# Patient Record
Sex: FEMALE | Race: WHITE | NOT HISPANIC OR LATINO | Employment: OTHER | URBAN - METROPOLITAN AREA
[De-identification: names, ages, dates, MRNs, and addresses within clinical notes are randomized per-mention and may not be internally consistent; named-entity substitution may affect disease eponyms.]

---

## 2017-01-17 ENCOUNTER — ALLSCRIPTS OFFICE VISIT (OUTPATIENT)
Dept: OTHER | Facility: OTHER | Age: 47
End: 2017-01-17

## 2017-03-07 ENCOUNTER — ALLSCRIPTS OFFICE VISIT (OUTPATIENT)
Dept: OTHER | Facility: OTHER | Age: 47
End: 2017-03-07

## 2017-03-26 ENCOUNTER — HOSPITAL ENCOUNTER (EMERGENCY)
Facility: HOSPITAL | Age: 47
Discharge: HOME | End: 2017-03-26
Attending: EMERGENCY MEDICINE | Admitting: EMERGENCY MEDICINE
Payer: COMMERCIAL

## 2017-03-26 ENCOUNTER — APPOINTMENT (EMERGENCY)
Dept: RADIOLOGY | Facility: HOSPITAL | Age: 47
End: 2017-03-26
Payer: COMMERCIAL

## 2017-03-26 VITALS
HEART RATE: 79 BPM | OXYGEN SATURATION: 100 % | RESPIRATION RATE: 20 BRPM | DIASTOLIC BLOOD PRESSURE: 68 MMHG | WEIGHT: 124 LBS | TEMPERATURE: 98.7 F | SYSTOLIC BLOOD PRESSURE: 102 MMHG

## 2017-03-26 DIAGNOSIS — R19.7 NAUSEA VOMITING AND DIARRHEA: ICD-10-CM

## 2017-03-26 DIAGNOSIS — R10.9 ABDOMINAL DISCOMFORT: ICD-10-CM

## 2017-03-26 DIAGNOSIS — Z98.84 H/O GASTRIC BYPASS: Primary | ICD-10-CM

## 2017-03-26 DIAGNOSIS — R11.2 NAUSEA VOMITING AND DIARRHEA: ICD-10-CM

## 2017-03-26 LAB
ALBUMIN SERPL BCP-MCNC: 3.1 G/DL (ref 3.5–5)
ALP SERPL-CCNC: 58 U/L (ref 46–116)
ALT SERPL W P-5'-P-CCNC: 27 U/L (ref 12–78)
ANION GAP SERPL CALCULATED.3IONS-SCNC: 9 MMOL/L (ref 4–13)
AST SERPL W P-5'-P-CCNC: 12 U/L (ref 5–45)
BACTERIA UR QL AUTO: NORMAL /HPF
BASOPHILS # BLD AUTO: 0.02 THOUSANDS/ΜL (ref 0–0.1)
BASOPHILS NFR BLD AUTO: 0 % (ref 0–1)
BILIRUB SERPL-MCNC: 0.38 MG/DL (ref 0.2–1)
BILIRUB UR QL STRIP: NEGATIVE
BUN SERPL-MCNC: 8 MG/DL (ref 5–25)
CALCIUM SERPL-MCNC: 8.6 MG/DL (ref 8.3–10.1)
CHLORIDE SERPL-SCNC: 107 MMOL/L (ref 100–108)
CLARITY UR: CLEAR
CO2 SERPL-SCNC: 27 MMOL/L (ref 21–32)
COLOR UR: YELLOW
CREAT SERPL-MCNC: 0.57 MG/DL (ref 0.6–1.3)
EOSINOPHIL # BLD AUTO: 0.07 THOUSAND/ΜL (ref 0–0.61)
EOSINOPHIL NFR BLD AUTO: 1 % (ref 0–6)
ERYTHROCYTE [DISTWIDTH] IN BLOOD BY AUTOMATED COUNT: 14.1 % (ref 11.6–15.1)
GFR SERPL CREATININE-BSD FRML MDRD: >60 ML/MIN/1.73SQ M
GLUCOSE SERPL-MCNC: 87 MG/DL (ref 65–140)
GLUCOSE UR STRIP-MCNC: NEGATIVE MG/DL
HCG UR QL: NEGATIVE
HCT VFR BLD AUTO: 35 % (ref 34.8–46.1)
HGB BLD-MCNC: 11.7 G/DL (ref 11.5–15.4)
HGB UR QL STRIP.AUTO: ABNORMAL
HYALINE CASTS #/AREA URNS LPF: NORMAL /LPF
KETONES UR STRIP-MCNC: NEGATIVE MG/DL
LEUKOCYTE ESTERASE UR QL STRIP: NEGATIVE
LIPASE SERPL-CCNC: 230 U/L (ref 73–393)
LYMPHOCYTES # BLD AUTO: 2 THOUSANDS/ΜL (ref 0.6–4.47)
LYMPHOCYTES NFR BLD AUTO: 34 % (ref 14–44)
MCH RBC QN AUTO: 30.8 PG (ref 26.8–34.3)
MCHC RBC AUTO-ENTMCNC: 33.4 G/DL (ref 31.4–37.4)
MCV RBC AUTO: 92 FL (ref 82–98)
MONOCYTES # BLD AUTO: 0.22 THOUSAND/ΜL (ref 0.17–1.22)
MONOCYTES NFR BLD AUTO: 4 % (ref 4–12)
NEUTROPHILS # BLD AUTO: 3.6 THOUSANDS/ΜL (ref 1.85–7.62)
NEUTS SEG NFR BLD AUTO: 61 % (ref 43–75)
NITRITE UR QL STRIP: NEGATIVE
NON-SQ EPI CELLS URNS QL MICRO: NORMAL /HPF
NRBC BLD AUTO-RTO: 0 /100 WBCS
PH UR STRIP.AUTO: 6 [PH] (ref 4.5–8)
PLATELET # BLD AUTO: 263 THOUSANDS/UL (ref 149–390)
PMV BLD AUTO: 8.9 FL (ref 8.9–12.7)
POTASSIUM SERPL-SCNC: 3.8 MMOL/L (ref 3.5–5.3)
PROT SERPL-MCNC: 6.3 G/DL (ref 6.4–8.2)
PROT UR STRIP-MCNC: NEGATIVE MG/DL
RBC # BLD AUTO: 3.8 MILLION/UL (ref 3.81–5.12)
RBC #/AREA URNS AUTO: NORMAL /HPF
SODIUM SERPL-SCNC: 143 MMOL/L (ref 136–145)
SP GR UR STRIP.AUTO: 1.01 (ref 1–1.03)
UROBILINOGEN UR QL STRIP.AUTO: 1 E.U./DL
WBC # BLD AUTO: 5.92 THOUSAND/UL (ref 4.31–10.16)
WBC #/AREA URNS AUTO: NORMAL /HPF

## 2017-03-26 PROCEDURE — 74177 CT ABD & PELVIS W/CONTRAST: CPT

## 2017-03-26 PROCEDURE — 87086 URINE CULTURE/COLONY COUNT: CPT

## 2017-03-26 PROCEDURE — 99284 EMERGENCY DEPT VISIT MOD MDM: CPT

## 2017-03-26 PROCEDURE — 36415 COLL VENOUS BLD VENIPUNCTURE: CPT | Performed by: EMERGENCY MEDICINE

## 2017-03-26 PROCEDURE — 96375 TX/PRO/DX INJ NEW DRUG ADDON: CPT

## 2017-03-26 PROCEDURE — 85025 COMPLETE CBC W/AUTO DIFF WBC: CPT | Performed by: EMERGENCY MEDICINE

## 2017-03-26 PROCEDURE — 81001 URINALYSIS AUTO W/SCOPE: CPT

## 2017-03-26 PROCEDURE — 96361 HYDRATE IV INFUSION ADD-ON: CPT

## 2017-03-26 PROCEDURE — 80053 COMPREHEN METABOLIC PANEL: CPT | Performed by: EMERGENCY MEDICINE

## 2017-03-26 PROCEDURE — 83690 ASSAY OF LIPASE: CPT | Performed by: EMERGENCY MEDICINE

## 2017-03-26 PROCEDURE — 81025 URINE PREGNANCY TEST: CPT | Performed by: EMERGENCY MEDICINE

## 2017-03-26 PROCEDURE — 96374 THER/PROPH/DIAG INJ IV PUSH: CPT

## 2017-03-26 RX ORDER — OXYCODONE HYDROCHLORIDE 5 MG/1
5 TABLET ORAL EVERY 4 HOURS PRN
Qty: 8 TABLET | Refills: 0 | Status: SHIPPED | OUTPATIENT
Start: 2017-03-26 | End: 2017-04-05

## 2017-03-26 RX ORDER — DICYCLOMINE HCL 20 MG
20 TABLET ORAL 2 TIMES DAILY
Qty: 60 TABLET | Refills: 0 | Status: SHIPPED | OUTPATIENT
Start: 2017-03-26 | End: 2020-02-27 | Stop reason: HOSPADM

## 2017-03-26 RX ORDER — ONDANSETRON 4 MG/1
4 TABLET, FILM COATED ORAL EVERY 6 HOURS
Qty: 30 TABLET | Refills: 0 | Status: SHIPPED | OUTPATIENT
Start: 2017-03-26 | End: 2019-03-29 | Stop reason: SDUPTHER

## 2017-03-26 RX ORDER — ONDANSETRON 2 MG/ML
4 INJECTION INTRAMUSCULAR; INTRAVENOUS ONCE
Status: COMPLETED | OUTPATIENT
Start: 2017-03-26 | End: 2017-03-26

## 2017-03-26 RX ADMIN — SODIUM CHLORIDE 1000 ML: 0.9 INJECTION, SOLUTION INTRAVENOUS at 12:18

## 2017-03-26 RX ADMIN — IOHEXOL 50 ML: 240 INJECTION, SOLUTION INTRATHECAL; INTRAVASCULAR; INTRAVENOUS; ORAL at 11:23

## 2017-03-26 RX ADMIN — ONDANSETRON 4 MG: 2 INJECTION INTRAMUSCULAR; INTRAVENOUS at 11:18

## 2017-03-26 RX ADMIN — IOHEXOL 100 ML: 350 INJECTION, SOLUTION INTRAVENOUS at 13:23

## 2017-03-26 RX ADMIN — HYDROMORPHONE HYDROCHLORIDE 0.5 MG: 1 INJECTION, SOLUTION INTRAMUSCULAR; INTRAVENOUS; SUBCUTANEOUS at 11:19

## 2017-03-28 LAB — BACTERIA UR CULT: NORMAL

## 2017-03-30 ENCOUNTER — ALLSCRIPTS OFFICE VISIT (OUTPATIENT)
Dept: OTHER | Facility: OTHER | Age: 47
End: 2017-03-30

## 2017-04-06 ENCOUNTER — ALLSCRIPTS OFFICE VISIT (OUTPATIENT)
Dept: OTHER | Facility: OTHER | Age: 47
End: 2017-04-06

## 2017-04-13 ENCOUNTER — ALLSCRIPTS OFFICE VISIT (OUTPATIENT)
Dept: OTHER | Facility: OTHER | Age: 47
End: 2017-04-13

## 2017-05-09 ENCOUNTER — ALLSCRIPTS OFFICE VISIT (OUTPATIENT)
Dept: OTHER | Facility: OTHER | Age: 47
End: 2017-05-09

## 2017-10-31 ENCOUNTER — ALLSCRIPTS OFFICE VISIT (OUTPATIENT)
Dept: OTHER | Facility: OTHER | Age: 47
End: 2017-10-31

## 2017-10-31 DIAGNOSIS — R53.83 OTHER FATIGUE: ICD-10-CM

## 2017-11-01 NOTE — PROGRESS NOTES
Assessment  1  Gastric ulcer (531 90) (K25 9)   2  GERD (gastroesophageal reflux disease) (530 81) (K21 9)   3  Fatigue (780 79) (R53 83)    Plan  Fatigue    · (1) CBC/PLT/DIFF; Status:Active; Requested for:31Oct2017;    · (1) COMPREHENSIVE METABOLIC PANEL; Status:Active; Requested for:31Oct2017;    · (1) LIPID PANEL, FASTING; Status:Active; Requested for:31Oct2017;    · (1) TSH; Status:Active; Requested for:31Oct2017;   GERD (gastroesophageal reflux disease)    · Pantoprazole Sodium 40 MG Oral Tablet Delayed Release (Protonix); TAKE 1  TABLET DAILY   · Sucralfate 1 GM Oral Tablet; TAKE 1 TABLET EVERY 12 HOURS DAILY   · *1 -  [facility]  *  Status: Active   Requested for: 85YMD6236  Care Summary provided  : Yes    Discussion/Summary  Discussion Summary:   Follow up in 2 weeks  Counseling Documentation With Imm: The patient was counseled regarding diagnostic results,-- instructions for management,-- risk factor reductions,-- prognosis,-- patient and family education,-- impressions,-- risks and benefits of treatment options,-- importance of compliance with treatment  Medication SE Review and Pt Understands Tx: The treatment plan was reviewed with the patient/guardian  The patient/guardian understands and agrees with the treatment plan      Chief Complaint  Chief Complaint Free Text Note Form: Complains of stomach pain      History of Present Illness  HPI: new pt with history of gastric bypass with numerous complications, pt has had esophageal strictures and ulcers and is having severe acid reflux, pt has stomach pain for over a year but worse over the last 2 weeks,      Review of Systems  Complete-Female:   Constitutional: no fever,-- no chills-- and-- no recent weight loss  Cardiovascular: no chest pain-- and-- no palpitations  Respiratory: no shortness of breath-- and-- no wheezing     Gastrointestinal: abdominal pain,-- nausea-- and-- vomiting, but-- no constipation,-- no diarrhea-- and-- no blood in stools  Genitourinary: no dysuria  Active Problems  1  Abnormal mammogram (793 80) (R92 8)   2  Asthma (493 90) (J45 909)   3  Atopic dermatitis (691 8) (L20 9)   4  Bronchitis, acute (466 0) (J20 9)   5  Burn of hand (944 00) (T23 009A)   6  Contact dermatitis (692 9) (L25 9)   7  Cough (786 2) (R05)   8  Depression screening (V79 0) (Z13 89)   9  Depression with anxiety (300 4) (F41 8)   10  Encounter for competency evaluation (V68 09) (Z02 79)   11  Encounter for screening mammogram for malignant neoplasm of breast (V76 12)    (Z12 31)   12  Fatigue (780 79) (R53 83)   13  History of Furuncles (680 9) (L02 92)   14  Gastric ulcer (531 90) (K25 9)   15  Herpes zoster (053 9) (B02 9)   16  Hypokalemia (276 8) (E87 6)   17  Insomnia (780 52) (G47 00)   18  Nausea with vomiting (787 01) (R11 2)   19  Nausea with vomiting (787 01) (R11 2)   20  Neck pain (723 1) (M54 2)   21  Nicotine dependence (305 1) (F17 200)   22  Numbness and tingling in right hand (782 0) (R20 0,R20 2)   23  Pain in joint of right wrist (719 43) (M25 531)   24  Periodontal abscess (523 31) (K05 219)   25  Pneumothorax, postprocedural (512 1) (J95 811)   26  Preoperative evaluation to rule out surgical contraindication (V72 83) (Z01 818)   27  Ready to quit smoking (V49 89) (F17 200)   28  Screening for deficiency anemia (V78 1) (Z13 0)   29  Screening for diabetes mellitus (V77 1) (Z13 1)   30  Screening for hyperlipidemia (V77 91) (Z13 220)   31  Tooth pain (525 9) (K08 89)   32  Upper respiratory tract infection (465 9) (J06 9)   33  Visit for screening (V82 9) (Z13 9)   34  Visit for screening mammogram (V76 12) (Z12 31)   35  Vitamin D deficiency (268 9) (E55 9)   36  Weakness of right hand (728 87) (R29 898)   37  Well female exam with routine gynecological exam (V72 31) (Z01 419)   38  Wheezing (786 07) (R06 2)    Past Medical History  1  History of Furuncles (680 9) (L02 92)   2   History of hypokalemia (V12 29) (Z86 39)    Surgical History  1  History of Dental Surgery   2  History of Gallbladder Surgery   3  History of Gastric Surgery For Morbid Obesity Gastric Bypass   4  History of Tubal Ligation (V25 2)    Family History  Mother    1  Family history of Diabetes (250 00) (E11 9)   2  Family history of Heart disease (429 9) (I51 9)  Father    3  Family history of Cancer (199 1) (C80 1)  Family History Reviewed: The family history was reviewed and updated today  Social History   · Caffeine use (V49 89) (F15 90)   · Current every day smoker (305 1) (F17 200)   · No alcohol use   · Ready to quit smoking (V49 89) (F17 200)   · Single   · Tobacco use (305 1) (Z72 0)  Social History Reviewed: The social history was reviewed and is unchanged  Current Meds   1  ARIPiprazole 5 MG Oral Tablet; TAKE 1 TABLET DAILY; Therapy: 78AVK1612 to (Evaluate:50Vot9924)  Requested for: 01NHL5724; Last   C27KFJ2820 Ordered   2  Carafate 1 GM/10ML Oral Suspension; TAKE 10 ML 3 TIMES DAILY; Therapy: 66Aui0834 to (Evaluate:88Ozg1822)  Requested for: 54QAH5337; Last   AI:59YXI7336 Ordered   3  Diclofenac Sodium 75 MG Oral Tablet Delayed Release; take 1 tablet by mouth twice a   day; Therapy: 92CJR2913 to (Evaluate:2017)  Requested for: 96QNV1227; Last   DP:57FXM6257 Ordered   4  DrRx Zithromax Z-Beltran 250 MG #6 pill pack; Two pills on day one followed by one pill po q   d til gone; Therapy: 59IZD5934 to (Last ANDREA:48TGC4634) Ordered   5  Ondansetron 4 MG Oral Tablet Disintegrating; Take one by mouth three times a day as   needed; Therapy: 41YES4375 to (Amaya Joyce)  Requested for: 72CZL7195; Last   Rx:99Xhz0830 Ordered   6  Pantoprazole Sodium 40 MG Oral Tablet Delayed Release; TAKE ONE (1) TABLET BY   MOUTH ONCE DAILY; Therapy: 94KDI4487 to (Last Rx:2017)  Requested for: 2017 Ordered   7   ProAir  (90 Base) MCG/ACT Inhalation Aerosol Solution; INHALE 1 TO 2 PUFFS   EVERY 4 TO 6 HOURS AS NEEDED; Therapy: 69AMY6767 to (Last Rx:09Mar2017)  Requested for: 26XBP1295 Ordered   8  Silver Sulfadiazine 1 % External Cream; APPLY  AND RUB  IN A THIN FILM TO AFFECTED   AREAS TWICE DAILY  (AM AND PM); Therapy: 86CYF9298 to (Last KB:38LHC9966)  Requested for: 99CCQ6554 Ordered   9  TraZODone HCl - 150 MG Oral Tablet; TAKE 1 TABLET AT BEDTIME; Therapy: 91PNE2663 to (Evaluate:57Dna3495)  Requested for: 56EDA6149; Last   Rx:93Tnl2457 Ordered   10  Venlafaxine HCl ER 75 MG Oral Capsule Extended Release 24 Hour; Take one capsule    daily for one week then increase to 2 caps daily; Therapy: 17CLU9353 to (Last ZU:08PNO8876)  Requested for: 92FLX9396 Ordered    Allergies  1  Morphine Sulfate TABS    Vitals  Vital Signs    Recorded: 09XRK5931 10:46AM   Temperature 48 3 F   Systolic 777   Diastolic 68   Height 5 ft 1 in   Weight 151 lb    BMI Calculated 28 53   BSA Calculated 1 68     Physical Exam    Constitutional   General appearance: No acute distress, well appearing and well nourished  well developed,-- appears healthy,-- well nourished-- and-- well hydrated  Ears, Nose, Mouth, and Throat   External inspection of ears and nose: Normal     Otoscopic examination: Tympanic membranes translucent with normal light reflex  Canals patent without erythema  Nasal mucosa, septum, and turbinates: Normal without edema or erythema  Oropharynx: Normal with no erythema, edema, exudate or lesions  Pulmonary   Respiratory effort: No increased work of breathing or signs of respiratory distress  Respiratory rate: normal  Assessment of respiratory effort revealed normal rhythm and effort  Auscultation of lungs: Clear to auscultation  no rales or crackles were heard bilaterally  no rhonchi  no friction rub  no wheezing  Cardiovascular   Auscultation of heart: Normal rate and rhythm, normal S1 and S2, without murmurs  The heart rate was normal  The rhythm was regular   Heart sounds: normal S1-- and-- normal S2  no murmurs were heard  Lymphatic   Palpation of lymph nodes in neck: No lymphadenopathy      Psychiatric   Mood and affect: Normal          Signatures   Electronically signed by : Clara Lee DO; Oct 31 2017 11:01AM EST                       (Author)

## 2017-11-21 ENCOUNTER — GENERIC CONVERSION - ENCOUNTER (OUTPATIENT)
Dept: OTHER | Facility: OTHER | Age: 47
End: 2017-11-21

## 2017-12-07 ENCOUNTER — GENERIC CONVERSION - ENCOUNTER (OUTPATIENT)
Dept: OTHER | Facility: OTHER | Age: 47
End: 2017-12-07

## 2018-01-09 NOTE — MISCELLANEOUS
Provider Comments  Provider Comments: You missed your appointment with Dr Clark Jensen on 04/13/2017  Please be advised this is your third no-show with our practice  Contact our office at [phone] to reschedule  Thank You,  [address] [place] [address] [facility]      Signatures   Electronically signed by : Jodee Howard DO;  Apr 14 2017  4:33PM EST                       (Author)

## 2018-01-10 NOTE — MISCELLANEOUS
Provider Comments  Provider Comments:   Marylou missed her scheduled appointment with Dr Kev Joseph on 3/30/2017  Our office attempted to contact her reschedule  A message was left on machine for patient to contact our office        Signatures   Electronically signed by : Yissel Ma DO; Mar 31 2017  4:46PM EST                       (Author)

## 2018-01-12 VITALS
RESPIRATION RATE: 16 BRPM | HEIGHT: 61 IN | BODY MASS INDEX: 25.32 KG/M2 | WEIGHT: 134.13 LBS | HEART RATE: 84 BPM | SYSTOLIC BLOOD PRESSURE: 120 MMHG | TEMPERATURE: 98.1 F | DIASTOLIC BLOOD PRESSURE: 80 MMHG

## 2018-01-13 VITALS
TEMPERATURE: 98.4 F | SYSTOLIC BLOOD PRESSURE: 110 MMHG | HEIGHT: 61 IN | DIASTOLIC BLOOD PRESSURE: 68 MMHG | WEIGHT: 151 LBS | BODY MASS INDEX: 28.51 KG/M2

## 2018-01-13 VITALS
RESPIRATION RATE: 16 BRPM | WEIGHT: 130.25 LBS | HEIGHT: 61 IN | SYSTOLIC BLOOD PRESSURE: 94 MMHG | HEART RATE: 60 BPM | DIASTOLIC BLOOD PRESSURE: 60 MMHG | TEMPERATURE: 98.2 F | BODY MASS INDEX: 24.59 KG/M2

## 2018-01-14 VITALS
BODY MASS INDEX: 25.72 KG/M2 | HEIGHT: 61 IN | DIASTOLIC BLOOD PRESSURE: 68 MMHG | TEMPERATURE: 98.2 F | WEIGHT: 136.25 LBS | RESPIRATION RATE: 16 BRPM | HEART RATE: 72 BPM | SYSTOLIC BLOOD PRESSURE: 112 MMHG

## 2018-01-15 NOTE — PROGRESS NOTES
Assessment    1  Insomnia (780 52) (G47 00)   2  Depression with anxiety (300 4) (F41 8)    Plan  Depression with anxiety    · RisperiDONE 0 5 MG Oral Tablet; TAKE (1) TABLET TWICE DAILY      · RisperiDONE 1 MG Oral Tablet; TAKE 1 TABLET EVERY EVENING  Depression with anxiety, Insomnia    · TraZODone HCl - 150 MG Oral Tablet; TAKE 1 TABLET AT BEDTIME  Neck pain    · Diclofenac Sodium 75 MG Oral Tablet Delayed Release; take 1 tablet by mouth twice a day  SocHx: Ready to quit smoking    · Nicotine 21 MG/24HR Transdermal Patch 24 Hour    Discussion/Summary  Discussion Summary:   Increase risperdal and then start trazodone for insomnia  Pt informed we will no longer rx tramadol given methamphetamine use  Will give anti-inflammatory in its place  RTO 1 month  Chief Complaint  Chief Complaint Free Text Note Form: pt here for follow up from [facility]  Placed on risperdone      History of Present Illness  HPI: Pt presents for follow up from mental health [facility]  She was placed on risperdal  She was sent to ED by police for attempting to stab herself after getting high off of methamphetamine  She is noting improvement since being started on risperdal  She is having some insomnia  Review of Systems  Complete-Female:   Constitutional: as noted in HPI  Eyes: No complaints of eye pain, no red eyes, no eyesight problems, no discharge, no dry eyes, no itching of eyes  ENT: no complaints of earache, no loss of hearing, no nose bleeds, no nasal discharge, no sore throat, no hoarseness  Cardiovascular: No complaints of slow heart rate, no fast heart rate, no chest pain, no palpitations, no leg claudication, no lower extremity edema  Respiratory: No complaints of shortness of breath, no wheezing, no cough, no SOB on exertion, no orthopnea, no PND  Gastrointestinal: No complaints of abdominal pain, no constipation, no nausea or vomiting, no diarrhea, no bloody stools     Musculoskeletal: No complaints of arthralgias, no myalgias, no joint swelling or stiffness, no limb pain or swelling  Integumentary: No complaints of skin rash or lesions, no itching, no skin wounds, no breast pain or lump  Psychiatric: Not suicidal, no sleep disturbance, no anxiety or depression, no change in personality, no emotional problems  ROS Reviewed:   ROS reviewed  Active Problems    1  Abdominal pain (789 00) (R10 9)   2  Abdominal pain, left upper quadrant (789 02) (R10 12)   3  Abdominal pain, LLQ (left lower quadrant) (789 04) (R10 32)   4  Abnormal mammogram (793 80) (R92 8)   5  Asthma (493 90) (J45 909)   6  Atopic dermatitis (691 8) (L20 9)   7  Bronchitis, acute (466 0) (J20 9)   8  Chest pain (786 50) (R07 9)   9  Contact dermatitis (692 9) (L25 9)   10  Cough (786 2) (R05)   11  Depression screening (V79 0) (Z13 89)   12  Depression with anxiety (300 4) (F41 8)   13  Encounter for competency evaluation (V68 09) (Z02 79)   14  Encounter for screening mammogram for malignant neoplasm of breast (V76 12) (Z12 31)   15  Fatigue (780 79) (R53 83)   16  Furuncles (680 9) (L02 92)   17  Gastric ulcer (531 90) (K25 9)   18  Herpes zoster (053 9) (B02 9)   19  Hypokalemia (276 8) (E87 6)   20  Impetigo (684) (L01 00)   21  Insomnia (780 52) (G47 00)   22  Nausea with vomiting (787 01) (R11 2)   23  Nausea with vomiting (787 01) (R11 2)   24  Neck pain (723 1) (M54 2)   25  Nicotine dependence (305 1) (F17 200)   26  Numbness and tingling in right hand (782 0) (R20 2)   27  Pain in joint of right wrist (719 43) (M25 531)   28  Periodontal abscess (523 31) (K05 219)   29  Pneumothorax, postprocedural (512 1) (J95 811)   30  Preoperative evaluation to rule out surgical contraindication (V72 83) (Z01 818)   31  Rash of face (782 1) (R21)   32  Ready to quit smoking (V49 89) (Z78 9)   33  Rib fractures (807 00) (S22 39XA)   34  Screening for deficiency anemia (V78 1) (Z13 0)   35   Screening for diabetes mellitus (V77 1) (Z13 1) 39  Screening for hyperlipidemia (V77 91) (Z13 220)   37  Skin rash (782 1) (R21)   38  Tooth pain (525 9) (K08 89)   39  Visit for screening (V82 9) (Z13 9)   40  Visit for screening mammogram (V76 12) (Z12 31)   41  Vitamin D deficiency (268 9) (E55 9)   42  Weakness of right hand (728 87) (M62 81)   43  Well female exam with routine gynecological exam (V72 31) (Z01 419)   44  Wheezing (786 07) (R06 2)    Past Medical History    1  History of hypokalemia (V12 29) (Z86 39)  Active Problems And Past Medical History Reviewed: The active problems and past medical history were reviewed and updated today  Surgical History    1  History of Dental Surgery   2  History of Gallbladder Surgery   3  History of Gastric Surgery For Morbid Obesity Gastric Bypass   4  History of Tubal Ligation (V25 2)  Surgical History Reviewed: The surgical history was reviewed and updated today  Family History  Mother    1  Family history of Diabetes (250 00) (E11 9)   2  Family history of Heart disease (429 9) (I51 9)  Father    3  Family history of Cancer (199 1) (C80 1)  Family History Reviewed: The family history was reviewed and updated today  Social History    · Caffeine use (V49 89) (F15 90)   · Current every day smoker (305 1) (F17 200)   · No alcohol use   · Ready to quit smoking (V49 89) (Z78 9)   · Single   · Tobacco use (305 1) (Z72 0)  Social History Reviewed: The social history was reviewed and updated today  The social history was reviewed and is unchanged  Current Meds   1  Breo Ellipta 200-25 MCG/INH Inhalation Aerosol Powder Breath Activated; Take one puff once daily; Therapy: 03JJE1854 to (Last Rx:90Hej9288)  Requested for: 22Cxy3187 Ordered   2  Carafate 1 GM/10ML Oral Suspension; TAKE 10 ML 3 TIMES DAILY; Therapy: 83Vjm0201 to (Rodriguez Petersen)  Requested for: 77Xsp2704; Last Rx:16Zmr6891   Ordered   3   Nicotine 21 MG/24HR Transdermal Patch 24 Hour; APPLY 1 PATCH DAILY AS DIRECTED; Therapy: 48EQV6114 to (Evaluate:14Mar2017)  Requested for: 01GHV3885; Last Rx:17Jan2017   Ordered   4  Ondansetron 4 MG Oral Tablet Dispersible; Take one by mouth three times a day as needed; Therapy: 38FKF2895 to ([id number])  Requested for: 48RXF6930; Last Rx:07Pxx8709   Ordered   5  Pantoprazole Sodium 40 MG Oral Tablet Delayed Release; Take 1 tablet daily; Therapy: 51GFB5802 to (Evaluate:70Zry4978)  Requested for: 48ZKG8449; Last Rx:17Jan2017   Ordered   6  ProAir  (90 Base) MCG/ACT Inhalation Aerosol Solution; INHALE 1 TO 2 PUFFS EVERY 4 TO   6 HOURS AS NEEDED; Therapy: 00ZRY7623 to (Last Rx:17Jan2017)  Requested for: 33ATQ9893 Ordered   7  ValACYclovir HCl - 1 GM Oral Tablet; TAKE 1 TABLET 3 TIMES DAILY; Therapy: 58Php5611 to (Evaluate:86Dxr3076)  Requested for: 40Fuu4848; Last Rx:84Ovz2837   Ordered  Medication List Reviewed: The medication list was reviewed and updated today  Allergies    1  Morphine Sulfate TABS    Vitals  Vital Signs    Recorded: 88FRL9453 03:07PM   Temperature 98 6 F   Heart Rate 68   Respiration 14   Systolic 155   Diastolic 64   Height 5 ft 1 in   Weight 128 lb 4 00 oz   BMI Calculated 24 23   BSA Calculated 1 56     Physical Exam    Constitutional   General appearance: No acute distress, well appearing and well nourished  Ears, Nose, Mouth, and Throat   External inspection of ears and nose: Normal     Otoscopic examination: Tympanic membranes translucent with normal light reflex  Canals patent without erythema  Nasal mucosa, septum, and turbinates: Normal without edema or erythema  Oropharynx: Normal with no erythema, edema, exudate or lesions  Pulmonary   Respiratory effort: No increased work of breathing or signs of respiratory distress  Auscultation of lungs: Clear to auscultation  Cardiovascular   Auscultation of heart: Normal rate and rhythm, normal S1 and S2, without murmurs      Examination of extremities for edema and/or varicosities: Normal     Carotid pulses: Normal     Musculoskeletal   Gait and station: Normal     Digits and nails: Normal without clubbing or cyanosis  Skin   Skin and subcutaneous tissue: Normal without rashes or lesions      Psychiatric   Orientation to person, place, and time: Normal     Mood and affect: Normal          Future Appointments    Date/Time Provider Specialty Site   04/06/2017 09:15 AM Mariangel Burgess [facility]     Signatures   Electronically signed by : Kirkwood Dandy, [address]; Mar  9 2017  3:48PM EST                       (Author)    Electronically signed by : LUIS ALBERTO Jackson ; Mar 10 2017  7:54AM EST                       (Author)

## 2018-01-22 VITALS
HEART RATE: 88 BPM | WEIGHT: 150 LBS | DIASTOLIC BLOOD PRESSURE: 70 MMHG | HEIGHT: 61 IN | OXYGEN SATURATION: 99 % | BODY MASS INDEX: 28.32 KG/M2 | TEMPERATURE: 98.4 F | SYSTOLIC BLOOD PRESSURE: 110 MMHG

## 2018-01-22 VITALS
TEMPERATURE: 98.6 F | RESPIRATION RATE: 14 BRPM | DIASTOLIC BLOOD PRESSURE: 64 MMHG | WEIGHT: 128.25 LBS | BODY MASS INDEX: 24.21 KG/M2 | HEART RATE: 68 BPM | SYSTOLIC BLOOD PRESSURE: 100 MMHG | HEIGHT: 61 IN

## 2018-01-24 VITALS
TEMPERATURE: 98.8 F | WEIGHT: 156 LBS | SYSTOLIC BLOOD PRESSURE: 110 MMHG | DIASTOLIC BLOOD PRESSURE: 70 MMHG | HEIGHT: 61 IN | BODY MASS INDEX: 29.45 KG/M2

## 2018-08-07 ENCOUNTER — TRANSCRIBE ORDERS (OUTPATIENT)
Dept: LAB | Facility: CLINIC | Age: 48
End: 2018-08-07

## 2018-08-07 ENCOUNTER — APPOINTMENT (OUTPATIENT)
Dept: LAB | Facility: CLINIC | Age: 48
End: 2018-08-07
Payer: COMMERCIAL

## 2018-08-07 DIAGNOSIS — R11.2 NAUSEA AND VOMITING, INTRACTABILITY OF VOMITING NOT SPECIFIED, UNSPECIFIED VOMITING TYPE: Primary | ICD-10-CM

## 2018-08-07 DIAGNOSIS — R11.2 NAUSEA AND VOMITING, INTRACTABILITY OF VOMITING NOT SPECIFIED, UNSPECIFIED VOMITING TYPE: ICD-10-CM

## 2018-08-07 LAB
ALBUMIN SERPL BCP-MCNC: 3.3 G/DL (ref 3.5–5)
ALP SERPL-CCNC: 78 U/L (ref 46–116)
ALT SERPL W P-5'-P-CCNC: 17 U/L (ref 12–78)
ANION GAP SERPL CALCULATED.3IONS-SCNC: 6 MMOL/L (ref 4–13)
AST SERPL W P-5'-P-CCNC: 14 U/L (ref 5–45)
BASOPHILS # BLD AUTO: 0.02 THOUSANDS/ΜL (ref 0–0.1)
BASOPHILS NFR BLD AUTO: 0 % (ref 0–1)
BILIRUB SERPL-MCNC: 0.9 MG/DL (ref 0.2–1)
BUN SERPL-MCNC: 12 MG/DL (ref 5–25)
CALCIUM SERPL-MCNC: 9 MG/DL (ref 8.3–10.1)
CHLORIDE SERPL-SCNC: 107 MMOL/L (ref 100–108)
CO2 SERPL-SCNC: 27 MMOL/L (ref 21–32)
CREAT SERPL-MCNC: 0.66 MG/DL (ref 0.6–1.3)
EOSINOPHIL # BLD AUTO: 0.04 THOUSAND/ΜL (ref 0–0.61)
EOSINOPHIL NFR BLD AUTO: 1 % (ref 0–6)
ERYTHROCYTE [DISTWIDTH] IN BLOOD BY AUTOMATED COUNT: 16.5 % (ref 11.6–15.1)
GFR SERPL CREATININE-BSD FRML MDRD: 106 ML/MIN/1.73SQ M
GLUCOSE P FAST SERPL-MCNC: 84 MG/DL (ref 65–99)
HCT VFR BLD AUTO: 40.2 % (ref 34.8–46.1)
HGB BLD-MCNC: 12.7 G/DL (ref 11.5–15.4)
IMM GRANULOCYTES # BLD AUTO: 0.01 THOUSAND/UL (ref 0–0.2)
IMM GRANULOCYTES NFR BLD AUTO: 0 % (ref 0–2)
LYMPHOCYTES # BLD AUTO: 2.25 THOUSANDS/ΜL (ref 0.6–4.47)
LYMPHOCYTES NFR BLD AUTO: 43 % (ref 14–44)
MCH RBC QN AUTO: 27.9 PG (ref 26.8–34.3)
MCHC RBC AUTO-ENTMCNC: 31.6 G/DL (ref 31.4–37.4)
MCV RBC AUTO: 88 FL (ref 82–98)
MONOCYTES # BLD AUTO: 0.28 THOUSAND/ΜL (ref 0.17–1.22)
MONOCYTES NFR BLD AUTO: 5 % (ref 4–12)
NEUTROPHILS # BLD AUTO: 2.59 THOUSANDS/ΜL (ref 1.85–7.62)
NEUTS SEG NFR BLD AUTO: 51 % (ref 43–75)
NRBC BLD AUTO-RTO: 0 /100 WBCS
PLATELET # BLD AUTO: 233 THOUSANDS/UL (ref 149–390)
PMV BLD AUTO: 10.6 FL (ref 8.9–12.7)
POTASSIUM SERPL-SCNC: 3.7 MMOL/L (ref 3.5–5.3)
PROT SERPL-MCNC: 7.1 G/DL (ref 6.4–8.2)
RBC # BLD AUTO: 4.56 MILLION/UL (ref 3.81–5.12)
SODIUM SERPL-SCNC: 140 MMOL/L (ref 136–145)
WBC # BLD AUTO: 5.19 THOUSAND/UL (ref 4.31–10.16)

## 2018-08-07 PROCEDURE — 80053 COMPREHEN METABOLIC PANEL: CPT

## 2018-08-07 PROCEDURE — 85025 COMPLETE CBC W/AUTO DIFF WBC: CPT

## 2018-08-07 PROCEDURE — 36415 COLL VENOUS BLD VENIPUNCTURE: CPT

## 2019-03-27 ENCOUNTER — TRANSITIONAL CARE MANAGEMENT (OUTPATIENT)
Dept: FAMILY MEDICINE CLINIC | Facility: CLINIC | Age: 49
End: 2019-03-27

## 2019-03-27 ENCOUNTER — OFFICE VISIT (OUTPATIENT)
Dept: FAMILY MEDICINE CLINIC | Facility: CLINIC | Age: 49
End: 2019-03-27
Payer: COMMERCIAL

## 2019-03-27 VITALS
HEART RATE: 75 BPM | SYSTOLIC BLOOD PRESSURE: 102 MMHG | WEIGHT: 131 LBS | DIASTOLIC BLOOD PRESSURE: 60 MMHG | HEIGHT: 60 IN | OXYGEN SATURATION: 97 % | TEMPERATURE: 97.6 F | RESPIRATION RATE: 16 BRPM | BODY MASS INDEX: 25.72 KG/M2

## 2019-03-27 DIAGNOSIS — Z79.899 USES NEBULIZER AND INHALER AT HOME: ICD-10-CM

## 2019-03-27 DIAGNOSIS — R11.2 INTRACTABLE VOMITING WITH NAUSEA, UNSPECIFIED VOMITING TYPE: ICD-10-CM

## 2019-03-27 DIAGNOSIS — K92.0 COFFEE GROUND EMESIS: ICD-10-CM

## 2019-03-27 DIAGNOSIS — Z72.0 CURRENTLY ATTEMPTING TO QUIT SMOKING: ICD-10-CM

## 2019-03-27 DIAGNOSIS — L23.1 CONTACT DERMATITIS DUE TO ADHESIVES, UNSPECIFIED CONTACT DERMATITIS TYPE: ICD-10-CM

## 2019-03-27 DIAGNOSIS — K91.89 GASTROJEJUNAL ANASTOMOTIC STRICTURE: ICD-10-CM

## 2019-03-27 DIAGNOSIS — K21.9 GASTROESOPHAGEAL REFLUX DISEASE, ESOPHAGITIS PRESENCE NOT SPECIFIED: ICD-10-CM

## 2019-03-27 DIAGNOSIS — Z76.89 ENCOUNTER TO ESTABLISH CARE WITH NEW DOCTOR: Primary | ICD-10-CM

## 2019-03-27 DIAGNOSIS — Z98.84 S/P GASTRIC BYPASS: ICD-10-CM

## 2019-03-27 DIAGNOSIS — IMO0001 TRANSITION OF CARE PERFORMED WITH SHARING OF CLINICAL SUMMARY: ICD-10-CM

## 2019-03-27 DIAGNOSIS — R10.9 ABDOMINAL PAIN, UNSPECIFIED ABDOMINAL LOCATION: ICD-10-CM

## 2019-03-27 DIAGNOSIS — G47.00 INSOMNIA, UNSPECIFIED TYPE: ICD-10-CM

## 2019-03-27 PROBLEM — Z91.19 HISTORY OF NONCOMPLIANCE WITH MEDICAL TREATMENT: Status: ACTIVE | Noted: 2019-03-27

## 2019-03-27 PROBLEM — Z78.9 TRANSITION OF CARE PERFORMED WITH SHARING OF CLINICAL SUMMARY: Status: ACTIVE | Noted: 2019-03-27

## 2019-03-27 PROBLEM — Z91.199 HISTORY OF NONCOMPLIANCE WITH MEDICAL TREATMENT: Status: ACTIVE | Noted: 2019-03-27

## 2019-03-27 PROCEDURE — 99496 TRANSJ CARE MGMT HIGH F2F 7D: CPT | Performed by: FAMILY MEDICINE

## 2019-03-27 RX ORDER — PANTOPRAZOLE SODIUM 40 MG/1
40 TABLET, DELAYED RELEASE ORAL
COMMUNITY
Start: 2017-10-31 | End: 2020-10-15 | Stop reason: SDUPTHER

## 2019-03-27 RX ORDER — FLUTICASONE FUROATE AND VILANTEROL 200; 25 UG/1; UG/1
1 POWDER RESPIRATORY (INHALATION) DAILY
COMMUNITY
Start: 2016-12-13 | End: 2019-03-29 | Stop reason: SDUPTHER

## 2019-03-27 RX ORDER — CHOLECALCIFEROL (VITAMIN D3) 1250 MCG
1 CAPSULE ORAL DAILY
COMMUNITY
End: 2022-04-05

## 2019-03-27 RX ORDER — TRAMADOL HYDROCHLORIDE 50 MG/1
50 TABLET ORAL EVERY 6 HOURS PRN
Qty: 30 TABLET | Refills: 0 | Status: SHIPPED | OUTPATIENT
Start: 2019-03-27 | End: 2020-02-27 | Stop reason: HOSPADM

## 2019-03-27 RX ORDER — HYDROXYZINE HYDROCHLORIDE 25 MG/1
25 TABLET, FILM COATED ORAL EVERY 6 HOURS PRN
Qty: 30 TABLET | Refills: 0 | Status: SHIPPED | OUTPATIENT
Start: 2019-03-27 | End: 2019-03-27 | Stop reason: SDUPTHER

## 2019-03-27 RX ORDER — SUCRALFATE 1 G/10ML
SUSPENSION ORAL
COMMUNITY
Start: 2019-03-27 | End: 2020-10-15 | Stop reason: SDUPTHER

## 2019-03-27 RX ORDER — HYDROXYZINE HYDROCHLORIDE 25 MG/1
25 TABLET, FILM COATED ORAL EVERY 6 HOURS PRN
Qty: 30 TABLET | Refills: 0 | Status: SHIPPED | OUTPATIENT
Start: 2019-03-27 | End: 2020-02-27 | Stop reason: HOSPADM

## 2019-03-27 RX ORDER — TRIAMCINOLONE ACETONIDE 1 MG/G
CREAM TOPICAL 2 TIMES DAILY
Qty: 30 G | Refills: 0 | Status: SHIPPED | OUTPATIENT
Start: 2019-03-27 | End: 2020-02-27 | Stop reason: HOSPADM

## 2019-03-27 RX ORDER — TRAMADOL HYDROCHLORIDE 50 MG/1
50 TABLET ORAL EVERY 6 HOURS PRN
COMMUNITY
Start: 2017-12-07 | End: 2019-03-27 | Stop reason: SDUPTHER

## 2019-03-27 RX ORDER — ALBUTEROL SULFATE 90 UG/1
1-2 AEROSOL, METERED RESPIRATORY (INHALATION)
COMMUNITY
Start: 2015-07-23 | End: 2019-03-29 | Stop reason: SDUPTHER

## 2019-03-27 NOTE — PROGRESS NOTES
Assessment/Plan:        Diagnoses and all orders for this visit:    Encounter to establish care with new doctor    Transition of care performed with sharing of clinical summary    Coffee ground emesis  Comments:  resolved prior to d/c, EGD in hosp no bleed, cont meds and f/u with surgeon as had been advised at d/c    Intractable vomiting with nausea, unspecified vomiting type  Comments:  dx at admission, tolerated PO in hosp and continues improving/ tolerating more usual diet, cpm and f/u with surg    Abdominal pain, unspecified abdominal location  Comments:  improving/resolving, cpm  Orders:  -     traMADol (ULTRAM) 50 mg tablet; Take 1 tablet (50 mg total) by mouth every 6 (six) hours as needed for moderate pain or severe pain May take up to 2 tablets every 6 hours as needed for pain    Gastrojejunal anastomotic stricture    Gastroesophageal reflux disease, esophagitis presence not specified  -     traMADol (ULTRAM) 50 mg tablet; Take 1 tablet (50 mg total) by mouth every 6 (six) hours as needed for moderate pain or severe pain May take up to 2 tablets every 6 hours as needed for pain    Insomnia, unspecified type  Comments:  trial hydroxyzine prn  Orders:  -     Discontinue: hydrOXYzine HCL (ATARAX) 25 mg tablet; Take 1 tablet (25 mg total) by mouth every 6 (six) hours as needed for itching  -     hydrOXYzine HCL (ATARAX) 25 mg tablet; Take 1 tablet (25 mg total) by mouth every 6 (six) hours as needed (insomnia)    S/P gastric bypass  -     traMADol (ULTRAM) 50 mg tablet;  Take 1 tablet (50 mg total) by mouth every 6 (six) hours as needed for moderate pain or severe pain May take up to 2 tablets every 6 hours as needed for pain    Currently attempting to quit smoking  Comments:  cpm, good work so far!!    Contact dermatitis due to adhesives, unspecified contact dermatitis type  -     triamcinolone (KENALOG) 0 1 % cream; Apply topically 2 (two) times a day    Uses nebulizer and inhaler at home  Comments:  uses inhaler PRN, not nebulizer           Subjective:  Chief Complaint   Patient presents with    Transition of Care Management    Establish Care        Patient ID: Meeta Mason is a 53 y o  female  Pt is new to our office  she is scheduled today as SIMONE, just discharged yesterday  Per c/c, SIMONE and hospital notes reviewed by me  Has not yet scheduled the f/u appt with surgeon as directed at d/c  Used to see [address] Humera Bernard, but not since 2017, pt states saw Dr Julia Calvillo in [facility] [place] [facility] since then  Reports, "Down to 10 cigarettes a day, on the patch"  C/o the tramadol rx'd by hospital at d/c not helping and she would like something to help her sleep  "Thankful ate some fish and kept it down last night"    3/23/2019  [facility]  Admission Date: 3/23/2019   Discharge Date: 3/26/2019  Hospital Course  This is a brief description of the patient's hospital stay; please refer to medical chart for further details  Patient is a 54 y/o female w/ PMH gerd, gastric bypass 2013 w/ Dr Bertin Nicholas at [facility], h/o subsequent strictures requiring adhesiolysis who presents w/ intractable N/V  CXR unremarkable  CT A/P only sig for some nonspecific mucosal thickening of anatomic gastric antrum of bypassed portion of the stomach  Patient states since initial bypass surgery has had chronic nausea  Last saw surgeon and had scope about a year ago w/ noted ulcers and strictures  Was supposed to have surgery for stricture but ultimately did not go through with the procedure  Patient states can not keep any fluids/foods down  Vomits 5-6 x/day  Notes coffee ground appearing emesis  Chronic nausea  Notes burning/crampy epigastric pain  No melena  No BRBPR  Patient had CT scan of the abdomen and pelvis on March 23 which showed no acute abdominal or pelvic pathology  There is no anterograde bowel obstruction  There was evidence of previous gastric bypass  Patient was seen by general surgery/bariatric surgery and upper GI was ordered   Patient to undergo EGD today which showed moderately enlarged pouch, GJ stricture which was able to accommodate scope, had to be balloon dilated, no ulcer seen  General surgery recommended continuation of PPI and carafate along with smoking cessation  This was stressed to the pt post-procedure  She was placed on a full liquid diet  She was placed on vitamin supplementation by general surgery  She otherwise remained stable for discharge home at this time  hgb remained stable, 10 2  No further episodes of emesis  She was discharged home with strict dietary recommendations, smoking cessation, nicotine patch, and follow up with general surgery office  She was also recommended to follow up with her PCP, which she states she has an appt with him tomorrow, 3/27  Admission Diagnoses   Generalized abdominal pain (R10 84)  Coffee ground emesis (K92 0)  Intractable vomiting with nausea, unspecified vomiting type (R11 2)  Discharge Diagnoses  Principal Problem:  Intractable nausea and vomiting  Active Problems:  Esophageal reflux  History of gastric bypass 2936 complicated by strictures requiring adhesiolysis   Coffee ground emesis   Medication List   START taking these medications   acetaminophen 500 MG tablet  Commonly known as: TYLENOL  Take 2 tablets (1,000 mg total) by mouth every 8 (eight) hours as needed for mild pain (pain score 1-3), headaches or fever  calcium carbonate-vitamin D 250-125 mg-unit Tab  Commonly known as: OS-FLACA D  Take 1 tablet by mouth 3 (three) times a day with meals  ergocalciferol 50,000 unit capsule  Commonly known as: DRISDOL (VITAMIN D-2)  Take 1 capsule (50,000 Units total) by mouth once a week  nicotine 14 mg/24 hr  Commonly known as: NICODERM CQ  Place 1 patch on the skin daily  pantoprazole 40 MG tablet  Commonly known as: PROTONIX  Take 1 tablet (40 mg total) by mouth 2 (two) times a day before meals    sucralfate 100 mg/mL suspension  Commonly known as: CARAFATE  Take 10 mL (1 g total) by mouth 4 (four) times a day before meals and at bedtime  traMADol 50 mg tablet  Commonly known as: ULTRAM  Take 1 tablet (50 mg total) by mouth every 6 (six) hours as needed for moderate pain (pain score 4-6)  12/7/2017  [address] [facility]  Assessment   1  GERD (gastroesophageal reflux disease) (530 81) (K21 9)   2  Abdominal pain (789 00) (R10 9)   Plan  Abdominal pain    · Start: TraMADol HCl - 50 MG Oral Tablet; TAKE 1 TO 2 TABLETS EVERY 4 TO 6 HOURSAS NEEDED FOR PAIN   Discussion/Summary   Follow up in 4 weeks  The patient was counseled regarding diagnostic results,-- instructions for management,-- risk factor reductions,-- prognosis,-- patient and family education,-- impressions,-- risks and benefits of treatment options,-- importance of compliance with treatment  Possible side effects of new medications were reviewed with the patient/guardian today  The treatment plan was reviewed with the patient/guardian  The patient/guardian understands and agrees with the treatment plan    Chief Complaint  pt complains of nauseasurgeon on 12/11keep food down    History of Present Illness  follow up for abdominal pain , pt failed to see the gastroenterologist, pt complains of epigastric abdominal pain radiating to the back, pt is taking protonix, and carafate neither of which is helping, pt is scheduled with gastro on the 11th           Review of Systems   Constitutional: Positive for activity change, appetite change and fatigue  Negative for chills, diaphoresis, fever and unexpected weight change  HENT: Negative for mouth sores, nosebleeds, sore throat, trouble swallowing and voice change  Eyes: Negative  Respiratory: Negative for apnea, cough, choking, chest tightness, shortness of breath, wheezing and stridor  Cardiovascular: Positive for leg swelling (slight puffiness in legs from the IVF per pt, getting better)  Negative for chest pain and palpitations     Gastrointestinal: Negative for anal bleeding, blood in stool, constipation, diarrhea and rectal pain  Per hpi   Endocrine: Negative  Genitourinary: Negative for decreased urine volume, difficulty urinating, dysuria, flank pain, frequency, hematuria and urgency  Musculoskeletal: Negative for gait problem  Skin: Negative  Neurological: Negative  Hematological: Negative  Objective:     Physical Exam   Constitutional: She is oriented to person, place, and time  She appears well-developed  She is cooperative  Non-toxic appearance  No distress  Appears chronically ill, older than stated age   HENT:   Head: Normocephalic and atraumatic  Mouth/Throat: Uvula is midline, oropharynx is clear and moist and mucous membranes are normal    Eyes: Pupils are equal, round, and reactive to light  Conjunctivae and lids are normal  No scleral icterus  Neck: Trachea normal  Neck supple  No JVD present  No thyroid mass and no thyromegaly present  Cardiovascular: Normal rate, regular rhythm, normal heart sounds and normal pulses  No edema   Pulmonary/Chest: Effort normal  No stridor  She has decreased breath sounds in the right lower field and the left lower field  She has no wheezes  She has no rhonchi  She has no rales  Abdominal: Soft  Bowel sounds are normal  She exhibits no distension and no mass  There is no hepatosplenomegaly  There is generalized tenderness (mild)  There is no rigidity, no rebound, no guarding, no CVA tenderness, no tenderness at McBurney's point and negative Khan's sign  No hernia  Lymphadenopathy:     She has no cervical adenopathy  Right: No supraclavicular adenopathy present  Left: No supraclavicular adenopathy present  Neurological: She is alert and oriented to person, place, and time  She has normal reflexes  Gait normal    Skin: Skin is warm and dry  She is not diaphoretic  No cyanosis  No pallor          No jaundice  Contact derm patches b/l antecub prior IV sites Psychiatric: Her speech is normal and behavior is normal  Her mood appears anxious (mildly)  Her affect is not angry, not blunt, not labile and not inappropriate  She does not exhibit a depressed mood  Nursing note and vitals reviewed  Vitals:    03/27/19 1240   BP: 102/60   Pulse: 75   Resp: 16   Temp: 97 6 °F (36 4 °C)   SpO2: 97%   Weight: 59 4 kg (131 lb)   Height: 5' (1 524 m)       Transitional Care Management Review:  Neetu Hector is a 53 y o  female here for TCM follow up  During the TCM phone call patient stated:    TCM Call (since 2/27/2019)     Date and time call was made  3/27/2019 12:35 PM    Hospital care reviewed  Records reviewed    Patient was hospitialized at  [facility]    Date of Admission  03/23/19    Date of discharge  03/26/19    Diagnosis  Abdominal Pain    Disposition  Home    Were the patients medications reviewed and updated  No    Current Symptoms  None      TCM Call (since 2/27/2019)     Post hospital issues  None    Should patient be enrolled in anticoag monitoring? No    Scheduled for follow up?   Yes    I have advised the patient to call PCP with any new or worsening symptoms  Tommy Almanzar MA          [employer], DO

## 2019-03-29 DIAGNOSIS — Z79.899 USES NEBULIZER AND INHALER AT HOME: Primary | ICD-10-CM

## 2019-03-29 DIAGNOSIS — F17.200 TOBACCO USE DISORDER: ICD-10-CM

## 2019-03-29 DIAGNOSIS — R11.2 INTRACTABLE VOMITING WITH NAUSEA, UNSPECIFIED VOMITING TYPE: ICD-10-CM

## 2019-03-30 PROBLEM — Z79.899 USES NEBULIZER AND INHALER AT HOME: Status: ACTIVE | Noted: 2019-03-30

## 2019-03-30 PROBLEM — K92.0 COFFEE GROUND EMESIS: Status: ACTIVE | Noted: 2019-03-30

## 2019-03-30 PROBLEM — K91.89 GASTROJEJUNAL ANASTOMOTIC STRICTURE: Status: ACTIVE | Noted: 2019-03-30

## 2019-03-30 PROBLEM — L23.1 CONTACT DERMATITIS DUE TO ADHESIVES: Status: ACTIVE | Noted: 2019-03-30

## 2019-03-31 RX ORDER — ALBUTEROL SULFATE 90 UG/1
1-2 AEROSOL, METERED RESPIRATORY (INHALATION) 2 TIMES DAILY PRN
Qty: 1 INHALER | Refills: 0 | Status: SHIPPED | OUTPATIENT
Start: 2019-03-31 | End: 2020-10-15

## 2019-03-31 RX ORDER — FLUTICASONE FUROATE AND VILANTEROL 200; 25 UG/1; UG/1
1 POWDER RESPIRATORY (INHALATION) DAILY
Qty: 1 INHALER | Refills: 0 | Status: SHIPPED | OUTPATIENT
Start: 2019-03-31 | End: 2020-10-15

## 2019-03-31 RX ORDER — ONDANSETRON 4 MG/1
4 TABLET, FILM COATED ORAL EVERY 8 HOURS PRN
Qty: 30 TABLET | Refills: 0 | Status: SHIPPED | OUTPATIENT
Start: 2019-03-31 | End: 2020-02-27 | Stop reason: HOSPADM

## 2019-04-01 ENCOUNTER — TELEPHONE (OUTPATIENT)
Dept: FAMILY MEDICINE CLINIC | Facility: CLINIC | Age: 49
End: 2019-04-01

## 2019-04-03 ENCOUNTER — TELEPHONE (OUTPATIENT)
Dept: FAMILY MEDICINE CLINIC | Facility: CLINIC | Age: 49
End: 2019-04-03

## 2019-04-04 DIAGNOSIS — G47.00 INSOMNIA, UNSPECIFIED TYPE: Primary | ICD-10-CM

## 2019-04-04 RX ORDER — TRAZODONE HYDROCHLORIDE 50 MG/1
50 TABLET ORAL
Qty: 30 TABLET | Refills: 0 | Status: SHIPPED | OUTPATIENT
Start: 2019-04-04 | End: 2020-10-15

## 2019-04-10 ENCOUNTER — TELEPHONE (OUTPATIENT)
Dept: FAMILY MEDICINE CLINIC | Facility: CLINIC | Age: 49
End: 2019-04-10

## 2019-05-15 ENCOUNTER — TELEPHONE (OUTPATIENT)
Dept: FAMILY MEDICINE CLINIC | Facility: CLINIC | Age: 49
End: 2019-05-15

## 2019-11-01 ENCOUNTER — TELEPHONE (OUTPATIENT)
Dept: FAMILY MEDICINE CLINIC | Facility: CLINIC | Age: 49
End: 2019-11-01

## 2020-02-24 ENCOUNTER — APPOINTMENT (EMERGENCY)
Dept: CT IMAGING | Facility: HOSPITAL | Age: 50
DRG: 249 | End: 2020-02-24
Payer: COMMERCIAL

## 2020-02-24 ENCOUNTER — HOSPITAL ENCOUNTER (INPATIENT)
Facility: HOSPITAL | Age: 50
LOS: 1 days | Discharge: HOME | DRG: 249 | End: 2020-02-27
Attending: EMERGENCY MEDICINE | Admitting: INTERNAL MEDICINE
Payer: COMMERCIAL

## 2020-02-24 DIAGNOSIS — R10.9 ABDOMINAL PAIN: Primary | ICD-10-CM

## 2020-02-24 DIAGNOSIS — K52.9 ENTERITIS: ICD-10-CM

## 2020-02-24 LAB
ABO GROUP BLD: NORMAL
ALBUMIN SERPL BCP-MCNC: 4.3 G/DL (ref 3.5–5.7)
ALP SERPL-CCNC: 72 U/L (ref 40–150)
ALT SERPL W P-5'-P-CCNC: 25 U/L (ref 7–52)
ANION GAP SERPL CALCULATED.3IONS-SCNC: 7 MMOL/L (ref 4–13)
APTT PPP: 37 SECONDS (ref 23–37)
AST SERPL W P-5'-P-CCNC: 28 U/L (ref 13–39)
BASOPHILS # BLD AUTO: 0 THOUSANDS/ΜL (ref 0–0.1)
BASOPHILS NFR BLD AUTO: 0 % (ref 0–2)
BILIRUB SERPL-MCNC: 0.5 MG/DL (ref 0.2–1)
BUN SERPL-MCNC: 9 MG/DL (ref 7–25)
CALCIUM SERPL-MCNC: 9.6 MG/DL (ref 8.6–10.5)
CHLORIDE SERPL-SCNC: 106 MMOL/L (ref 98–107)
CO2 SERPL-SCNC: 27 MMOL/L (ref 21–31)
CREAT SERPL-MCNC: 0.72 MG/DL (ref 0.6–1.2)
EOSINOPHIL # BLD AUTO: 0.1 THOUSAND/ΜL (ref 0–0.61)
EOSINOPHIL NFR BLD AUTO: 1 % (ref 0–5)
ERYTHROCYTE [DISTWIDTH] IN BLOOD BY AUTOMATED COUNT: 16.8 % (ref 11.5–14.5)
GFR SERPL CREATININE-BSD FRML MDRD: 99 ML/MIN/1.73SQ M
GLUCOSE SERPL-MCNC: 107 MG/DL (ref 65–99)
HCG SERPL QL: NEGATIVE
HCT VFR BLD AUTO: 42.7 % (ref 42–47)
HGB BLD-MCNC: 13.4 G/DL (ref 12–16)
INR PPP: 1.03 (ref 0.9–1.5)
LACTATE SERPL-SCNC: 1.1 MMOL/L (ref 0.5–2)
LIPASE SERPL-CCNC: 63 U/L (ref 11–82)
LYMPHOCYTES # BLD AUTO: 2.4 THOUSANDS/ΜL (ref 0.6–4.47)
LYMPHOCYTES NFR BLD AUTO: 48 % (ref 21–51)
MCH RBC QN AUTO: 26.6 PG (ref 26–34)
MCHC RBC AUTO-ENTMCNC: 31.4 G/DL (ref 31–37)
MCV RBC AUTO: 85 FL (ref 81–99)
MONOCYTES # BLD AUTO: 0.2 THOUSAND/ΜL (ref 0.17–1.22)
MONOCYTES NFR BLD AUTO: 3 % (ref 2–12)
NEUTROPHILS # BLD AUTO: 2.3 THOUSANDS/ΜL (ref 1.4–6.5)
NEUTS SEG NFR BLD AUTO: 47 % (ref 42–75)
PLATELET # BLD AUTO: 330 THOUSANDS/UL (ref 149–390)
PMV BLD AUTO: 7.3 FL (ref 8.6–11.7)
POTASSIUM SERPL-SCNC: 3.4 MMOL/L (ref 3.5–5.5)
PROT SERPL-MCNC: 7.5 G/DL (ref 6.4–8.9)
PROTHROMBIN TIME: 11.9 SECONDS (ref 10.2–13)
RBC # BLD AUTO: 5.02 MILLION/UL (ref 3.9–5.2)
RH BLD: POSITIVE
SODIUM SERPL-SCNC: 140 MMOL/L (ref 134–143)
TROPONIN I SERPL-MCNC: <0.03 NG/ML
WBC # BLD AUTO: 5 THOUSAND/UL (ref 4.8–10.8)

## 2020-02-24 PROCEDURE — 93005 ELECTROCARDIOGRAM TRACING: CPT

## 2020-02-24 PROCEDURE — 85610 PROTHROMBIN TIME: CPT | Performed by: EMERGENCY MEDICINE

## 2020-02-24 PROCEDURE — 86850 RBC ANTIBODY SCREEN: CPT | Performed by: EMERGENCY MEDICINE

## 2020-02-24 PROCEDURE — 96375 TX/PRO/DX INJ NEW DRUG ADDON: CPT

## 2020-02-24 PROCEDURE — 84484 ASSAY OF TROPONIN QUANT: CPT | Performed by: EMERGENCY MEDICINE

## 2020-02-24 PROCEDURE — 74174 CTA ABD&PLVS W/CONTRAST: CPT

## 2020-02-24 PROCEDURE — 84703 CHORIONIC GONADOTROPIN ASSAY: CPT | Performed by: EMERGENCY MEDICINE

## 2020-02-24 PROCEDURE — 80053 COMPREHEN METABOLIC PANEL: CPT | Performed by: EMERGENCY MEDICINE

## 2020-02-24 PROCEDURE — 36415 COLL VENOUS BLD VENIPUNCTURE: CPT | Performed by: EMERGENCY MEDICINE

## 2020-02-24 PROCEDURE — 83605 ASSAY OF LACTIC ACID: CPT | Performed by: EMERGENCY MEDICINE

## 2020-02-24 PROCEDURE — 83690 ASSAY OF LIPASE: CPT | Performed by: EMERGENCY MEDICINE

## 2020-02-24 PROCEDURE — 86900 BLOOD TYPING SEROLOGIC ABO: CPT | Performed by: EMERGENCY MEDICINE

## 2020-02-24 PROCEDURE — 96365 THER/PROPH/DIAG IV INF INIT: CPT

## 2020-02-24 PROCEDURE — 85025 COMPLETE CBC W/AUTO DIFF WBC: CPT | Performed by: EMERGENCY MEDICINE

## 2020-02-24 PROCEDURE — 86901 BLOOD TYPING SEROLOGIC RH(D): CPT | Performed by: EMERGENCY MEDICINE

## 2020-02-24 PROCEDURE — 71275 CT ANGIOGRAPHY CHEST: CPT

## 2020-02-24 PROCEDURE — 99285 EMERGENCY DEPT VISIT HI MDM: CPT | Performed by: EMERGENCY MEDICINE

## 2020-02-24 PROCEDURE — 99285 EMERGENCY DEPT VISIT HI MDM: CPT

## 2020-02-24 PROCEDURE — 85730 THROMBOPLASTIN TIME PARTIAL: CPT | Performed by: EMERGENCY MEDICINE

## 2020-02-24 PROCEDURE — 96361 HYDRATE IV INFUSION ADD-ON: CPT

## 2020-02-24 RX ORDER — HYDROMORPHONE HCL/PF 1 MG/ML
0.5 SYRINGE (ML) INJECTION ONCE
Status: COMPLETED | OUTPATIENT
Start: 2020-02-24 | End: 2020-02-24

## 2020-02-24 RX ORDER — ONDANSETRON 2 MG/ML
4 INJECTION INTRAMUSCULAR; INTRAVENOUS ONCE
Status: COMPLETED | OUTPATIENT
Start: 2020-02-24 | End: 2020-02-24

## 2020-02-24 RX ORDER — FENTANYL CITRATE 50 UG/ML
50 INJECTION, SOLUTION INTRAMUSCULAR; INTRAVENOUS ONCE
Status: COMPLETED | OUTPATIENT
Start: 2020-02-24 | End: 2020-02-25

## 2020-02-24 RX ORDER — HYDROMORPHONE HCL/PF 1 MG/ML
0.2 SYRINGE (ML) INJECTION ONCE
Status: DISCONTINUED | OUTPATIENT
Start: 2020-02-24 | End: 2020-02-24

## 2020-02-24 RX ORDER — SUCRALFATE ORAL 1 G/10ML
1000 SUSPENSION ORAL ONCE
Status: DISCONTINUED | OUTPATIENT
Start: 2020-02-24 | End: 2020-02-24

## 2020-02-24 RX ORDER — SUCRALFATE ORAL 1 G/10ML
1000 SUSPENSION ORAL ONCE
Status: COMPLETED | OUTPATIENT
Start: 2020-02-24 | End: 2020-02-24

## 2020-02-24 RX ORDER — OLANZAPINE 10 MG/1
6 INJECTION, POWDER, LYOPHILIZED, FOR SOLUTION INTRAMUSCULAR ONCE
Status: DISCONTINUED | OUTPATIENT
Start: 2020-02-24 | End: 2020-02-24

## 2020-02-24 RX ORDER — OLANZAPINE 10 MG/1
4 INJECTION, POWDER, LYOPHILIZED, FOR SOLUTION INTRAMUSCULAR ONCE
Status: DISCONTINUED | OUTPATIENT
Start: 2020-02-24 | End: 2020-02-24

## 2020-02-24 RX ORDER — CEFEPIME HYDROCHLORIDE 2 G/50ML
2000 INJECTION, SOLUTION INTRAVENOUS ONCE
Status: COMPLETED | OUTPATIENT
Start: 2020-02-24 | End: 2020-02-24

## 2020-02-24 RX ORDER — ACETAMINOPHEN 325 MG/1
650 TABLET ORAL ONCE
Status: DISCONTINUED | OUTPATIENT
Start: 2020-02-24 | End: 2020-02-24

## 2020-02-24 RX ORDER — PANTOPRAZOLE SODIUM 40 MG/1
40 INJECTION, POWDER, FOR SOLUTION INTRAVENOUS ONCE
Status: COMPLETED | OUTPATIENT
Start: 2020-02-24 | End: 2020-02-25

## 2020-02-24 RX ADMIN — IOHEXOL 100 ML: 350 INJECTION, SOLUTION INTRAVENOUS at 20:06

## 2020-02-24 RX ADMIN — HYDROMORPHONE HYDROCHLORIDE 0.5 MG: 1 INJECTION, SOLUTION INTRAMUSCULAR; INTRAVENOUS; SUBCUTANEOUS at 20:10

## 2020-02-24 RX ADMIN — SODIUM CHLORIDE 1000 ML: 0.9 INJECTION, SOLUTION INTRAVENOUS at 23:00

## 2020-02-24 RX ADMIN — CEFEPIME HYDROCHLORIDE 2000 MG: 2 INJECTION, SOLUTION INTRAVENOUS at 23:00

## 2020-02-24 RX ADMIN — SODIUM CHLORIDE 1000 ML: 0.9 INJECTION, SOLUTION INTRAVENOUS at 20:12

## 2020-02-24 RX ADMIN — SUCRALFATE 1000 MG: 1 SUSPENSION ORAL at 23:17

## 2020-02-24 RX ADMIN — ONDANSETRON 4 MG: 2 INJECTION INTRAMUSCULAR; INTRAVENOUS at 20:10

## 2020-02-25 ENCOUNTER — APPOINTMENT (OUTPATIENT)
Dept: RADIOLOGY | Facility: HOSPITAL | Age: 50
DRG: 249 | End: 2020-02-25
Attending: SPECIALIST
Payer: COMMERCIAL

## 2020-02-25 ENCOUNTER — APPOINTMENT (EMERGENCY)
Dept: CT IMAGING | Facility: HOSPITAL | Age: 50
DRG: 249 | End: 2020-02-25
Payer: COMMERCIAL

## 2020-02-25 PROBLEM — R10.12 LEFT UPPER QUADRANT PAIN: Status: ACTIVE | Noted: 2020-02-25

## 2020-02-25 PROBLEM — F41.8 DEPRESSION WITH ANXIETY: Status: ACTIVE | Noted: 2017-03-07

## 2020-02-25 PROBLEM — Z98.84 HISTORY OF GASTRIC BYPASS: Status: ACTIVE | Noted: 2019-03-23

## 2020-02-25 LAB
ABO GROUP BLD: NORMAL
AMPHETAMINES SERPL QL SCN: POSITIVE
ANION GAP SERPL CALCULATED.3IONS-SCNC: 7 MMOL/L (ref 4–13)
ATRIAL RATE: 60 BPM
BACTERIA UR QL AUTO: ABNORMAL /HPF
BARBITURATES UR QL: NEGATIVE
BASOPHILS # BLD AUTO: 0 THOUSANDS/ΜL (ref 0–0.1)
BASOPHILS NFR BLD AUTO: 0 % (ref 0–2)
BENZODIAZ UR QL: NEGATIVE
BILIRUB UR QL STRIP: NEGATIVE
BLD GP AB SCN SERPL QL: POSITIVE
BUN SERPL-MCNC: 8 MG/DL (ref 7–25)
CALCIUM SERPL-MCNC: 8.5 MG/DL (ref 8.6–10.5)
CHLORIDE SERPL-SCNC: 106 MMOL/L (ref 98–107)
CLARITY UR: CLEAR
CO2 SERPL-SCNC: 26 MMOL/L (ref 21–31)
COCAINE UR QL: NEGATIVE
COLOR UR: YELLOW
CREAT SERPL-MCNC: 0.74 MG/DL (ref 0.6–1.2)
DAT POLY-SP REAG RBC QL: NEGATIVE
EOSINOPHIL # BLD AUTO: 0 THOUSAND/ΜL (ref 0–0.61)
EOSINOPHIL NFR BLD AUTO: 0 % (ref 0–5)
ERYTHROCYTE [DISTWIDTH] IN BLOOD BY AUTOMATED COUNT: 16.9 % (ref 11.5–14.5)
GFR SERPL CREATININE-BSD FRML MDRD: 96 ML/MIN/1.73SQ M
GLUCOSE SERPL-MCNC: 98 MG/DL (ref 65–99)
GLUCOSE UR STRIP-MCNC: NEGATIVE MG/DL
HCT VFR BLD AUTO: 37.6 % (ref 42–47)
HGB BLD-MCNC: 11.7 G/DL (ref 12–16)
HGB UR QL STRIP.AUTO: ABNORMAL
KETONES UR STRIP-MCNC: NEGATIVE MG/DL
LEUKOCYTE ESTERASE UR QL STRIP: NEGATIVE
LIPASE SERPL-CCNC: 64 U/L (ref 73–393)
LYMPHOCYTES # BLD AUTO: 1.1 THOUSANDS/ΜL (ref 0.6–4.47)
LYMPHOCYTES NFR BLD AUTO: 10 % (ref 21–51)
MCH RBC QN AUTO: 26.3 PG (ref 26–34)
MCHC RBC AUTO-ENTMCNC: 31.2 G/DL (ref 31–37)
MCV RBC AUTO: 85 FL (ref 81–99)
METHADONE UR QL: NEGATIVE
MONOCYTES # BLD AUTO: 0.3 THOUSAND/ΜL (ref 0.17–1.22)
MONOCYTES NFR BLD AUTO: 3 % (ref 2–12)
NEUTROPHILS # BLD AUTO: 9.4 THOUSANDS/ΜL (ref 1.4–6.5)
NEUTS SEG NFR BLD AUTO: 87 % (ref 42–75)
NITRITE UR QL STRIP: NEGATIVE
NON-SQ EPI CELLS URNS QL MICRO: ABNORMAL /HPF
OPIATES UR QL SCN: NEGATIVE
P AXIS: 76 DEGREES
PCP UR QL: NEGATIVE
PH UR STRIP.AUTO: 6 [PH]
PLATELET # BLD AUTO: 265 THOUSANDS/UL (ref 149–390)
PMV BLD AUTO: 7.9 FL (ref 8.6–11.7)
POTASSIUM SERPL-SCNC: 3.5 MMOL/L (ref 3.5–5.5)
PR INTERVAL: 106 MS
PROCALCITONIN SERPL-MCNC: 2.92 NG/ML
PROT UR STRIP-MCNC: NEGATIVE MG/DL
QRS AXIS: 78 DEGREES
QRSD INTERVAL: 84 MS
QT INTERVAL: 440 MS
QTC INTERVAL: 440 MS
RBC # BLD AUTO: 4.45 MILLION/UL (ref 3.9–5.2)
RBC #/AREA URNS AUTO: ABNORMAL /HPF
RH BLD: POSITIVE
SODIUM SERPL-SCNC: 139 MMOL/L (ref 134–143)
SP GR UR STRIP.AUTO: <=1.005 (ref 1–1.03)
SPECIMEN EXPIRATION DATE: NORMAL
T WAVE AXIS: 78 DEGREES
THC UR QL: NEGATIVE
UROBILINOGEN UR QL STRIP.AUTO: 0.2 E.U./DL
VENTRICULAR RATE: 60 BPM
WBC # BLD AUTO: 10.8 THOUSAND/UL (ref 4.8–10.8)
WBC #/AREA URNS AUTO: ABNORMAL /HPF

## 2020-02-25 PROCEDURE — 86880 COOMBS TEST DIRECT: CPT | Performed by: PHYSICIAN ASSISTANT

## 2020-02-25 PROCEDURE — 99254 IP/OBS CNSLTJ NEW/EST MOD 60: CPT | Performed by: SPECIALIST

## 2020-02-25 PROCEDURE — 84145 PROCALCITONIN (PCT): CPT | Performed by: PHYSICIAN ASSISTANT

## 2020-02-25 PROCEDURE — 83690 ASSAY OF LIPASE: CPT | Performed by: PHYSICIAN ASSISTANT

## 2020-02-25 PROCEDURE — 81001 URINALYSIS AUTO W/SCOPE: CPT | Performed by: EMERGENCY MEDICINE

## 2020-02-25 PROCEDURE — 74177 CT ABD & PELVIS W/CONTRAST: CPT

## 2020-02-25 PROCEDURE — 99220 PR INITIAL OBSERVATION CARE/DAY 70 MINUTES: CPT | Performed by: HOSPITALIST

## 2020-02-25 PROCEDURE — 74022 RADEX COMPL AQT ABD SERIES: CPT

## 2020-02-25 PROCEDURE — 80307 DRUG TEST PRSMV CHEM ANLYZR: CPT | Performed by: EMERGENCY MEDICINE

## 2020-02-25 PROCEDURE — 86870 RBC ANTIBODY IDENTIFICATION: CPT | Performed by: INTERNAL MEDICINE

## 2020-02-25 PROCEDURE — 96367 TX/PROPH/DG ADDL SEQ IV INF: CPT

## 2020-02-25 PROCEDURE — 80048 BASIC METABOLIC PNL TOTAL CA: CPT | Performed by: PHYSICIAN ASSISTANT

## 2020-02-25 PROCEDURE — 93010 ELECTROCARDIOGRAM REPORT: CPT | Performed by: INTERNAL MEDICINE

## 2020-02-25 PROCEDURE — C9113 INJ PANTOPRAZOLE SODIUM, VIA: HCPCS | Performed by: EMERGENCY MEDICINE

## 2020-02-25 PROCEDURE — 85025 COMPLETE CBC W/AUTO DIFF WBC: CPT | Performed by: PHYSICIAN ASSISTANT

## 2020-02-25 PROCEDURE — 82150 ASSAY OF AMYLASE: CPT | Performed by: PHYSICIAN ASSISTANT

## 2020-02-25 PROCEDURE — 96366 THER/PROPH/DIAG IV INF ADDON: CPT

## 2020-02-25 PROCEDURE — 96375 TX/PRO/DX INJ NEW DRUG ADDON: CPT

## 2020-02-25 PROCEDURE — 96372 THER/PROPH/DIAG INJ SC/IM: CPT

## 2020-02-25 PROCEDURE — 81003 URINALYSIS AUTO W/O SCOPE: CPT | Performed by: EMERGENCY MEDICINE

## 2020-02-25 RX ORDER — HALOPERIDOL 5 MG/ML
5 INJECTION INTRAMUSCULAR ONCE
Status: COMPLETED | OUTPATIENT
Start: 2020-02-25 | End: 2020-02-25

## 2020-02-25 RX ORDER — ACETAMINOPHEN 325 MG/1
650 TABLET ORAL EVERY 6 HOURS PRN
Status: DISCONTINUED | OUTPATIENT
Start: 2020-02-25 | End: 2020-02-27 | Stop reason: HOSPADM

## 2020-02-25 RX ORDER — TRAMADOL HYDROCHLORIDE 50 MG/1
50 TABLET ORAL EVERY 6 HOURS PRN
Status: DISCONTINUED | OUTPATIENT
Start: 2020-02-25 | End: 2020-02-25

## 2020-02-25 RX ORDER — ALBUTEROL SULFATE 90 UG/1
2 AEROSOL, METERED RESPIRATORY (INHALATION) EVERY 4 HOURS PRN
Status: DISCONTINUED | OUTPATIENT
Start: 2020-02-25 | End: 2020-02-27 | Stop reason: HOSPADM

## 2020-02-25 RX ORDER — NICOTINE 21 MG/24HR
1 PATCH, TRANSDERMAL 24 HOURS TRANSDERMAL DAILY
Status: DISCONTINUED | OUTPATIENT
Start: 2020-02-25 | End: 2020-02-27 | Stop reason: HOSPADM

## 2020-02-25 RX ORDER — FLUTICASONE FUROATE AND VILANTEROL 200; 25 UG/1; UG/1
1 POWDER RESPIRATORY (INHALATION) DAILY
Status: DISCONTINUED | OUTPATIENT
Start: 2020-02-25 | End: 2020-02-27 | Stop reason: HOSPADM

## 2020-02-25 RX ORDER — CIPROFLOXACIN 2 MG/ML
400 INJECTION, SOLUTION INTRAVENOUS EVERY 12 HOURS
Status: DISCONTINUED | OUTPATIENT
Start: 2020-02-25 | End: 2020-02-27

## 2020-02-25 RX ORDER — DIPHENHYDRAMINE HYDROCHLORIDE 50 MG/ML
25 INJECTION INTRAMUSCULAR; INTRAVENOUS ONCE
Status: COMPLETED | OUTPATIENT
Start: 2020-02-25 | End: 2020-02-25

## 2020-02-25 RX ORDER — PANTOPRAZOLE SODIUM 40 MG/1
40 TABLET, DELAYED RELEASE ORAL
Status: DISCONTINUED | OUTPATIENT
Start: 2020-02-25 | End: 2020-02-27 | Stop reason: HOSPADM

## 2020-02-25 RX ORDER — TRAZODONE HYDROCHLORIDE 50 MG/1
50 TABLET ORAL
Status: DISCONTINUED | OUTPATIENT
Start: 2020-02-25 | End: 2020-02-27 | Stop reason: HOSPADM

## 2020-02-25 RX ORDER — SUCRALFATE ORAL 1 G/10ML
1000 SUSPENSION ORAL
Status: DISCONTINUED | OUTPATIENT
Start: 2020-02-25 | End: 2020-02-27 | Stop reason: HOSPADM

## 2020-02-25 RX ORDER — HYDROXYZINE HYDROCHLORIDE 25 MG/1
25 TABLET, FILM COATED ORAL EVERY 6 HOURS PRN
Status: DISCONTINUED | OUTPATIENT
Start: 2020-02-25 | End: 2020-02-25

## 2020-02-25 RX ORDER — ONDANSETRON 2 MG/ML
4 INJECTION INTRAMUSCULAR; INTRAVENOUS EVERY 6 HOURS PRN
Status: DISCONTINUED | OUTPATIENT
Start: 2020-02-25 | End: 2020-02-27 | Stop reason: HOSPADM

## 2020-02-25 RX ADMIN — VANCOMYCIN HYDROCHLORIDE 750 MG: 750 INJECTION, SOLUTION INTRAVENOUS at 00:06

## 2020-02-25 RX ADMIN — SUCRALFATE 1000 MG: 1 SUSPENSION ORAL at 09:10

## 2020-02-25 RX ADMIN — SUCRALFATE 1000 MG: 1 SUSPENSION ORAL at 12:04

## 2020-02-25 RX ADMIN — DIPHENHYDRAMINE HYDROCHLORIDE 25 MG: 50 INJECTION INTRAMUSCULAR; INTRAVENOUS at 01:56

## 2020-02-25 RX ADMIN — FENTANYL CITRATE 50 MCG: 50 INJECTION INTRAMUSCULAR; INTRAVENOUS at 00:06

## 2020-02-25 RX ADMIN — SUCRALFATE 1000 MG: 1 SUSPENSION ORAL at 21:40

## 2020-02-25 RX ADMIN — HALOPERIDOL LACTATE 5 MG: 5 INJECTION, SOLUTION INTRAMUSCULAR at 01:52

## 2020-02-25 RX ADMIN — CIPROFLOXACIN 400 MG: 2 INJECTION, SOLUTION INTRAVENOUS at 16:01

## 2020-02-25 RX ADMIN — IOHEXOL 100 ML: 350 INJECTION, SOLUTION INTRAVENOUS at 01:21

## 2020-02-25 RX ADMIN — FLUTICASONE FUROATE AND VILANTEROL TRIFENATATE 1 PUFF: 200; 25 POWDER RESPIRATORY (INHALATION) at 09:10

## 2020-02-25 RX ADMIN — PANTOPRAZOLE SODIUM 40 MG: 40 TABLET, DELAYED RELEASE ORAL at 05:55

## 2020-02-25 RX ADMIN — PANTOPRAZOLE SODIUM 40 MG: 40 INJECTION, POWDER, FOR SOLUTION INTRAVENOUS at 00:08

## 2020-02-25 RX ADMIN — METRONIDAZOLE 500 MG: 500 INJECTION, SOLUTION INTRAVENOUS at 15:34

## 2020-02-25 RX ADMIN — SUCRALFATE 1000 MG: 1 SUSPENSION ORAL at 16:01

## 2020-02-25 RX ADMIN — NICOTINE 1 PATCH: 14 PATCH, EXTENDED RELEASE TRANSDERMAL at 09:08

## 2020-02-25 RX ADMIN — METRONIDAZOLE 500 MG: 500 INJECTION, SOLUTION INTRAVENOUS at 21:40

## 2020-02-25 NOTE — NURSING NOTE
Pt refused AM bloodwork after being stuck x 1  Pt jerked arm back during 1st attempt, so attempt was unsuccessful

## 2020-02-25 NOTE — SOCIAL WORK
Attempted to evaluate the pt x3  Pt very lethargic holding the phone on her face  Leeanna Pitts RN and Ena DAVIS present during the evaluation  Pt did not open her eyes and mumbled her answers to questions  Pt states she lives with her mother at [address] [place] [address]  Verified mothers phone as [phone]  Pt states she gets her medications at [facility] in [facility]  Pt states her mother will transport her home upon discharge  Patient/caregiver received discharge checklist   Content reviewed  Patient/caregiver encouraged to participate in discharge plan of care prior to discharge home  CM reviewed d/c planning process including the following: identifying help at home, patient preference for d/c planning needs, availability of treatment team to discuss questions or concerns patient and/or family may have regarding understanding medications and recognizing signs and symptoms once discharged  CM also encouraged patient to follow up with all recommended appointments after discharge  Patient advised of importance for patient and family to participate in managing patients medical well being

## 2020-02-25 NOTE — UTILIZATION REVIEW
Initial Clinical Review    Admission: Date/Time/Statement: Admission Orders (From admission, onward)     Ordered        02/25/20 0159  Place in Observation  Once                   Orders Placed This Encounter   Procedures    Place in Observation     Standing Status:   Standing     Number of Occurrences:   1     Order Specific Question:   Admitting Physician     Answer:   Libby Dias [[id number]]     Order Specific Question:   Level of Care     Answer:   Med Surg [[id number]]     ED Arrival Information     Expected Arrival Acuity Means of Arrival Escorted By Service Admission Type    - 2/24/2020 19:23 Urgent Ambulance [facility] General Medicine Urgent    Arrival Complaint    abd pain         Chief Complaint   Patient presents with    Abdominal Pain     Pt states she passed out in the bathtub and now has epigastric LUQ pain  Pt has hx of gastric bypass     Assessment/Plan: Left upper quadrant pain  Assessment & Plan  · Placed in observation Medicine  · Place on clear liquid diet  · Consult GI in a m  · Continue pre-hospital Carafate and Protonix  · Give pain control p r n   · Unclear etiology as patient's labs and vital signs are unremarkable and CT scan only reveals nonspecific small-bowel enteritis  Patient did receive vanc and cefepime and ER, will hold off on further antibiotics at this time and trend procalcitonin      Nicotine dependence  Assessment & Plan  Give nicotine 14 mg transdermal daily     Asthma  Assessment & Plan  Continue pre-hospital Breo 200/251 puff daily and Proventil 2 puffs q 4 hours p r n      Gastroesophageal reflux disease  Assessment & Plan  Continue pre-hospital Carafate 1 g p o  Q i d  and Protonix 40 mg p o  Daily     Intractable vomiting with nausea  Assessment & Plan  Give Zofran p r n  Insomnia  Assessment & Plan  Continue pre-hospital trazodone 50 mg p o  Q h s  P r n  Geradine Austin is a 51 y o  female who presents with left upper quadrant abdominal pain x1 week    Patient presents ER for further evaluation treatment one-week history of left upper quadrant abdominal pain  Patient states her pain is 10/10 without aggravating or alleviating factors and accompanied with multiple episodes of vomiting  Patient has a history of gastric bypass in 2013 with subsequent surgeries and most recently was at [facility] in March of 2019 and had to have an EGD and underwent a balloon dilatation of the stricture at the [address] junction without ulcer      Patient states that she was supposed to have and unknown surgery done by our bariatric surgeon Dr Martin Brock but he passed away 2 days prior to her scheduled surgery    Patient denies any fever chills, no bright red blood per rectum or dark stools       ED Triage Vitals   Temperature Pulse Respirations Blood Pressure SpO2   02/24/20 1939 02/24/20 1937 02/24/20 1937 02/24/20 1937 02/24/20 1937   (!) 97 °F (36 1 °C) 66 18 106/79 100 %      Temp Source Heart Rate Source Patient Position - Orthostatic VS BP Location FiO2 (%)   02/24/20 1937 02/24/20 1937 02/25/20 0256 02/24/20 1937 --   Rectal Monitor Lying Left arm       Pain Score       02/24/20 1937       Worst Possible Pain        Wt Readings from Last 1 Encounters:   02/24/20 52 2 kg (115 lb)     Additional Vital Signs:   02/25/20 0753  99 9 °F (37 7 °C)  74  18  94/59    100 %  None (Room air)  Lying   02/25/20 0256  99 5 °F (37 5 °C)  65  16  99/67    95 %  None (Room air)  Lying   02/25/20 0215    61  16  96/65  77  100 %       02/25/20 0200    58  18  101/70  81  100 %       02/25/20 0145    58  17  104/66  80  100 %       02/25/20 0045    59    104/71  82  100 %       02/25/20 0000    71    106/75  86  100 %       02/24/20 2345    84    97/70  79  91 %       02/24/20 2330    81    105/70  82  100 %       02/24/20 2315    66    107/75  86  100 %       02/24/20 2300    80    109/68  83  92 %       02/24/20 2245  98 3 °F (36 8 °C)  98  21  88/53Abnormal   67        02/24/20 2230    69  18  84/58Abnormal    65  100 %  None (Room air)     BP: Dr Eli Rizvi notified at 02/24/20 2230 02/24/20 2200    67  17  116/70  86  100 %       02/24/20 2145    61  14  107/67  82  100 %       02/24/20 2115    59  22      100 %       02/24/20 2045    55  18  109/75  86  100 %       02/24/20 1939  97 °F (36 1 °C)Abnormal                  02/24/20 1937    66  18  106/79  89  100 %  None (Room air)           Pertinent Labs/Diagnostic Test Results:   Ct abd/pelvis   (2/25)     No evidence of bowel obstruction  Mild thickening of loops of small bowel which may represent enteritis  CTA    Chest/abd/pelvis  (2/24)     Mild diffuse small bowel dilation with mild diffuse wall thickening and mucosal hyperemia in keeping with a nonspecific small bowel enteritis, likely infectious or inflammatory  No prominent terminal ileal thickening  Prominent stool throughout the colon without obstruction    Results from last 7 days   Lab Units 02/25/20 0828 02/24/20 1945   WBC Thousand/uL 10 80 5 00   HEMOGLOBIN g/dL 11 7* 13 4   HEMATOCRIT % 37 6* 42 7   PLATELETS Thousands/uL 265 330   NEUTROS ABS Thousands/µL 9 40* 2 30         Results from last 7 days   Lab Units 02/25/20 0828 02/24/20 1945   SODIUM mmol/L 139 140   POTASSIUM mmol/L 3 5 3 4*   CHLORIDE mmol/L 106 106   CO2 mmol/L 26 27   ANION GAP mmol/L 7 7   BUN mg/dL 8 9   CREATININE mg/dL 0 74 0 72   EGFR ml/min/1 73sq m 96 99   CALCIUM mg/dL 8 5* 9 6     Results from last 7 days   Lab Units 02/24/20 1945   AST U/L 28   ALT U/L 25   ALK PHOS U/L 72   TOTAL PROTEIN g/dL 7 5   ALBUMIN g/dL 4 3   TOTAL BILIRUBIN mg/dL 0 50         Results from last 7 days   Lab Units 02/25/20 0828 02/24/20 1945   GLUCOSE RANDOM mg/dL 98 107*           Results from last 7 days   Lab Units 02/24/20 1945   TROPONIN I ng/mL <0 03         Results from last 7 days   Lab Units 02/24/20 1945   PROTIME seconds 11 9   INR  1 03   PTT seconds 37             Results from last 7 days   Lab Units 02/24/20  2254   LACTIC ACID mmol/L 1 1               Results from last 7 days   Lab Units 02/24/20  1945   LIPASE u/L 63             Results from last 7 days   Lab Units 02/25/20  0138   CLARITY UA  Clear   COLOR UA  Yellow   SPEC GRAV UA  <=1 005*   PH UA  6 0   GLUCOSE UA mg/dl Negative   KETONES UA mg/dl Negative   BLOOD UA  2+*   PROTEIN UA mg/dl Negative   NITRITE UA  Negative   BILIRUBIN UA  Negative   UROBILINOGEN UA E U /dl 0 2   LEUKOCYTES UA  Negative   WBC UA /hpf None Seen   RBC UA /hpf 10-20*   BACTERIA UA /hpf None Seen   EPITHELIAL CELLS WET PREP /hpf None Seen         Results from last 7 days   Lab Units 02/25/20  0138   AMPH/METH  Positive*   BARBITURATE UR  Negative   BENZODIAZEPINE UR  Negative   COCAINE UR  Negative   METHADONE URINE  Negative   OPIATE UR  Negative   PCP UR  Negative   THC UR  Negative         ED Treatment:   Medication Administration from 02/24/2020 1923 to 02/25/2020 0230       Date/Time Order Dose Route Action Action by Comments     02/24/2020 2003 OLANZapine (ZyPREXA) IM injection 6 mg   Intramuscular Canceled Entry Shree Dunn RN      02/24/2020 2230 sodium chloride 0 9 % bolus 1,000 mL 0 mL Intravenous Stopped Shree Dunn RN      02/24/2020 2012 sodium chloride 0 9 % bolus 1,000 mL 1,000 mL Intravenous [address] Shree Dunn RN      02/24/2020 2013 acetaminophen (TYLENOL) tablet 650 mg   Oral Canceled Entry Shree Dunn RN      02/24/2020 2016 sucralfate (CARAFATE) oral suspension 1,000 mg   Oral Canceled Entry Shree Dunn RN      02/24/2020 2010 HYDROmorphone (DILAUDID) injection 0 5 mg 0 5 mg Intravenous Given Shree Dunn RN      02/24/2020 2010 ondansetron (ZOFRAN) injection 4 mg 4 mg Intravenous Given Shree Dunn RN      02/24/2020 2006 iohexol (OMNIPAQUE) 350 MG/ML injection (MULTI-DOSE) 100 mL 100 mL Intravenous Given Eloise Pines      02/24/2020 2024 OLANZapine (ZyPREXA) IM injection 4 mg 0 mg Intramuscular Hold Shree Dunn RN per Dr Azalea Jose     02/24/2020 2025 sucralfate (CARAFATE) oral suspension 1,000 mg 0 mg Oral Hold Shree Dunn RN per Dr Azalea Jose     02/25/2020 0156 vancomycin (VANCOCIN) IVPB (premix) 750 mg 0 mg/kg Intravenous Stopped Shree Dunn RN      02/25/2020 0006 vancomycin (VANCOCIN) IVPB (premix) 750 mg 750 mg Intravenous [address] Shree Dunn RN      02/24/2020 2335 cefepime (MAXIPIME) IVPB (premix) 2,000 mg 0 mg Intravenous Stopped Shree Dunn RN      02/24/2020 2300 cefepime (MAXIPIME) IVPB (premix) 2,000 mg 2,000 mg Intravenous [address] Shree Dunn RN      02/25/2020 0130 sodium chloride 0 9 % bolus 1,000 mL 0 mL Intravenous Stopped Shree Dunn RN      02/24/2020 2300 sodium chloride 0 9 % bolus 1,000 mL 1,000 mL Intravenous [address] Shree Dunn RN      02/24/2020 2317 sucralfate (CARAFATE) oral suspension 1,000 mg 1,000 mg Oral Given Shree Dunn RN      02/25/2020 0008 pantoprazole (PROTONIX) injection 40 mg 40 mg Intravenous Given Shree Dunn RN      02/25/2020 0006 iohexol (OMNIPAQUE) 240 MG/ML solution 25 mL 25 mL Oral Given Shree Dunn RN in 325mL water     02/25/2020 0006 fentanyl citrate (PF) 100 MCG/2ML 50 mcg 50 mcg Intravenous Given Shree Dunn RN      02/25/2020 0121 iohexol (OMNIPAQUE) 350 MG/ML injection (SINGLE-DOSE) 100 mL 100 mL Intravenous Given Gaby Corral      02/25/2020 0152 haloperidol lactate (HALDOL) injection 5 mg 5 mg Intramuscular Given Shree Dunn RN      02/25/2020 6329 diphenhydrAMINE (BENADRYL) injection 25 mg 25 mg Intravenous Given Shree Dunn RN         Past Medical History:   Diagnosis Date    Asthma     GERD (gastroesophageal reflux disease)     H/O gastric bypass     Hypokalemia     Methamphetamine abuse ([address] )     Positive UDS;  Feb 2020     Present on Admission:   Left upper quadrant pain   Insomnia   Intractable vomiting with nausea   Gastroesophageal reflux disease   Asthma   Nicotine dependence      Admitting Diagnosis: Enteritis [K52 9]  Abdominal pain [R10 9]  Age/Sex: 51 y o  female  Admission Orders:  Scheduled Medications:    Medications:  fluticasone-vilanterol 1 puff Inhalation Daily   nicotine 1 patch Transdermal Daily   pantoprazole 40 mg Oral Early Morning   sucralfate 1,000 mg Oral 4x Daily (AC & HS)     Continuous IV Infusions:     PRN Meds:    acetaminophen 650 mg Oral Q6H PRN   albuterol 2 puff Inhalation Q4H PRN   hydrOXYzine HCL 25 mg Oral Q6H PRN   ondansetron 4 mg Intravenous Q6H PRN   traMADol 50 mg Oral Q6H PRN   traZODone 50 mg Oral HS PRN         Network Utilization Review Department  [email or web address]  ATTENTION: Please call with any questions or concerns to [phone] and carefully listen to the prompts so that you are directed to the right person  All voicemails are confidential   Margret Velez all requests for admission clinical reviews, approved or denied determinations and any other requests to dedicated fax number below belonging to the campus where the patient is receiving treatment   List of dedicated fax numbers for the Facilities:  [address] DENIALS (Administrative/Medical Necessity) [phone]   [address] (Maternity/NICU/Pediatrics) [phone]   Anisha Oshea [phone]   Cristiana Leon [phone]   Nelda Isaac [phone]   Merlinda Downs [phone]   [address] [facility] 15[address] [facility] [phone]   [facility]  [phone]   [address] [facility] [address]  [address] [facility] 1000 [address] [facility] [phone]

## 2020-02-25 NOTE — CONSULTS
Consultation - GI   Keo Morris 51 y o  female MRN: [id number]  Unit/Bed#: -01 Encounter: [id number]      Assessment/Plan     Assessment:  1  Abdominal pain, diffusely tender with nausea and vomiting  2  Enteritis on CT scan  3  Anemia  4  GERD  Plan:  The case was discussed with Dr Tessa Celeste who saw and examined the patient  Will check stool studies to rule out infectious cause  Will check amylase and lipase to rule out pancreatitis  Would recommend surgical consultation to rule out underlying surgical etiologists such as appendicitis  Continue Protonix 40mg and Carafate  Continue Zofran as needed for nausea/vomiting  Recommend antibiotic use with Flagyl and Cipro  Continue clear liquid diet  All of the patient's questions were answered  History of Present Illness   Physician Requesting Consult: Kaycee Moore MD  Reason for Consult / Principal Problem: Abdominal pain  Hx and PE limited by:   HPI: Shawn Cruz is a 51y o  year old female who presents with ongoing abdominal pain with nausea and vomiting  She states she feels the same since she came in  She admits that her pain is a 10/10 without aggravating factors  She has a history of a gastric bypass surgery in 2013  She had an EGD in March 2019 with Dr Leal Quiet which balloon dilatation of the stricture at the [address] junction was completed  She has no ulcers at that time but admits having ulcers in the past    Her CT did show evidence of enteritis and has been having some diarrhea lately as well  She has not had any rectal bleeding  She has known history of GERD and has been stable on Protonix and Carafate  Inpatient consult to gastroenterology  Consult performed by: Jenny Nicholson PA-C  Consult ordered by: Deidre Ojeda MD          Review of Systems   Constitutional: Positive for appetite change  HENT: Negative for trouble swallowing  Respiratory: Negative for shortness of breath      Cardiovascular: Negative for chest pain    Gastrointestinal: Positive for abdominal pain, diarrhea, nausea and vomiting  Negative for anal bleeding, blood in stool and rectal pain  Historical Information   Past Medical History:   Diagnosis Date    Asthma     GERD (gastroesophageal reflux disease)     H/O gastric bypass     Hypokalemia     Methamphetamine abuse ([address] )     Positive UDS;  Feb 2020     Past Surgical History:   Procedure Laterality Date    CHOLECYSTECTOMY      DENTAL SURGERY      GASTRIC BYPASS      EILEEN-EN-Y PROCEDURE      TUBAL LIGATION       Social History   Social History     Substance and Sexual Activity   Alcohol Use Never    Frequency: Never     Social History     Substance and Sexual Activity   Drug Use Yes    Types: Methamphetamines     E-Cigarette/Vaping    E-Cigarette Use Never User      E-Cigarette/Vaping Substances     Social History     Tobacco Use   Smoking Status Current Every Day Smoker    Packs/day: 0 25    Years: 15 00    Pack years: 3 75    Types: Cigarettes   Smokeless Tobacco Never Used     Family History:   Family History   Problem Relation Age of Onset    Diabetes Mother     Heart disease Mother     Stroke Mother     Arthritis Mother     Hyperlipidemia Mother     Colon cancer Father     Heart disease Sister     Coronary artery disease Sister     Heart disease Brother     Coronary artery disease Brother        Meds/Allergies   all current active meds have been reviewed, current meds:   Current Facility-Administered Medications   Medication Dose Route Frequency    acetaminophen (TYLENOL) tablet 650 mg  650 mg Oral Q6H PRN    albuterol (PROVENTIL HFA,VENTOLIN HFA) inhaler 2 puff  2 puff Inhalation Q4H PRN    fluticasone-vilanterol (BREO ELLIPTA) 200-25 MCG/INH inhaler 1 puff  1 puff Inhalation Daily    nicotine (NICODERM CQ) 14 mg/24hr TD 24 hr patch 1 patch  1 patch Transdermal Daily    ondansetron (ZOFRAN) injection 4 mg  4 mg Intravenous Q6H PRN    pantoprazole (PROTONIX) EC tablet 40 mg  40 mg Oral Early Morning    sucralfate (CARAFATE) oral suspension 1,000 mg  1,000 mg Oral 4x Daily (AC & HS)    traZODone (DESYREL) tablet 50 mg  50 mg Oral HS PRN    and PTA meds:   Prior to Admission Medications   Prescriptions Last Dose Informant Patient Reported? Taking?    CARAFATE 1 GM/10ML suspension Not Taking at Unknown time  Yes No   Cholecalciferol (VITAMIN D3) 11268 units CAPS Not Taking at Unknown time  Yes No   Sig: Take by mouth   albuterol (PROAIR HFA) 90 mcg/act inhaler Not Taking at Unknown time  No No   Sig: Inhale 1-2 puffs 2 (two) times a day as needed for wheezing or shortness of breath   Patient not taking: Reported on 2/25/2020   dicyclomine (BENTYL) 20 mg tablet   No No   Sig: Take 1 tablet by mouth 2 (two) times a day for 30 days   fluticasone-vilanterol (BREO ELLIPTA) 200-25 MCG/INH inhaler Not Taking at Unknown time  No No   Sig: Inhale 1 puff daily   Patient not taking: Reported on 2/25/2020   hydrOXYzine HCL (ATARAX) 25 mg tablet Not Taking at Unknown time  No No   Sig: Take 1 tablet (25 mg total) by mouth every 6 (six) hours as needed (insomnia)   Patient not taking: Reported on 2/25/2020   ondansetron (ZOFRAN) 4 mg tablet   No No   Sig: Take 1 tablet (4 mg total) by mouth every 8 (eight) hours as needed for nausea or vomiting for up to 30 days   pantoprazole (PROTONIX) 40 mg tablet Not Taking at Unknown time  Yes No   Sig: Take 40 mg by mouth   traMADol (ULTRAM) 50 mg tablet Not Taking at Unknown time  No No   Sig: Take 1 tablet (50 mg total) by mouth every 6 (six) hours as needed for moderate pain or severe pain May take up to 2 tablets every 6 hours as needed for pain   Patient not taking: Reported on 2/25/2020   traZODone (DESYREL) 50 mg tablet Not Taking at Unknown time  No No   Sig: Take 1 tablet (50 mg total) by mouth daily at bedtime as needed for sleep   Patient not taking: Reported on 2/25/2020   triamcinolone (KENALOG) 0 1 % cream Not Taking at Unknown time No No   Sig: Apply topically 2 (two) times a day   Patient not taking: Reported on 2/25/2020      Facility-Administered Medications: None       Allergies   Allergen Reactions    Morphine     Sulfamethoxazole-Trimethoprim Nausea Only       Objective       Intake/Output Summary (Last 24 hours) at 2/25/2020 1317  Last data filed at 2/25/2020 0940  Gross per 24 hour   Intake 2420 ml   Output    Net 2420 ml       Invasive Devices:   Peripheral IV 02/24/20 Left Antecubital (Active)   Site Assessment Clean;Dry; Intact 2/25/2020 11:00 AM   Dressing Type Transparent 2/25/2020 11:00 AM   Line Status Capped;Flushed 2/25/2020 11:00 AM   Dressing Status Clean;Dry; Intact 2/25/2020 11:00 AM     Vitals:    02/25/20 0200 02/25/20 0215 02/25/20 0256 02/25/20 0753   BP: 101/70 96/65 99/67 94/59   BP Location:   Right arm Right arm   Pulse: 58 61 65 74   Resp: 18 16 16 18   Temp:   99 5 °F (37 5 °C) 99 9 °F (37 7 °C)   TempSrc:   Temporal Temporal   SpO2: 100% 100% 95% 100%   Weight:       Height:         Physical Exam   Constitutional: She is oriented to person, place, and time  She appears well-developed  No distress  HENT:   Head: Normocephalic and atraumatic  Eyes: EOM are normal    Neck: Neck supple  Cardiovascular: Normal rate and regular rhythm  Pulmonary/Chest: Effort normal and breath sounds normal    Abdominal: Soft  Bowel sounds are normal  She exhibits no distension  There is tenderness  There is no rebound and no guarding  Diffusely tender throughout abdomen   Neurological: She is alert and oriented to person, place, and time  Nursing note and vitals reviewed  Lab Results: I have personally reviewed pertinent reports  Imaging Studies: I have personally reviewed pertinent reports  VTE Prophylaxis: Heparin    Counseling / Coordination of Care  Total floor / unit time spent today 70 minutes   Greater than 50% of total time was spent with the patient and / or family counseling and / or coordination of care  A description of the counseling / coordination of care: direct patient care, review of patient's chart, discussion with supervising physician and hospital staff

## 2020-02-25 NOTE — H&P
H&P- Maria Isabel Vance 1970, 51 y o  female MRN: [id number]    Unit/Bed#: -01 Encounter: [id number]    Primary Care Provider: Boston Ortiz DO   Date and time admitted to hospital: 2/24/2020  7:24 PM        * Left upper quadrant pain  Assessment & Plan  · Placed in observation Medicine  · Place on clear liquid diet  · Consult GI in a m  · Continue pre-hospital Carafate and Protonix  · Give pain control p r n   · Unclear etiology as patient's labs and vital signs are unremarkable and CT scan only reveals nonspecific small-bowel enteritis  Patient did receive vanc and cefepime and ER, will hold off on further antibiotics at this time and trend procalcitonin  Nicotine dependence  Assessment & Plan  Give nicotine 14 mg transdermal daily    Asthma  Assessment & Plan  Continue pre-hospital Breo 200/251 puff daily and Proventil 2 puffs q 4 hours p r n  Gastroesophageal reflux disease  Assessment & Plan  Continue pre-hospital Carafate 1 g p o  Q i d  and Protonix 40 mg p o  Daily    Intractable vomiting with nausea  Assessment & Plan  Give Zofran p r n  Insomnia  Assessment & Plan  Continue pre-hospital trazodone 50 mg p o  Q h s  P r n  VTE Prophylaxis: Heparin  Code Status:  Level 1  POLST: There is no POLST form on file for this patient (pre-hospital)  Discussion with family:  None present at bedside at time of exam    Anticipated Length of Stay:  Patient will be admitted on an Observation basis with an anticipated length of stay of  < 2 midnights  Justification for Hospital Stay:  Abdominal pain and vomiting requiring further evaluation    Chief Complaint:   Left upper quadrant abdominal pain x1 week    History of Present Illness:    Maria Isabel Vance is a 51 y o  female who presents with left upper quadrant abdominal pain x1 week  Patient presents ER for further evaluation treatment one-week history of left upper quadrant abdominal pain    Patient states her pain is 10/10 without aggravating or alleviating factors and accompanied with multiple episodes of vomiting  Patient has a history of gastric bypass in 2013 with subsequent surgeries and most recently was at [facility] in March of 2019 and had to have an EGD and underwent a balloon dilatation of the stricture at the [facility] junction without ulcer  Patient states that she was supposed to have and unknown surgery done by our bariatric surgeon Dr Radhames Fernandez but he passed away 2 days prior to her scheduled surgery  Patient denies any fever chills, no bright red blood per rectum or dark stools  Review of Systems:  Review of Systems   Constitutional: Negative for chills and fever  Respiratory: Negative for cough and shortness of breath  Gastrointestinal: Positive for abdominal pain, nausea and vomiting  Negative for blood in stool and diarrhea  Genitourinary: Negative for dysuria, hematuria and urgency  Neurological: Negative for weakness, light-headedness and headaches  All other systems reviewed and are negative  Past Medical and Surgical History:   Past Medical History:   Diagnosis Date    Asthma     GERD (gastroesophageal reflux disease)     H/O gastric bypass     Hypokalemia        Past Surgical History:   Procedure Laterality Date    CHOLECYSTECTOMY      DENTAL SURGERY      GASTRIC BYPASS      EILEEN-EN-Y PROCEDURE      TUBAL LIGATION         Meds/Allergies:  Prior to Admission medications    Medication Sig Start Date End Date Taking?  Authorizing Provider   albuterol (PROAIR HFA) 90 mcg/act inhaler Inhale 1-2 puffs 2 (two) times a day as needed for wheezing or shortness of breath 3/31/19  Yes Tala Coffman DO   CARAFATE 1 GM/10ML suspension  3/27/19   Historical Provider, MD   Cholecalciferol (VITAMIN D3) 86316 units CAPS Take by mouth    Historical Provider, MD   dicyclomine (BENTYL) 20 mg tablet Take 1 tablet by mouth 2 (two) times a day for 30 days 3/26/17 4/25/17  Shira Sewell DO fluticasone-vilanterol (BREO ELLIPTA) 200-25 MCG/INH inhaler Inhale 1 puff daily 3/31/19   Shaista Chase DO   hydrOXYzine HCL (ATARAX) 25 mg tablet Take 1 tablet (25 mg total) by mouth every 6 (six) hours as needed (insomnia) 3/27/19   Tala Coffman DO   ondansetron (ZOFRAN) 4 mg tablet Take 1 tablet (4 mg total) by mouth every 8 (eight) hours as needed for nausea or vomiting for up to 30 days 3/31/19 4/30/19  Shaista Chase DO   pantoprazole (PROTONIX) 40 mg tablet Take 40 mg by mouth 10/31/17   Historical Provider, MD   traMADol (ULTRAM) 50 mg tablet Take 1 tablet (50 mg total) by mouth every 6 (six) hours as needed for moderate pain or severe pain May take up to 2 tablets every 6 hours as needed for pain 3/27/19   Shaista Chase DO   traZODone (DESYREL) 50 mg tablet Take 1 tablet (50 mg total) by mouth daily at bedtime as needed for sleep 4/4/19   Shaista Chase DO   triamcinolone (KENALOG) 0 1 % cream Apply topically 2 (two) times a day  Patient not taking: Reported on 2/25/2020 3/27/19   Shaista Chase DO     I have reviewed home medications using allscripts  Allergies:    Allergies   Allergen Reactions    Morphine     Sulfamethoxazole-Trimethoprim Nausea Only       Social History:  Marital Status: Single   Occupation:  Unclear  Patient Pre-hospital Living Situation:  Resides at home with   Patient Pre-hospital Level of Mobility:  Full without assist  Patient Pre-hospital Diet Restrictions:  None  Substance Use History:   Social History     Substance and Sexual Activity   Alcohol Use Never    Frequency: Never     Social History     Tobacco Use   Smoking Status Current Every Day Smoker    Packs/day: 0 25    Years: 15 00    Pack years: 3 75    Types: Cigarettes   Smokeless Tobacco Never Used     Social History     Substance and Sexual Activity   Drug Use Yes    Types: Methamphetamines       Family History:  I have reviewed the patients family history    Physical Exam: Vitals:   Blood Pressure: 99/67 (02/25/20 0256)  Pulse: 65 (02/25/20 0256)  Temperature: 99 5 °F (37 5 °C) (02/25/20 0256)  Temp Source: Temporal (02/25/20 0256)  Respirations: 16 (02/25/20 0256)  Height: 5' (152 4 cm) (02/24/20 1937)  Weight - Scale: 52 2 kg (115 lb) (02/24/20 1937)  SpO2: 95 % (02/25/20 0256)    Physical Exam   Constitutional: She is oriented to person, place, and time  She appears well-developed and well-nourished  HENT:   Head: Normocephalic and atraumatic  Mouth/Throat: No oropharyngeal exudate  Eyes: Pupils are equal, round, and reactive to light  EOM are normal  No scleral icterus  Neck: Normal range of motion  Neck supple  No JVD present  Cardiovascular: Normal rate, regular rhythm and normal heart sounds  No murmur heard  Pulmonary/Chest: Effort normal and breath sounds normal  No respiratory distress  She has no wheezes  She has no rales  Abdominal: Soft  Bowel sounds are normal  There is tenderness in the epigastric area and left upper quadrant  There is guarding  There is no rebound  Musculoskeletal: Normal range of motion  She exhibits no edema  Lymphadenopathy:     She has no cervical adenopathy  Neurological: She is alert and oriented to person, place, and time  Skin: Skin is warm and dry  No rash noted  No erythema  Psychiatric: Her mood appears anxious  Her affect is labile  She is agitated  Nursing note and vitals reviewed  Additional Data:   Lab Results: I have personally reviewed pertinent reports      Results from last 7 days   Lab Units 02/24/20 1945   WBC Thousand/uL 5 00   HEMOGLOBIN g/dL 13 4   HEMATOCRIT % 42 7   PLATELETS Thousands/uL 330   NEUTROS PCT % 47   LYMPHS PCT % 48   MONOS PCT % 3   EOS PCT % 1     Results from last 7 days   Lab Units 02/24/20 1945   POTASSIUM mmol/L 3 4*   CHLORIDE mmol/L 106   CO2 mmol/L 27   BUN mg/dL 9   CREATININE mg/dL 0 72   CALCIUM mg/dL 9 6   ALK PHOS U/L 72   ALT U/L 25   AST U/L 28     Results from last 7 days   Lab Units 02/24/20  1945   INR  1 03               Imaging: I have personally reviewed pertinent reports  CT abdomen pelvis with contrast   Final Result by Jaymie Beck DO (02/25 0140)      No evidence of bowel obstruction  Mild thickening of loops of small bowel which may represent enteritis  Workstation performed: [id number]         CTA chest abdomen pelvis w wo contrast   Final Result by Cecille Palumbo MD (02/24 2030)      Mild diffuse small bowel dilation with mild diffuse wall thickening and mucosal hyperemia in keeping with a nonspecific small bowel enteritis, likely infectious or inflammatory  No prominent terminal ileal thickening  Prominent stool throughout the colon without obstruction  I personally discussed this study with Bre Adams on 2/24/2020 at 8:308PM                      Workstation performed: [id number]             EKG, Pathology, and Other Studies Reviewed on Admission:   · EKG: N/A    NetAccess / Epic Records Reviewed: Yes     ** Please Note: This note has been constructed using a voice recognition system   **

## 2020-02-25 NOTE — PROGRESS NOTES
Patient receiving clear liquid diet  She has not had significant wt  change per chart review  BMI is 22 46 normal  GI recommending endoscopy, obstruction series  Diet advancement not indicated at this time  Recommend ensure clear apple TID with meals, no RD protocol to order same

## 2020-02-25 NOTE — ASSESSMENT & PLAN NOTE
· Placed in observation Medicine  · Place on clear liquid diet  · Consult GI in a m  · Continue pre-hospital Carafate and Protonix  · Give pain control p r n   · Unclear etiology as patient's labs and vital signs are unremarkable and CT scan only reveals nonspecific small-bowel enteritis  Patient did receive vanc and cefepime and ER, will hold off on further antibiotics at this time and trend procalcitonin

## 2020-02-25 NOTE — ED PROVIDER NOTES
History  Chief Complaint   Patient presents with    Abdominal Pain     Pt states she passed out in the bathtub and now has epigastric LUQ pain  Pt has hx of gastric bypass     HPI    Patient is a 51year old female who presents with mid and luq abdominal pain  She notes a history of gastric bypass  Patient also reports and episode of syncope in the bathtub  No head strike or fall  No vomiting  No chest pain or sob  Lungs clear  No focal neuro defects      + diffuse abdominal tenderness  MDM 51 yof, abdominal pain, ddx includes AAA, dissection, appy vs other acute intraabdominal process, will image  -----------------------  PER PRIOR NOTES  Patient is a 54 y/o female w/ PMH gerd, gastric bypass 2013 w/ Dr Venkatesh Vickers at [facility], h/o subsequent strictures requiring adhesiolysis who presents w/ intractable N/V  CXR unremarkable  CT A/P only sig for some nonspecific mucosal thickening of anatomic gastric antrum of bypassed portion of the stomach  Patient states since initial bypass surgery has had chronic nausea  Last saw surgeon and had scope about a year ago w/ noted ulcers and strictures  Was supposed to have surgery for stricture but ultimately did not go through with the procedure  Patient states can not keep any fluids/foods down  Vomits 5-6 x/day  Notes coffee ground appearing emesis  Chronic nausea  Notes burning/crampy epigastric pain  No melena  No BRBPR  Patient had CT scan of the abdomen and pelvis on March 23 which showed no acute abdominal or pelvic pathology  There is no anterograde bowel obstruction  There was evidence of previous gastric bypass  Patient was seen by general surgery/bariatric surgery and upper GI was ordered  Patient to undergo EGD today which showed moderately enlarged pouch, GJ stricture which was able to accommodate scope, had to be balloon dilated, no ulcer seen   General surgery recommended continuation of PPI and carafate along with smoking cessation  This was stressed to the pt post-procedure  She was placed on a full liquid diet  She was placed on vitamin supplementation by general surgery  She otherwise remained stable for discharge home at this time  hgb remained stable, 10 2  No further episodes of emesis  She was discharged home with strict dietary recommendations, smoking cessation, nicotine patch, and follow up with general surgery office  She was also recommended to follow up with her PCP, which she states she has an appt with him tomorrow, 3/27   -------------------------------    Prior to Admission Medications   Prescriptions Last Dose Informant Patient Reported? Taking?    CARAFATE 1 GM/10ML suspension   Yes No   Cholecalciferol (VITAMIN D3) 88622 units CAPS   Yes No   Sig: Take by mouth   albuterol (PROAIR HFA) 90 mcg/act inhaler   No No   Sig: Inhale 1-2 puffs 2 (two) times a day as needed for wheezing or shortness of breath   dicyclomine (BENTYL) 20 mg tablet   No No   Sig: Take 1 tablet by mouth 2 (two) times a day for 30 days   fluticasone-vilanterol (BREO ELLIPTA) 200-25 MCG/INH inhaler   No No   Sig: Inhale 1 puff daily   hydrOXYzine HCL (ATARAX) 25 mg tablet   No No   Sig: Take 1 tablet (25 mg total) by mouth every 6 (six) hours as needed (insomnia)   ondansetron (ZOFRAN) 4 mg tablet   No No   Sig: Take 1 tablet (4 mg total) by mouth every 8 (eight) hours as needed for nausea or vomiting for up to 30 days   pantoprazole (PROTONIX) 40 mg tablet   Yes No   Sig: Take 40 mg by mouth   traMADol (ULTRAM) 50 mg tablet   No No   Sig: Take 1 tablet (50 mg total) by mouth every 6 (six) hours as needed for moderate pain or severe pain May take up to 2 tablets every 6 hours as needed for pain   traZODone (DESYREL) 50 mg tablet   No No   Sig: Take 1 tablet (50 mg total) by mouth daily at bedtime as needed for sleep   triamcinolone (KENALOG) 0 1 % cream   No No   Sig: Apply topically 2 (two) times a day      Facility-Administered Medications: None Past Medical History:   Diagnosis Date    Asthma     GERD (gastroesophageal reflux disease)     H/O gastric bypass     Hypokalemia        Past Surgical History:   Procedure Laterality Date    CHOLECYSTECTOMY      DENTAL SURGERY      GASTRIC BYPASS      EILEEN-EN-Y PROCEDURE      TUBAL LIGATION         Family History   Problem Relation Age of Onset    Diabetes Mother     Heart disease Mother     Stroke Mother     Arthritis Mother     Hyperlipidemia Mother     Colon cancer Father     Heart disease Sister     Coronary artery disease Sister     Heart disease Brother     Coronary artery disease Brother      I have reviewed and agree with the history as documented  E-Cigarette/Vaping     E-Cigarette/Vaping Substances     Social History     Tobacco Use    Smoking status: Current Every Day Smoker    Smokeless tobacco: Never Used    Tobacco comment: ready to quit smoking per [employer]   Substance Use Topics    Alcohol use: No    Drug use: No       Review of Systems   Gastrointestinal: Positive for abdominal pain  All other systems reviewed and are negative  Physical Exam  Physical Exam   Constitutional: She is oriented to person, place, and time  She appears well-developed and well-nourished  HENT:   Head: Normocephalic and atraumatic  Right Ear: External ear normal    Left Ear: External ear normal    Eyes: Conjunctivae and EOM are normal    Neck: Normal range of motion  Neck supple  No JVD present  No tracheal deviation present  Cardiovascular: Normal rate, regular rhythm and normal heart sounds  Pulmonary/Chest: Effort normal  No respiratory distress  She has no wheezes  She has no rales  Abdominal: Soft  Bowel sounds are normal  There is tenderness  There is no rebound and no guarding  Musculoskeletal: She exhibits no edema or tenderness  Neurological: She is alert and oriented to person, place, and time  Skin: Skin is warm and dry  No rash noted  No erythema  Psychiatric: She has a normal mood and affect  Thought content normal    Nursing note and vitals reviewed        Vital Signs  ED Triage Vitals   Temperature Pulse Respirations Blood Pressure SpO2   02/24/20 1939 02/24/20 1937 02/24/20 1937 02/24/20 1937 02/24/20 1937   (!) 97 °F (36 1 °C) 66 18 106/79 100 %      Temp Source Heart Rate Source Patient Position - Orthostatic VS BP Location FiO2 (%)   02/24/20 1937 02/24/20 1937 -- 02/24/20 1937 --   Rectal Monitor  Left arm       Pain Score       02/24/20 1937       Worst Possible Pain           Vitals:    02/24/20 2345 02/25/20 0000 02/25/20 0045 02/25/20 0145   BP: 97/70 106/75 104/71 104/66   Pulse: 84 71 59 58         Visual Acuity      ED Medications  Medications   sodium chloride 0 9 % bolus 1,000 mL (0 mL Intravenous Stopped 2/24/20 2230)   HYDROmorphone (DILAUDID) injection 0 5 mg (0 5 mg Intravenous Given 2/24/20 2010)   ondansetron (ZOFRAN) injection 4 mg (4 mg Intravenous Given 2/24/20 2010)   iohexol (OMNIPAQUE) 350 MG/ML injection (MULTI-DOSE) 100 mL (100 mL Intravenous Given 2/24/20 2006)   vancomycin (VANCOCIN) IVPB (premix) 750 mg (0 mg/kg × 52 2 kg Intravenous Stopped 2/25/20 0156)   cefepime (MAXIPIME) IVPB (premix) 2,000 mg (0 mg Intravenous Stopped 2/24/20 2335)   sodium chloride 0 9 % bolus 1,000 mL (0 mL Intravenous Stopped 2/25/20 0130)   sucralfate (CARAFATE) oral suspension 1,000 mg (1,000 mg Oral Given 2/24/20 2317)   pantoprazole (PROTONIX) injection 40 mg (40 mg Intravenous Given 2/25/20 0008)   iohexol (OMNIPAQUE) 240 MG/ML solution 25 mL (25 mL Oral Given 2/25/20 0006)   fentanyl citrate (PF) 100 MCG/2ML 50 mcg (50 mcg Intravenous Given 2/25/20 0006)   iohexol (OMNIPAQUE) 350 MG/ML injection (SINGLE-DOSE) 100 mL (100 mL Intravenous Given 2/25/20 0121)   haloperidol lactate (HALDOL) injection 5 mg (5 mg Intramuscular Given 2/25/20 0152)   diphenhydrAMINE (BENADRYL) injection 25 mg (25 mg Intravenous Given 2/25/20 0156)       Diagnostic Studies  Results Reviewed     Procedure Component Value Units Date/Time    UA w Reflex to Microscopic w Reflex to Culture [[id number]]  (Abnormal) Collected:  02/25/20 0138    Lab Status:  Final result Specimen:  Urine, Clean Catch Updated:  02/25/20 0158     Color, UA Yellow     Clarity, UA Clear     Specific Gravity, UA <=1 005     pH, UA 6 0     Leukocytes, UA Negative     Nitrite, UA Negative     Protein, UA Negative mg/dl      Glucose, UA Negative mg/dl      Ketones, UA Negative mg/dl      Urobilinogen, UA 0 2 E U /dl      Bilirubin, UA Negative     Blood, UA 2+    Urine Microscopic [[id number]] Collected:  02/25/20 0138    Lab Status: In process Specimen:  Urine, Clean Catch Updated:  02/25/20 0152    Rapid drug screen, urine [[id number]] Collected:  02/25/20 0138    Lab Status: In process Specimen:  Urine, Clean Catch Updated:  02/25/20 0152    Lactic acid, plasma [[id number]]  (Normal) Collected:  02/24/20 2254    Lab Status:  Final result Specimen:  Blood from Arm, Right Updated:  02/24/20 2328     LACTIC ACID 1 1 mmol/L     Narrative:       Result may be elevated if tourniquet was used during collection      hCG, qualitative pregnancy [[id number]]  (Normal) Collected:  02/24/20 2040    Lab Status:  Final result Specimen:  Blood Updated:  02/24/20 2103     Preg, Serum Negative    Lipase [[id number]]  (Normal) Collected:  02/24/20 1945    Lab Status:  Final result Specimen:  Blood from Arm, Left Updated:  02/24/20 2039     Lipase 63 u/L     Comprehensive metabolic panel [[id number]]  (Abnormal) Collected:  02/24/20 1945    Lab Status:  Final result Specimen:  Blood from Arm, Left Updated:  02/24/20 2038     Sodium 140 mmol/L      Potassium 3 4 mmol/L      Chloride 106 mmol/L      CO2 27 mmol/L      ANION GAP 7 mmol/L      BUN 9 mg/dL      Creatinine 0 72 mg/dL      Glucose 107 mg/dL      Calcium 9 6 mg/dL      AST 28 U/L      ALT 25 U/L      Alkaline Phosphatase 72 U/L      Total Protein 7 5 g/dL      Albumin 4 3 g/dL      Total Bilirubin 0 50 mg/dL      eGFR 99 ml/min/1 73sq m     Narrative:       National Kidney Disease Foundation guidelines for Chronic Kidney Disease (CKD):     Stage 1 with normal or high GFR (GFR > 90 mL/min/1 73 square meters)    Stage 2 Mild CKD (GFR = 60-89 mL/min/1 73 square meters)    Stage 3A Moderate CKD (GFR = 45-59 mL/min/1 73 square meters)    Stage 3B Moderate CKD (GFR = 30-44 mL/min/1 73 square meters)    Stage 4 Severe CKD (GFR = 15-29 mL/min/1 73 square meters)    Stage 5 End Stage CKD (GFR <15 mL/min/1 73 square meters)  Note: GFR calculation is accurate only with a steady state creatinine    Troponin I [[id number]]  (Normal) Collected:  02/24/20 1945    Lab Status:  Final result Specimen:  Blood from Arm, Left Updated:  02/24/20 2011     Troponin I <0 03 ng/mL     Protime-INR [[id number]]  (Normal) Collected:  02/24/20 1945    Lab Status:  Final result Specimen:  Blood from Arm, Left Updated:  02/24/20 2003     Protime 11 9 seconds      INR 1 03    APTT [[id number]]  (Normal) Collected:  02/24/20 1945    Lab Status:  Final result Specimen:  Blood from Arm, Left Updated:  02/24/20 2003     PTT 37 seconds     CBC and differential [[id number]]  (Abnormal) Collected:  02/24/20 1945    Lab Status:  Final result Specimen:  Blood from Arm, Left Updated:  02/24/20 1956     WBC 5 00 Thousand/uL      RBC 5 02 Million/uL      Hemoglobin 13 4 g/dL      Hematocrit 42 7 %      MCV 85 fL      MCH 26 6 pg      MCHC 31 4 g/dL      RDW 16 8 %      MPV 7 3 fL      Platelets 024 Thousands/uL      Neutrophils Relative 47 %      Lymphocytes Relative 48 %      Monocytes Relative 3 %      Eosinophils Relative 1 %      Basophils Relative 0 %      Neutrophils Absolute 2 30 Thousands/µL      Lymphocytes Absolute 2 40 Thousands/µL      Monocytes Absolute 0 20 Thousand/µL      Eosinophils Absolute 0 10 Thousand/µL      Basophils Absolute 0 00 Thousands/µL                  CT abdomen pelvis with contrast   Final Result by Casey Elliott DO (02/25 0140)      No evidence of bowel obstruction  Mild thickening of loops of small bowel which may represent enteritis  Workstation performed: [id number]         CTA chest abdomen pelvis w wo contrast   Final Result by Luisa Lorenzo MD (02/24 2030)      Mild diffuse small bowel dilation with mild diffuse wall thickening and mucosal hyperemia in keeping with a nonspecific small bowel enteritis, likely infectious or inflammatory  No prominent terminal ileal thickening  Prominent stool throughout the colon without obstruction  I personally discussed this study with [place] Ken on 2/24/2020 at 8:308PM                      Workstation performed: [id number]                    Procedures  Procedures         ED Course                               MDM  Number of Diagnoses or Management Options  Abdominal pain:   Enteritis:   Diagnosis management comments: Ct with po - no obstruction  Accepted by Benjamin Muir / Darla Sandifer        Disposition  Final diagnoses:   Abdominal pain   Enteritis     Time reflects when diagnosis was documented in both MDM as applicable and the Disposition within this note     Time User Action Codes Description Comment    2/25/2020  2:01 AM Oliva Arias Add [R10 9] Abdominal pain     2/25/2020  2:01 AM Oliva Arias Add [K52 9] Enteritis       ED Disposition     ED Disposition Condition Date/Time Comment    Admit Stable Tue Feb 25, 2020  2:01 AM Case was discussed with dominic and the patient's admission status was agreed to be Admission Status: observation status to the service of Dr Benjamin Muir   Follow-up Information    None         Patient's Medications   Discharge Prescriptions    No medications on file     No discharge procedures on file      PDMP Review     None          ED Provider  Electronically Signed by           Trevor Ferro MD  02/25/20 6404

## 2020-02-26 LAB
AMYLASE SERPL-CCNC: 35 IU/L (ref 25–115)
ANION GAP SERPL CALCULATED.3IONS-SCNC: 6 MMOL/L (ref 4–13)
BASOPHILS # BLD AUTO: 0 THOUSANDS/ΜL (ref 0–0.1)
BASOPHILS NFR BLD AUTO: 0 % (ref 0–2)
BUN SERPL-MCNC: 8 MG/DL (ref 7–25)
CALCIUM SERPL-MCNC: 8.6 MG/DL (ref 8.6–10.5)
CHLORIDE SERPL-SCNC: 103 MMOL/L (ref 98–107)
CO2 SERPL-SCNC: 25 MMOL/L (ref 21–31)
CREAT SERPL-MCNC: 0.54 MG/DL (ref 0.6–1.2)
EOSINOPHIL # BLD AUTO: 0 THOUSAND/ΜL (ref 0–0.61)
EOSINOPHIL NFR BLD AUTO: 0 % (ref 0–5)
ERYTHROCYTE [DISTWIDTH] IN BLOOD BY AUTOMATED COUNT: 16.7 % (ref 11.5–14.5)
GFR SERPL CREATININE-BSD FRML MDRD: 112 ML/MIN/1.73SQ M
GLUCOSE SERPL-MCNC: 74 MG/DL (ref 65–99)
HCT VFR BLD AUTO: 33 % (ref 42–47)
HGB BLD-MCNC: 10.5 G/DL (ref 12–16)
LYMPHOCYTES # BLD AUTO: 1.1 THOUSANDS/ΜL (ref 0.6–4.47)
LYMPHOCYTES NFR BLD AUTO: 17 % (ref 21–51)
MCH RBC QN AUTO: 26.5 PG (ref 26–34)
MCHC RBC AUTO-ENTMCNC: 31.9 G/DL (ref 31–37)
MCV RBC AUTO: 83 FL (ref 81–99)
MONOCYTES # BLD AUTO: 0.2 THOUSAND/ΜL (ref 0.17–1.22)
MONOCYTES NFR BLD AUTO: 3 % (ref 2–12)
NEUTROPHILS # BLD AUTO: 5.5 THOUSANDS/ΜL (ref 1.4–6.5)
NEUTS SEG NFR BLD AUTO: 80 % (ref 42–75)
PLATELET # BLD AUTO: 223 THOUSANDS/UL (ref 149–390)
PMV BLD AUTO: 8 FL (ref 8.6–11.7)
POTASSIUM SERPL-SCNC: 3.3 MMOL/L (ref 3.5–5.5)
PROCALCITONIN SERPL-MCNC: 1.71 NG/ML
RBC # BLD AUTO: 3.96 MILLION/UL (ref 3.9–5.2)
SODIUM SERPL-SCNC: 134 MMOL/L (ref 134–143)
WBC # BLD AUTO: 6.9 THOUSAND/UL (ref 4.8–10.8)

## 2020-02-26 PROCEDURE — 85025 COMPLETE CBC W/AUTO DIFF WBC: CPT | Performed by: HOSPITALIST

## 2020-02-26 PROCEDURE — 99232 SBSQ HOSP IP/OBS MODERATE 35: CPT | Performed by: HOSPITALIST

## 2020-02-26 PROCEDURE — 84145 PROCALCITONIN (PCT): CPT | Performed by: PHYSICIAN ASSISTANT

## 2020-02-26 PROCEDURE — 80048 BASIC METABOLIC PNL TOTAL CA: CPT | Performed by: HOSPITALIST

## 2020-02-26 RX ORDER — POLYETHYLENE GLYCOL 3350 17 G/17G
17 POWDER, FOR SOLUTION ORAL DAILY PRN
Status: DISCONTINUED | OUTPATIENT
Start: 2020-02-26 | End: 2020-02-27 | Stop reason: HOSPADM

## 2020-02-26 RX ADMIN — SUCRALFATE 1000 MG: 1 SUSPENSION ORAL at 21:29

## 2020-02-26 RX ADMIN — METRONIDAZOLE 500 MG: 500 INJECTION, SOLUTION INTRAVENOUS at 15:07

## 2020-02-26 RX ADMIN — SUCRALFATE 1000 MG: 1 SUSPENSION ORAL at 15:57

## 2020-02-26 RX ADMIN — NICOTINE 1 PATCH: 14 PATCH, EXTENDED RELEASE TRANSDERMAL at 09:32

## 2020-02-26 RX ADMIN — TRAZODONE HYDROCHLORIDE 50 MG: 50 TABLET ORAL at 21:30

## 2020-02-26 RX ADMIN — METRONIDAZOLE 500 MG: 500 INJECTION, SOLUTION INTRAVENOUS at 05:12

## 2020-02-26 RX ADMIN — FLUTICASONE FUROATE AND VILANTEROL TRIFENATATE 1 PUFF: 200; 25 POWDER RESPIRATORY (INHALATION) at 09:30

## 2020-02-26 RX ADMIN — CIPROFLOXACIN 400 MG: 2 INJECTION, SOLUTION INTRAVENOUS at 01:55

## 2020-02-26 RX ADMIN — CIPROFLOXACIN 400 MG: 2 INJECTION, SOLUTION INTRAVENOUS at 15:54

## 2020-02-26 RX ADMIN — SUCRALFATE 1000 MG: 1 SUSPENSION ORAL at 06:22

## 2020-02-26 RX ADMIN — SUCRALFATE 1000 MG: 1 SUSPENSION ORAL at 11:53

## 2020-02-26 RX ADMIN — ENOXAPARIN SODIUM 40 MG: 40 INJECTION SUBCUTANEOUS at 11:53

## 2020-02-26 RX ADMIN — METRONIDAZOLE 500 MG: 500 INJECTION, SOLUTION INTRAVENOUS at 21:29

## 2020-02-26 NOTE — SOCIAL WORK
Discussed the POC with the rounding team   Pt  will need one more day of antibiotics and will be able to be discharged to home tomorrow  Pt will have and endoscopy as an outpt  Mother will transport home

## 2020-02-26 NOTE — ASSESSMENT & PLAN NOTE
· Mild and intermittent, currently without exacerbation  ·  Continue pre-hospital Breo 200/251 puff daily and Proventil 2 puffs q 4 hours p r n

## 2020-02-26 NOTE — ASSESSMENT & PLAN NOTE
· Abdominal pain has improved but has not resolved  · Secondary to an infectious enterocolitis  · Procalcitonin level was 2 92, it is now down to 1 71  · Stool studies are  pending  · Appreciate GI and surgery input  · Continue day 2 of IV metronidazole and IV ciprofloxacin  · Patient is currently on a clear liquid diet  · Will change the patient from the observation status to the inpatient status due to slower than expected resolution of symptoms, the need for IV antibiotics in the setting of having an elevated procalcitonin level  · Potential discharge planning for 24-48 hours

## 2020-02-26 NOTE — PROGRESS NOTES
Progress Note - Mariya Pina 1970, 51 y o  female MRN: [id number]    Unit/Bed#: -01 Encounter: [id number]    Primary Care Provider: Romulo Bullard DO   Date and time admitted to hospital: 2/24/2020  7:24 PM        * Left upper quadrant pain  Assessment & Plan  · Abdominal pain has improved but has not resolved  · Secondary to an infectious enterocolitis  · Procalcitonin level was 2 92, it is now down to 1 71  · Stool studies are  pending  · Appreciate GI and surgery input  · Continue day 2 of IV metronidazole and IV ciprofloxacin  · Patient is currently on a clear liquid diet  · Will change the patient from the observation status to the inpatient status due to slower than expected resolution of symptoms, the need for IV antibiotics in the setting of having an elevated procalcitonin level  · Potential discharge planning for 24-48 hours    Intractable vomiting with nausea  Assessment & Plan  · The most part has resolved, patient has started to tolerate a clear liquid diet  · Secondary to the infectious enterocolitis  · See the outline above    Asthma  Assessment & Plan  · Mild and intermittent, currently without exacerbation  ·  Continue pre-hospital Breo 200/251 puff daily and Proventil 2 puffs q 4 hours p r n  Gastroesophageal reflux disease  Assessment & Plan  · Continue Carafate and Protonix    Insomnia  Assessment & Plan  · Continue pre-hospital trazodone 50 mg p o  Q h s  P r n  Nicotine dependence  Assessment & Plan  · Continue nicotine patch  · Cessation counseling provided        VTE Pharmacologic Prophylaxis: Pharmacologic: Enoxaparin (Lovenox)    Patient Centered Rounds: I have performed bedside rounds with nursing staff today      Discussions with Specialists or Other Care Team Provider:  Gastroenterology, surgery, case management, nursing, pharmacy  Education and Discussions with Family / Patient:  Patient was brought up to par with the plan of care for today, all questions answered to her satisfaction    Current Length of Stay: 0 day(s)    Current Patient Status: Observation   Certification Statement: The patient, admitted on an observation basis, will now require > 2 midnight hospital stay due to The need for continued IV antibiotics    Discharge Plan:  Hopeful discharge planning in 24-48 hours    Code Status: Level 1 - Full Code    Subjective:   Patient seen and examined, complains of constipation  She reports a moderate improvement in her abdominal pain, nausea and vomiting  Objective:     Vitals:   Temp (24hrs), Av 9 °F (36 6 °C), Min:97 4 °F (36 3 °C), Max:98 9 °F (37 2 °C)    Temp:  [97 4 °F (36 3 °C)-98 9 °F (37 2 °C)] 97 5 °F (36 4 °C)  HR:  [70-76] 70  Resp:  [17-18] 18  BP: ()/(54-68) 111/68  SpO2:  [96 %-100 %] 100 %  Body mass index is 22 46 kg/m²  Input and Output Summary (last 24 hours): Intake/Output Summary (Last 24 hours) at 2020 1143  Last data filed at 2020 0914  Gross per 24 hour   Intake 360 ml   Output    Net 360 ml       Physical Exam:     Physical Exam   Constitutional: She is oriented to person, place, and time  She appears well-developed and well-nourished  HENT:   Head: Normocephalic and atraumatic  Nose: Nose normal    Mouth/Throat: Oropharynx is clear and moist    Eyes: Pupils are equal, round, and reactive to light  Conjunctivae and EOM are normal    Neck: Normal range of motion  Neck supple  No JVD present  No thyromegaly present  Cardiovascular: Normal rate, regular rhythm and intact distal pulses  Exam reveals no gallop and no friction rub  No murmur heard  Pulmonary/Chest: Effort normal and breath sounds normal  No respiratory distress  Abdominal: Soft  Bowel sounds are normal  She exhibits no distension and no mass  There is no tenderness  There is no guarding     Soft, mildly tender to palpation in the epigastric region, nondistended, bowel sounds normoactive x4 quadrants, no rebound or guarding Musculoskeletal: Normal range of motion  She exhibits no edema  Lymphadenopathy:     She has no cervical adenopathy  Neurological: She is alert and oriented to person, place, and time  No cranial nerve deficit  Skin: Skin is warm  No rash noted  No erythema  Psychiatric: She has a normal mood and affect  Her behavior is normal    Vitals reviewed  Additional Data:     Labs:    Results from last 7 days   Lab Units 02/26/20 0449   WBC Thousand/uL 6 90   HEMOGLOBIN g/dL 10 5*   HEMATOCRIT % 33 0*   PLATELETS Thousands/uL 223   NEUTROS PCT % 80*   LYMPHS PCT % 17*   MONOS PCT % 3   EOS PCT % 0     Results from last 7 days   Lab Units 02/26/20 0449 02/24/20 1945   POTASSIUM mmol/L 3 3*   < > 3 4*   CHLORIDE mmol/L 103   < > 106   CO2 mmol/L 25   < > 27   BUN mg/dL 8   < > 9   CREATININE mg/dL 0 54*   < > 0 72   CALCIUM mg/dL 8 6   < > 9 6   ALK PHOS U/L  --   --  72   ALT U/L  --   --  25   AST U/L  --   --  28    < > = values in this interval not displayed  Results from last 7 days   Lab Units 02/24/20 1945   INR  1 03               * I Have Reviewed All Lab Data Listed Above  * Additional Pertinent Lab Tests Reviewed:  Tess 67 Admission  Reviewed    Imaging:  Imaging Reports Reviewed Today Include:  Obstructive series-grossly within normal limits    Recent Cultures (last 7 days):           Last 24 Hours Medication List:     Current Facility-Administered Medications:  acetaminophen 650 mg Oral Q6H PRN Bo Fabian PA-C    albuterol 2 puff Inhalation Q4H PRN Bo Fabian PA-C    ciprofloxacin 400 mg Intravenous Q12H Luz Carmona MD Last Rate: 400 mg (02/26/20 0155)   fluticasone-vilanterol 1 puff Inhalation Daily Bo Fabian PA-C    metroNIDAZOLE 500 mg Intravenous Q8H Luz Carmona MD Last Rate: 500 mg (02/26/20 0628)   nicotine 1 patch Transdermal Daily Bo Fabian PA-C    ondansetron 4 mg Intravenous Q6H PRN Bo Fabian PA-C    pantoprazole 40 mg Oral Early Morning Dalia Mcburney, PA-C    sucralfate 1,000 mg Oral 4x Daily (AC & HS) Dalia Mcburney, PA-C    traZODone 50 mg Oral HS PRN Dalia Mcburney, PA-C         Today, Patient Was Seen By: Robyn Srinivasan MD    ** Please Note: Dictation voice to text software may have been used in the creation of this document   **

## 2020-02-26 NOTE — ASSESSMENT & PLAN NOTE
· The most part has resolved, patient has started to tolerate a clear liquid diet  · Secondary to the infectious enterocolitis  · See the outline above

## 2020-02-26 NOTE — UTILIZATION REVIEW
Initial Clinical Review   Observation 2/25/20 @ 0159, converted to inpatient admission 2/26/20 @ 1147 for continued care & tx for LUQ pain  Per MD:  Double ivabt therapy in progress for abd pain 2nd infectious enterocolitis with elevated procalcitonin  abd Soft, mildly tender to palpation in the epigastric region, nondistended, bowel sounds normoactive x4 quadrants, no rebound or guarding  Remains on clear liquids at this time  Admitting Physician Jasmina Miranda    Level of Care Med Surg    Estimated length of stay More than 2 Midnights    Certification I certify that inpatient services are medically necessary for this patient for a duration of greater than two midnights  See H&P and MD Progress Notes for additional information about the patient's course of treatment            Order History   Inpatient   Date/Time Action Taken User   02/26/20 1147 Release Ramez Leslie MD (auto-released)   02/26/20 1147 Complete Ramez Leslie MD       ED Arrival Information       Expected Arrival 70 Angel Barron of Arrival Escorted By Service Admission Type    - 2/24/2020 19:23 Urgent Ambulance [facility]      Arrival Complaint    abd pain                Chief Complaint   Patient presents with    Abdominal Pain     Pt states she passed out in the bathtub and now has epigastric LUQ pain  Pt has hx of gastric bypass     Raphael Erickson is a 51 y o  female who presents with left upper quadrant abdominal pain x1 week  Patient presents ER for further evaluation treatment one-week history of left upper quadrant abdominal pain  Patient states her pain is 10/10 without aggravating or alleviating factors and accompanied with multiple episodes of vomiting   Patient has a history of gastric bypass in 2013 with subsequent surgeries and most recently was at [facility] in March of 2019 and had to have an EGD and underwent a balloon dilatation of the stricture at the [address] [place] [address] without ulcer  Patient states that she was supposed to have and unknown surgery done by our bariatric surgeon Dr Vielka Norris but he passed away 2 days prior to her scheduled surgery   Patient denies any fever chills, no bright red blood per rectum or dark stools            ED Triage Vitals   Temperature Pulse Respirations Blood Pressure SpO2   02/24/20 1939 02/24/20 1937 02/24/20 1937 02/24/20 1937 02/24/20 1937   (!) 97 °F (36 1 °C) 66 18 106/79 100 %      Temp Source Heart Rate Source Patient Position - Orthostatic VS BP Location FiO2 (%)   02/24/20 1937 02/24/20 1937 02/25/20 0256 02/24/20 1937 --   Rectal Monitor Lying Left arm       Pain Score       02/24/20 1937       Worst Possible Pain             Wt Readings from Last 1 Encounters:   02/24/20 52 2 kg (115 lb)     Additional Vital Signs:   02/25/20 0753  99 9 °F (37 7 °C)  74  18  94/59    100 %  None (Room air)  Lying   02/25/20 0256  99 5 °F (37 5 °C)  65  16  99/67    95 %  None (Room air)  Lying   02/25/20 0215    61  16  96/65  77  100 %       02/25/20 0200    58  18  101/70  81  100 %       02/25/20 0145    58  17  104/66  80  100 %       02/25/20 0045    59    104/71  82  100 %       02/25/20 0000    71    106/75  86  100 %       02/24/20 2345    84    97/70  79  91 %       02/24/20 2330    81    105/70  82  100 %       02/24/20 2315    66    107/75  86  100 %       02/24/20 2300    80    109/68  83  92 %       02/24/20 2245  98 3 °F (36 8 °C)  98  21  88/53Abnormal   67         02/24/20 2230    69  18  84/58Abnormal   65  100 %  None (Room air)     BP: Dr Donald Charles notified at 02/24/20 2230 02/24/20 2200    67  17  116/70  86  100 %       02/24/20 2145    61  14  107/67  82  100 %       02/24/20 2115    59  22      100 %       02/24/20 2045    55  18  109/75  86  100 %       02/24/20 1939  97 °F (36 1 °C)Abnormal                  02/24/20 1937    66  18  106/79  89  100 %  None (Room air)   Pertinent Labs/Diagnostic Test Results:   Ct abd/pelvis (2/25) No evidence of bowel obstruction  Mild thickening of loops of small bowel which may represent enteritis  CTA Chest/abd/pelvis (2/24) Mild diffuse small bowel dilation with mild diffuse wall thickening and mucosal hyperemia in keeping with a nonspecific small bowel enteritis, likely infectious or inflammatory  No prominent terminal ileal thickening  Prominent stool throughout the colon without obstruction  Ref Range & Units 2/26/20 0449 2/25/20 0828 2/24/20 1945   WBC 4 80 - 10 80 Thousand/uL 6 90  10 80  5 00    RBC 3 90 - 5 20 Million/uL 3 96  4 45  5 02    Hemoglobin 12 0 - 16 0 g/dL 10 5Low   11 7Low   13 4    Hematocrit 42 0 - 47 0 % 33  0Low   37 6Low   42 7    MCV 81 - 99 fL 83  85  85    MCH 26 0 - 34 0 pg 26 5  26 3  26 6    MCHC 31 0 - 37 0 g/dL 31 9  31 2  31 4    RDW 11 5 - 14 5 % 16 7High   16 9High   16  8High     MPV 8 6 - 11 7 fL 8 0Low   7 9Low   7 3Low     Platelets 368 - 474 Thousands/uL 223  265  330    Neutrophils Relative 42 - 75 % 80High   87High   47    Lymphocytes Relative 21 - 51 % 17Low   10Low   48    Monocytes Relative 2 - 12 % 3  3  3    Eosinophils Relative 0 - 5 % 0  0  1    Basophils Relative 0 - 2 % 0  0  0    Neutrophils Absolute 1 40 - 6 50 Thousands/µL 5 50  9  40High   2 30    Lymphocytes Absolute 0 60 - 4 47 Thousands/µL 1 10  1 10  2 40    Monocytes Absolute 0 17 - 1 22 Thousand/µL 0 20  0 30  0 20    Eosinophils Absolute 0 00 - 0 61 Thousand/µL 0 00  0 00  0 10    Basophils Absolute 0 00 - 0 10 Thousands/µL 0 00  0 00  0 00    nRBC          Ref Range & Units 2/26/20 0449 2/25/20 0828 2/24/20 1945   Sodium 134 - 143 mmol/L 134  139  140    Potassium 3 5 - 5 5 mmol/L 3 3Low   3 5  3 4Low     Chloride 98 - 107 mmol/L 103  106  106    CO2 21 - 31 mmol/L 25  26  27    ANION GAP 4 - 13 mmol/L 6  7  7    BUN 7 - 25 mg/dL 8  8  9    Creatinine 0 60 - 1 20 mg/dL 0 54Low   0 74 CM 0 72 CM   Glucose 65 - 99 mg/dL 74  98 CM 107High  CM   Calcium 8 6 - 10 5 mg/dL 8 6  8 5Low   9 6    eGFR ml/min/1 73sq m 112  96  99             Results from last 7 days   Lab Units 02/24/20   1945   AST U/L 28   ALT U/L 25   ALK PHOS U/L 72   TOTAL PROTEIN g/dL 7 5   ALBUMIN g/dL 4 3   TOTAL BILIRUBIN mg/dL 0 50           Results from last 7 days   Lab Units 02/25/20   0828 02/24/20   1945   GLUCOSE RANDOM mg/dL 98 107*          Results from last 7 days   Lab Units 02/24/20   1945   TROPONIN I ng/mL <0 03          Results from last 7 days   Lab Units 02/24/20   1945   PROTIME seconds 11 9   INR  1 03   PTT seconds 37          Results from last 7 days   Lab Units 02/24/20   2254   LACTIC ACID mmol/L 1 1          Results from last 7 days   Lab Units 02/24/20   1945   LIPASE u/L 63          Results from last 7 days   Lab Units 02/25/20   0138   CLARITY UA  Clear   COLOR UA  Yellow   SPEC GRAV UA  <=1 005*   PH UA  6 0   GLUCOSE UA mg/dl Negative   KETONES UA mg/dl Negative   BLOOD UA  2+*   PROTEIN UA mg/dl Negative   NITRITE UA  Negative   BILIRUBIN UA  Negative   UROBILINOGEN UA E U /dl 0 2   LEUKOCYTES UA  Negative   WBC UA /hpf None Seen   RBC UA /hpf 10-20*   BACTERIA UA /hpf None Seen   EPITHELIAL CELLS WET PREP /hpf None Seen            Results from last 7 days   Lab Units 02/25/20   0138   AMPH/METH  Positive*   BARBITURATE UR  Negative   BENZODIAZEPINE UR  Negative   COCAINE UR  Negative   METHADONE URINE  Negative   OPIATE UR  Negative   PCP UR  Negative   THC UR  Negative      Ref Range & Units 2/26/20 0449   Procalcitonin <=0 25 ng/ml 1  71High        ED Treatment:               Medication Administration from 02/24/2020 1923 to 02/25/2020 0230        Date/Time Order Dose Route Action     02/24/2020 2012 sodium chloride 0 9 % bolus 1,000 mL 1,000 mL Intravenous New Bag     02/24/2020 2010 HYDROmorphone (DILAUDID) injection 0 5 mg 0 5 mg Intravenous Given     02/24/2020 2010 ondansetron (ZOFRAN) injection 4 mg 4 mg Intravenous Given 02/24/2020 2006 iohexol (OMNIPAQUE) 350 MG/ML injection (MULTI-DOSE) 100 mL 100 mL Intravenous Given     02/25/2020 0006 vancomycin (VANCOCIN) IVPB (premix) 750 mg 750 mg Intravenous New Bag     02/24/2020 2300 cefepime (MAXIPIME) IVPB (premix) 2,000 mg 2,000 mg Intravenous New Bag     02/24/2020 2300 sodium chloride 0 9 % bolus 1,000 mL 1,000 mL Intravenous New Bag     02/24/2020 2317 sucralfate (CARAFATE) oral suspension 1,000 mg 1,000 mg Oral Given     02/25/2020 0008 pantoprazole (PROTONIX) injection 40 mg 40 mg Intravenous Given     02/25/2020 0006 iohexol (OMNIPAQUE) 240 MG/ML solution 25 mL 25 mL Oral Given     02/25/2020 0006 fentanyl citrate (PF) 100 MCG/2ML 50 mcg 50 mcg Intravenous Given     02/25/2020 0121 iohexol (OMNIPAQUE) 350 MG/ML injection (SINGLE-DOSE) 100 mL 100 mL Intravenous Given     02/25/2020 0152 haloperidol lactate (HALDOL) injection 5 mg 5 mg Intramuscular Given     02/25/2020 0156 diphenhydrAMINE (BENADRYL) injection 25 mg 25 mg Intravenous Given          Medical History                                                       Present on Admission:    Left upper quadrant pain    Insomnia    Intractable vomiting with nausea    Gastroesophageal reflux disease    Asthma    Nicotine dependence     Admitting Diagnosis: Enteritis [K52 9]   Abdominal pain [R10 9]   Age/Sex: 51 y o  female   Admission Orders:   Consult gi  Consult surgery  Clear liquids  scd's  Scheduled Medications:   acetaminophen 650 mg Oral Q6H PRN   albuterol 2 puff Inhalation Q4H PRN   ciprofloxacin 400 mg Intravenous Q12H   fluticasone-vilanterol 1 puff Inhalation Daily   metroNIDAZOLE 500 mg Intravenous Q8H   nicotine 1 patch Transdermal Daily   ondansetron 4 mg Intravenous Q6H PRN   pantoprazole 40 mg Oral Early Morning   polyethylene glycol 17 g Oral Daily PRN   sucralfate 1,000 mg Oral 4x Daily (AC & HS)   traZODone 50 mg Oral HS PRN     Network Utilization Review Department [email or web address]   ATTENTION: Please call with any questions or concerns to [phone] and carefully listen to the prompts so that you are directed to the right person  All voicemails are confidential    Flores Omer all requests for admission clinical reviews, approved or denied determinations and any other requests to dedicated fax number below belonging to the campus where the patient is receiving treatment   List of dedicated fax numbers for the Facilities:   [address] [place] [address] DENIALS (Administrative/Medical Necessity) [phone]   [address] (Maternity/NICU/Pediatrics) [phone]   [facility] [phone]   [facility] [phone]   Lucia Mcguire [phone]   Kay Elizondo [phone]   [address] [place] [address] [address] [place] [address] [phone]   Collin Barahona [phone]   [address] [facility]  [address] [facility] [address] [facility] [phone]

## 2020-02-26 NOTE — PLAN OF CARE
Problem: PAIN - ADULT  Goal: Verbalizes/displays adequate comfort level or baseline comfort level  Description  Interventions:  - Encourage patient to monitor pain and request assistance  - Assess pain using appropriate pain scale  - Administer analgesics based on type and severity of pain and evaluate response  - Implement non-pharmacological measures as appropriate and evaluate response  - Consider cultural and social influences on pain and pain management  - Notify physician/advanced practitioner if interventions unsuccessful or patient reports new pain  Outcome: Progressing     Problem: INFECTION - ADULT  Goal: Absence or prevention of progression during hospitalization  Description  INTERVENTIONS:  - Assess and monitor for signs and symptoms of infection  - Monitor lab/diagnostic results  - Monitor all insertion sites, i e  indwelling lines, tubes, and drains  - Monitor endotracheal if appropriate and nasal secretions for changes in amount and color  - [place] appropriate cooling/warming therapies per order  - Administer medications as ordered  - Instruct and encourage patient and family to use good hand hygiene technique  - Identify and instruct in appropriate isolation precautions for identified infection/condition  Outcome: Progressing  Goal: Absence of fever/infection during neutropenic period  Description  INTERVENTIONS:  - Monitor WBC    Outcome: Progressing     Problem: SAFETY ADULT  Goal: Patient will remain free of falls  Description  INTERVENTIONS:  - Assess patient frequently for physical needs  -  Identify cognitive and physical deficits and behaviors that affect risk of falls    -  [place] fall precautions as indicated by assessment   - Educate patient/family on patient safety including physical limitations  - Instruct patient to call for assistance with activity based on assessment  - Modify environment to reduce risk of injury  - Consider OT/PT consult to assist with strengthening/mobility  Outcome: Progressing  Goal: Maintain or return to baseline ADL function  Description  INTERVENTIONS:  -  Assess patient's ability to carry out ADLs; assess patient's baseline for ADL function and identify physical deficits which impact ability to perform ADLs (bathing, care of mouth/teeth, toileting, grooming, dressing, etc )  - Assess/evaluate cause of self-care deficits   - Assess range of motion  - Assess patient's mobility; develop plan if impaired  - Assess patient's need for assistive devices and provide as appropriate  - Encourage maximum independence but intervene and supervise when necessary  - Involve family in performance of ADLs  - Assess for home care needs following discharge   - Consider OT consult to assist with ADL evaluation and planning for discharge  - Provide patient education as appropriate  Outcome: Progressing  Goal: Maintain or return mobility status to optimal level  Description  INTERVENTIONS:  - Assess patient's baseline mobility status (ambulation, transfers, stairs, etc )    - Identify cognitive and physical deficits and behaviors that affect mobility  - Identify mobility aids required to assist with transfers and/or ambulation (gait belt, sit-to-stand, lift, walker, cane, etc )  - [place] fall precautions as indicated by assessment  - Record patient progress and toleration of activity level on Mobility SBAR; progress patient to next Phase/Stage  - Instruct patient to call for assistance with activity based on assessment  - Consider rehabilitation consult to assist with strengthening/weightbearing, etc   Outcome: Progressing     Problem: DISCHARGE PLANNING  Goal: Discharge to home or other facility with appropriate resources  Description  INTERVENTIONS:  - Identify barriers to discharge w/patient and caregiver  - Arrange for needed discharge resources and transportation as appropriate  - Identify discharge learning needs (meds, wound care, etc )  - Arrange for interpretive services to assist at discharge as needed  - Refer to Case Management Department for coordinating discharge planning if the patient needs post-hospital services based on physician/advanced practitioner order or complex needs related to functional status, cognitive ability, or social support system  Outcome: Progressing     Problem: Knowledge Deficit  Goal: Patient/family/caregiver demonstrates understanding of disease process, treatment plan, medications, and discharge instructions  Description  Complete learning assessment and assess knowledge base  Interventions:  - Provide teaching at level of understanding  - Provide teaching via preferred learning methods  Outcome: Progressing     Problem: Potential for Falls  Goal: Patient will remain free of falls  Description  INTERVENTIONS:  - Assess patient frequently for physical needs  -  Identify cognitive and physical deficits and behaviors that affect risk of falls    -  [place] fall precautions as indicated by assessment   - Educate patient/family on patient safety including physical limitations  - Instruct patient to call for assistance with activity based on assessment  - Modify environment to reduce risk of injury  - Consider OT/PT consult to assist with strengthening/mobility  Outcome: Progressing     Problem: Prexisting or High Potential for Compromised Skin Integrity  Goal: Skin integrity is maintained or improved  Description  INTERVENTIONS:  - Identify patients at risk for skin breakdown  - Assess and monitor skin integrity  - Assess and monitor nutrition and hydration status  - Monitor labs   - Assess for incontinence   - Turn and reposition patient  - Assist with mobility/ambulation  - Relieve pressure over bony prominences  - Avoid friction and shearing  - Provide appropriate hygiene as needed including keeping skin clean and dry  - Evaluate need for skin moisturizer/barrier cream  - Collaborate with interdisciplinary team   - Patient/family teaching  - Consider wound care consult   Outcome: Progressing     Problem: Nutrition/Hydration-ADULT  Goal: Nutrient/Hydration intake appropriate for improving, restoring or maintaining nutritional needs  Description  Monitor and assess patient's nutrition/hydration status for malnutrition  Collaborate with interdisciplinary team and initiate plan and interventions as ordered  Monitor patient's weight and dietary intake as ordered or per policy  Utilize nutrition screening tool and intervene as necessary  Determine patient's food preferences and provide high-protein, high-caloric foods as appropriate       INTERVENTIONS:  - Monitor oral intake, urinary output, labs, and treatment plans  - Assess nutrition and hydration status and recommend course of action  - Evaluate amount of meals eaten  - Assist patient with eating if necessary   - Allow adequate time for meals  - Recommend/ encourage appropriate diets, oral nutritional supplements, and vitamin/mineral supplements  - Order, calculate, and assess calorie counts as needed  - Recommend, monitor, and adjust tube feedings and TPN/PPN based on assessed needs  - Assess need for intravenous fluids  - Provide specific nutrition/hydration education as appropriate  - Include patient/family/caregiver in decisions related to nutrition  Outcome: Progressing     Problem: DISCHARGE PLANNING - CARE MANAGEMENT  Goal: Discharge to post-acute care or home with appropriate resources  Description  INTERVENTIONS:  - Conduct assessment to determine patient/family and health care team treatment goals, and need for post-acute services based on payer coverage, community resources, and patient preferences, and barriers to discharge  - Address psychosocial, clinical, and financial barriers to discharge as identified in assessment in conjunction with the patient/family and health care team  - Arrange appropriate level of post-acute services according to patient's   needs and preference and payer coverage in collaboration with the physician and health care team  - Communicate with and update the patient/family, physician, and health care team regarding progress on the discharge plan  - Arrange appropriate transportation to post-acute venues  Pt will be discharged home with mother who will transport     Outcome: Progressing

## 2020-02-27 ENCOUNTER — TRANSITIONAL CARE MANAGEMENT (OUTPATIENT)
Dept: FAMILY MEDICINE CLINIC | Facility: CLINIC | Age: 50
End: 2020-02-27

## 2020-02-27 VITALS
DIASTOLIC BLOOD PRESSURE: 52 MMHG | BODY MASS INDEX: 22.58 KG/M2 | HEIGHT: 60 IN | RESPIRATION RATE: 20 BRPM | TEMPERATURE: 98 F | OXYGEN SATURATION: 100 % | HEART RATE: 80 BPM | WEIGHT: 115 LBS | SYSTOLIC BLOOD PRESSURE: 90 MMHG

## 2020-02-27 LAB
ANION GAP SERPL CALCULATED.3IONS-SCNC: 7 MMOL/L (ref 4–13)
BASOPHILS # BLD AUTO: 0 THOUSANDS/ΜL (ref 0–0.1)
BASOPHILS NFR BLD AUTO: 0 % (ref 0–2)
BUN SERPL-MCNC: 8 MG/DL (ref 7–25)
CALCIUM SERPL-MCNC: 8.6 MG/DL (ref 8.6–10.5)
CHLORIDE SERPL-SCNC: 106 MMOL/L (ref 98–107)
CO2 SERPL-SCNC: 25 MMOL/L (ref 21–31)
CREAT SERPL-MCNC: 0.55 MG/DL (ref 0.6–1.2)
EOSINOPHIL # BLD AUTO: 0.1 THOUSAND/ΜL (ref 0–0.61)
EOSINOPHIL NFR BLD AUTO: 1 % (ref 0–5)
ERYTHROCYTE [DISTWIDTH] IN BLOOD BY AUTOMATED COUNT: 16.8 % (ref 11.5–14.5)
GFR SERPL CREATININE-BSD FRML MDRD: 111 ML/MIN/1.73SQ M
GLUCOSE SERPL-MCNC: 74 MG/DL (ref 65–99)
HCT VFR BLD AUTO: 32.6 % (ref 42–47)
HGB BLD-MCNC: 10.4 G/DL (ref 12–16)
LYMPHOCYTES # BLD AUTO: 1.5 THOUSANDS/ΜL (ref 0.6–4.47)
LYMPHOCYTES NFR BLD AUTO: 36 % (ref 21–51)
MCH RBC QN AUTO: 26.7 PG (ref 26–34)
MCHC RBC AUTO-ENTMCNC: 31.9 G/DL (ref 31–37)
MCV RBC AUTO: 84 FL (ref 81–99)
MONOCYTES # BLD AUTO: 0.2 THOUSAND/ΜL (ref 0.17–1.22)
MONOCYTES NFR BLD AUTO: 6 % (ref 2–12)
NEUTROPHILS # BLD AUTO: 2.3 THOUSANDS/ΜL (ref 1.4–6.5)
NEUTS SEG NFR BLD AUTO: 56 % (ref 42–75)
PLATELET # BLD AUTO: 223 THOUSANDS/UL (ref 149–390)
PMV BLD AUTO: 8 FL (ref 8.6–11.7)
POTASSIUM SERPL-SCNC: 3.3 MMOL/L (ref 3.5–5.5)
PROCALCITONIN SERPL-MCNC: 0.85 NG/ML
RBC # BLD AUTO: 3.89 MILLION/UL (ref 3.9–5.2)
SODIUM SERPL-SCNC: 138 MMOL/L (ref 134–143)
WBC # BLD AUTO: 4 THOUSAND/UL (ref 4.8–10.8)

## 2020-02-27 PROCEDURE — 84145 PROCALCITONIN (PCT): CPT | Performed by: HOSPITALIST

## 2020-02-27 PROCEDURE — 99239 HOSP IP/OBS DSCHRG MGMT >30: CPT | Performed by: HOSPITALIST

## 2020-02-27 PROCEDURE — 85025 COMPLETE CBC W/AUTO DIFF WBC: CPT | Performed by: HOSPITALIST

## 2020-02-27 PROCEDURE — 80048 BASIC METABOLIC PNL TOTAL CA: CPT | Performed by: HOSPITALIST

## 2020-02-27 RX ORDER — CIPROFLOXACIN 500 MG/1
500 TABLET, FILM COATED ORAL EVERY 12 HOURS SCHEDULED
Status: DISCONTINUED | OUTPATIENT
Start: 2020-02-27 | End: 2020-02-27 | Stop reason: HOSPADM

## 2020-02-27 RX ORDER — METRONIDAZOLE 500 MG/1
500 TABLET ORAL EVERY 8 HOURS SCHEDULED
Status: DISCONTINUED | OUTPATIENT
Start: 2020-02-27 | End: 2020-02-27 | Stop reason: HOSPADM

## 2020-02-27 RX ORDER — METRONIDAZOLE 500 MG/1
500 TABLET ORAL EVERY 8 HOURS SCHEDULED
Qty: 18 TABLET | Refills: 0 | Status: SHIPPED | OUTPATIENT
Start: 2020-02-27 | End: 2020-03-04

## 2020-02-27 RX ORDER — CIPROFLOXACIN 500 MG/1
500 TABLET, FILM COATED ORAL EVERY 12 HOURS SCHEDULED
Qty: 12 TABLET | Refills: 0 | Status: SHIPPED | OUTPATIENT
Start: 2020-02-27 | End: 2020-03-04

## 2020-02-27 RX ADMIN — NICOTINE 1 PATCH: 14 PATCH, EXTENDED RELEASE TRANSDERMAL at 09:17

## 2020-02-27 RX ADMIN — CIPROFLOXACIN 400 MG: 2 INJECTION, SOLUTION INTRAVENOUS at 01:59

## 2020-02-27 RX ADMIN — FLUTICASONE FUROATE AND VILANTEROL TRIFENATATE 1 PUFF: 200; 25 POWDER RESPIRATORY (INHALATION) at 09:17

## 2020-02-27 RX ADMIN — SUCRALFATE 1000 MG: 1 SUSPENSION ORAL at 06:03

## 2020-02-27 RX ADMIN — METRONIDAZOLE 500 MG: 500 INJECTION, SOLUTION INTRAVENOUS at 05:14

## 2020-02-27 RX ADMIN — PANTOPRAZOLE SODIUM 40 MG: 40 TABLET, DELAYED RELEASE ORAL at 05:53

## 2020-02-27 NOTE — ASSESSMENT & PLAN NOTE
· Mild and intermittent, currently without exacerbation  ·  Continue pre-hospital Breo 200/251 puff daily and Proventil 2 puffs q 4 hours p r n    · DC home on pre-admit meds at pre-admit dosages

## 2020-02-27 NOTE — PLAN OF CARE
Problem: PAIN - ADULT  Goal: Verbalizes/displays adequate comfort level or baseline comfort level  Description  Interventions:  - Encourage patient to monitor pain and request assistance  - Assess pain using appropriate pain scale  - Administer analgesics based on type and severity of pain and evaluate response  - Implement non-pharmacological measures as appropriate and evaluate response  - Consider cultural and social influences on pain and pain management  - Notify physician/advanced practitioner if interventions unsuccessful or patient reports new pain  Outcome: Progressing     Problem: INFECTION - ADULT  Goal: Absence or prevention of progression during hospitalization  Description  INTERVENTIONS:  - Assess and monitor for signs and symptoms of infection  - Monitor lab/diagnostic results  - Monitor all insertion sites, i e  indwelling lines, tubes, and drains  - Monitor endotracheal if appropriate and nasal secretions for changes in amount and color  - [place] appropriate cooling/warming therapies per order  - Administer medications as ordered  - Instruct and encourage patient and family to use good hand hygiene technique  - Identify and instruct in appropriate isolation precautions for identified infection/condition  Outcome: Progressing  Goal: Absence of fever/infection during neutropenic period  Description  INTERVENTIONS:  - Monitor WBC    Outcome: Progressing     Problem: SAFETY ADULT  Goal: Patient will remain free of falls  Description  INTERVENTIONS:  - Assess patient frequently for physical needs  -  Identify cognitive and physical deficits and behaviors that affect risk of falls    -  [place] fall precautions as indicated by assessment   - Educate patient/family on patient safety including physical limitations  - Instruct patient to call for assistance with activity based on assessment  - Modify environment to reduce risk of injury  - Consider OT/PT consult to assist with strengthening/mobility  Outcome: Progressing  Goal: Maintain or return to baseline ADL function  Description  INTERVENTIONS:  -  Assess patient's ability to carry out ADLs; assess patient's baseline for ADL function and identify physical deficits which impact ability to perform ADLs (bathing, care of mouth/teeth, toileting, grooming, dressing, etc )  - Assess/evaluate cause of self-care deficits   - Assess range of motion  - Assess patient's mobility; develop plan if impaired  - Assess patient's need for assistive devices and provide as appropriate  - Encourage maximum independence but intervene and supervise when necessary  - Involve family in performance of ADLs  - Assess for home care needs following discharge   - Consider OT consult to assist with ADL evaluation and planning for discharge  - Provide patient education as appropriate  Outcome: Progressing  Goal: Maintain or return mobility status to optimal level  Description  INTERVENTIONS:  - Assess patient's baseline mobility status (ambulation, transfers, stairs, etc )    - Identify cognitive and physical deficits and behaviors that affect mobility  - Identify mobility aids required to assist with transfers and/or ambulation (gait belt, sit-to-stand, lift, walker, cane, etc )  - [place] fall precautions as indicated by assessment  - Record patient progress and toleration of activity level on Mobility SBAR; progress patient to next Phase/Stage  - Instruct patient to call for assistance with activity based on assessment  - Consider rehabilitation consult to assist with strengthening/weightbearing, etc   Outcome: Progressing     Problem: DISCHARGE PLANNING  Goal: Discharge to home or other facility with appropriate resources  Description  INTERVENTIONS:  - Identify barriers to discharge w/patient and caregiver  - Arrange for needed discharge resources and transportation as appropriate  - Identify discharge learning needs (meds, wound care, etc )  - Arrange for interpretive services to assist at discharge as needed  - Refer to Case Management Department for coordinating discharge planning if the patient needs post-hospital services based on physician/advanced practitioner order or complex needs related to functional status, cognitive ability, or social support system  Outcome: Progressing     Problem: Knowledge Deficit  Goal: Patient/family/caregiver demonstrates understanding of disease process, treatment plan, medications, and discharge instructions  Description  Complete learning assessment and assess knowledge base  Interventions:  - Provide teaching at level of understanding  - Provide teaching via preferred learning methods  Outcome: Progressing     Problem: Potential for Falls  Goal: Patient will remain free of falls  Description  INTERVENTIONS:  - Assess patient frequently for physical needs  -  Identify cognitive and physical deficits and behaviors that affect risk of falls    -  [place] fall precautions as indicated by assessment   - Educate patient/family on patient safety including physical limitations  - Instruct patient to call for assistance with activity based on assessment  - Modify environment to reduce risk of injury  - Consider OT/PT consult to assist with strengthening/mobility  Outcome: Progressing     Problem: Prexisting or High Potential for Compromised Skin Integrity  Goal: Skin integrity is maintained or improved  Description  INTERVENTIONS:  - Identify patients at risk for skin breakdown  - Assess and monitor skin integrity  - Assess and monitor nutrition and hydration status  - Monitor labs   - Assess for incontinence   - Turn and reposition patient  - Assist with mobility/ambulation  - Relieve pressure over bony prominences  - Avoid friction and shearing  - Provide appropriate hygiene as needed including keeping skin clean and dry  - Evaluate need for skin moisturizer/barrier cream  - Collaborate with interdisciplinary team   - Patient/family teaching  - Consider wound care consult   Outcome: Progressing     Problem: Nutrition/Hydration-ADULT  Goal: Nutrient/Hydration intake appropriate for improving, restoring or maintaining nutritional needs  Description  Monitor and assess patient's nutrition/hydration status for malnutrition  Collaborate with interdisciplinary team and initiate plan and interventions as ordered  Monitor patient's weight and dietary intake as ordered or per policy  Utilize nutrition screening tool and intervene as necessary  Determine patient's food preferences and provide high-protein, high-caloric foods as appropriate       INTERVENTIONS:  - Monitor oral intake, urinary output, labs, and treatment plans  - Assess nutrition and hydration status and recommend course of action  - Evaluate amount of meals eaten  - Assist patient with eating if necessary   - Allow adequate time for meals  - Recommend/ encourage appropriate diets, oral nutritional supplements, and vitamin/mineral supplements  - Order, calculate, and assess calorie counts as needed  - Recommend, monitor, and adjust tube feedings and TPN/PPN based on assessed needs  - Assess need for intravenous fluids  - Provide specific nutrition/hydration education as appropriate  - Include patient/family/caregiver in decisions related to nutrition  Outcome: Progressing     Problem: DISCHARGE PLANNING - CARE MANAGEMENT  Goal: Discharge to post-acute care or home with appropriate resources  Description  INTERVENTIONS:  - Conduct assessment to determine patient/family and health care team treatment goals, and need for post-acute services based on payer coverage, community resources, and patient preferences, and barriers to discharge  - Address psychosocial, clinical, and financial barriers to discharge as identified in assessment in conjunction with the patient/family and health care team  - Arrange appropriate level of post-acute services according to patient's   needs and preference and payer coverage in collaboration with the physician and health care team  - Communicate with and update the patient/family, physician, and health care team regarding progress on the discharge plan  - Arrange appropriate transportation to post-acute venues  Pt will be discharged home with mother who will transport     Outcome: Progressing

## 2020-02-27 NOTE — ASSESSMENT & PLAN NOTE
· More or less resolved  · Secondary to an infectious enterocolitis  · Procalcitonin level is improving  · Patient feels well enough to go home  · Status post a GI and surgical evaluation  · No further inpatient workup needed  · Patient to be discharged to complete a total of 7 days worth of Cipro and Flagyl  · Prescription provided for the same  · Outpatient follow-up with PCP

## 2020-02-27 NOTE — SOCIAL WORK
Pt has been evaluated by MD and is stable to be discharged  Pt indicated she will need a ride home  TC to [employer], spoke to Jermaine who reports they are booked for the day  Pt has a friend who will transport

## 2020-02-27 NOTE — DISCHARGE SUMMARY
Discharge- Isidoro Aguilar 1970, 51 y o  female MRN: [id number]    Unit/Bed#: -01 Encounter: [id number]    Primary Care Provider: Daniele Bhagat DO   Date and time admitted to hospital: 2/24/2020  7:24 PM        * Left upper quadrant pain  Assessment & Plan  · More or less resolved  · Secondary to an infectious enterocolitis  · Procalcitonin level is improving  · Patient feels well enough to go home  · Status post a GI and surgical evaluation  · No further inpatient workup needed  · Patient to be discharged to complete a total of 7 days worth of Cipro and Flagyl  · Prescription provided for the same  · Outpatient follow-up with PCP    Intractable vomiting with nausea  Assessment & Plan  · Resolved  · Secondary to the infectious enterocolitis  · See the outline above    Asthma  Assessment & Plan  · Mild and intermittent, currently without exacerbation  ·  Continue pre-hospital Breo 200/251 puff daily and Proventil 2 puffs q 4 hours p r n  · DC home on pre-admit meds at pre-admit dosages    Gastroesophageal reflux disease  Assessment & Plan  · Continue Carafate and Protonix at time of discharge    Insomnia  Assessment & Plan  · Continue pre-hospital trazodone 50 mg p o  Q h s  P r n      Nicotine dependence  Assessment & Plan  · Continue nicotine patch  · Cessation counseling provided  · Patient quite not yet ready to quit          Discharging Physician / Practitioner: Paramjit Delcid MD  PCP: Daniele Bhagat DO  Admission Date:   Admission Orders (From admission, onward)     Ordered        02/26/20 1147  Inpatient Admission  Once         02/25/20 0159  Place in Observation  Once                   Discharge Date: 02/27/20    Resolved Problems  Date Reviewed: 2/25/2020    None          Consultations During Hospital Stay:  · Gastroenterology  · General surgery    Procedures Performed:   · None    Significant Findings / Test Results:   · CTA chest abdomen pelvis-Mild diffuse small bowel dilation with mild diffuse wall thickening and mucosal hyperemia in keeping with a nonspecific small bowel enteritis, likely infectious or inflammatory  No prominent terminal ileal thickening  Prominent stool throughout the colon without obstruction  · Obstructive series-Nonspecific bowel gas pattern with scattered mildly dilated small bowel   The appearance is similar to the prior CT and is unlikely obstructive in etiology        Incidental Findings:   · None     Test Results Pending at Discharge (will require follow up): · None     Outpatient Tests Requested:  · None    Complications:     None    Reason for Admission:  Abdominal pain/enteritis    Hospital Course:     Vincenzo Daily is a 51 y o  female patient who originally presented to the hospital on 2/24/2020 due to abdominal pain  Please refer to the initial history and physical examination completed by Eliana RIGGS for the initial presenting features and complaints  In brief, the patient is a pleasant 23-year-old female who was admitted back on day of admission after she came in with intractable abdominal pain  In the emergency room she had a CTA chest abdomen pelvis, which yielded enteritis  Patient was started on antibiotics and was admitted to the floors  A GI consultation was obtained  Initially GI requested that we get a general surgical evaluation because they were concerned about her clinical presentation  Patient has a known history of having gastric bypass surgery and recently last year underwent EGD testing which resulted in dilatation of an esophageal stricture  A general surgical consultation was obtained  An obstructive series yielded no acute pathology as outlined above  Patient had increased procalcitonin testing  She initially received vancomycin and cefepime in the ER, however this was changed to metronidazole and ciprofloxacin  Patient had a great response to the antibiotics  She started tolerating a clear liquid diet  Ultimately she was cleared for discharge on 02/27/2020  She was given a prescription to complete a total of 7 days worth of antibiotics  She will follow up in the outpatient setting with her primary care physician  She was otherwise discharged home on all other pre-admission medications at the preadmission dosages  Please see above list of diagnoses and related plan for additional information  Condition at Discharge: good     Discharge Day Visit / Exam:     Subjective:  Patient seen and examined, no new complaints no distress  Patient feels well enough to go home  Her only concern is having the right clothing to go home with  Vitals: Blood Pressure: 90/52 (02/27/20 0805)  Pulse: 80 (02/27/20 0805)  Temperature: 98 °F (36 7 °C) (02/27/20 0805)  Temp Source: Tympanic (02/27/20 0805)  Respirations: 20 (02/27/20 0805)  Height: 5' (152 4 cm) (02/24/20 1937)  Weight - Scale: 52 2 kg (115 lb) (02/24/20 1937)  SpO2: 100 % (02/27/20 0805)  Exam:   Physical Exam   Constitutional: She is oriented to person, place, and time  She appears well-developed and well-nourished  HENT:   Head: Normocephalic and atraumatic  Nose: Nose normal    Mouth/Throat: Oropharynx is clear and moist    Eyes: Pupils are equal, round, and reactive to light  Conjunctivae and EOM are normal    Neck: Normal range of motion  Neck supple  No JVD present  No thyromegaly present  Cardiovascular: Normal rate, regular rhythm and intact distal pulses  Exam reveals no gallop and no friction rub  No murmur heard  Pulmonary/Chest: Effort normal and breath sounds normal  No respiratory distress  Abdominal: Soft  Bowel sounds are normal  She exhibits no distension and no mass  There is no tenderness  There is no guarding  Musculoskeletal: Normal range of motion  She exhibits no edema  Lymphadenopathy:     She has no cervical adenopathy  Neurological: She is alert and oriented to person, place, and time  No cranial nerve deficit     Skin: Skin is warm  No rash noted  No erythema  Psychiatric: She has a normal mood and affect  Her behavior is normal    Vitals reviewed  Discussion with Family:  No family members were present at the time of my discharge evaluation    Discharge instructions/Information to patient and family:   See after visit summary for information provided to patient and family  Provisions for Follow-Up Care:  See after visit summary for information related to follow-up care and any pertinent home health orders  Disposition:     Home    For Discharges to [address] [facility]:   · Not Applicable to this Patient - Not Applicable to this Patient    Planned Readmission:    None     Discharge Statement:  I spent 40 minutes discharging the patient  This time was spent on the day of discharge  I had direct contact with the patient on the day of discharge  Greater than 50% of the total time was spent examining patient, answering all patient questions, arranging and discussing plan of care with patient as well as directly providing post-discharge instructions  Additional time then spent on discharge activities  Discharge Medications:  See after visit summary for reconciled discharge medications provided to patient and family        ** Please Note: This note has been constructed using a voice recognition system **

## 2020-02-27 NOTE — NURSING NOTE
Pt expressed need for ride and clothing to go home stating that her mother does not drive  Roommate overheard and offered to bring her home because they are being discharge at the same time  Pt walked to the lobby with RN and [facility] Student  Pt willing accepted ride from roommate and left together

## 2020-04-08 LAB — BLOOD GROUP ANTIBODIES SERPL: NORMAL

## 2020-09-07 ENCOUNTER — APPOINTMENT (EMERGENCY)
Dept: RADIOLOGY | Facility: HOSPITAL | Age: 50
End: 2020-09-07
Payer: COMMERCIAL

## 2020-09-07 ENCOUNTER — HOSPITAL ENCOUNTER (EMERGENCY)
Facility: HOSPITAL | Age: 50
Discharge: HOME | End: 2020-09-07
Attending: FAMILY MEDICINE
Payer: COMMERCIAL

## 2020-09-07 VITALS
HEART RATE: 59 BPM | TEMPERATURE: 97.3 F | DIASTOLIC BLOOD PRESSURE: 64 MMHG | SYSTOLIC BLOOD PRESSURE: 107 MMHG | WEIGHT: 115 LBS | RESPIRATION RATE: 22 BRPM | HEIGHT: 60 IN | OXYGEN SATURATION: 100 % | BODY MASS INDEX: 22.58 KG/M2

## 2020-09-07 DIAGNOSIS — R52 DIFFUSE PAIN: Primary | ICD-10-CM

## 2020-09-07 DIAGNOSIS — Z76.5 DRUG-SEEKING BEHAVIOR: ICD-10-CM

## 2020-09-07 LAB
ALBUMIN SERPL BCP-MCNC: 3.7 G/DL (ref 3.5–5.7)
ALP SERPL-CCNC: 54 U/L (ref 40–150)
ALT SERPL W P-5'-P-CCNC: 18 U/L (ref 7–52)
ANION GAP SERPL CALCULATED.3IONS-SCNC: 6 MMOL/L (ref 4–13)
APTT PPP: 31 SECONDS (ref 23–37)
AST SERPL W P-5'-P-CCNC: 32 U/L (ref 13–39)
BASOPHILS # BLD AUTO: 0 THOUSANDS/ΜL (ref 0–0.1)
BASOPHILS NFR BLD AUTO: 1 % (ref 0–2)
BILIRUB SERPL-MCNC: 0.4 MG/DL (ref 0.2–1)
BUN SERPL-MCNC: 15 MG/DL (ref 7–25)
CALCIUM SERPL-MCNC: 8.9 MG/DL (ref 8.6–10.5)
CHLORIDE SERPL-SCNC: 108 MMOL/L (ref 98–107)
CK MB SERPL-MCNC: 18.1 NG/ML (ref 0.6–6.3)
CK MB SERPL-MCNC: 4.3 % (ref 0.6–6.3)
CK SERPL-CCNC: 418 U/L (ref 30–192)
CO2 SERPL-SCNC: 25 MMOL/L (ref 21–31)
CREAT SERPL-MCNC: 0.58 MG/DL (ref 0.6–1.2)
EOSINOPHIL # BLD AUTO: 0.2 THOUSAND/ΜL (ref 0–0.61)
EOSINOPHIL NFR BLD AUTO: 3 % (ref 0–5)
ERYTHROCYTE [DISTWIDTH] IN BLOOD BY AUTOMATED COUNT: 19.4 % (ref 11.5–14.5)
GFR SERPL CREATININE-BSD FRML MDRD: 109 ML/MIN/1.73SQ M
GLUCOSE SERPL-MCNC: 99 MG/DL (ref 65–99)
HCT VFR BLD AUTO: 32.6 % (ref 42–47)
HGB BLD-MCNC: 10.3 G/DL (ref 12–16)
INR PPP: 0.98 (ref 0.84–1.19)
LIPASE SERPL-CCNC: 20 U/L (ref 11–82)
LYMPHOCYTES # BLD AUTO: 2.2 THOUSANDS/ΜL (ref 0.6–4.47)
LYMPHOCYTES NFR BLD AUTO: 40 % (ref 21–51)
MAGNESIUM SERPL-MCNC: 2 MG/DL (ref 1.9–2.7)
MCH RBC QN AUTO: 23.8 PG (ref 26–34)
MCHC RBC AUTO-ENTMCNC: 31.7 G/DL (ref 31–37)
MCV RBC AUTO: 75 FL (ref 81–99)
MONOCYTES # BLD AUTO: 0.3 THOUSAND/ΜL (ref 0.17–1.22)
MONOCYTES NFR BLD AUTO: 6 % (ref 2–12)
NEUTROPHILS # BLD AUTO: 2.8 THOUSANDS/ΜL (ref 1.4–6.5)
NEUTS SEG NFR BLD AUTO: 51 % (ref 42–75)
PHOSPHATE SERPL-MCNC: 3.6 MG/DL (ref 3–5.5)
PLATELET # BLD AUTO: 289 THOUSANDS/UL (ref 149–390)
PMV BLD AUTO: 7.2 FL (ref 8.6–11.7)
POTASSIUM SERPL-SCNC: 3.8 MMOL/L (ref 3.5–5.5)
PROT SERPL-MCNC: 6.2 G/DL (ref 6.4–8.9)
PROTHROMBIN TIME: 12.9 SECONDS (ref 11.6–14.5)
RBC # BLD AUTO: 4.34 MILLION/UL (ref 3.9–5.2)
SODIUM SERPL-SCNC: 139 MMOL/L (ref 134–143)
TSH SERPL DL<=0.05 MIU/L-ACNC: 0.53 UIU/ML (ref 0.45–5.33)
WBC # BLD AUTO: 5.6 THOUSAND/UL (ref 4.8–10.8)

## 2020-09-07 PROCEDURE — 71045 X-RAY EXAM CHEST 1 VIEW: CPT

## 2020-09-07 PROCEDURE — 96374 THER/PROPH/DIAG INJ IV PUSH: CPT

## 2020-09-07 PROCEDURE — 99284 EMERGENCY DEPT VISIT MOD MDM: CPT

## 2020-09-07 PROCEDURE — 96361 HYDRATE IV INFUSION ADD-ON: CPT

## 2020-09-07 PROCEDURE — 84100 ASSAY OF PHOSPHORUS: CPT | Performed by: PHYSICIAN ASSISTANT

## 2020-09-07 PROCEDURE — 93005 ELECTROCARDIOGRAM TRACING: CPT

## 2020-09-07 PROCEDURE — 82553 CREATINE MB FRACTION: CPT | Performed by: PHYSICIAN ASSISTANT

## 2020-09-07 PROCEDURE — 83690 ASSAY OF LIPASE: CPT | Performed by: PHYSICIAN ASSISTANT

## 2020-09-07 PROCEDURE — 36415 COLL VENOUS BLD VENIPUNCTURE: CPT | Performed by: PHYSICIAN ASSISTANT

## 2020-09-07 PROCEDURE — 85610 PROTHROMBIN TIME: CPT | Performed by: PHYSICIAN ASSISTANT

## 2020-09-07 PROCEDURE — 80053 COMPREHEN METABOLIC PANEL: CPT | Performed by: PHYSICIAN ASSISTANT

## 2020-09-07 PROCEDURE — 99284 EMERGENCY DEPT VISIT MOD MDM: CPT | Performed by: PHYSICIAN ASSISTANT

## 2020-09-07 PROCEDURE — 85025 COMPLETE CBC W/AUTO DIFF WBC: CPT | Performed by: PHYSICIAN ASSISTANT

## 2020-09-07 PROCEDURE — 85730 THROMBOPLASTIN TIME PARTIAL: CPT | Performed by: PHYSICIAN ASSISTANT

## 2020-09-07 PROCEDURE — 83735 ASSAY OF MAGNESIUM: CPT | Performed by: PHYSICIAN ASSISTANT

## 2020-09-07 PROCEDURE — 82550 ASSAY OF CK (CPK): CPT | Performed by: PHYSICIAN ASSISTANT

## 2020-09-07 PROCEDURE — 84443 ASSAY THYROID STIM HORMONE: CPT | Performed by: PHYSICIAN ASSISTANT

## 2020-09-07 RX ORDER — PANTOPRAZOLE SODIUM 40 MG/1
40 TABLET, DELAYED RELEASE ORAL ONCE
Status: COMPLETED | OUTPATIENT
Start: 2020-09-07 | End: 2020-09-07

## 2020-09-07 RX ORDER — KETOROLAC TROMETHAMINE 30 MG/ML
30 INJECTION, SOLUTION INTRAMUSCULAR; INTRAVENOUS ONCE
Status: COMPLETED | OUTPATIENT
Start: 2020-09-07 | End: 2020-09-07

## 2020-09-07 RX ADMIN — PANTOPRAZOLE SODIUM 40 MG: 40 TABLET, DELAYED RELEASE ORAL at 19:13

## 2020-09-07 RX ADMIN — SODIUM CHLORIDE 1000 ML: 0.9 INJECTION, SOLUTION INTRAVENOUS at 19:11

## 2020-09-07 RX ADMIN — KETOROLAC TROMETHAMINE 30 MG: 30 INJECTION, SOLUTION INTRAMUSCULAR at 19:12

## 2020-09-07 NOTE — ED NOTES
Pt  Again rang call bell asking to use portable phone  Pt  Required assistance operating portable phone  This nurse assisted pt  In calling two numbers        Gaurav Jose RN  09/07/20 [id number]

## 2020-09-07 NOTE — ED NOTES
Pt rang call bell stating "I just want to go home  I'm not stupid toradol isn't even a good drug "  Provider aware and bedside immediately        Ronnie Fritz RN  09/07/20 1943

## 2020-09-07 NOTE — ED NOTES
Pt  Shouting out as this nurse entered the room for blood draw  Pt  Requesting pain medication  This nurse informed pt   Pain medications were ordered but IV access must be established first       Terryelton Womack RN  09/07/20 1947

## 2020-09-08 LAB
ATRIAL RATE: 54 BPM
P AXIS: 66 DEGREES
PR INTERVAL: 134 MS
QRS AXIS: 70 DEGREES
QRSD INTERVAL: 72 MS
QT INTERVAL: 446 MS
QTC INTERVAL: 422 MS
T WAVE AXIS: 67 DEGREES
VENTRICULAR RATE: 54 BPM

## 2020-09-08 PROCEDURE — 93010 ELECTROCARDIOGRAM REPORT: CPT | Performed by: INTERNAL MEDICINE

## 2020-09-08 NOTE — ED PROVIDER NOTES
History  Chief Complaint   Patient presents with    Arm Pain     According to the patient, she has bilateral arm pain  no trauma     64-year-old female presents the emergency department with concern for bruising and diffuse pain appearing across the extremities  She appears much older than stated age  Patient reports that over the last 3 days she has noted bruising appearing over the arms and legs which she reports as painful and feels like frostbite she denies any major medical problems  She states that she takes Protonix for her history of stomach ulcers and denies other daily medications  She appears moderately distressed and uncomfortable due to her symptoms  She reports to pain with movement and pain with breathing  She states that she usually smokes about has stopped due to her symptoms  Allergies reviewed          Prior to Admission Medications   Prescriptions Last Dose Informant Patient Reported? Taking? CARAFATE 1 GM/10ML suspension Not Taking at Unknown time  Yes No   Cholecalciferol (VITAMIN D3) 27243 units CAPS   Yes No   Sig: Take by mouth   albuterol (PROAIR HFA) 90 mcg/act inhaler   No No   Sig: Inhale 1-2 puffs 2 (two) times a day as needed for wheezing or shortness of breath   Patient not taking: Reported on 2/25/2020   fluticasone-vilanterol (BREO ELLIPTA) 200-25 MCG/INH inhaler   No No   Sig: Inhale 1 puff daily   Patient not taking: Reported on 2/25/2020   pantoprazole (PROTONIX) 40 mg tablet   Yes No   Sig: Take 40 mg by mouth   traZODone (DESYREL) 50 mg tablet   No No   Sig: Take 1 tablet (50 mg total) by mouth daily at bedtime as needed for sleep   Patient not taking: Reported on 2/25/2020      Facility-Administered Medications: None       Past Medical History:   Diagnosis Date    Asthma     GERD (gastroesophageal reflux disease)     H/O gastric bypass     Hypokalemia     Methamphetamine abuse (HCC)     Positive UDS;  Feb 2020       Past Surgical History:   Procedure Laterality Date    CHOLECYSTECTOMY      DENTAL SURGERY      GASTRIC BYPASS      EILEEN-EN-Y PROCEDURE      TUBAL LIGATION         Family History   Problem Relation Age of Onset    Diabetes Mother     Heart disease Mother     Stroke Mother     Arthritis Mother     Hyperlipidemia Mother     Colon cancer Father     Heart disease Sister     Coronary artery disease Sister     Heart disease Brother     Coronary artery disease Brother      I have reviewed and agree with the history as documented  E-Cigarette/Vaping    E-Cigarette Use Never User      E-Cigarette/Vaping Substances     Social History     Tobacco Use    Smoking status: Former Smoker     Packs/day: 0 25     Years: 15 00     Pack years: 3 75     Types: Cigarettes    Smokeless tobacco: Never Used   Substance Use Topics    Alcohol use: Never     Frequency: Never    Drug use: Not Currently     Types: Methamphetamines       Review of Systems   Constitutional: Negative for chills, fatigue and fever  HENT: Negative for congestion, ear pain, rhinorrhea, sinus pressure, sneezing, sore throat and trouble swallowing  Eyes: Negative for discharge and itching  Respiratory: Negative for cough, chest tightness, shortness of breath, wheezing and stridor  Cardiovascular: Negative for chest pain and palpitations  Gastrointestinal: Negative for abdominal pain, diarrhea, nausea and vomiting  Neurological: Negative for dizziness, syncope, numbness and headaches  Hematological: Bruises/bleeds easily  All other systems reviewed and are negative  Physical Exam  Physical Exam  Vitals signs and nursing note reviewed  Constitutional:       General: She is in acute distress (pain)  Appearance: She is well-developed  She is cachectic  She is not diaphoretic  Comments: Appearing much older than stated age  HENT:      Head: Normocephalic and atraumatic        Right Ear: External ear normal       Left Ear: External ear normal  Nose: Nose normal    Eyes:      Conjunctiva/sclera: Conjunctivae normal       Pupils: Pupils are equal, round, and reactive to light  Neck:      Musculoskeletal: Normal range of motion  Cardiovascular:      Rate and Rhythm: Normal rate and regular rhythm  Heart sounds: Normal heart sounds  No murmur  No friction rub  No gallop  Pulmonary:      Effort: Pulmonary effort is normal  No respiratory distress  Breath sounds: Normal breath sounds  No stridor  No wheezing or rales  Abdominal:      General: Bowel sounds are normal  There is no distension  Palpations: Abdomen is soft  Tenderness: There is no abdominal tenderness  There is no guarding  Musculoskeletal: Normal range of motion  General: No tenderness  Skin:     General: Skin is warm  Capillary Refill: Capillary refill takes less than 2 seconds  Comments: [place] shaped bruised noted to the patients upper and lower extremities b/l  Ranging in size from a nickel to half dollar  No bruises noted over trunk or back  Neurological:      Mental Status: She is alert and oriented to person, place, and time           Vital Signs  ED Triage Vitals [09/07/20 1800]   Temperature Pulse Respirations Blood Pressure SpO2   (!) 97 3 °F (36 3 °C) 65 18 110/73 100 %      Temp Source Heart Rate Source Patient Position - Orthostatic VS BP Location FiO2 (%)   Temporal Monitor Lying Right arm --      Pain Score       9           Vitals:    09/07/20 1800 09/07/20 1914 09/07/20 1930   BP: 110/73 108/66 107/64   Pulse: 65 60 59   Patient Position - Orthostatic VS: Lying Lying          Visual Acuity      ED Medications  Medications   sodium chloride 0 9 % bolus 1,000 mL (0 mL Intravenous Stopped 9/7/20 1949)   ketorolac (TORADOL) injection 30 mg (30 mg Intravenous Given 9/7/20 1912)   pantoprazole (PROTONIX) EC tablet 40 mg (40 mg Oral Given 9/7/20 1913)       Diagnostic Studies  Results Reviewed     Procedure Component Value Units Date/Time CKMB [[id number]]  (Abnormal) Collected:  09/07/20 1900    Lab Status:  Final result Specimen:  Blood from Arm, Right Updated:  09/07/20 2015     CK-MB Index 4 3 %      CK-MB 18 1 ng/mL     TSH [[id number]]  (Normal) Collected:  09/07/20 1900    Lab Status:  Final result Specimen:  Blood from Arm, Right Updated:  09/07/20 2005     TSH 3RD GENERATON 0 530 uIU/mL     Narrative:       Patients undergoing fluorescein dye angiography may retain small amounts of fluorescein in the body for 48-72 hours post procedure  Samples containing fluorescein can produce falsely depressed TSH values  If the patient had this procedure,a specimen should be resubmitted post fluorescein clearance        CK Total with Reflex CKMB [[id number]]  (Abnormal) Collected:  09/07/20 1900    Lab Status:  Final result Specimen:  Blood from Arm, Right Updated:  09/07/20 1955     Total  U/L     Comprehensive metabolic panel [[id number]]  (Abnormal) Collected:  09/07/20 1900    Lab Status:  Final result Specimen:  Blood from Arm, Right Updated:  09/07/20 1955     Sodium 139 mmol/L      Potassium 3 8 mmol/L      Chloride 108 mmol/L      CO2 25 mmol/L      ANION GAP 6 mmol/L      BUN 15 mg/dL      Creatinine 0 58 mg/dL      Glucose 99 mg/dL      Calcium 8 9 mg/dL      AST 32 U/L      ALT 18 U/L      Alkaline Phosphatase 54 U/L      Total Protein 6 2 g/dL      Albumin 3 7 g/dL      Total Bilirubin 0 40 mg/dL      eGFR 109 ml/min/1 73sq m     Narrative:       Maik guidelines for Chronic Kidney Disease (CKD):     Stage 1 with normal or high GFR (GFR > 90 mL/min/1 73 square meters)    Stage 2 Mild CKD (GFR = 60-89 mL/min/1 73 square meters)    Stage 3A Moderate CKD (GFR = 45-59 mL/min/1 73 square meters)    Stage 3B Moderate CKD (GFR = 30-44 mL/min/1 73 square meters)    Stage 4 Severe CKD (GFR = 15-29 mL/min/1 73 square meters)    Stage 5 End Stage CKD (GFR <15 mL/min/1 73 square meters)  Note: GFR calculation is accurate only with a steady state creatinine    Phosphorus [[id number]]  (Normal) Collected:  09/07/20 1900    Lab Status:  Final result Specimen:  Blood from Arm, Right Updated:  09/07/20 1954     Phosphorus 3 6 mg/dL     Magnesium [[id number]]  (Normal) Collected:  09/07/20 1900    Lab Status:  Final result Specimen:  Blood from Arm, Right Updated:  09/07/20 1954     Magnesium 2 0 mg/dL     Lipase [[id number]]  (Normal) Collected:  09/07/20 1900    Lab Status:  Final result Specimen:  Blood from Arm, Right Updated:  09/07/20 1954     Lipase 20 u/L     Protime-INR [[id number]]  (Normal) Collected:  09/07/20 1900    Lab Status:  Final result Specimen:  Blood from Arm, Right Updated:  09/07/20 1945     Protime 12 9 seconds      INR 0 98    APTT [[id number]]  (Normal) Collected:  09/07/20 1900    Lab Status:  Final result Specimen:  Blood from Arm, Right Updated:  09/07/20 1945     PTT 31 seconds     CBC and differential [[id number]]  (Abnormal) Collected:  09/07/20 1900    Lab Status:  Final result Specimen:  Blood from Arm, Right Updated:  09/07/20 1938     WBC 5 60 Thousand/uL      RBC 4 34 Million/uL      Hemoglobin 10 3 g/dL      Hematocrit 32 6 %      MCV 75 fL      MCH 23 8 pg      MCHC 31 7 g/dL      RDW 19 4 %      MPV 7 2 fL      Platelets 417 Thousands/uL      Neutrophils Relative 51 %      Lymphocytes Relative 40 %      Monocytes Relative 6 %      Eosinophils Relative 3 %      Basophils Relative 1 %      Neutrophils Absolute 2 80 Thousands/µL      Lymphocytes Absolute 2 20 Thousands/µL      Monocytes Absolute 0 30 Thousand/µL      Eosinophils Absolute 0 20 Thousand/µL      Basophils Absolute 0 00 Thousands/µL                  XR chest 1 view portable   Final Result by Mecca Roberson MD (09/07 1959)      No acute cardiopulmonary disease              Workstation performed: [id number]                    Procedures  Procedures         ED Course  ED Course as of Sep 08 1148   Mon Sep 07, 2020   1950 Patient signing out AMA at this time  She states that her pain was not being adequately controlled  I offered ketamine to this patient for pain management  She refused and states that she no longer wants any medications for pain or further care in the ED  She states "I would rather go home and take Tylenol"advised the patient that leaving without having a complete evaluation the emergency department is dangerous and may result in death permanent disability and worsening symptoms  She verbalized understandings and accepts responsibility to leave AMA  AMA paperwork completed and signed by the patient    Dr Maryellen Leal made aware  US AUDIT      Most Recent Value   Initial Alcohol Screen: US AUDIT-C    1  How often do you have a drink containing alcohol?  0 Filed at: 09/07/2020 1759   Audit-C Score  0 Filed at: 09/07/2020 1759                  PATRICK/DAST-10      Most Recent Value   PATRICK: How many times in the past year have you    Used an illegal drug or used a prescription medication for non-medical reasons? Never Filed at: 09/07/2020 1759                                MDM  Number of Diagnoses or Management Options  Diffuse pain: new and requires workup  Drug-seeking behavior: established and worsening  Diagnosis management comments: Pt reported to be known to facility for drug seeking behaviors in the past, there is concern the patient may have multiple EMRs with different dates of birth provided  I attempted to complete a workup on the patient to evaluate for  Possible coagulopathy and other etiologies to explain presence of reportedly atraumatic bruising  toradol was initially trialed for pain relief however the patient because agitated that I was not providing stronger medications for pain  I discussed this patients case with Dr Maryellen Leal  I offered the patient ketamine as an alternative medication to try for analgesia and advised that I would not be giving narcotic pain medication to her   She stated that she wished to leave AMA and that she no longer wanted any further care from us in the emergency department  Pt stated "I would rather just go home and take tylenol"  See ED Course note  At the time the patient signed out ama, her presenting distress related to her pain had apparently resolved and was ambulatory without difficulty, assistance or complaint through the emergency department  Of note, her lab evaluation and cxr appear unremarkable  Amount and/or Complexity of Data Reviewed  Clinical lab tests: ordered and reviewed  Tests in the radiology section of CPT®: ordered and reviewed  Discuss the patient with other providers: yes    Risk of Complications, Morbidity, and/or Mortality  Presenting problems: moderate  Diagnostic procedures: low  Management options: low    Patient Progress  Patient progress: stable        Disposition  Final diagnoses:   Diffuse pain   Drug-seeking behavior     Time reflects when diagnosis was documented in both MDM as applicable and the Disposition within this note     Time User Action Codes Description Comment    9/7/2020  6:53 PM Zeina Needles Add [H69 80] Eustachian tube dysfunction     9/7/2020  6:54 PM Zeina Needles Add [Z91 09] Environmental allergies     9/7/2020  7:03 PM Zeina Needles Remove [Z91 09] Environmental allergies     9/7/2020  7:03 PM Carroll Finch Remove [H69 80] Eustachian tube dysfunction     9/7/2020  7:45 PM Zeina Needles Add [R52] Diffuse pain     9/7/2020  7:57 PM Zeina Needles Add [Z76 5] Drug-seeking behavior       ED Disposition     ED Disposition Condition Date/Time Comment    [facility] Sep 7, 2020  7:46 PM Date: 9/7/2020  Patient: Lara Mehta  Admitted: 9/7/2020  6:02 PM  Attending Provider: MD Marylou Baker or her authorized caregiver has made the decision for the patient to leave the emergency department against the advice of  the emergency department staff   She or her authorized caregiver has been informed and understands the inherent risks, including death, worsening symptoms, permanent disability  She or her authorized caregiver has decided to accept the [address] [place] for this decision  Marylou Morris and all necessary parties have been advised that she may return for further evaluation or treatment  Her condition at time of discharge was stable  Marylou Morris had current vital signs as follows:  /66 ( BP Location: Left arm)   Pulse 60   Temp (!) 97 3 °F (36 3 °C) (Temporal)   Resp 20   Ht 5' (1 524 m)   Wt 52 2 kg (115 lb)         Follow-up Information    None         Discharge Medication List as of 9/7/2020  8:02 PM      CONTINUE these medications which have NOT CHANGED    Details   albuterol (PROAIR HFA) 90 mcg/act inhaler Inhale 1-2 puffs 2 (two) times a day as needed for wheezing or shortness of breath, Starting Sun 3/31/2019, Normal      CARAFATE 1 GM/10ML suspension Starting Wed 3/27/2019, Historical Med      Cholecalciferol (VITAMIN D3) 53623 units CAPS Take by mouth, Historical Med      fluticasone-vilanterol (BREO ELLIPTA) 200-25 MCG/INH inhaler Inhale 1 puff daily, Starting Sun 3/31/2019, Normal      pantoprazole (PROTONIX) 40 mg tablet Take 40 mg by mouth, Starting Tue 10/31/2017, Historical Med      traZODone (DESYREL) 50 mg tablet Take 1 tablet (50 mg total) by mouth daily at bedtime as needed for sleep, Starting Thu 4/4/2019, Normal           No discharge procedures on file      PDMP Review     None          ED Provider  Electronically Signed by           Lilliam Duran PA-C  09/08/20 8131

## 2020-09-09 ENCOUNTER — TELEPHONE (OUTPATIENT)
Dept: FAMILY MEDICINE CLINIC | Facility: CLINIC | Age: 50
End: 2020-09-09

## 2020-10-15 ENCOUNTER — OFFICE VISIT (OUTPATIENT)
Dept: INTERNAL MEDICINE CLINIC | Facility: CLINIC | Age: 50
End: 2020-10-15
Payer: COMMERCIAL

## 2020-10-15 VITALS
OXYGEN SATURATION: 99 % | WEIGHT: 143.5 LBS | SYSTOLIC BLOOD PRESSURE: 122 MMHG | BODY MASS INDEX: 28.17 KG/M2 | HEART RATE: 88 BPM | HEIGHT: 60 IN | TEMPERATURE: 97.8 F | DIASTOLIC BLOOD PRESSURE: 64 MMHG

## 2020-10-15 DIAGNOSIS — K91.89 GASTROJEJUNAL ANASTOMOTIC STRICTURE: ICD-10-CM

## 2020-10-15 DIAGNOSIS — Z11.4 SCREENING FOR HIV (HUMAN IMMUNODEFICIENCY VIRUS): ICD-10-CM

## 2020-10-15 DIAGNOSIS — Z13.29 SCREENING FOR THYROID DISORDER: ICD-10-CM

## 2020-10-15 DIAGNOSIS — L30.9 DERMATITIS: ICD-10-CM

## 2020-10-15 DIAGNOSIS — N95.1 PERIMENOPAUSAL: ICD-10-CM

## 2020-10-15 DIAGNOSIS — K21.9 GASTROESOPHAGEAL REFLUX DISEASE, UNSPECIFIED WHETHER ESOPHAGITIS PRESENT: ICD-10-CM

## 2020-10-15 DIAGNOSIS — Z13.220 SCREENING FOR LIPID DISORDERS: ICD-10-CM

## 2020-10-15 DIAGNOSIS — Z13.0 SCREENING FOR DEFICIENCY ANEMIA: ICD-10-CM

## 2020-10-15 DIAGNOSIS — Z13.31 POSITIVE DEPRESSION SCREENING: ICD-10-CM

## 2020-10-15 DIAGNOSIS — F15.11 HISTORY OF METHAMPHETAMINE ABUSE (HCC): ICD-10-CM

## 2020-10-15 DIAGNOSIS — R20.2 PARESTHESIA: ICD-10-CM

## 2020-10-15 DIAGNOSIS — Z12.11 SCREEN FOR COLON CANCER: ICD-10-CM

## 2020-10-15 DIAGNOSIS — Z23 ENCOUNTER FOR VACCINATION: ICD-10-CM

## 2020-10-15 DIAGNOSIS — F32.1 MODERATE MAJOR DEPRESSION, SINGLE EPISODE (HCC): ICD-10-CM

## 2020-10-15 DIAGNOSIS — Z13.1 SCREENING FOR DIABETES MELLITUS (DM): ICD-10-CM

## 2020-10-15 DIAGNOSIS — Z12.31 ENCOUNTER FOR SCREENING MAMMOGRAM FOR MALIGNANT NEOPLASM OF BREAST: ICD-10-CM

## 2020-10-15 DIAGNOSIS — Z80.0 FAMILY HISTORY OF COLON CANCER: ICD-10-CM

## 2020-10-15 DIAGNOSIS — R10.84 GENERALIZED ABDOMINAL PAIN: ICD-10-CM

## 2020-10-15 DIAGNOSIS — F51.05 INSOMNIA SECONDARY TO DEPRESSION WITH ANXIETY: ICD-10-CM

## 2020-10-15 DIAGNOSIS — F17.210 CIGARETTE NICOTINE DEPENDENCE WITHOUT COMPLICATION: ICD-10-CM

## 2020-10-15 DIAGNOSIS — J45.40 MODERATE PERSISTENT ASTHMA WITHOUT COMPLICATION: ICD-10-CM

## 2020-10-15 DIAGNOSIS — E55.9 VITAMIN D DEFICIENCY: ICD-10-CM

## 2020-10-15 DIAGNOSIS — L65.9 ALOPECIA: ICD-10-CM

## 2020-10-15 DIAGNOSIS — R11.2 NON-INTRACTABLE VOMITING WITH NAUSEA, UNSPECIFIED VOMITING TYPE: ICD-10-CM

## 2020-10-15 DIAGNOSIS — F41.8 DEPRESSION WITH ANXIETY: ICD-10-CM

## 2020-10-15 DIAGNOSIS — R26.9 GAIT ABNORMALITY: ICD-10-CM

## 2020-10-15 DIAGNOSIS — R49.0 HOARSENESS: ICD-10-CM

## 2020-10-15 DIAGNOSIS — F41.8 INSOMNIA SECONDARY TO DEPRESSION WITH ANXIETY: ICD-10-CM

## 2020-10-15 DIAGNOSIS — Z98.84 HISTORY OF GASTRIC BYPASS: ICD-10-CM

## 2020-10-15 DIAGNOSIS — N91.2 AMENORRHEA: ICD-10-CM

## 2020-10-15 DIAGNOSIS — J45.40 MODERATE PERSISTENT ASTHMA WITHOUT COMPLICATION: Primary | ICD-10-CM

## 2020-10-15 DIAGNOSIS — F41.8 ANXIETY WITH DEPRESSION: ICD-10-CM

## 2020-10-15 DIAGNOSIS — G47.00 INSOMNIA, UNSPECIFIED TYPE: ICD-10-CM

## 2020-10-15 PROBLEM — L23.1 CONTACT DERMATITIS DUE TO ADHESIVES: Status: RESOLVED | Noted: 2019-03-30 | Resolved: 2020-10-15

## 2020-10-15 PROBLEM — K92.0 COFFEE GROUND EMESIS: Status: RESOLVED | Noted: 2019-03-30 | Resolved: 2020-10-15

## 2020-10-15 PROBLEM — R10.9 ABDOMINAL PAIN: Status: RESOLVED | Noted: 2019-03-27 | Resolved: 2020-10-15

## 2020-10-15 PROBLEM — R10.12 LEFT UPPER QUADRANT PAIN: Status: RESOLVED | Noted: 2020-02-25 | Resolved: 2020-10-15

## 2020-10-15 PROCEDURE — 99215 OFFICE O/P EST HI 40 MIN: CPT | Performed by: INTERNAL MEDICINE

## 2020-10-15 PROCEDURE — 4004F PT TOBACCO SCREEN RCVD TLK: CPT | Performed by: INTERNAL MEDICINE

## 2020-10-15 PROCEDURE — 3725F SCREEN DEPRESSION PERFORMED: CPT | Performed by: INTERNAL MEDICINE

## 2020-10-15 RX ORDER — CLOTRIMAZOLE AND BETAMETHASONE DIPROPIONATE 10; .64 MG/G; MG/G
CREAM TOPICAL 2 TIMES DAILY
Qty: 45 G | Refills: 0 | Status: SHIPPED | OUTPATIENT
Start: 2020-10-15 | End: 2020-10-15 | Stop reason: SDUPTHER

## 2020-10-15 RX ORDER — PANTOPRAZOLE SODIUM 40 MG/1
40 TABLET, DELAYED RELEASE ORAL DAILY
Qty: 90 TABLET | Refills: 1 | Status: ON HOLD | OUTPATIENT
Start: 2020-10-15 | End: 2020-10-28 | Stop reason: SDUPTHER

## 2020-10-15 RX ORDER — PANTOPRAZOLE SODIUM 40 MG/1
40 TABLET, DELAYED RELEASE ORAL DAILY
Qty: 90 TABLET | Refills: 1 | Status: SHIPPED | OUTPATIENT
Start: 2020-10-15 | End: 2020-10-15 | Stop reason: SDUPTHER

## 2020-10-15 RX ORDER — SUCRALFATE 1 G/10ML
1 SUSPENSION ORAL
Qty: 420 ML | Refills: 2 | Status: SHIPPED | OUTPATIENT
Start: 2020-10-15 | End: 2020-10-15 | Stop reason: SDUPTHER

## 2020-10-15 RX ORDER — SUCRALFATE 1 G/10ML
1 SUSPENSION ORAL
Qty: 420 ML | Refills: 2 | Status: SHIPPED | OUTPATIENT
Start: 2020-10-15 | End: 2021-07-14

## 2020-10-15 RX ORDER — CLOTRIMAZOLE AND BETAMETHASONE DIPROPIONATE 10; .64 MG/G; MG/G
CREAM TOPICAL 2 TIMES DAILY
Qty: 45 G | Refills: 0 | Status: SHIPPED | OUTPATIENT
Start: 2020-10-15 | End: 2021-05-13

## 2020-10-15 RX ORDER — FLUTICASONE FUROATE AND VILANTEROL 100; 25 UG/1; UG/1
1 POWDER RESPIRATORY (INHALATION) DAILY
Qty: 3 INHALER | Refills: 0 | Status: SHIPPED | OUTPATIENT
Start: 2020-10-15 | End: 2020-10-15 | Stop reason: SDUPTHER

## 2020-10-15 RX ORDER — FLUTICASONE FUROATE AND VILANTEROL 100; 25 UG/1; UG/1
1 POWDER RESPIRATORY (INHALATION) DAILY
Qty: 3 INHALER | Refills: 0 | Status: SHIPPED | OUTPATIENT
Start: 2020-10-15 | End: 2020-10-19

## 2020-10-19 DIAGNOSIS — J45.40 MODERATE PERSISTENT ASTHMA WITHOUT COMPLICATION: Primary | ICD-10-CM

## 2020-10-19 RX ORDER — BUDESONIDE AND FORMOTEROL FUMARATE DIHYDRATE 160; 4.5 UG/1; UG/1
2 AEROSOL RESPIRATORY (INHALATION) 2 TIMES DAILY
Qty: 3 INHALER | Refills: 0 | Status: SHIPPED | OUTPATIENT
Start: 2020-10-19 | End: 2021-05-13 | Stop reason: SDUPTHER

## 2020-10-23 ENCOUNTER — TELEPHONE (OUTPATIENT)
Dept: INTERNAL MEDICINE CLINIC | Facility: CLINIC | Age: 50
End: 2020-10-23

## 2020-10-27 ENCOUNTER — APPOINTMENT (OUTPATIENT)
Dept: LAB | Facility: CLINIC | Age: 50
End: 2020-10-27
Payer: COMMERCIAL

## 2020-10-27 ENCOUNTER — HOSPITAL ENCOUNTER (OUTPATIENT)
Facility: HOSPITAL | Age: 50
Setting detail: OBSERVATION
Discharge: HOME | End: 2020-10-28
Attending: EMERGENCY MEDICINE | Admitting: HOSPITALIST
Payer: COMMERCIAL

## 2020-10-27 ENCOUNTER — APPOINTMENT (OUTPATIENT)
Dept: RADIOLOGY | Facility: CLINIC | Age: 50
End: 2020-10-27
Payer: COMMERCIAL

## 2020-10-27 ENCOUNTER — OFFICE VISIT (OUTPATIENT)
Dept: URGENT CARE | Facility: CLINIC | Age: 50
End: 2020-10-27
Payer: COMMERCIAL

## 2020-10-27 ENCOUNTER — APPOINTMENT (EMERGENCY)
Dept: CT IMAGING | Facility: HOSPITAL | Age: 50
End: 2020-10-27
Payer: COMMERCIAL

## 2020-10-27 ENCOUNTER — TRANSCRIBE ORDERS (OUTPATIENT)
Dept: LAB | Facility: CLINIC | Age: 50
End: 2020-10-27

## 2020-10-27 VITALS
OXYGEN SATURATION: 100 % | HEART RATE: 100 BPM | HEIGHT: 65 IN | WEIGHT: 144 LBS | SYSTOLIC BLOOD PRESSURE: 170 MMHG | DIASTOLIC BLOOD PRESSURE: 90 MMHG | BODY MASS INDEX: 23.99 KG/M2 | TEMPERATURE: 97.6 F

## 2020-10-27 DIAGNOSIS — R04.2 HEMOPTYSIS: ICD-10-CM

## 2020-10-27 DIAGNOSIS — R10.84 GENERALIZED ABDOMINAL PAIN: ICD-10-CM

## 2020-10-27 DIAGNOSIS — Z98.84 HISTORY OF GASTRIC BYPASS: ICD-10-CM

## 2020-10-27 DIAGNOSIS — R10.9 ABDOMINAL PAIN, UNSPECIFIED ABDOMINAL LOCATION: Primary | ICD-10-CM

## 2020-10-27 DIAGNOSIS — Z11.4 SCREENING FOR HIV (HUMAN IMMUNODEFICIENCY VIRUS): ICD-10-CM

## 2020-10-27 DIAGNOSIS — R10.84 GENERALIZED ABDOMINAL PAIN: Primary | ICD-10-CM

## 2020-10-27 DIAGNOSIS — R11.2 NON-INTRACTABLE VOMITING WITH NAUSEA, UNSPECIFIED VOMITING TYPE: ICD-10-CM

## 2020-10-27 DIAGNOSIS — R20.2 PARESTHESIA: ICD-10-CM

## 2020-10-27 DIAGNOSIS — Z13.220 SCREENING FOR LIPID DISORDERS: ICD-10-CM

## 2020-10-27 DIAGNOSIS — N91.2 AMENORRHEA: Primary | ICD-10-CM

## 2020-10-27 DIAGNOSIS — N91.2 AMENORRHEA: ICD-10-CM

## 2020-10-27 DIAGNOSIS — K21.9 GASTROESOPHAGEAL REFLUX DISEASE, UNSPECIFIED WHETHER ESOPHAGITIS PRESENT: ICD-10-CM

## 2020-10-27 DIAGNOSIS — K91.89 GASTROJEJUNAL ANASTOMOTIC STRICTURE: ICD-10-CM

## 2020-10-27 PROBLEM — F41.8 DEPRESSION WITH ANXIETY: Chronic | Status: ACTIVE | Noted: 2017-03-07

## 2020-10-27 LAB
25(OH)D3 SERPL-MCNC: 12.5 NG/ML (ref 30–100)
ALBUMIN SERPL BCP-MCNC: 3.4 G/DL (ref 3.5–5)
ALBUMIN SERPL BCP-MCNC: 4 G/DL (ref 3.5–5.7)
ALP SERPL-CCNC: 68 U/L (ref 40–150)
ALP SERPL-CCNC: 88 U/L (ref 46–116)
ALT SERPL W P-5'-P-CCNC: 17 U/L (ref 7–52)
ALT SERPL W P-5'-P-CCNC: 25 U/L (ref 12–78)
AMPHETAMINES SERPL QL SCN: NEGATIVE
AMYLASE SERPL-CCNC: 53 IU/L (ref 25–115)
ANION GAP SERPL CALCULATED.3IONS-SCNC: 3 MMOL/L (ref 4–13)
ANION GAP SERPL CALCULATED.3IONS-SCNC: 8 MMOL/L (ref 4–13)
ANISOCYTOSIS BLD QL SMEAR: PRESENT
AST SERPL W P-5'-P-CCNC: 23 U/L (ref 5–45)
AST SERPL W P-5'-P-CCNC: 29 U/L (ref 13–39)
B-HCG SERPL-ACNC: <0.6 MIU/ML (ref 0–11.6)
BARBITURATES UR QL: NEGATIVE
BASOPHILS # BLD AUTO: 0.05 THOUSANDS/ΜL (ref 0–0.1)
BASOPHILS # BLD AUTO: 0.1 THOUSANDS/ΜL (ref 0–0.1)
BASOPHILS NFR BLD AUTO: 1 % (ref 0–1)
BASOPHILS NFR BLD AUTO: 1 % (ref 0–2)
BENZODIAZ UR QL: NEGATIVE
BILIRUB SERPL-MCNC: 0.6 MG/DL (ref 0.2–1)
BILIRUB SERPL-MCNC: 0.6 MG/DL (ref 0.2–1)
BILIRUB UR QL STRIP: NEGATIVE
BUN SERPL-MCNC: 11 MG/DL (ref 5–25)
BUN SERPL-MCNC: 11 MG/DL (ref 7–25)
CALCIUM ALBUM COR SERPL-MCNC: 8.9 MG/DL (ref 8.3–10.1)
CALCIUM SERPL-MCNC: 8.4 MG/DL (ref 8.3–10.1)
CALCIUM SERPL-MCNC: 9 MG/DL (ref 8.6–10.5)
CHLORIDE SERPL-SCNC: 105 MMOL/L (ref 98–107)
CHLORIDE SERPL-SCNC: 110 MMOL/L (ref 100–108)
CHOLEST SERPL-MCNC: 153 MG/DL (ref 50–200)
CLARITY UR: CLEAR
CO2 SERPL-SCNC: 24 MMOL/L (ref 21–31)
CO2 SERPL-SCNC: 27 MMOL/L (ref 21–32)
COCAINE UR QL: NEGATIVE
COLOR UR: YELLOW
CREAT SERPL-MCNC: 0.59 MG/DL (ref 0.6–1.2)
CREAT SERPL-MCNC: 0.61 MG/DL (ref 0.6–1.3)
EOSINOPHIL # BLD AUTO: 0.04 THOUSAND/ΜL (ref 0–0.61)
EOSINOPHIL # BLD AUTO: 0.1 THOUSAND/ΜL (ref 0–0.61)
EOSINOPHIL NFR BLD AUTO: 1 % (ref 0–5)
EOSINOPHIL NFR BLD AUTO: 1 % (ref 0–6)
ERYTHROCYTE [DISTWIDTH] IN BLOOD BY AUTOMATED COUNT: 18.4 % (ref 11.6–15.1)
ERYTHROCYTE [DISTWIDTH] IN BLOOD BY AUTOMATED COUNT: 18.6 % (ref 11.5–14.5)
EST. AVERAGE GLUCOSE BLD GHB EST-MCNC: 103 MG/DL
FERRITIN SERPL-MCNC: 4 NG/ML (ref 8–388)
FOLATE SERPL-MCNC: 17.3 NG/ML (ref 3.1–17.5)
FSH SERPL-ACNC: 8.8 MIU/ML
GFR SERPL CREATININE-BSD FRML MDRD: 107 ML/MIN/1.73SQ M
GFR SERPL CREATININE-BSD FRML MDRD: 108 ML/MIN/1.73SQ M
GLUCOSE P FAST SERPL-MCNC: 70 MG/DL (ref 65–99)
GLUCOSE SERPL-MCNC: 84 MG/DL (ref 65–99)
GLUCOSE UR STRIP-MCNC: NEGATIVE MG/DL
HBA1C MFR BLD: 5.2 %
HCG SERPL QL: NEGATIVE
HCT VFR BLD AUTO: 30.1 % (ref 42–47)
HCT VFR BLD AUTO: 31 % (ref 34.8–46.1)
HDLC SERPL-MCNC: 77 MG/DL
HGB BLD-MCNC: 9 G/DL (ref 11.5–15.4)
HGB BLD-MCNC: 9.2 G/DL (ref 12–16)
HGB UR QL STRIP.AUTO: NEGATIVE
HYPERCHROMIA BLD QL SMEAR: PRESENT
IMM GRANULOCYTES # BLD AUTO: 0.01 THOUSAND/UL (ref 0–0.2)
IMM GRANULOCYTES NFR BLD AUTO: 0 % (ref 0–2)
IRON SATN MFR SERPL: 2 %
IRON SERPL-MCNC: 11 UG/DL (ref 50–170)
KETONES UR STRIP-MCNC: NEGATIVE MG/DL
LDLC SERPL CALC-MCNC: 65 MG/DL (ref 0–100)
LEUKOCYTE ESTERASE UR QL STRIP: NEGATIVE
LH SERPL-ACNC: 21.1 MIU/ML
LIPASE SERPL-CCNC: 127 U/L (ref 73–393)
LIPASE SERPL-CCNC: 16 U/L (ref 11–82)
LYMPHOCYTES # BLD AUTO: 1.86 THOUSANDS/ΜL (ref 0.6–4.47)
LYMPHOCYTES # BLD AUTO: 2.6 THOUSANDS/ΜL (ref 0.6–4.47)
LYMPHOCYTES NFR BLD AUTO: 27 % (ref 14–44)
LYMPHOCYTES NFR BLD AUTO: 33 % (ref 21–51)
MAGNESIUM SERPL-MCNC: 2.3 MG/DL (ref 1.6–2.6)
MCH RBC QN AUTO: 22.9 PG (ref 26–34)
MCH RBC QN AUTO: 23 PG (ref 26.8–34.3)
MCHC RBC AUTO-ENTMCNC: 29 G/DL (ref 31.4–37.4)
MCHC RBC AUTO-ENTMCNC: 30.6 G/DL (ref 31–37)
MCV RBC AUTO: 75 FL (ref 81–99)
MCV RBC AUTO: 79 FL (ref 82–98)
METHADONE UR QL: NEGATIVE
MICROCYTES BLD QL AUTO: PRESENT
MONOCYTES # BLD AUTO: 0.32 THOUSAND/ΜL (ref 0.17–1.22)
MONOCYTES # BLD AUTO: 0.4 THOUSAND/ΜL (ref 0.17–1.22)
MONOCYTES NFR BLD AUTO: 5 % (ref 2–12)
MONOCYTES NFR BLD AUTO: 5 % (ref 4–12)
NEUTROPHILS # BLD AUTO: 4.7 THOUSANDS/ΜL (ref 1.85–7.62)
NEUTROPHILS # BLD AUTO: 4.8 THOUSANDS/ΜL (ref 1.4–6.5)
NEUTS SEG NFR BLD AUTO: 61 % (ref 42–75)
NEUTS SEG NFR BLD AUTO: 66 % (ref 43–75)
NITRITE UR QL STRIP: NEGATIVE
NRBC BLD AUTO-RTO: 0 /100 WBCS
OPIATES UR QL SCN: POSITIVE
OVALOCYTES BLD QL SMEAR: PRESENT
OXYCODONE+OXYMORPHONE UR QL SCN: NEGATIVE
PCP UR QL: NEGATIVE
PH UR STRIP.AUTO: 6.5 [PH]
PLATELET # BLD AUTO: 326 THOUSANDS/UL (ref 149–390)
PLATELET # BLD AUTO: 382 THOUSANDS/UL (ref 149–390)
PLATELET BLD QL SMEAR: ADEQUATE
PMV BLD AUTO: 7.9 FL (ref 8.6–11.7)
PMV BLD AUTO: 9.6 FL (ref 8.9–12.7)
POTASSIUM SERPL-SCNC: 4.1 MMOL/L (ref 3.5–5.3)
POTASSIUM SERPL-SCNC: 4.6 MMOL/L (ref 3.5–5.5)
PROT SERPL-MCNC: 7 G/DL (ref 6.4–8.2)
PROT SERPL-MCNC: 7 G/DL (ref 6.4–8.9)
PROT UR STRIP-MCNC: NEGATIVE MG/DL
RBC # BLD AUTO: 3.92 MILLION/UL (ref 3.81–5.12)
RBC # BLD AUTO: 4.02 MILLION/UL (ref 3.9–5.2)
RBC MORPH BLD: NORMAL
SODIUM SERPL-SCNC: 137 MMOL/L (ref 134–143)
SODIUM SERPL-SCNC: 140 MMOL/L (ref 136–145)
SP GR UR STRIP.AUTO: 1.02 (ref 1–1.03)
THC UR QL: NEGATIVE
TIBC SERPL-MCNC: 541 UG/DL (ref 250–450)
TRIGL SERPL-MCNC: 56 MG/DL
TROPONIN I SERPL-MCNC: <0.03 NG/ML
TSH SERPL DL<=0.05 MIU/L-ACNC: 1.34 UIU/ML (ref 0.36–3.74)
UROBILINOGEN UR QL STRIP.AUTO: 0.2 E.U./DL
VIT B12 SERPL-MCNC: 240 PG/ML (ref 100–900)
WBC # BLD AUTO: 6.98 THOUSAND/UL (ref 4.31–10.16)
WBC # BLD AUTO: 7.9 THOUSAND/UL (ref 4.8–10.8)

## 2020-10-27 PROCEDURE — 83036 HEMOGLOBIN GLYCOSYLATED A1C: CPT

## 2020-10-27 PROCEDURE — C9113 INJ PANTOPRAZOLE SODIUM, VIA: HCPCS | Performed by: PHYSICIAN ASSISTANT

## 2020-10-27 PROCEDURE — 83001 ASSAY OF GONADOTROPIN (FSH): CPT

## 2020-10-27 PROCEDURE — 96375 TX/PRO/DX INJ NEW DRUG ADDON: CPT

## 2020-10-27 PROCEDURE — 84703 CHORIONIC GONADOTROPIN ASSAY: CPT

## 2020-10-27 PROCEDURE — 96360 HYDRATION IV INFUSION INIT: CPT

## 2020-10-27 PROCEDURE — 85025 COMPLETE CBC W/AUTO DIFF WBC: CPT | Performed by: EMERGENCY MEDICINE

## 2020-10-27 PROCEDURE — 83540 ASSAY OF IRON: CPT

## 2020-10-27 PROCEDURE — 80053 COMPREHEN METABOLIC PANEL: CPT | Performed by: EMERGENCY MEDICINE

## 2020-10-27 PROCEDURE — 83690 ASSAY OF LIPASE: CPT | Performed by: EMERGENCY MEDICINE

## 2020-10-27 PROCEDURE — 99283 EMERGENCY DEPT VISIT LOW MDM: CPT | Performed by: PHYSICIAN ASSISTANT

## 2020-10-27 PROCEDURE — 82150 ASSAY OF AMYLASE: CPT

## 2020-10-27 PROCEDURE — G1004 CDSM NDSC: HCPCS

## 2020-10-27 PROCEDURE — 83735 ASSAY OF MAGNESIUM: CPT

## 2020-10-27 PROCEDURE — 82728 ASSAY OF FERRITIN: CPT

## 2020-10-27 PROCEDURE — 84702 CHORIONIC GONADOTROPIN TEST: CPT | Performed by: EMERGENCY MEDICINE

## 2020-10-27 PROCEDURE — 83690 ASSAY OF LIPASE: CPT

## 2020-10-27 PROCEDURE — 83550 IRON BINDING TEST: CPT

## 2020-10-27 PROCEDURE — 80061 LIPID PANEL: CPT

## 2020-10-27 PROCEDURE — 84443 ASSAY THYROID STIM HORMONE: CPT

## 2020-10-27 PROCEDURE — 99203 OFFICE O/P NEW LOW 30 MIN: CPT | Performed by: PHYSICIAN ASSISTANT

## 2020-10-27 PROCEDURE — 85025 COMPLETE CBC W/AUTO DIFF WBC: CPT

## 2020-10-27 PROCEDURE — 96361 HYDRATE IV INFUSION ADD-ON: CPT

## 2020-10-27 PROCEDURE — 83002 ASSAY OF GONADOTROPIN (LH): CPT

## 2020-10-27 PROCEDURE — 82306 VITAMIN D 25 HYDROXY: CPT

## 2020-10-27 PROCEDURE — 74022 RADEX COMPL AQT ABD SERIES: CPT

## 2020-10-27 PROCEDURE — 84484 ASSAY OF TROPONIN QUANT: CPT | Performed by: PHYSICIAN ASSISTANT

## 2020-10-27 PROCEDURE — G0382 LEV 3 HOSP TYPE B ED VISIT: HCPCS | Performed by: PHYSICIAN ASSISTANT

## 2020-10-27 PROCEDURE — 99285 EMERGENCY DEPT VISIT HI MDM: CPT | Performed by: EMERGENCY MEDICINE

## 2020-10-27 PROCEDURE — 84425 ASSAY OF VITAMIN B-1: CPT

## 2020-10-27 PROCEDURE — 82607 VITAMIN B-12: CPT

## 2020-10-27 PROCEDURE — 93005 ELECTROCARDIOGRAM TRACING: CPT

## 2020-10-27 PROCEDURE — 81003 URINALYSIS AUTO W/O SCOPE: CPT | Performed by: EMERGENCY MEDICINE

## 2020-10-27 PROCEDURE — 74177 CT ABD & PELVIS W/CONTRAST: CPT

## 2020-10-27 PROCEDURE — 84207 ASSAY OF VITAMIN B-6: CPT

## 2020-10-27 PROCEDURE — 99220 PR INITIAL OBSERVATION CARE/DAY 70 MINUTES: CPT | Performed by: PHYSICIAN ASSISTANT

## 2020-10-27 PROCEDURE — 87389 HIV-1 AG W/HIV-1&-2 AB AG IA: CPT

## 2020-10-27 PROCEDURE — 36415 COLL VENOUS BLD VENIPUNCTURE: CPT

## 2020-10-27 PROCEDURE — 84590 ASSAY OF VITAMIN A: CPT

## 2020-10-27 PROCEDURE — 80307 DRUG TEST PRSMV CHEM ANLYZR: CPT | Performed by: PHYSICIAN ASSISTANT

## 2020-10-27 PROCEDURE — 99285 EMERGENCY DEPT VISIT HI MDM: CPT

## 2020-10-27 PROCEDURE — C9113 INJ PANTOPRAZOLE SODIUM, VIA: HCPCS | Performed by: EMERGENCY MEDICINE

## 2020-10-27 PROCEDURE — 82746 ASSAY OF FOLIC ACID SERUM: CPT

## 2020-10-27 PROCEDURE — 80053 COMPREHEN METABOLIC PANEL: CPT

## 2020-10-27 PROCEDURE — 96374 THER/PROPH/DIAG INJ IV PUSH: CPT

## 2020-10-27 RX ORDER — HYDROMORPHONE HCL/PF 1 MG/ML
0.2 SYRINGE (ML) INJECTION EVERY 6 HOURS PRN
Status: DISCONTINUED | OUTPATIENT
Start: 2020-10-27 | End: 2020-10-28 | Stop reason: HOSPADM

## 2020-10-27 RX ORDER — SODIUM CHLORIDE 9 MG/ML
125 INJECTION, SOLUTION INTRAVENOUS CONTINUOUS
Status: DISCONTINUED | OUTPATIENT
Start: 2020-10-27 | End: 2020-10-28 | Stop reason: HOSPADM

## 2020-10-27 RX ORDER — METOCLOPRAMIDE 10 MG/1
10 TABLET ORAL 4 TIMES DAILY
Qty: 10 TABLET | Refills: 0 | Status: SHIPPED | OUTPATIENT
Start: 2020-10-27 | End: 2020-10-28 | Stop reason: HOSPADM

## 2020-10-27 RX ORDER — BUDESONIDE AND FORMOTEROL FUMARATE DIHYDRATE 160; 4.5 UG/1; UG/1
2 AEROSOL RESPIRATORY (INHALATION) 2 TIMES DAILY
Status: DISCONTINUED | OUTPATIENT
Start: 2020-10-27 | End: 2020-10-28 | Stop reason: HOSPADM

## 2020-10-27 RX ORDER — PANTOPRAZOLE SODIUM 40 MG/1
40 INJECTION, POWDER, FOR SOLUTION INTRAVENOUS ONCE
Status: COMPLETED | OUTPATIENT
Start: 2020-10-27 | End: 2020-10-27

## 2020-10-27 RX ORDER — ACETAMINOPHEN 325 MG/1
650 TABLET ORAL EVERY 4 HOURS PRN
Status: DISCONTINUED | OUTPATIENT
Start: 2020-10-27 | End: 2020-10-28 | Stop reason: HOSPADM

## 2020-10-27 RX ORDER — PROCHLORPERAZINE 25 MG
25 SUPPOSITORY, RECTAL RECTAL EVERY 12 HOURS PRN
Status: DISCONTINUED | OUTPATIENT
Start: 2020-10-27 | End: 2020-10-28 | Stop reason: HOSPADM

## 2020-10-27 RX ORDER — ONDANSETRON 2 MG/ML
4 INJECTION INTRAMUSCULAR; INTRAVENOUS EVERY 6 HOURS PRN
Status: DISCONTINUED | OUTPATIENT
Start: 2020-10-27 | End: 2020-10-28 | Stop reason: HOSPADM

## 2020-10-27 RX ORDER — SODIUM CHLORIDE 9 MG/ML
1000 INJECTION, SOLUTION INTRAVENOUS CONTINUOUS
Status: DISCONTINUED | OUTPATIENT
Start: 2020-10-27 | End: 2020-10-27

## 2020-10-27 RX ORDER — METOCLOPRAMIDE HYDROCHLORIDE 5 MG/ML
10 INJECTION INTRAMUSCULAR; INTRAVENOUS ONCE
Status: COMPLETED | OUTPATIENT
Start: 2020-10-27 | End: 2020-10-27

## 2020-10-27 RX ORDER — HYDROMORPHONE HCL/PF 1 MG/ML
0.2 SYRINGE (ML) INJECTION ONCE
Status: COMPLETED | OUTPATIENT
Start: 2020-10-27 | End: 2020-10-27

## 2020-10-27 RX ORDER — HYDROMORPHONE HCL/PF 1 MG/ML
0.5 SYRINGE (ML) INJECTION ONCE
Status: COMPLETED | OUTPATIENT
Start: 2020-10-27 | End: 2020-10-27

## 2020-10-27 RX ADMIN — PANTOPRAZOLE SODIUM 40 MG: 40 INJECTION, POWDER, LYOPHILIZED, FOR SOLUTION INTRAVENOUS at 16:35

## 2020-10-27 RX ADMIN — SODIUM CHLORIDE 1000 ML/HR: 9 INJECTION, SOLUTION INTRAVENOUS at 13:52

## 2020-10-27 RX ADMIN — HYDROMORPHONE HYDROCHLORIDE 0.5 MG: 1 INJECTION, SOLUTION INTRAMUSCULAR; INTRAVENOUS; SUBCUTANEOUS at 12:49

## 2020-10-27 RX ADMIN — HYDROMORPHONE HYDROCHLORIDE 0.2 MG: 1 INJECTION, SOLUTION INTRAMUSCULAR; INTRAVENOUS; SUBCUTANEOUS at 20:13

## 2020-10-27 RX ADMIN — PANTOPRAZOLE SODIUM 8 MG/HR: 40 INJECTION, POWDER, LYOPHILIZED, FOR SOLUTION INTRAVENOUS at 19:23

## 2020-10-27 RX ADMIN — SODIUM CHLORIDE 1000 ML: 0.9 INJECTION, SOLUTION INTRAVENOUS at 19:13

## 2020-10-27 RX ADMIN — HYDROMORPHONE HYDROCHLORIDE 0.2 MG: 1 INJECTION, SOLUTION INTRAMUSCULAR; INTRAVENOUS; SUBCUTANEOUS at 23:28

## 2020-10-27 RX ADMIN — SODIUM CHLORIDE 125 ML/HR: 0.9 INJECTION, SOLUTION INTRAVENOUS at 23:27

## 2020-10-27 RX ADMIN — IOHEXOL 100 ML: 350 INJECTION, SOLUTION INTRAVENOUS at 14:31

## 2020-10-27 RX ADMIN — METOCLOPRAMIDE 10 MG: 5 INJECTION, SOLUTION INTRAMUSCULAR; INTRAVENOUS at 12:46

## 2020-10-28 ENCOUNTER — ANESTHESIA (OUTPATIENT)
Dept: GASTROENTEROLOGY | Facility: HOSPITAL | Age: 50
End: 2020-10-28
Payer: COMMERCIAL

## 2020-10-28 ENCOUNTER — ANESTHESIA EVENT (OUTPATIENT)
Dept: GASTROENTEROLOGY | Facility: HOSPITAL | Age: 50
End: 2020-10-28
Payer: COMMERCIAL

## 2020-10-28 ENCOUNTER — APPOINTMENT (OUTPATIENT)
Dept: GASTROENTEROLOGY | Facility: HOSPITAL | Age: 50
End: 2020-10-28
Attending: INTERNAL MEDICINE
Payer: COMMERCIAL

## 2020-10-28 VITALS
BODY MASS INDEX: 24.46 KG/M2 | OXYGEN SATURATION: 98 % | HEIGHT: 65 IN | TEMPERATURE: 98.9 F | DIASTOLIC BLOOD PRESSURE: 72 MMHG | SYSTOLIC BLOOD PRESSURE: 104 MMHG | WEIGHT: 146.83 LBS | RESPIRATION RATE: 19 BRPM | HEART RATE: 61 BPM

## 2020-10-28 VITALS — HEART RATE: 67 BPM

## 2020-10-28 LAB
ANION GAP SERPL CALCULATED.3IONS-SCNC: 6 MMOL/L (ref 4–13)
ATRIAL RATE: 60 BPM
BUN SERPL-MCNC: 8 MG/DL (ref 7–25)
CALCIUM SERPL-MCNC: 7.9 MG/DL (ref 8.6–10.5)
CHLORIDE SERPL-SCNC: 108 MMOL/L (ref 98–107)
CO2 SERPL-SCNC: 24 MMOL/L (ref 21–31)
CREAT SERPL-MCNC: 0.55 MG/DL (ref 0.6–1.2)
ERYTHROCYTE [DISTWIDTH] IN BLOOD BY AUTOMATED COUNT: 18.5 % (ref 11.5–14.5)
GFR SERPL CREATININE-BSD FRML MDRD: 110 ML/MIN/1.73SQ M
GLUCOSE P FAST SERPL-MCNC: 87 MG/DL (ref 65–99)
GLUCOSE SERPL-MCNC: 87 MG/DL (ref 65–99)
HCT VFR BLD AUTO: 26 % (ref 42–47)
HGB BLD-MCNC: 8.1 G/DL (ref 12–16)
HIV 1+2 AB+HIV1 P24 AG SERPL QL IA: NORMAL
MCH RBC QN AUTO: 23.5 PG (ref 26–34)
MCHC RBC AUTO-ENTMCNC: 31.4 G/DL (ref 31–37)
MCV RBC AUTO: 75 FL (ref 81–99)
P AXIS: 66 DEGREES
PLATELET # BLD AUTO: 292 THOUSANDS/UL (ref 149–390)
PMV BLD AUTO: 7.7 FL (ref 8.6–11.7)
POTASSIUM SERPL-SCNC: 3.7 MMOL/L (ref 3.5–5.5)
PR INTERVAL: 134 MS
QRS AXIS: 54 DEGREES
QRSD INTERVAL: 82 MS
QT INTERVAL: 438 MS
QTC INTERVAL: 438 MS
RBC # BLD AUTO: 3.46 MILLION/UL (ref 3.9–5.2)
SODIUM SERPL-SCNC: 138 MMOL/L (ref 134–143)
T WAVE AXIS: 61 DEGREES
VENTRICULAR RATE: 60 BPM
WBC # BLD AUTO: 6.4 THOUSAND/UL (ref 4.8–10.8)

## 2020-10-28 PROCEDURE — 93010 ELECTROCARDIOGRAM REPORT: CPT | Performed by: INTERNAL MEDICINE

## 2020-10-28 PROCEDURE — C9113 INJ PANTOPRAZOLE SODIUM, VIA: HCPCS | Performed by: PHYSICIAN ASSISTANT

## 2020-10-28 PROCEDURE — 88305 TISSUE EXAM BY PATHOLOGIST: CPT | Performed by: PATHOLOGY

## 2020-10-28 PROCEDURE — 99217 PR OBSERVATION CARE DISCHARGE MANAGEMENT: CPT | Performed by: FAMILY MEDICINE

## 2020-10-28 PROCEDURE — 85027 COMPLETE CBC AUTOMATED: CPT | Performed by: PHYSICIAN ASSISTANT

## 2020-10-28 PROCEDURE — 80048 BASIC METABOLIC PNL TOTAL CA: CPT | Performed by: PHYSICIAN ASSISTANT

## 2020-10-28 RX ORDER — FENTANYL CITRATE/PF 50 MCG/ML
12.5 SYRINGE (ML) INJECTION
Status: COMPLETED | OUTPATIENT
Start: 2020-10-28 | End: 2020-10-28

## 2020-10-28 RX ORDER — PANTOPRAZOLE SODIUM 40 MG/1
40 TABLET, DELAYED RELEASE ORAL DAILY
Qty: 90 TABLET | Refills: 0 | Status: SHIPPED | OUTPATIENT
Start: 2020-10-28 | End: 2021-05-13 | Stop reason: SDUPTHER

## 2020-10-28 RX ORDER — LIDOCAINE HYDROCHLORIDE 10 MG/ML
INJECTION, SOLUTION EPIDURAL; INFILTRATION; INTRACAUDAL; PERINEURAL AS NEEDED
Status: DISCONTINUED | OUTPATIENT
Start: 2020-10-28 | End: 2020-10-28

## 2020-10-28 RX ORDER — ONDANSETRON 4 MG/1
4 TABLET, FILM COATED ORAL EVERY 8 HOURS PRN
Qty: 20 TABLET | Refills: 0 | Status: SHIPPED | OUTPATIENT
Start: 2020-10-28 | End: 2021-05-13

## 2020-10-28 RX ORDER — ONDANSETRON 2 MG/ML
4 INJECTION INTRAMUSCULAR; INTRAVENOUS ONCE AS NEEDED
Status: DISCONTINUED | OUTPATIENT
Start: 2020-10-28 | End: 2020-10-28 | Stop reason: HOSPADM

## 2020-10-28 RX ORDER — EPHEDRINE SULFATE 50 MG/ML
INJECTION INTRAVENOUS AS NEEDED
Status: DISCONTINUED | OUTPATIENT
Start: 2020-10-28 | End: 2020-10-28

## 2020-10-28 RX ORDER — PROPOFOL 10 MG/ML
INJECTION, EMULSION INTRAVENOUS AS NEEDED
Status: DISCONTINUED | OUTPATIENT
Start: 2020-10-28 | End: 2020-10-28

## 2020-10-28 RX ORDER — DICYCLOMINE HYDROCHLORIDE 10 MG/1
10 CAPSULE ORAL
Qty: 20 CAPSULE | Refills: 0 | Status: SHIPPED | OUTPATIENT
Start: 2020-10-28 | End: 2021-05-13

## 2020-10-28 RX ADMIN — EPHEDRINE SULFATE 10 MG: 50 INJECTION, SOLUTION INTRAVENOUS at 13:40

## 2020-10-28 RX ADMIN — NICOTINE 1 PATCH: 7 PATCH TRANSDERMAL at 08:25

## 2020-10-28 RX ADMIN — LIDOCAINE HYDROCHLORIDE 100 MG: 10 INJECTION, SOLUTION EPIDURAL; INFILTRATION; INTRACAUDAL; PERINEURAL at 13:33

## 2020-10-28 RX ADMIN — SODIUM CHLORIDE 125 ML/HR: 0.9 INJECTION, SOLUTION INTRAVENOUS at 08:37

## 2020-10-28 RX ADMIN — BUDESONIDE AND FORMOTEROL FUMARATE DIHYDRATE 2 PUFF: 160; 4.5 AEROSOL RESPIRATORY (INHALATION) at 13:25

## 2020-10-28 RX ADMIN — PROPOFOL 50 MG: 10 INJECTION, EMULSION INTRAVENOUS at 13:34

## 2020-10-28 RX ADMIN — FENTANYL CITRATE 12.5 MCG: 50 INJECTION INTRAMUSCULAR; INTRAVENOUS at 14:28

## 2020-10-28 RX ADMIN — HYDROMORPHONE HYDROCHLORIDE 0.2 MG: 1 INJECTION, SOLUTION INTRAMUSCULAR; INTRAVENOUS; SUBCUTANEOUS at 08:31

## 2020-10-28 RX ADMIN — FENTANYL CITRATE 12.5 MCG: 50 INJECTION INTRAMUSCULAR; INTRAVENOUS at 14:14

## 2020-10-28 RX ADMIN — FENTANYL CITRATE 12.5 MCG: 50 INJECTION INTRAMUSCULAR; INTRAVENOUS at 14:06

## 2020-10-28 RX ADMIN — HYDROMORPHONE HYDROCHLORIDE 0.2 MG: 1 INJECTION, SOLUTION INTRAMUSCULAR; INTRAVENOUS; SUBCUTANEOUS at 16:50

## 2020-10-28 RX ADMIN — BUDESONIDE AND FORMOTEROL FUMARATE DIHYDRATE 2 PUFF: 160; 4.5 AEROSOL RESPIRATORY (INHALATION) at 08:25

## 2020-10-28 RX ADMIN — FENTANYL CITRATE 12.5 MCG: 50 INJECTION INTRAMUSCULAR; INTRAVENOUS at 14:48

## 2020-10-28 RX ADMIN — HYDROMORPHONE HYDROCHLORIDE 0.2 MG: 1 INJECTION, SOLUTION INTRAMUSCULAR; INTRAVENOUS; SUBCUTANEOUS at 02:31

## 2020-10-28 RX ADMIN — ONDANSETRON 4 MG: 2 INJECTION INTRAMUSCULAR; INTRAVENOUS at 03:54

## 2020-10-28 RX ADMIN — PROPOFOL 100 MG: 10 INJECTION, EMULSION INTRAVENOUS at 13:33

## 2020-10-28 RX ADMIN — BUDESONIDE AND FORMOTEROL FUMARATE DIHYDRATE 2 PUFF: 160; 4.5 AEROSOL RESPIRATORY (INHALATION) at 08:28

## 2020-10-28 RX ADMIN — PANTOPRAZOLE SODIUM 8 MG/HR: 40 INJECTION, POWDER, LYOPHILIZED, FOR SOLUTION INTRAVENOUS at 06:01

## 2020-10-30 ENCOUNTER — TRANSITIONAL CARE MANAGEMENT (OUTPATIENT)
Dept: INTERNAL MEDICINE CLINIC | Facility: CLINIC | Age: 50
End: 2020-10-30

## 2020-11-01 LAB
VIT A SERPL-MCNC: 29.4 UG/DL (ref 20.1–62)
VIT B1 BLD-SCNC: 103.4 NMOL/L (ref 66.5–200)

## 2020-11-03 LAB — VIT B6 SERPL-MCNC: 8 UG/L (ref 2–32.8)

## 2020-11-25 ENCOUNTER — APPOINTMENT (OUTPATIENT)
Dept: RADIOLOGY | Facility: CLINIC | Age: 50
End: 2020-11-25
Payer: COMMERCIAL

## 2020-11-25 ENCOUNTER — OFFICE VISIT (OUTPATIENT)
Dept: URGENT CARE | Facility: CLINIC | Age: 50
End: 2020-11-25
Payer: COMMERCIAL

## 2020-11-25 VITALS
OXYGEN SATURATION: 96 % | RESPIRATION RATE: 18 BRPM | DIASTOLIC BLOOD PRESSURE: 63 MMHG | HEART RATE: 98 BPM | WEIGHT: 140 LBS | TEMPERATURE: 97.4 F | BODY MASS INDEX: 22.5 KG/M2 | HEIGHT: 66 IN | SYSTOLIC BLOOD PRESSURE: 139 MMHG

## 2020-11-25 DIAGNOSIS — S49.91XA INJURY OF RIGHT UPPER EXTREMITY, INITIAL ENCOUNTER: ICD-10-CM

## 2020-11-25 DIAGNOSIS — S46.912A LEFT SHOULDER STRAIN, INITIAL ENCOUNTER: Primary | ICD-10-CM

## 2020-11-25 DIAGNOSIS — S49.92XA INJURY OF LEFT UPPER EXTREMITY, INITIAL ENCOUNTER: ICD-10-CM

## 2020-11-25 PROCEDURE — 99203 OFFICE O/P NEW LOW 30 MIN: CPT | Performed by: NURSE PRACTITIONER

## 2020-11-25 PROCEDURE — 73080 X-RAY EXAM OF ELBOW: CPT

## 2020-11-25 PROCEDURE — 99283 EMERGENCY DEPT VISIT LOW MDM: CPT | Performed by: NURSE PRACTITIONER

## 2020-11-25 PROCEDURE — G0382 LEV 3 HOSP TYPE B ED VISIT: HCPCS | Performed by: NURSE PRACTITIONER

## 2020-11-25 PROCEDURE — 73110 X-RAY EXAM OF WRIST: CPT

## 2020-11-25 PROCEDURE — 73030 X-RAY EXAM OF SHOULDER: CPT

## 2020-12-17 ENCOUNTER — HOSPITAL ENCOUNTER (EMERGENCY)
Facility: HOSPITAL | Age: 50
Discharge: HOME | End: 2020-12-17
Attending: EMERGENCY MEDICINE
Payer: COMMERCIAL

## 2020-12-17 VITALS
HEART RATE: 76 BPM | DIASTOLIC BLOOD PRESSURE: 63 MMHG | RESPIRATION RATE: 18 BRPM | OXYGEN SATURATION: 99 % | SYSTOLIC BLOOD PRESSURE: 114 MMHG | TEMPERATURE: 98.4 F

## 2020-12-17 DIAGNOSIS — F15.10 METHAMPHETAMINE ABUSE (HCC): Primary | ICD-10-CM

## 2020-12-17 PROCEDURE — 99282 EMERGENCY DEPT VISIT SF MDM: CPT | Performed by: EMERGENCY MEDICINE

## 2020-12-17 PROCEDURE — 99283 EMERGENCY DEPT VISIT LOW MDM: CPT

## 2021-02-23 ENCOUNTER — OFFICE VISIT (OUTPATIENT)
Dept: URGENT CARE | Facility: CLINIC | Age: 51
End: 2021-02-23
Payer: COMMERCIAL

## 2021-02-23 VITALS
HEART RATE: 82 BPM | BODY MASS INDEX: 27.34 KG/M2 | HEIGHT: 60 IN | RESPIRATION RATE: 20 BRPM | TEMPERATURE: 97.7 F | OXYGEN SATURATION: 100 %

## 2021-02-23 DIAGNOSIS — M54.50 ACUTE LEFT-SIDED LOW BACK PAIN WITHOUT SCIATICA: ICD-10-CM

## 2021-02-23 DIAGNOSIS — R05.9 COUGH: Primary | ICD-10-CM

## 2021-02-23 LAB
SL AMB  POCT GLUCOSE, UA: NEGATIVE
SL AMB LEUKOCYTE ESTERASE,UA: NEGATIVE
SL AMB POCT BILIRUBIN,UA: NEGATIVE
SL AMB POCT BLOOD,UA: ABNORMAL
SL AMB POCT CLARITY,UA: ABNORMAL
SL AMB POCT COLOR,UA: ABNORMAL
SL AMB POCT KETONES,UA: NEGATIVE
SL AMB POCT NITRITE,UA: NEGATIVE
SL AMB POCT PH,UA: 7.5
SL AMB POCT SPECIFIC GRAVITY,UA: 1.02
SL AMB POCT URINE PROTEIN: NEGATIVE
SL AMB POCT UROBILINOGEN: 0.2

## 2021-02-23 PROCEDURE — U0003 INFECTIOUS AGENT DETECTION BY NUCLEIC ACID (DNA OR RNA); SEVERE ACUTE RESPIRATORY SYNDROME CORONAVIRUS 2 (SARS-COV-2) (CORONAVIRUS DISEASE [COVID-19]), AMPLIFIED PROBE TECHNIQUE, MAKING USE OF HIGH THROUGHPUT TECHNOLOGIES AS DESCRIBED BY CMS-2020-01-R: HCPCS | Performed by: NURSE PRACTITIONER

## 2021-02-23 PROCEDURE — G0382 LEV 3 HOSP TYPE B ED VISIT: HCPCS | Performed by: NURSE PRACTITIONER

## 2021-02-23 PROCEDURE — 99283 EMERGENCY DEPT VISIT LOW MDM: CPT | Performed by: NURSE PRACTITIONER

## 2021-02-23 PROCEDURE — U0005 INFEC AGEN DETEC AMPLI PROBE: HCPCS | Performed by: NURSE PRACTITIONER

## 2021-02-23 PROCEDURE — 87086 URINE CULTURE/COLONY COUNT: CPT | Performed by: NURSE PRACTITIONER

## 2021-02-23 PROCEDURE — 99213 OFFICE O/P EST LOW 20 MIN: CPT | Performed by: NURSE PRACTITIONER

## 2021-02-23 PROCEDURE — 81002 URINALYSIS NONAUTO W/O SCOPE: CPT | Performed by: NURSE PRACTITIONER

## 2021-02-23 NOTE — PATIENT INSTRUCTIONS
Patient instructed to self quarantine  Advised to avoid nsaids  If you develop prolonged high fever, worsening or productive cough, shortness of breath, difficulty breathing, chest pain, or any new or concerning symptoms please proceed ER  Instructed patient to call ER prior to arrival make them aware she is being test for covid  Patient verbalizes understanding  Start vitamin c 1g twice daily,vitamin d3 2000IU daily, and multivitamin  monitor pulse ox  Call PCP in 24 hours for f/u  [address]    Your healthcare provider and/or public health staff have evaluated you and have determined that you do not need to remain in the hospital at this time  At this time you can be isolated at home where you will be monitored by staff from your local or state health department  You should carefully follow the prevention and isolation steps below until a healthcare provider or local or state health department says that you can return to your normal activities  Stay home except to get medical care    People who are mildly ill with COVID-19 are able to isolate at home during their illness  You should restrict activities outside your home, except for getting medical care  Do not go to work, school, or public areas  Avoid using public transportation, ride-sharing, or taxis  Separate yourself from other people and animals in your home    People: As much as possible, you should stay in a specific room and away from other people in your home  Also, you should use a separate bathroom, if available  Animals: You should restrict contact with pets and other animals while you are sick with COVID-19, just like you would around other people  Although there have not been reports of pets or other animals becoming sick with COVID-19, it is still recommended that people sick with COVID-19 limit contact with animals until more information is known about the virus   When possible, have another member of your household care for your animals while you are sick  If you are sick with COVID-19, avoid contact with your pet, including petting, snuggling, being kissed or licked, and sharing food  If you must care for your pet or be around animals while you are sick, wash your hands before and after you interact with pets and wear a facemask  See COVID-19 and Animals for more information  Call ahead before visiting your doctor    If you have a medical appointment, call the healthcare provider and tell them that you have or may have COVID-19  This will help the healthcare providers office take steps to keep other people from getting infected or exposed  Wear a facemask    You should wear a facemask when you are around other people (e g , sharing a room or vehicle) or pets and before you enter a healthcare providers office  If you are not able to wear a facemask (for example, because it causes trouble breathing), then people who live with you should not stay in the same room with you, or they should wear a facemask if they enter your room  Cover your coughs and sneezes    Cover your mouth and nose with a tissue when you cough or sneeze  Throw used tissues in a lined trash can  Immediately wash your hands with soap and water for at least 20 seconds or, if soap and water are not available, clean your hands with an alcohol-based hand  that contains at least 60% alcohol  Clean your hands often    Wash your hands often with soap and water for at least 20 seconds, especially after blowing your nose, coughing, or sneezing; going to the bathroom; and before eating or preparing food  If soap and water are not readily available, use an alcohol-based hand  with at least 60% alcohol, covering all surfaces of your hands and rubbing them together until they feel dry  Soap and water are the best option if hands are visibly dirty  Avoid touching your eyes, nose, and mouth with unwashed hands      Avoid sharing personal household items    You should not share dishes, drinking glasses, cups, eating utensils, towels, or bedding with other people or pets in your home  After using these items, they should be washed thoroughly with soap and water  Clean all high-touch surfaces everyday    High touch surfaces include counters, tabletops, doorknobs, bathroom fixtures, toilets, phones, keyboards, tablets, and bedside tables  Also, clean any surfaces that may have blood, stool, or body fluids on them  Use a household cleaning spray or wipe, according to the label instructions  Labels contain instructions for safe and effective use of the cleaning product including precautions you should take when applying the product, such as wearing gloves and making sure you have good ventilation during use of the product  Monitor your symptoms    Seek prompt medical attention if your illness is worsening (e g , difficulty breathing)  Before seeking care, call your healthcare provider and tell them that you have, or are being evaluated for, COVID-19  Put on a facemask before you enter the facility  These steps will help the healthcare providers office to keep other people in the office or waiting room from getting infected or exposed  Ask your healthcare provider to call the local or [facility] health department  Persons who are placed under active monitoring or facilitated self-monitoring should follow instructions provided by their local health department or occupational health professionals, as appropriate  If you have a medical emergency and need to call 911, notify the dispatch personnel that you have, or are being evaluated for COVID-19  If possible, put on a facemask before emergency medical services arrive      Discontinuing home isolation    Patients with confirmed COVID-19 should remain under home isolation precautions until the following conditions are met:   - They have had no fever for at least 24 hours (that is one full day of no fever without the use medicine that reduces fevers)  AND  - other symptoms have improved (for example, when their cough or shortness of breath have improved)  AND  - If had mild or moderate illness, at least 10 days have passed since their symptoms first appeared or if severe illness (needed oxygen) or immunosuppressed, at least 20 days have passed since symptoms first appeared  Patients with confirmed COVID-19 should also notify close contacts (including their workplace) and ask that they self-quarantine  Currently, close contact is defined as being within 6 feet for 15 minutes or more from the period 24 hours starting 48 hours before symptom onset to the time at which the patient went into isolation  Close contacts of patients diagnosed with COVID-19 should be instructed by the patient to self-quarantine for 14 days from the last time of their last contact with the patient       Source: [employer]

## 2021-02-24 ENCOUNTER — TELEPHONE (OUTPATIENT)
Dept: URGENT CARE | Facility: CLINIC | Age: 51
End: 2021-02-24

## 2021-02-24 LAB
BACTERIA UR CULT: NORMAL
SARS-COV-2 RNA RESP QL NAA+PROBE: NEGATIVE

## 2021-02-24 NOTE — TELEPHONE ENCOUNTER
Patient aware of negative COVID-19 testing  Discussed with patient if she has any worsening symptoms she should go to the emergency room  She states she still not feeling well    She verbalized understanding of instructions on telephone

## 2021-05-09 ENCOUNTER — HOSPITAL ENCOUNTER (EMERGENCY)
Facility: HOSPITAL | Age: 51
Discharge: HOME | End: 2021-05-09
Attending: EMERGENCY MEDICINE
Payer: COMMERCIAL

## 2021-05-09 ENCOUNTER — APPOINTMENT (EMERGENCY)
Dept: CT IMAGING | Facility: HOSPITAL | Age: 51
End: 2021-05-09
Payer: COMMERCIAL

## 2021-05-09 VITALS
HEART RATE: 77 BPM | SYSTOLIC BLOOD PRESSURE: 96 MMHG | OXYGEN SATURATION: 99 % | DIASTOLIC BLOOD PRESSURE: 65 MMHG | RESPIRATION RATE: 18 BRPM

## 2021-05-09 DIAGNOSIS — M54.9 BACK PAIN: Primary | ICD-10-CM

## 2021-05-09 PROCEDURE — 99284 EMERGENCY DEPT VISIT MOD MDM: CPT

## 2021-05-09 PROCEDURE — 99284 EMERGENCY DEPT VISIT MOD MDM: CPT | Performed by: EMERGENCY MEDICINE

## 2021-05-09 PROCEDURE — 72131 CT LUMBAR SPINE W/O DYE: CPT

## 2021-05-09 RX ORDER — CYCLOBENZAPRINE HCL 10 MG
10 TABLET ORAL 3 TIMES DAILY PRN
Qty: 10 TABLET | Refills: 0 | Status: SHIPPED | OUTPATIENT
Start: 2021-05-09 | End: 2021-07-14 | Stop reason: ALTCHOICE

## 2021-05-09 RX ORDER — IBUPROFEN 800 MG/1
400 TABLET ORAL EVERY 8 HOURS PRN
Qty: 12 TABLET | Refills: 0 | Status: SHIPPED | OUTPATIENT
Start: 2021-05-09 | End: 2021-05-13

## 2021-05-09 NOTE — DISCHARGE INSTRUCTIONS
Return to er with fever, vomiting or diarrhea, abd pain, with weakness to the legs, sensation changes, incontinence or with any other concerns

## 2021-05-09 NOTE — ED PROVIDER NOTES
History  Chief Complaint   Patient presents with    Back Pain     pt states feels like an electrical feeling going up her back     Back pain taht is lower and seems to have an electric like aspect that moves up lower back slightly  She was with trouble walking earlier she states 2/2 pain but states now her pain is better and she is walking well  No fever, trauma  No weakness, sensation changes, incontinence, fever, chills  She does not feel sick  She states long hx of similar back problems  She denies any recent imaging  Prior to Admission Medications   Prescriptions Last Dose Informant Patient Reported? Taking? Carafate 1 GM/10ML suspension   No No   Sig: Take 10 mL (1 g total) by mouth 4 (four) times a day (with meals and at bedtime)   Patient not taking: Reported on 11/25/2020   Cholecalciferol (VITAMIN D3) 33266 units CAPS   Yes No   Sig: Take by mouth   budesonide-formoterol (SYMBICORT) 160-4 5 mcg/act inhaler   No No   Sig: Inhale 2 puffs 2 (two) times a day Rinse mouth after use     Patient not taking: Reported on 11/25/2020   clotrimazole-betamethasone (LOTRISONE) 1-0 05 % cream   No No   Sig: Apply topically 2 (two) times a day   Patient not taking: Reported on 10/27/2020   dicyclomine (BENTYL) 10 mg capsule   No No   Sig: Take 1 capsule (10 mg total) by mouth 4 (four) times a day (before meals and at bedtime)   Patient not taking: Reported on 11/25/2020   ondansetron (ZOFRAN) 4 mg tablet   No No   Sig: Take 1 tablet (4 mg total) by mouth every 8 (eight) hours as needed for nausea or vomiting   Patient not taking: Reported on 11/25/2020   pantoprazole (PROTONIX) 40 mg tablet   No No   Sig: Take 1 tablet (40 mg total) by mouth daily   Patient not taking: Reported on 11/25/2020      Facility-Administered Medications: None       Past Medical History:   Diagnosis Date    Asthma     GERD (gastroesophageal reflux disease)     H/O gastric bypass     Hypokalemia     Methamphetamine abuse ([facility] [address] ) Positive UDS; Feb 2020       Past Surgical History:   Procedure Laterality Date    CHOLECYSTECTOMY      DENTAL SURGERY      ESOPHAGEAL DILATION      GASTRIC BYPASS      EILEEN-EN-Y PROCEDURE      TUBAL LIGATION         Family History   Problem Relation Age of Onset    Diabetes Mother     Heart disease Mother     Stroke Mother     Arthritis Mother     Hyperlipidemia Mother     Colon cancer Father     Heart disease Sister     Coronary artery disease Sister     Heart disease Brother     Coronary artery disease Brother     Lung cancer Maternal Grandmother     Lung cancer Maternal Grandfather     Lung cancer Paternal Grandmother     Lung cancer Paternal Grandfather     Lung cancer Paternal Uncle     Lung cancer Cousin      I have reviewed and agree with the history as documented  E-Cigarette/Vaping    E-Cigarette Use Never User      E-Cigarette/Vaping Substances     Social History     Tobacco Use    Smoking status: Heavy Tobacco Smoker     Packs/day: 0 50     Years: 15 00     Pack years: 7 50     Types: Cigarettes    Smokeless tobacco: Never Used   Substance Use Topics    Alcohol use: Never     Frequency: Never    Drug use: Yes     Types: Methamphetamines     Comment: reports use this week (12/2020)       Review of Systems   All other systems reviewed and are negative  Physical Exam  Physical Exam  Vitals signs and nursing note reviewed  Constitutional:       General: She is not in acute distress  Appearance: Normal appearance  She is well-developed and normal weight  She is not ill-appearing, toxic-appearing or diaphoretic  HENT:      Head: Normocephalic and atraumatic  Right Ear: Tympanic membrane normal       Left Ear: Tympanic membrane normal       Nose: Nose normal       Mouth/Throat:      Mouth: Mucous membranes are moist    Eyes:      Conjunctiva/sclera: Conjunctivae normal    Neck:      Musculoskeletal: Neck supple     Cardiovascular:      Rate and Rhythm: Normal rate and regular rhythm  Pulses: Normal pulses  Heart sounds: Normal heart sounds  No murmur  No friction rub  No gallop  Pulmonary:      Effort: Pulmonary effort is normal  No respiratory distress  Breath sounds: Normal breath sounds  No stridor  No wheezing, rhonchi or rales  Chest:      Chest wall: No tenderness  Abdominal:      General: Abdomen is flat  Bowel sounds are normal  There is no distension  Palpations: Abdomen is soft  There is no mass  Tenderness: There is no abdominal tenderness  There is no right CVA tenderness, left CVA tenderness, guarding or rebound  Hernia: No hernia is present  Musculoskeletal: Normal range of motion  General: No swelling, tenderness, deformity or signs of injury  Right lower leg: No edema  Left lower leg: No edema  Comments: Midline lumbar tenderness  bl para vert spasm and tenderness which is mild  Strong motor to the LE  Intact sensation  Skin:     General: Skin is warm and dry  Capillary Refill: Capillary refill takes less than 2 seconds  Coloration: Skin is not jaundiced or pale  Findings: No bruising, erythema, lesion or rash  Neurological:      General: No focal deficit present  Mental Status: She is alert  Mental status is at baseline  She is disoriented  Cranial Nerves: No cranial nerve deficit  Sensory: No sensory deficit  Motor: No weakness        Coordination: Coordination normal       Gait: Gait normal       Deep Tendon Reflexes: Reflexes normal    Psychiatric:         Mood and Affect: Mood normal          Vital Signs  ED Triage Vitals   Temp Pulse Respirations Blood Pressure SpO2   -- 05/09/21 1502 05/09/21 1502 05/09/21 1502 05/09/21 1502    73 18 124/69 100 %      Temp src Heart Rate Source Patient Position - Orthostatic VS BP Location FiO2 (%)   -- 05/09/21 1459 05/09/21 1502 05/09/21 1502 --    Monitor Sitting Left arm       Pain Score       05/09/21 1502       2 Vitals:    05/09/21 1502 05/09/21 1645   BP: 124/69 96/65   Pulse: 73 77   Patient Position - Orthostatic VS: Sitting          Visual Acuity      ED Medications  Medications - No data to display    Diagnostic Studies  Results Reviewed     None                 CT spine lumbar without contrast   Final Result by Izaiah Dick MD (05/09 1714)      Mild degenerative facet disease at L4-L5  Otherwise normal CT of the lumbar spine  Workstation performed: [id number]                    Procedures  Procedures         ED Course                                           MDM  Number of Diagnoses or Management Options  Diagnosis management comments: To er with back pain noted today  In er she states pain is improved from earlier  On exam she does have midline tenderness  Ct reassuring  She is ambulating well  She is with out fever, abd pain or tenderness , uti sx  She did have her second covid shot 2 days  She states she is eating well  She denies fever, gi sx  She denies focal neuro sx  I have asked her to fu with her pcp  I have addressed all red flags, need for fu and when to return to er and she has voiced understanding  Disposition  Final diagnoses:   Back pain     Time reflects when diagnosis was documented in both MDM as applicable and the Disposition within this note     Time User Action Codes Description Comment    5/9/2021  5:48 PM Amber Laughter Add [M54 9] Back pain       ED Disposition     ED Disposition Condition Date/Time Comment    Discharge Stable Sun May 9, 2021  5:48 PM Marylou Morris discharge to home/self care              Follow-up Information     Follow up With Specialties Details Why Contact Info    Madhu Sparks, DO Internal Medicine In 3 days  [address]  [phone]            Discharge Medication List as of 5/9/2021  5:50 PM      START taking these medications    Details   cyclobenzaprine (FLEXERIL) 10 mg tablet Take 1 tablet (10 mg total) by mouth 3 (three) times a day as needed for muscle spasms for up to 10 doses, Starting Sun 5/9/2021, Normal         CONTINUE these medications which have NOT CHANGED    Details   budesonide-formoterol (SYMBICORT) 160-4 5 mcg/act inhaler Inhale 2 puffs 2 (two) times a day Rinse mouth after use , Starting Mon 10/19/2020, Normal      Carafate 1 GM/10ML suspension Take 10 mL (1 g total) by mouth 4 (four) times a day (with meals and at bedtime), Starting Thu 10/15/2020, Normal      Cholecalciferol (VITAMIN D3) 71696 units CAPS Take by mouth, Historical Med      clotrimazole-betamethasone (LOTRISONE) 1-0 05 % cream Apply topically 2 (two) times a day, Starting Thu 10/15/2020, Normal      dicyclomine (BENTYL) 10 mg capsule Take 1 capsule (10 mg total) by mouth 4 (four) times a day (before meals and at bedtime), Starting Wed 10/28/2020, Normal      ondansetron (ZOFRAN) 4 mg tablet Take 1 tablet (4 mg total) by mouth every 8 (eight) hours as needed for nausea or vomiting, Starting Wed 10/28/2020, Normal      pantoprazole (PROTONIX) 40 mg tablet Take 1 tablet (40 mg total) by mouth daily, Starting Wed 10/28/2020, Normal           No discharge procedures on file      PDMP Review     None          ED Provider  Electronically Signed by           Emanuel Nicole MD  05/12/21 5720

## 2021-05-13 ENCOUNTER — TELEPHONE (OUTPATIENT)
Dept: FAMILY MEDICINE CLINIC | Facility: CLINIC | Age: 51
End: 2021-05-13

## 2021-05-13 ENCOUNTER — OFFICE VISIT (OUTPATIENT)
Dept: FAMILY MEDICINE CLINIC | Facility: CLINIC | Age: 51
End: 2021-05-13
Payer: COMMERCIAL

## 2021-05-13 VITALS
SYSTOLIC BLOOD PRESSURE: 108 MMHG | TEMPERATURE: 97.5 F | DIASTOLIC BLOOD PRESSURE: 70 MMHG | OXYGEN SATURATION: 100 % | HEART RATE: 72 BPM | HEIGHT: 60 IN | BODY MASS INDEX: 32.35 KG/M2 | WEIGHT: 164.8 LBS | RESPIRATION RATE: 20 BRPM

## 2021-05-13 DIAGNOSIS — R10.84 GENERALIZED ABDOMINAL PAIN: ICD-10-CM

## 2021-05-13 DIAGNOSIS — M54.9 BACK PAIN: Primary | ICD-10-CM

## 2021-05-13 DIAGNOSIS — K21.9 GASTROESOPHAGEAL REFLUX DISEASE, UNSPECIFIED WHETHER ESOPHAGITIS PRESENT: ICD-10-CM

## 2021-05-13 DIAGNOSIS — J45.40 MODERATE PERSISTENT ASTHMA WITHOUT COMPLICATION: ICD-10-CM

## 2021-05-13 DIAGNOSIS — Z98.84 HISTORY OF GASTRIC BYPASS: ICD-10-CM

## 2021-05-13 DIAGNOSIS — Z76.89 ENCOUNTER TO ESTABLISH CARE: ICD-10-CM

## 2021-05-13 DIAGNOSIS — K59.00 CONSTIPATION, UNSPECIFIED CONSTIPATION TYPE: ICD-10-CM

## 2021-05-13 DIAGNOSIS — K91.89 GASTROJEJUNAL ANASTOMOTIC STRICTURE: ICD-10-CM

## 2021-05-13 PROCEDURE — 3008F BODY MASS INDEX DOCD: CPT | Performed by: FAMILY MEDICINE

## 2021-05-13 PROCEDURE — 4004F PT TOBACCO SCREEN RCVD TLK: CPT | Performed by: FAMILY MEDICINE

## 2021-05-13 PROCEDURE — 3725F SCREEN DEPRESSION PERFORMED: CPT | Performed by: FAMILY MEDICINE

## 2021-05-13 PROCEDURE — 99215 OFFICE O/P EST HI 40 MIN: CPT | Performed by: FAMILY MEDICINE

## 2021-05-13 RX ORDER — PANTOPRAZOLE SODIUM 40 MG/1
40 TABLET, DELAYED RELEASE ORAL DAILY
Qty: 90 TABLET | Refills: 0 | Status: SHIPPED | OUTPATIENT
Start: 2021-05-13 | End: 2021-07-14 | Stop reason: SDUPTHER

## 2021-05-13 RX ORDER — IBUPROFEN 800 MG/1
400 TABLET ORAL EVERY 8 HOURS PRN
Qty: 12 TABLET | Refills: 0 | Status: CANCELLED | OUTPATIENT
Start: 2021-05-13

## 2021-05-13 RX ORDER — BUDESONIDE AND FORMOTEROL FUMARATE DIHYDRATE 160; 4.5 UG/1; UG/1
2 AEROSOL RESPIRATORY (INHALATION) 2 TIMES DAILY
Qty: 10.2 G | Refills: 1 | Status: SHIPPED | OUTPATIENT
Start: 2021-05-13 | End: 2022-04-01

## 2021-05-13 RX ORDER — CHOLECALCIFEROL (VITAMIN D3) 1250 MCG
CAPSULE ORAL
Status: CANCELLED | OUTPATIENT
Start: 2021-05-13

## 2021-05-13 RX ORDER — ALBUTEROL SULFATE 90 UG/1
2 AEROSOL, METERED RESPIRATORY (INHALATION) EVERY 6 HOURS PRN
Qty: 8 G | Refills: 1 | Status: SHIPPED | OUTPATIENT
Start: 2021-05-13 | End: 2021-07-14 | Stop reason: SDUPTHER

## 2021-05-13 NOTE — TELEPHONE ENCOUNTER
Called to follow up with patient since she did not go to the ER  She said she is home and she will go to the ER tomorrow  She ira feeling worse then she did when she was in here

## 2021-05-13 NOTE — PROGRESS NOTES
Melody Dc 1970 female MRN: [id number]  [facility] [place]    Visit to Establish Care: Family Medicine    Assessment/Plan     No problem-specific Assessment & Plan notes found for this encounter  Marylou was seen today for physical exam     Diagnoses and all orders for this visit:    Back pain  -     Cancel: Transfer to other facility  -     Transfer to other facility    Gastroesophageal reflux disease, unspecified whether esophagitis present  -     pantoprazole (PROTONIX) 40 mg tablet; Take 1 tablet (40 mg total) by mouth daily  -     Cancel: Transfer to other facility  -     Transfer to other facility    Gastrojejunal anastomotic stricture  -     Cancel: Transfer to other facility  -     Transfer to other facility    History of gastric bypass  -     Cancel: Transfer to other facility  -     Transfer to other facility    Moderate persistent asthma without complication  -     albuterol (PROVENTIL HFA,VENTOLIN HFA) 90 mcg/act inhaler; Inhale 2 puffs every 6 (six) hours as needed for wheezing or shortness of breath  -     budesonide-formoterol (SYMBICORT) 160-4 5 mcg/act inhaler; Inhale 2 puffs 2 (two) times a day Rinse mouth after use  Constipation, unspecified constipation type    Generalized abdominal pain  -     Cancel: Transfer to other facility  -     Transfer to other facility    Encounter to establish care        In addition to the above, the patient was counseled on general preventative health care subjects, including but not limited to:  - Nutrition, healthy weight, aerobic and weight-bearing exercise  - Mental health, social support, and self care  - Advised of the importance of dental hygiene and routine dental visits   - Patient made aware of  services at the office  SUBJECTIVE    HPI:  Melody Dc is a 53 y o  female who presented to establish care with this family medicine practice       Abdominal pain and back pain- About a month ago vomited bile (green color) without food or blood, then right away back pain and abdominal pain  Feels like electrical shock in back and goes to stomach, lasts all night, abdominal pain feels like burning and pulling, hurts lower part of abdomen both sides, epigastric region as well  Occurring every day for the past month, worsening  Back pain does sometimes radiate down legs, her left side last week her arm and leg went numb and weak, lasted for a couple days, went to ED For this 5/9  Eyes turned yellow last week, that resolved in a couple days, has happened before  No aggravating factors, worse with overdoing it, better with muscle relaxer and ibuprofen together  History of ulcers years ago  She has had similar symptoms before  No fevers  Constipation since her gastric bypass surgery 15 years ago  Going every 2-3 days, very hard to pass, color is black to beige color, beige color stool recently, last bowel movement yesterday  History of gastrojejunal anastomotic stricture  History of jaundice in teen years, comes and goes  CT spine lumbar 5/9- "Mild degenerative facet disease at L4-L5  Otherwise normal CT of the lumbar spine "    Today- She has pain in back middle and lower region, always spreads to abdomen, pain level 6/10, gets very severe at night, 8-10/10 today during abdominal exam, cries out in pain  Taking ibuprofen and Flexeril together  Helps with pain  No alcohol use, prior drug use in December/January meth, no IVDA  Not sexually active, no history of STI  Review of Systems   Constitutional: Positive for diaphoresis (with severe pain, mostly at night)  Negative for chills and fever  HENT: Negative for congestion and sore throat  Eyes: Negative for discharge, redness and visual disturbance  Jaundice   Respiratory: Positive for shortness of breath (with severe pain)  Negative for cough, chest tightness and wheezing      Cardiovascular: Positive for chest pain (feels pressure with severe pain, not currently) and palpitations (with severe pain but also gets palpitations at baseline)  Gastrointestinal: Positive for abdominal distention, abdominal pain, constipation, nausea and vomiting (last 1 month ago)  Negative for blood in stool and diarrhea  Genitourinary: Negative for decreased urine volume, difficulty urinating and dysuria  Musculoskeletal: Positive for back pain  Skin: Negative for pallor  Neurological: Positive for dizziness (with severe pain) and light-headedness (with severe pain)  Negative for weakness and numbness  Psychiatric/Behavioral: Positive for decreased concentration  Negative for dysphoric mood  The patient is not nervous/anxious  Historical Information   Past Medical History:   Diagnosis Date    Asthma     GERD (gastroesophageal reflux disease)     H/O gastric bypass     Hypokalemia     Methamphetamine abuse ([address] )     Positive UDS;  Feb 2020     Past Surgical History:   Procedure Laterality Date    CHOLECYSTECTOMY      DENTAL SURGERY      ESOPHAGEAL DILATION      GASTRIC BYPASS      EILEEN-EN-Y PROCEDURE      TUBAL LIGATION       Family History   Problem Relation Age of Onset    Diabetes Mother     Heart disease Mother     Stroke Mother     Arthritis Mother     Hyperlipidemia Mother     Colon cancer Father     Heart disease Sister     Coronary artery disease Sister     Heart disease Brother     Coronary artery disease Brother     Lung cancer Maternal Grandmother     Lung cancer Maternal Grandfather     Lung cancer Paternal Grandmother     Lung cancer Paternal Grandfather     Lung cancer Paternal Uncle     Lung cancer Cousin      Social History     Socioeconomic History    Marital status: Single     Spouse name: Not on file    Number of children: Not on file    Years of education: Not on file    Highest education level: Not on file   Occupational History    Not on file   Social Needs    Financial resource strain: Not on file   [place] insecurity     Worry: Not on file     Inability: Not on file    Transportation needs     Medical: Not on file     Non-medical: Not on file   Tobacco Use    Smoking status: Light Tobacco Smoker     Packs/day: 0 25     Years: 15 00     Pack years: 3 75     Types: Cigarettes    Smokeless tobacco: Never Used   Substance and Sexual Activity    Alcohol use: Never     Frequency: Never    Drug use: Yes     Types: Methamphetamines     Comment: reports use this week (12/2020)    Sexual activity: Not on file   Lifestyle    Physical activity     Days per week: Not on file     Minutes per session: Not on file    Stress: Not on file   Relationships    Social connections     Talks on phone: Not on file     Gets together: Not on file     Attends [employer] service: Not on file     Active member of club or organization: Not on file     Attends meetings of clubs or organizations: Not on file     Relationship status: Not on file    Intimate partner violence     Fear of current or ex partner: Not on file     Emotionally abused: Not on file     Physically abused: Not on file     Forced sexual activity: Not on file   Other Topics Concern    Not on file   Social History Narrative    Caffeine use    Ready to quit smoking    Never - 33 yrs male , 2 children     OB History    No obstetric history on file             Medications:      Current Outpatient Medications:     Carafate 1 GM/10ML suspension, Take 10 mL (1 g total) by mouth 4 (four) times a day (with meals and at bedtime), Disp: 420 mL, Rfl: 2    Cholecalciferol (VITAMIN D3) 40636 units CAPS, Take by mouth, Disp: , Rfl:     cyclobenzaprine (FLEXERIL) 10 mg tablet, Take 1 tablet (10 mg total) by mouth 3 (three) times a day as needed for muscle spasms for up to 10 doses, Disp: 10 tablet, Rfl: 0    albuterol (PROVENTIL HFA,VENTOLIN HFA) 90 mcg/act inhaler, Inhale 2 puffs every 6 (six) hours as needed for wheezing or shortness of breath, Disp: 8 g, Rfl: 1   budesonide-formoterol (SYMBICORT) 160-4 5 mcg/act inhaler, Inhale 2 puffs 2 (two) times a day Rinse mouth after use , Disp: 10 2 g, Rfl: 1    pantoprazole (PROTONIX) 40 mg tablet, Take 1 tablet (40 mg total) by mouth daily, Disp: 90 tablet, Rfl: 0      Physical Exam:    Physical Exam  Vitals signs reviewed  Constitutional:       General: She is not in acute distress  Appearance: Normal appearance  She is ill-appearing  She is not toxic-appearing or diaphoretic  HENT:      Head: Normocephalic and atraumatic  Mouth/Throat:      Comments: Mask in place  Eyes:      General: No scleral icterus  Right eye: No discharge  Left eye: No discharge  Extraocular Movements: Extraocular movements intact  Conjunctiva/sclera: Conjunctivae normal    Neck:      Musculoskeletal: Normal range of motion and neck supple  No neck rigidity  Cardiovascular:      Rate and Rhythm: Normal rate and regular rhythm  Heart sounds: Normal heart sounds  No murmur  No friction rub  No gallop  Pulmonary:      Effort: Pulmonary effort is normal  No respiratory distress  Breath sounds: Normal breath sounds  No stridor  No wheezing or rhonchi  Abdominal:      General: Bowel sounds are normal  There is no distension  Palpations: Abdomen is soft  There is no mass  Tenderness: There is abdominal tenderness  There is no guarding or rebound  Comments: Tenderness to palpation RLQ, LLQ, epigastric region and RUQ, difficulty shifting position on exam table due to pain   Musculoskeletal:      Lumbar back: She exhibits decreased range of motion, tenderness and pain  She exhibits no edema  Skin:     General: Skin is warm  Coloration: Skin is not pale  Findings: No erythema or rash  Neurological:      Mental Status: She is alert and oriented to person, place, and time  Motor: No weakness     Psychiatric:         Mood and Affect: Mood normal          Behavior: Behavior normal  No future appointments        William Torres, DO  [address] [facility]

## 2021-05-14 ENCOUNTER — TELEPHONE (OUTPATIENT)
Dept: FAMILY MEDICINE CLINIC | Facility: CLINIC | Age: 51
End: 2021-05-14

## 2021-05-14 NOTE — TELEPHONE ENCOUNTER
Patient mother states that patient was unable to go to the ED as she did not have a way to get there yesterday, she stated that Molly Little would be on her way to the hospital later today,

## 2021-06-01 ENCOUNTER — APPOINTMENT (EMERGENCY)
Dept: CT IMAGING | Facility: HOSPITAL | Age: 51
DRG: 351 | End: 2021-06-01
Payer: COMMERCIAL

## 2021-06-01 ENCOUNTER — APPOINTMENT (EMERGENCY)
Dept: RADIOLOGY | Facility: HOSPITAL | Age: 51
DRG: 351 | End: 2021-06-01
Payer: COMMERCIAL

## 2021-06-01 ENCOUNTER — HOSPITAL ENCOUNTER (INPATIENT)
Facility: HOSPITAL | Age: 51
LOS: 1 days | Discharge: LEFT BEFORE DISCHARGE | DRG: 351 | End: 2021-06-04
Attending: FAMILY MEDICINE | Admitting: INTERNAL MEDICINE
Payer: COMMERCIAL

## 2021-06-01 DIAGNOSIS — E83.42 HYPOMAGNESEMIA: ICD-10-CM

## 2021-06-01 DIAGNOSIS — R41.82 ALTERED MENTAL STATUS: ICD-10-CM

## 2021-06-01 DIAGNOSIS — F15.90 AMPHETAMINE USER: ICD-10-CM

## 2021-06-01 DIAGNOSIS — E87.6 HYPOKALEMIA: ICD-10-CM

## 2021-06-01 DIAGNOSIS — M62.82 RHABDOMYOLYSIS: Primary | ICD-10-CM

## 2021-06-01 DIAGNOSIS — R41.0 DELIRIUM: ICD-10-CM

## 2021-06-01 LAB
ALBUMIN SERPL BCP-MCNC: 4.2 G/DL (ref 3.5–5.7)
ALP SERPL-CCNC: 57 U/L (ref 40–150)
ALT SERPL W P-5'-P-CCNC: 21 U/L (ref 7–52)
AMPHETAMINES SERPL QL SCN: POSITIVE
ANION GAP SERPL CALCULATED.3IONS-SCNC: 15 MMOL/L (ref 4–13)
ANISOCYTOSIS BLD QL SMEAR: PRESENT
APAP SERPL-MCNC: <10 UG/ML (ref 10–20)
AST SERPL W P-5'-P-CCNC: 47 U/L (ref 13–39)
BACTERIA UR QL AUTO: ABNORMAL /HPF
BARBITURATES UR QL: NEGATIVE
BASOPHILS # BLD AUTO: 0 THOUSANDS/ΜL (ref 0–0.1)
BASOPHILS NFR BLD AUTO: 1 % (ref 0–2)
BENZODIAZ UR QL: NEGATIVE
BILIRUB SERPL-MCNC: 1.6 MG/DL (ref 0.2–1)
BILIRUB UR QL STRIP: ABNORMAL
BUN SERPL-MCNC: 23 MG/DL (ref 7–25)
CALCIUM SERPL-MCNC: 9 MG/DL (ref 8.6–10.5)
CHLORIDE SERPL-SCNC: 104 MMOL/L (ref 98–107)
CK MB SERPL-MCNC: 25 NG/ML (ref 0.6–6.3)
CK MB SERPL-MCNC: 3.6 % (ref 0.6–6.3)
CK SERPL-CCNC: 694 U/L (ref 30–192)
CLARITY UR: CLEAR
CO2 SERPL-SCNC: 21 MMOL/L (ref 21–31)
COCAINE UR QL: NEGATIVE
COLOR UR: YELLOW
CREAT SERPL-MCNC: 0.78 MG/DL (ref 0.6–1.2)
EOSINOPHIL # BLD AUTO: 0.1 THOUSAND/ΜL (ref 0–0.61)
EOSINOPHIL NFR BLD AUTO: 1 % (ref 0–5)
ERYTHROCYTE [DISTWIDTH] IN BLOOD BY AUTOMATED COUNT: 19.6 % (ref 11.5–14.5)
ETHANOL SERPL-MCNC: <10 MG/DL
EXT PREG TEST URINE: NEGATIVE
EXT. CONTROL ED NAV: NORMAL
GFR SERPL CREATININE-BSD FRML MDRD: 90 ML/MIN/1.73SQ M
GIANT PLATELETS BLD QL SMEAR: PRESENT
GLUCOSE SERPL-MCNC: 72 MG/DL (ref 65–99)
GLUCOSE UR STRIP-MCNC: NEGATIVE MG/DL
HCT VFR BLD AUTO: 31 % (ref 42–47)
HGB BLD-MCNC: 9.2 G/DL (ref 12–16)
HGB UR QL STRIP.AUTO: ABNORMAL
HYPERCHROMIA BLD QL SMEAR: PRESENT
KETONES UR STRIP-MCNC: ABNORMAL MG/DL
LACTATE SERPL-SCNC: 1.5 MMOL/L (ref 0.5–2)
LEUKOCYTE ESTERASE UR QL STRIP: NEGATIVE
LIPASE SERPL-CCNC: <10 U/L (ref 11–82)
LYMPHOCYTES # BLD AUTO: 1.5 THOUSANDS/ΜL (ref 0.6–4.47)
LYMPHOCYTES NFR BLD AUTO: 22 % (ref 21–51)
MAGNESIUM SERPL-MCNC: 1.8 MG/DL (ref 1.9–2.7)
MCH RBC QN AUTO: 20.8 PG (ref 26–34)
MCHC RBC AUTO-ENTMCNC: 29.8 G/DL (ref 31–37)
MCV RBC AUTO: 70 FL (ref 81–99)
METHADONE UR QL: NEGATIVE
MICROCYTES BLD QL AUTO: PRESENT
MONOCYTES # BLD AUTO: 0.5 THOUSAND/ΜL (ref 0.17–1.22)
MONOCYTES NFR BLD AUTO: 7 % (ref 2–12)
MUCOUS THREADS UR QL AUTO: ABNORMAL
NEUTROPHILS # BLD AUTO: 4.6 THOUSANDS/ΜL (ref 1.4–6.5)
NEUTS SEG NFR BLD AUTO: 69 % (ref 42–75)
NITRITE UR QL STRIP: NEGATIVE
NON-SQ EPI CELLS URNS QL MICRO: ABNORMAL /HPF
OPIATES UR QL SCN: NEGATIVE
OXYCODONE+OXYMORPHONE UR QL SCN: NEGATIVE
PCP UR QL: NEGATIVE
PH UR STRIP.AUTO: 5.5 [PH]
PLATELET # BLD AUTO: 317 THOUSANDS/UL (ref 149–390)
PLATELET BLD QL SMEAR: ADEQUATE
PMV BLD AUTO: 6.9 FL (ref 8.6–11.7)
POLYCHROMASIA BLD QL SMEAR: PRESENT
POTASSIUM SERPL-SCNC: 3.2 MMOL/L (ref 3.5–5.5)
PROT SERPL-MCNC: 6.8 G/DL (ref 6.4–8.9)
PROT UR STRIP-MCNC: ABNORMAL MG/DL
RBC # BLD AUTO: 4.43 MILLION/UL (ref 3.9–5.2)
RBC #/AREA URNS AUTO: ABNORMAL /HPF
RBC MORPH BLD: PRESENT
SALICYLATES SERPL-MCNC: <5 MG/DL (ref 10–30)
SARS-COV-2 RNA RESP QL NAA+PROBE: NEGATIVE
SODIUM SERPL-SCNC: 140 MMOL/L (ref 134–143)
SP GR UR STRIP.AUTO: >=1.03 (ref 1–1.03)
THC UR QL: NEGATIVE
TROPONIN I SERPL-MCNC: <0.03 NG/ML
UROBILINOGEN UR QL STRIP.AUTO: 1 E.U./DL
WBC # BLD AUTO: 6.6 THOUSAND/UL (ref 4.8–10.8)
WBC #/AREA URNS AUTO: ABNORMAL /HPF

## 2021-06-01 PROCEDURE — 96372 THER/PROPH/DIAG INJ SC/IM: CPT

## 2021-06-01 PROCEDURE — 72125 CT NECK SPINE W/O DYE: CPT

## 2021-06-01 PROCEDURE — 82553 CREATINE MB FRACTION: CPT | Performed by: PHYSICIAN ASSISTANT

## 2021-06-01 PROCEDURE — 71250 CT THORAX DX C-: CPT

## 2021-06-01 PROCEDURE — 80179 DRUG ASSAY SALICYLATE: CPT | Performed by: PHYSICIAN ASSISTANT

## 2021-06-01 PROCEDURE — 83690 ASSAY OF LIPASE: CPT | Performed by: PHYSICIAN ASSISTANT

## 2021-06-01 PROCEDURE — 99291 CRITICAL CARE FIRST HOUR: CPT | Performed by: EMERGENCY MEDICINE

## 2021-06-01 PROCEDURE — 87040 BLOOD CULTURE FOR BACTERIA: CPT | Performed by: PHYSICIAN ASSISTANT

## 2021-06-01 PROCEDURE — 80143 DRUG ASSAY ACETAMINOPHEN: CPT | Performed by: PHYSICIAN ASSISTANT

## 2021-06-01 PROCEDURE — 83605 ASSAY OF LACTIC ACID: CPT | Performed by: PHYSICIAN ASSISTANT

## 2021-06-01 PROCEDURE — 85025 COMPLETE CBC W/AUTO DIFF WBC: CPT | Performed by: PHYSICIAN ASSISTANT

## 2021-06-01 PROCEDURE — 82550 ASSAY OF CK (CPK): CPT | Performed by: PHYSICIAN ASSISTANT

## 2021-06-01 PROCEDURE — U0005 INFEC AGEN DETEC AMPLI PROBE: HCPCS | Performed by: PHYSICIAN ASSISTANT

## 2021-06-01 PROCEDURE — G1004 CDSM NDSC: HCPCS

## 2021-06-01 PROCEDURE — 96365 THER/PROPH/DIAG IV INF INIT: CPT

## 2021-06-01 PROCEDURE — U0003 INFECTIOUS AGENT DETECTION BY NUCLEIC ACID (DNA OR RNA); SEVERE ACUTE RESPIRATORY SYNDROME CORONAVIRUS 2 (SARS-COV-2) (CORONAVIRUS DISEASE [COVID-19]), AMPLIFIED PROBE TECHNIQUE, MAKING USE OF HIGH THROUGHPUT TECHNOLOGIES AS DESCRIBED BY CMS-2020-01-R: HCPCS | Performed by: PHYSICIAN ASSISTANT

## 2021-06-01 PROCEDURE — 36415 COLL VENOUS BLD VENIPUNCTURE: CPT | Performed by: PHYSICIAN ASSISTANT

## 2021-06-01 PROCEDURE — 96375 TX/PRO/DX INJ NEW DRUG ADDON: CPT

## 2021-06-01 PROCEDURE — 81001 URINALYSIS AUTO W/SCOPE: CPT | Performed by: PHYSICIAN ASSISTANT

## 2021-06-01 PROCEDURE — 74176 CT ABD & PELVIS W/O CONTRAST: CPT

## 2021-06-01 PROCEDURE — 80307 DRUG TEST PRSMV CHEM ANLYZR: CPT | Performed by: PHYSICIAN ASSISTANT

## 2021-06-01 PROCEDURE — 71045 X-RAY EXAM CHEST 1 VIEW: CPT

## 2021-06-01 PROCEDURE — 82077 ASSAY SPEC XCP UR&BREATH IA: CPT | Performed by: PHYSICIAN ASSISTANT

## 2021-06-01 PROCEDURE — 80053 COMPREHEN METABOLIC PANEL: CPT | Performed by: PHYSICIAN ASSISTANT

## 2021-06-01 PROCEDURE — 96361 HYDRATE IV INFUSION ADD-ON: CPT

## 2021-06-01 PROCEDURE — 84484 ASSAY OF TROPONIN QUANT: CPT | Performed by: PHYSICIAN ASSISTANT

## 2021-06-01 PROCEDURE — 83735 ASSAY OF MAGNESIUM: CPT | Performed by: PHYSICIAN ASSISTANT

## 2021-06-01 PROCEDURE — 70450 CT HEAD/BRAIN W/O DYE: CPT

## 2021-06-01 PROCEDURE — 81025 URINE PREGNANCY TEST: CPT | Performed by: PHYSICIAN ASSISTANT

## 2021-06-01 PROCEDURE — 96366 THER/PROPH/DIAG IV INF ADDON: CPT

## 2021-06-01 PROCEDURE — 99285 EMERGENCY DEPT VISIT HI MDM: CPT

## 2021-06-01 RX ORDER — MAGNESIUM SULFATE HEPTAHYDRATE 40 MG/ML
2 INJECTION, SOLUTION INTRAVENOUS ONCE
Status: COMPLETED | OUTPATIENT
Start: 2021-06-01 | End: 2021-06-01

## 2021-06-01 RX ORDER — SODIUM CHLORIDE, SODIUM GLUCONATE, SODIUM ACETATE, POTASSIUM CHLORIDE, MAGNESIUM CHLORIDE, SODIUM PHOSPHATE, DIBASIC, AND POTASSIUM PHOSPHATE .53; .5; .37; .037; .03; .012; .00082 G/100ML; G/100ML; G/100ML; G/100ML; G/100ML; G/100ML; G/100ML
1000 INJECTION, SOLUTION INTRAVENOUS ONCE
Status: DISCONTINUED | OUTPATIENT
Start: 2021-06-01 | End: 2021-06-01

## 2021-06-01 RX ORDER — DIPHENHYDRAMINE HYDROCHLORIDE 50 MG/ML
25 INJECTION INTRAMUSCULAR; INTRAVENOUS ONCE
Status: DISCONTINUED | OUTPATIENT
Start: 2021-06-01 | End: 2021-06-01

## 2021-06-01 RX ORDER — KETAMINE HCL IN NACL, ISO-OSM 100MG/10ML
1 SYRINGE (ML) INJECTION ONCE
Status: DISCONTINUED | OUTPATIENT
Start: 2021-06-01 | End: 2021-06-01

## 2021-06-01 RX ORDER — SODIUM CHLORIDE 9 MG/ML
125 INJECTION, SOLUTION INTRAVENOUS CONTINUOUS
Status: DISCONTINUED | OUTPATIENT
Start: 2021-06-01 | End: 2021-06-02

## 2021-06-01 RX ORDER — KETAMINE HCL IN NACL, ISO-OSM 100MG/10ML
100 SYRINGE (ML) INJECTION ONCE
Status: COMPLETED | OUTPATIENT
Start: 2021-06-01 | End: 2021-06-01

## 2021-06-01 RX ORDER — LORAZEPAM 2 MG/ML
2 INJECTION INTRAMUSCULAR ONCE
Status: COMPLETED | OUTPATIENT
Start: 2021-06-01 | End: 2021-06-01

## 2021-06-01 RX ORDER — ZIPRASIDONE MESYLATE 20 MG/ML
15 INJECTION, POWDER, LYOPHILIZED, FOR SOLUTION INTRAMUSCULAR ONCE
Status: DISCONTINUED | OUTPATIENT
Start: 2021-06-01 | End: 2021-06-01

## 2021-06-01 RX ORDER — LORAZEPAM 2 MG/ML
1 INJECTION INTRAMUSCULAR ONCE
Status: COMPLETED | OUTPATIENT
Start: 2021-06-01 | End: 2021-06-01

## 2021-06-01 RX ORDER — OLANZAPINE 10 MG/1
10 INJECTION, POWDER, LYOPHILIZED, FOR SOLUTION INTRAMUSCULAR ONCE
Status: COMPLETED | OUTPATIENT
Start: 2021-06-01 | End: 2021-06-01

## 2021-06-01 RX ADMIN — OLANZAPINE 10 MG: 10 INJECTION, POWDER, FOR SOLUTION INTRAMUSCULAR at 18:30

## 2021-06-01 RX ADMIN — MAGNESIUM SULFATE HEPTAHYDRATE 2 G: 40 INJECTION, SOLUTION INTRAVENOUS at 21:38

## 2021-06-01 RX ADMIN — LORAZEPAM 2 MG: 2 INJECTION INTRAMUSCULAR; INTRAVENOUS at 17:00

## 2021-06-01 RX ADMIN — Medication 100 MG: at 21:15

## 2021-06-01 RX ADMIN — SODIUM CHLORIDE 1000 ML: 0.9 INJECTION, SOLUTION INTRAVENOUS at 21:38

## 2021-06-01 RX ADMIN — SODIUM CHLORIDE 1000 ML: 0.9 INJECTION, SOLUTION INTRAVENOUS at 16:45

## 2021-06-01 RX ADMIN — SODIUM CHLORIDE 2000 ML: 0.9 INJECTION, SOLUTION INTRAVENOUS at 19:02

## 2021-06-01 RX ADMIN — SODIUM CHLORIDE 125 ML/HR: 0.9 INJECTION, SOLUTION INTRAVENOUS at 22:19

## 2021-06-01 NOTE — Clinical Note
Darshana Little was seen and treated in our emergency department on 6/1/2021  Diagnosis:     Laura Felix    She may return on this date: If you have any questions or concerns, please don't hesitate to call        Nasima Guaman MD    ______________________________           _______________          _______________  [facility] [place]                              Date                                Time

## 2021-06-01 NOTE — ED PROVIDER NOTES
History  Chief Complaint   Patient presents with    Altered Mental Status     Female appearing approximately [age] years of age presents to the emergency department via EMS after being found down on a with local walking trial   She appears to be in a state of agitated delirium  Concern for possible drug abuse  She is unable to tell us who she is     She is not speaking clearly  She appears to be indicating that she is having difficulty breathing however lung sounds are clear upper airway is clear and her vital signs unremarkable  Unable to obtain ROS  Patient is not participating with physical exam     It was initially unknown who she was we required police to identify her          None       Past Medical History:   Diagnosis Date    Hypokalemia     Pyelonephritis        Past Surgical History:   Procedure Laterality Date    CHOLECYSTECTOMY      SUBTOTAL COLECTOMY      TOOTH EXTRACTION         Family History   Problem Relation Age of Onset    Kidney disease Mother         Pt also reports "bone disease"    Hypertension Mother     Diabetes Mother     Colon cancer Father          motor cycle accident    Cancer Brother     Heart attack Brother      I have reviewed and agree with the history as documented  E-Cigarette/Vaping    E-Cigarette Use Current Every Day User     Cartridges/Day 1      E-Cigarette/Vaping Substances     Social History     Tobacco Use    Smoking status: Current Every Day Smoker     Packs/day: 1 00     Types: Cigarettes    Smokeless tobacco: Never Used   Substance Use Topics    Alcohol use: Never     Frequency: Never    Drug use: Yes     Types: Methamphetamines       Review of Systems   Unable to perform ROS: Mental status change       Physical Exam  Physical Exam  Vitals signs and nursing note reviewed  Constitutional:       General: She is not in acute distress  Appearance: She is well-developed and overweight  She is ill-appearing  She is not diaphoretic  HENT:      Head: Normocephalic and atraumatic  Right Ear: External ear normal       Left Ear: External ear normal       Nose: Nose normal       Mouth/Throat:      Mouth: Mucous membranes are moist    Eyes:      Conjunctiva/sclera: Conjunctivae normal       Pupils: Pupils are equal, round, and reactive to light  Neck:      Musculoskeletal: Normal range of motion  Cardiovascular:      Rate and Rhythm: Regular rhythm  Tachycardia present  Heart sounds: Normal heart sounds  No murmur  No friction rub  No gallop  Pulmonary:      Effort: Pulmonary effort is normal  No respiratory distress  Breath sounds: Normal breath sounds  No stridor  No wheezing or rales  Abdominal:      General: Bowel sounds are normal  There is no distension  Palpations: Abdomen is soft  Tenderness: There is no abdominal tenderness  There is no guarding  Musculoskeletal:         General: No tenderness  Skin:     General: Skin is warm  Capillary Refill: Capillary refill takes less than 2 seconds  Neurological:      General: No focal deficit present  Mental Status: She is alert  She is disoriented  GCS: GCS eye subscore is 4  GCS verbal subscore is 2  GCS motor subscore is 5           Vital Signs  ED Triage Vitals   Temperature Pulse Respirations Blood Pressure SpO2   06/01/21 1903 06/01/21 1730 06/01/21 1730 06/01/21 1730 06/01/21 1730   98 6 °F (37 °C) 73 17 96/57 97 %      Temp Source Heart Rate Source Patient Position - Orthostatic VS BP Location FiO2 (%)   06/02/21 0005 06/01/21 1730 06/01/21 1730 06/01/21 1730 --   Temporal Monitor Sitting Left arm       Pain Score       06/02/21 0044       No Pain           Vitals:    06/02/21 0731 06/02/21 1556 06/02/21 2330 06/03/21 0733   BP: 110/68 116/58 114/65 115/67   Pulse: 56 78 62 67   Patient Position - Orthostatic VS: Lying Lying Lying Lying         Visual Acuity  Visual Acuity      Most Recent Value   L Pupil Size (mm)  3   R Pupil Size (mm) 3   L Pupil Shape  Round   R Pupil Shape  Round          ED Medications  Medications   acetaminophen (TYLENOL) tablet 650 mg (650 mg Oral Given 6/3/21 1038)   ondansetron (ZOFRAN) injection 4 mg (4 mg Intravenous Given 6/3/21 0345)   magnesium oxide (MAG-OX) tablet 400 mg (400 mg Oral Given 6/3/21 1006)   magnesium sulfate 2 g/50 mL IVPB (premix) 2 g (2 g Intravenous New Bag 6/3/21 1022)   dextrose 5 % and sodium chloride 0 45 % infusion (has no administration in time range)   sodium chloride 0 9 % bolus 1,000 mL (0 mL Intravenous Stopped 6/1/21 1830)   LORazepam (ATIVAN) injection 2 mg (2 mg Intravenous Given 6/1/21 1700)   LORazepam (FOR EMS ONLY) (ATIVAN) 2 mg/mL injection 2 mg (0 mg Does not apply Given to EMS 6/1/21 1746)   OLANZapine (ZyPREXA) IM injection 10 mg (10 mg Intramuscular Given 6/1/21 1830)   sodium chloride 0 9 % bolus 2,000 mL (0 mL Intravenous Stopped 6/1/21 2046)   magnesium sulfate 2 g/50 mL IVPB (premix) 2 g (0 g Intravenous Stopped 6/1/21 2327)   sodium chloride 0 9 % bolus 1,000 mL (0 mL Intravenous Stopped 6/1/21 2218)   Ketamine HCl 100 mg (100 mg Intravenous Given 6/1/21 2115)   dextrose 50 % IV solution 25 mL (25 mL Intravenous Given 6/2/21 1149)   potassium chloride (K-DUR,KLOR-CON) CR tablet 40 mEq (40 mEq Oral Given 6/3/21 0108)   potassium chloride 20 mEq IVPB (premix) (20 mEq Intravenous New Bag 6/3/21 0356)       Diagnostic Studies  Results Reviewed     Procedure Component Value Units Date/Time    Blood culture #1 [[id number]] Collected: 06/01/21 1707    Lab Status: Preliminary result Specimen: Blood from Arm, Left Updated: 06/03/21 0301     Blood Culture No Growth at 24 hrs  Blood culture #2 [[id number]] Collected: 06/01/21 1707    Lab Status: Preliminary result Specimen: Blood from Arm, Left Updated: 06/03/21 0301     Blood Culture No Growth at 24 hrs      Novel Coronavirus (Covid-19),PCR [facility] - 2 Hour Stat [[id number]]  (Normal) Collected: 06/01/21 3653    Lab Status: Final result Specimen: Nares from Nasopharyngeal Swab Updated: 06/01/21 1911     SARS-CoV-2 Negative    Narrative: The specimen collection materials, transport medium, and/or testing methodology utilized in the production of these test results have been proven to be reliable in a limited validation with an abbreviated program under the Emergency Utilization Authorization provided by the FDA  Testing reported as "Presumptive positive" will be confirmed with secondary testing to ensure result accuracy  Clinical caution and judgement should be used with the interpretation of these results with consideration of the clinical impression and other laboratory testing  Testing reported as "Positive" or "Negative" has been proven to be accurate according to standard laboratory validation requirements  All testing is performed with control materials showing appropriate reactivity at standard intervals  CKMB [[id number]]  (Abnormal) Collected: 06/01/21 1707    Lab Status: Final result Specimen: Blood from Arm, Left Updated: 06/01/21 1835     CK-MB Index 3 6 %      CK-MB 25 0 ng/mL     Rapid drug screen, urine [[id number]]  (Abnormal) Collected: 06/01/21 1812    Lab Status: Final result Specimen: Urine, Clean Catch Updated: 06/01/21 1832     Amph/Meth UR Positive     Barbiturate Ur Negative     Benzodiazepine Urine Negative     Cocaine Urine Negative     Methadone Urine Negative     Opiate Urine Negative     PCP Ur Negative     THC Urine Negative     Oxycodone Urine Negative    Narrative:      FOR MEDICAL PURPOSES ONLY  IF CONFIRMATION NEEDED PLEASE CONTACT THE LAB WITHIN 5 DAYS      Drug Screen Cutoff Levels:  AMPHETAMINE/METHAMPHETAMINES  1000 ng/mL  BARBITURATES     200 ng/mL  BENZODIAZEPINES     200 ng/mL  COCAINE      300 ng/mL  METHADONE      300 ng/mL  OPIATES      300 ng/mL  PHENCYCLIDINE     25 ng/mL  THC       50 ng/mL  OXYCODONE      100 ng/mL    Urine Microscopic [[id number]]  (Abnormal) Collected: 06/01/21 1813    Lab Status: Final result Specimen: Urine, Clean Catch Updated: 06/01/21 1829     RBC, UA 4-10 /hpf      WBC, UA 10-20 /hpf      Epithelial Cells Occasional /hpf      Bacteria, UA Occasional /hpf      MUCUS THREADS Moderate    UA (URINE) with reflex to Scope [[id number]]  (Abnormal) Collected: 06/01/21 1813    Lab Status: Final result Specimen: Urine, Clean Catch Updated: 06/01/21 1826     Color, UA Yellow     Clarity, UA Clear     Specific Gravity, UA >=1 030     pH, UA 5 5     Leukocytes, UA Negative     Nitrite, UA Negative     Protein, UA Trace mg/dl      Glucose, UA Negative mg/dl      Ketones, UA 40 (2+) mg/dl      Urobilinogen, UA 1 0 E U /dl      Bilirubin, UA 1+     Blood, UA 1+    POCT pregnancy, urine [[id number]]  (Normal) Resulted: 06/01/21 1810    Lab Status: Final result Updated: 06/01/21 1810     EXT PREG TEST UR (Ref: Negative) negative     Control valid    Acetaminophen level-If concentration is detectable, please discuss with medical  on call   [[id number]]  (Abnormal) Collected: 06/01/21 1707    Lab Status: Final result Specimen: Blood from Arm, Left Updated: 06/01/21 1738     Acetaminophen Level <95 ug/mL     Salicylate level [[id number]]  (Abnormal) Collected: 06/01/21 1707    Lab Status: Final result Specimen: Blood from Arm, Left Updated: 98/77/22 6811     Salicylate Lvl <5 mg/dL     Magnesium [[id number]]  (Abnormal) Collected: 06/01/21 1707    Lab Status: Final result Specimen: Blood from Arm, Left Updated: 06/01/21 1738     Magnesium 1 8 mg/dL     CK Total with Reflex CKMB [[id number]]  (Abnormal) Collected: 06/01/21 1707    Lab Status: Final result Specimen: Blood from Arm, Left Updated: 06/01/21 1738     Total  U/L     Comprehensive metabolic panel [[id number]]  (Abnormal) Collected: 06/01/21 1707    Lab Status: Final result Specimen: Blood from Arm, Left Updated: 06/01/21 1738     Sodium 140 mmol/L      Potassium 3 2 mmol/L      Chloride 104 mmol/L      CO2 21 mmol/L ANION GAP 15 mmol/L      BUN 23 mg/dL      Creatinine 0 78 mg/dL      Glucose 72 mg/dL      Calcium 9 0 mg/dL      AST 47 U/L      ALT 21 U/L      Alkaline Phosphatase 57 U/L      Total Protein 6 8 g/dL      Albumin 4 2 g/dL      Total Bilirubin 1 60 mg/dL      eGFR 90 ml/min/1 73sq m     Narrative:      [place] guidelines for Chronic Kidney Disease (CKD):     Stage 1 with normal or high GFR (GFR > 90 mL/min/1 73 square meters)    Stage 2 Mild CKD (GFR = 60-89 mL/min/1 73 square meters)    Stage 3A Moderate CKD (GFR = 45-59 mL/min/1 73 square meters)    Stage 3B Moderate CKD (GFR = 30-44 mL/min/1 73 square meters)    Stage 4 Severe CKD (GFR = 15-29 mL/min/1 73 square meters)    Stage 5 End Stage CKD (GFR <15 mL/min/1 73 square meters)  Note: GFR calculation is accurate only with a steady state creatinine    Lipase [[id number]]  (Abnormal) Collected: 06/01/21 1707    Lab Status: Final result Specimen: Blood from Arm, Left Updated: 06/01/21 1737     Lipase <10 u/L     Ethanol [[id number]]  (Normal) Collected: 06/01/21 1707    Lab Status: Final result Specimen: Blood from Arm, Left Updated: 06/01/21 1737     Ethanol Lvl <10 mg/dL     Lactic acid [[id number]]  (Normal) Collected: 06/01/21 1707    Lab Status: Final result Specimen: Blood from Arm, Left Updated: 06/01/21 1735     LACTIC ACID 1 5 mmol/L     Narrative:      Result may be elevated if tourniquet was used during collection      Troponin I [[id number]]  (Normal) Collected: 06/01/21 1707    Lab Status: Final result Specimen: Blood from Arm, Left Updated: 06/01/21 1735     Troponin I <0 03 ng/mL     Smear Review(Phlebs Do Not Order) [[id number]] Collected: 06/01/21 1707    Lab Status: Final result Specimen: Blood from Arm, Left Updated: 06/01/21 1728     RBC Morphology Present     Anisocytosis Present     Hypochromia Present     Microcytes Present     Polychromasia Present     Platelet Estimate Adequate     Giant PLTs Present    CBC and differential [[id number]]  (Abnormal) Collected: 06/01/21 1707    Lab Status: Final result Specimen: Blood from Arm, Left Updated: 06/01/21 1721     WBC 6 60 Thousand/uL      RBC 4 43 Million/uL      Hemoglobin 9 2 g/dL      Hematocrit 31 0 %      MCV 70 fL      MCH 20 8 pg      MCHC 29 8 g/dL      RDW 19 6 %      MPV 6 9 fL      Platelets 212 Thousands/uL      Neutrophils Relative 69 %      Lymphocytes Relative 22 %      Monocytes Relative 7 %      Eosinophils Relative 1 %      Basophils Relative 1 %      Neutrophils Absolute 4 60 Thousands/µL      Lymphocytes Absolute 1 50 Thousands/µL      Monocytes Absolute 0 50 Thousand/µL      Eosinophils Absolute 0 10 Thousand/µL      Basophils Absolute 0 00 Thousands/µL                  CT chest abdomen pelvis wo contrast   Final Result by Martha Mg DO (06/01 2246)      No acute intracranial abnormality is seen  CT CERVICAL SPINE - WITHOUT CONTRAST      INDICATION:   Altered mental status   ams  Covid 19 test performed on 6/1/2021 is negative      COMPARISON:  None  TECHNIQUE:  CT examination of the cervical spine was performed without intravenous contrast   Contiguous axial images were obtained  Sagittal and coronal reconstructions were performed  Radiation dose length product (DLP) for this visit:  879 4 mGy-cm (accession [id number]), 754 7 mGy-cm (accession [id number]), 338 9 mGy-cm (accession [id number])  This examination, like all CT scans performed in the [facility], was performed    utilizing techniques to minimize radiation dose exposure, including the use of iterative reconstruction and automated exposure control  IMAGE QUALITY:  Diagnostic  FINDINGS:      ALIGNMENT:  Normal alignment of the cervical spine  No subluxation  VERTEBRAL BODIES:  No fracture  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      DEGENERATIVE CHANGES:  No significant cervical degenerative changes are noted        PREVERTEBRAL AND PARASPINAL SOFT TISSUES:  Unremarkable  THORACIC INLET:  Please refer to the concurrent chest, abdomen, and pelvic CT report for description of the thoracic inlet findings  IMPRESSION:      No evidence of acute cervical spine injury  CT CHEST, ABDOMEN AND PELVIS WITH IV CONTRAST      INDICATION: Altered mental status, found down  Covid 19 test performed on 6/1/2021 is negative      COMPARISON: None  TECHNIQUE: CT examination of the chest, abdomen and pelvis was performed  Reformatted images were created in axial, sagittal, and coronal planes  Radiation dose length product (DLP) for this visit:  879 4 mGy-cm (accession [id number]), 754 7 mGy-cm (accession [id number]), 338 9 mGy-cm (accession [id number])  This examination, like all CT scans performed in the [facility], was performed    utilizing techniques to minimize radiation dose exposure, including the use of iterative reconstruction and automated exposure control  IV Contrast:   Enteric Contrast:  Enteric contrast was not administered  CHEST      LUNGS:  Focal alveolar opacity in the anterior right apex, could represent atelectasis or infectious or inflammatory pneumonitis  Mild dependent atelectasis bilaterally  Lungs otherwise appear grossly clear  PLEURA:  Unremarkable  HEART/GREAT VESSELS: Unremarkable for patient's age  No CT signs of aortic injury  MEDIASTINUM AND SIENA:  Unremarkable  CHEST WALL AND LOWER NECK: Normal       ABDOMEN      LIVER/BILIARY TREE:  Unremarkable  GALLBLADDER:  Gallbladder is surgically absent  SPLEEN:  Unremarkable  PANCREAS:  Unremarkable  ADRENAL GLANDS:  Unremarkable  KIDNEYS/URETERS:  Tiny nonobstructing stone in the lower pole of the right kidney  Bilateral kidneys otherwise appear grossly unremarkable; no hydronephrosis  STOMACH AND BOWEL:  Status post gastric bypass  No evidence of bowel obstruction    Otherwise grossly unremarkable  APPENDIX:  No findings to suggest appendicitis  ABDOMINOPELVIC CAVITY:  No ascites or free intraperitoneal air  No lymphadenopathy  VESSELS:  Unremarkable for patient's age  PELVIS      REPRODUCTIVE ORGANS:  Unremarkable for patient's age  URINARY BLADDER:  Bladder is partially distended  A catheter is seen within the bladder  Small amount of air within the bladder lumen, probably related to recent instrumentation  Consider a superimposed cystitis in the appropriate clinical setting  ABDOMINAL WALL/INGUINAL REGIONS:  Unremarkable  OSSEOUS STRUCTURES:  No acute fracture or destructive osseous lesion  IMPRESSION:        No discrete evidence of acute traumatic injury  Focal alveolar opacity in the anterior right apex, could represent atelectasis or infectious or inflammatory pneumonitis  Partially distended bladder  A catheter is seen within the bladder  Small amount of air within the bladder lumen, probably related to recent instrumentation  Consider a superimposed cystitis in the appropriate clinical setting  Status post gastric bypass, no evidence of bowel obstruction  Right-sided nephrolithiasis, no hydronephrosis  Other findings as above  Workstation performed: [id number]         CT spine cervical without contrast   Final Result by Dl Rowan DO (06/01 2246)      No acute intracranial abnormality is seen  CT CERVICAL SPINE - WITHOUT CONTRAST      INDICATION:   Altered mental status   ams  Covid 19 test performed on 6/1/2021 is negative      COMPARISON:  None  TECHNIQUE:  CT examination of the cervical spine was performed without intravenous contrast   Contiguous axial images were obtained  Sagittal and coronal reconstructions were performed          Radiation dose length product (DLP) for this visit:  879 4 mGy-cm (accession [id number]), 754 7 mGy-cm (accession [id number]), 338 9 mGy-cm (accession [id number])  This examination, like all CT scans performed in the [facility], was performed    utilizing techniques to minimize radiation dose exposure, including the use of iterative reconstruction and automated exposure control  IMAGE QUALITY:  Diagnostic  FINDINGS:      ALIGNMENT:  Normal alignment of the cervical spine  No subluxation  VERTEBRAL BODIES:  No fracture  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      DEGENERATIVE CHANGES:  No significant cervical degenerative changes are noted  PREVERTEBRAL AND PARASPINAL SOFT TISSUES:  Unremarkable  THORACIC INLET:  Please refer to the concurrent chest, abdomen, and pelvic CT report for description of the thoracic inlet findings  IMPRESSION:      No evidence of acute cervical spine injury  CT CHEST, ABDOMEN AND PELVIS WITH IV CONTRAST      INDICATION: Altered mental status, found down  Covid 19 test performed on 6/1/2021 is negative      COMPARISON: None  TECHNIQUE: CT examination of the chest, abdomen and pelvis was performed  Reformatted images were created in axial, sagittal, and coronal planes  Radiation dose length product (DLP) for this visit:  879 4 mGy-cm (accession [id number]), 754 7 mGy-cm (accession [id number]), 338 9 mGy-cm (accession [id number])  This examination, like all CT scans performed in the [facility], was performed    utilizing techniques to minimize radiation dose exposure, including the use of iterative reconstruction and automated exposure control  IV Contrast:   Enteric Contrast:  Enteric contrast was not administered  CHEST      LUNGS:  Focal alveolar opacity in the anterior right apex, could represent atelectasis or infectious or inflammatory pneumonitis  Mild dependent atelectasis bilaterally  Lungs otherwise appear grossly clear  PLEURA:  Unremarkable  HEART/GREAT VESSELS: Unremarkable for patient's age   No CT signs of aortic injury  MEDIASTINUM AND SIENA:  Unremarkable  CHEST WALL AND LOWER NECK: Normal       ABDOMEN      LIVER/BILIARY TREE:  Unremarkable  GALLBLADDER:  Gallbladder is surgically absent  SPLEEN:  Unremarkable  PANCREAS:  Unremarkable  ADRENAL GLANDS:  Unremarkable  KIDNEYS/URETERS:  Tiny nonobstructing stone in the lower pole of the right kidney  Bilateral kidneys otherwise appear grossly unremarkable; no hydronephrosis  STOMACH AND BOWEL:  Status post gastric bypass  No evidence of bowel obstruction  Otherwise grossly unremarkable  APPENDIX:  No findings to suggest appendicitis  ABDOMINOPELVIC CAVITY:  No ascites or free intraperitoneal air  No lymphadenopathy  VESSELS:  Unremarkable for patient's age  PELVIS      REPRODUCTIVE ORGANS:  Unremarkable for patient's age  URINARY BLADDER:  Bladder is partially distended  A catheter is seen within the bladder  Small amount of air within the bladder lumen, probably related to recent instrumentation  Consider a superimposed cystitis in the appropriate clinical setting  ABDOMINAL WALL/INGUINAL REGIONS:  Unremarkable  OSSEOUS STRUCTURES:  No acute fracture or destructive osseous lesion  IMPRESSION:        No discrete evidence of acute traumatic injury  Focal alveolar opacity in the anterior right apex, could represent atelectasis or infectious or inflammatory pneumonitis  Partially distended bladder  A catheter is seen within the bladder  Small amount of air within the bladder lumen, probably related to recent instrumentation  Consider a superimposed cystitis in the appropriate clinical setting  Status post gastric bypass, no evidence of bowel obstruction  Right-sided nephrolithiasis, no hydronephrosis  Other findings as above        Workstation performed: [id number]         CT head without contrast   Final Result by Reece Hernandez DO (06/01 2246)      No acute intracranial abnormality is seen  CT CERVICAL SPINE - WITHOUT CONTRAST      INDICATION:   Altered mental status   ams  Covid 19 test performed on 6/1/2021 is negative      COMPARISON:  None  TECHNIQUE:  CT examination of the cervical spine was performed without intravenous contrast   Contiguous axial images were obtained  Sagittal and coronal reconstructions were performed  Radiation dose length product (DLP) for this visit:  879 4 mGy-cm (accession [id number]), 754 7 mGy-cm (accession [id number]), 338 9 mGy-cm (accession [id number])  This examination, like all CT scans performed in the [facility], was performed    utilizing techniques to minimize radiation dose exposure, including the use of iterative reconstruction and automated exposure control  IMAGE QUALITY:  Diagnostic  FINDINGS:      ALIGNMENT:  Normal alignment of the cervical spine  No subluxation  VERTEBRAL BODIES:  No fracture  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      DEGENERATIVE CHANGES:  No significant cervical degenerative changes are noted  PREVERTEBRAL AND PARASPINAL SOFT TISSUES:  Unremarkable  THORACIC INLET:  Please refer to the concurrent chest, abdomen, and pelvic CT report for description of the thoracic inlet findings  IMPRESSION:      No evidence of acute cervical spine injury  CT CHEST, ABDOMEN AND PELVIS WITH IV CONTRAST      INDICATION: Altered mental status, found down  Covid 19 test performed on 6/1/2021 is negative      COMPARISON: None  TECHNIQUE: CT examination of the chest, abdomen and pelvis was performed  Reformatted images were created in axial, sagittal, and coronal planes  Radiation dose length product (DLP) for this visit:  879 4 mGy-cm (accession [id number]), 754 7 mGy-cm (accession [id number]), 338 9 mGy-cm (accession [id number])    This examination, like all CT scans performed in the [facility], was performed    utilizing techniques to minimize radiation dose exposure, including the use of iterative reconstruction and automated exposure control  IV Contrast:   Enteric Contrast:  Enteric contrast was not administered  CHEST      LUNGS:  Focal alveolar opacity in the anterior right apex, could represent atelectasis or infectious or inflammatory pneumonitis  Mild dependent atelectasis bilaterally  Lungs otherwise appear grossly clear  PLEURA:  Unremarkable  HEART/GREAT VESSELS: Unremarkable for patient's age  No CT signs of aortic injury  MEDIASTINUM AND SIENA:  Unremarkable  CHEST WALL AND LOWER NECK: Normal       ABDOMEN      LIVER/BILIARY TREE:  Unremarkable  GALLBLADDER:  Gallbladder is surgically absent  SPLEEN:  Unremarkable  PANCREAS:  Unremarkable  ADRENAL GLANDS:  Unremarkable  KIDNEYS/URETERS:  Tiny nonobstructing stone in the lower pole of the right kidney  Bilateral kidneys otherwise appear grossly unremarkable; no hydronephrosis  STOMACH AND BOWEL:  Status post gastric bypass  No evidence of bowel obstruction  Otherwise grossly unremarkable  APPENDIX:  No findings to suggest appendicitis  ABDOMINOPELVIC CAVITY:  No ascites or free intraperitoneal air  No lymphadenopathy  VESSELS:  Unremarkable for patient's age  PELVIS      REPRODUCTIVE ORGANS:  Unremarkable for patient's age  URINARY BLADDER:  Bladder is partially distended  A catheter is seen within the bladder  Small amount of air within the bladder lumen, probably related to recent instrumentation  Consider a superimposed cystitis in the appropriate clinical setting  ABDOMINAL WALL/INGUINAL REGIONS:  Unremarkable  OSSEOUS STRUCTURES:  No acute fracture or destructive osseous lesion  IMPRESSION:        No discrete evidence of acute traumatic injury        Focal alveolar opacity in the anterior right apex, could represent atelectasis or infectious or inflammatory pneumonitis  Partially distended bladder  A catheter is seen within the bladder  Small amount of air within the bladder lumen, probably related to recent instrumentation  Consider a superimposed cystitis in the appropriate clinical setting  Status post gastric bypass, no evidence of bowel obstruction  Right-sided nephrolithiasis, no hydronephrosis  Other findings as above  Workstation performed: [id number]         XR chest 1 view portable   Final Result by Suzy Donato MD (06/02 4418)      No acute cardiopulmonary disease  Workstation performed: [id number]                    Procedures  Procedures         ED Course  ED Course as of Jun 03 1151   Tue Jun 01, 2021   1739 Total CK(!): 694   1829 Ketones, UA(!): 40 (2+)   1840 AMPH/METH(!): Positive                                           MDM  Number of Diagnoses or Management Options  Altered mental status: new and requires workup  Amphetamine user [facility]): new and requires workup  Rhabdomyolysis: new and requires workup  Diagnosis management comments: Patient found down pre-hospital by police and EMS  Found to be under the influence of amphetamines also has rhabdo  Floridly altered mental status  Patient does not participate with physical exam   Unable to provide history  Scattered bruises across the patient's body  No other significant signs of trauma  Patient appeared to be in a state of agitated delirium  She required Ativan multiple doses of antipsychotic medications to keep her in a calm state  Patient became agitated during CT scan process and would not hold still for CT head imaging  Consider conscious sedation to obtain CT imaging of head prior to admission  Case was discussed with Dr Robert Johnson and Dr Sourav Chowdhury  Pt care sign out given to Dr Sourav Chowdhury  Final dispo pending imaging studies          Amount and/or Complexity of Data Reviewed  Clinical lab tests: ordered and reviewed  Tests in the radiology section of CPT®: ordered and reviewed  Discuss the patient with other providers: yes    Risk of Complications, Morbidity, and/or Mortality  Presenting problems: high  Diagnostic procedures: moderate  Management options: moderate    Patient Progress  Patient progress: stable      Disposition  Final diagnoses:   Rhabdomyolysis   Altered mental status   Amphetamine user ([facility] [address] )   Hypomagnesemia     Time reflects when diagnosis was documented in both MDM as applicable and the Disposition within this note     Time User Action Codes Description Comment    6/1/2021  6:15 PM Wili Messing Add [M62 82] Rhabdomyolysis     6/1/2021  6:15 PM Wili Messing Add [R41 82] Altered mental status     6/1/2021  6:45 PM Wili Messing Add [F15 10] Amphetamine user ([address] [address] )     6/1/2021  9:32 PM Shailesh Mock Add [E83 42] Hypomagnesemia     6/2/2021 11:47 AM Paul Mulch Add [R41 0] Delirium     6/2/2021 11:48 AM Paul Mulch Add [E87 6] Hypokalemia       ED Disposition     ED Disposition Condition Date/Time Comment    Admit Stable Tue Jun 1, 2021 11:38 PM Case was discussed with Arlene Silva and the patient's admission status was agreed to be Admission Status: observation status to the service of Dr Kaz Britton   Follow-up Information    None         There are no discharge medications for this patient  No discharge procedures on file      PDMP Review     None          ED Provider  Electronically Signed by           Yumi Cheema PA-C  06/03/21 1970

## 2021-06-02 PROBLEM — R41.0 DELIRIUM: Status: ACTIVE | Noted: 2021-06-02

## 2021-06-02 PROBLEM — E87.6 HYPOKALEMIA: Status: ACTIVE | Noted: 2021-06-02

## 2021-06-02 PROBLEM — M62.82 NON-TRAUMATIC RHABDOMYOLYSIS: Status: ACTIVE | Noted: 2021-06-02

## 2021-06-02 LAB
ALBUMIN SERPL BCP-MCNC: 3.4 G/DL (ref 3.5–5.7)
ALP SERPL-CCNC: 51 U/L (ref 40–150)
ALT SERPL W P-5'-P-CCNC: 17 U/L (ref 7–52)
ANION GAP SERPL CALCULATED.3IONS-SCNC: 15 MMOL/L (ref 4–13)
ANION GAP SERPL CALCULATED.3IONS-SCNC: 8 MMOL/L (ref 4–13)
ANISOCYTOSIS BLD QL SMEAR: PRESENT
AST SERPL W P-5'-P-CCNC: 42 U/L (ref 13–39)
BASE EX.OXY STD BLDV CALC-SCNC: 83 %
BASE EXCESS BLDV CALC-SCNC: -9 MMOL/L
BASOPHILS # BLD AUTO: 0 THOUSANDS/ΜL (ref 0–0.1)
BASOPHILS NFR BLD AUTO: 1 % (ref 0–2)
BETA-HYDROXYBUTYRATE: 4.4 MMOL/L
BILIRUB SERPL-MCNC: 1.1 MG/DL (ref 0.2–1)
BUN SERPL-MCNC: 5 MG/DL (ref 7–25)
BUN SERPL-MCNC: 9 MG/DL (ref 7–25)
CALCIUM ALBUM COR SERPL-MCNC: 8.3 MG/DL (ref 8.3–10.1)
CALCIUM SERPL-MCNC: 7.8 MG/DL (ref 8.6–10.5)
CALCIUM SERPL-MCNC: 8.1 MG/DL (ref 8.6–10.5)
CHLORIDE SERPL-SCNC: 110 MMOL/L (ref 98–107)
CHLORIDE SERPL-SCNC: 112 MMOL/L (ref 98–107)
CK MB SERPL-MCNC: 23.1 NG/ML (ref 0.6–6.3)
CK MB SERPL-MCNC: 5.2 % (ref 0.6–6.3)
CK SERPL-CCNC: 441 U/L (ref 30–192)
CO2 SERPL-SCNC: 12 MMOL/L (ref 21–31)
CO2 SERPL-SCNC: 24 MMOL/L (ref 21–31)
CREAT SERPL-MCNC: 0.49 MG/DL (ref 0.6–1.2)
CREAT SERPL-MCNC: 0.56 MG/DL (ref 0.6–1.2)
EOSINOPHIL # BLD AUTO: 0.1 THOUSAND/ΜL (ref 0–0.61)
EOSINOPHIL NFR BLD AUTO: 2 % (ref 0–5)
ERYTHROCYTE [DISTWIDTH] IN BLOOD BY AUTOMATED COUNT: 20 % (ref 11.5–14.5)
GFR SERPL CREATININE-BSD FRML MDRD: 110 ML/MIN/1.73SQ M
GFR SERPL CREATININE-BSD FRML MDRD: 115 ML/MIN/1.73SQ M
GLUCOSE SERPL-MCNC: 100 MG/DL (ref 65–140)
GLUCOSE SERPL-MCNC: 102 MG/DL (ref 65–140)
GLUCOSE SERPL-MCNC: 121 MG/DL (ref 65–140)
GLUCOSE SERPL-MCNC: 124 MG/DL (ref 65–140)
GLUCOSE SERPL-MCNC: 137 MG/DL (ref 65–140)
GLUCOSE SERPL-MCNC: 145 MG/DL (ref 65–140)
GLUCOSE SERPL-MCNC: 50 MG/DL (ref 65–99)
GLUCOSE SERPL-MCNC: 92 MG/DL (ref 65–99)
HCO3 BLDV-SCNC: 17.9 MMOL/L (ref 24–30)
HCT VFR BLD AUTO: 28.2 % (ref 42–47)
HGB BLD-MCNC: 8.4 G/DL (ref 12–16)
HYPERCHROMIA BLD QL SMEAR: PRESENT
LACTATE SERPL-SCNC: 0.7 MMOL/L (ref 0.5–2)
LYMPHOCYTES # BLD AUTO: 1.4 THOUSANDS/ΜL (ref 0.6–4.47)
LYMPHOCYTES NFR BLD AUTO: 29 % (ref 21–51)
MCH RBC QN AUTO: 21.4 PG (ref 26–34)
MCHC RBC AUTO-ENTMCNC: 29.8 G/DL (ref 31–37)
MCV RBC AUTO: 72 FL (ref 81–99)
MONOCYTES # BLD AUTO: 0.3 THOUSAND/ΜL (ref 0.17–1.22)
MONOCYTES NFR BLD AUTO: 5 % (ref 2–12)
NEUTROPHILS # BLD AUTO: 3.1 THOUSANDS/ΜL (ref 1.4–6.5)
NEUTS SEG NFR BLD AUTO: 63 % (ref 42–75)
O2 CT BLDV-SCNC: 10.1 ML/DL
PCO2 BLDV: 45.5 MM HG
PH BLDV: 7.22 [PH] (ref 7.3–7.4)
PLATELET # BLD AUTO: 222 THOUSANDS/UL (ref 149–390)
PLATELET BLD QL SMEAR: ADEQUATE
PMV BLD AUTO: 7.2 FL (ref 8.6–11.7)
PO2 BLDV: 67 MM HG (ref 35–45)
POTASSIUM SERPL-SCNC: 3 MMOL/L (ref 3.5–5.5)
POTASSIUM SERPL-SCNC: 3.8 MMOL/L (ref 3.5–5.5)
PROT SERPL-MCNC: 5.9 G/DL (ref 6.4–8.9)
RBC # BLD AUTO: 3.93 MILLION/UL (ref 3.9–5.2)
RBC MORPH BLD: NORMAL
SODIUM SERPL-SCNC: 139 MMOL/L (ref 134–143)
SODIUM SERPL-SCNC: 142 MMOL/L (ref 134–143)
WBC # BLD AUTO: 4.9 THOUSAND/UL (ref 4.8–10.8)

## 2021-06-02 PROCEDURE — 83605 ASSAY OF LACTIC ACID: CPT | Performed by: INTERNAL MEDICINE

## 2021-06-02 PROCEDURE — 82010 KETONE BODYS QUAN: CPT | Performed by: INTERNAL MEDICINE

## 2021-06-02 PROCEDURE — 99220 PR INITIAL OBSERVATION CARE/DAY 70 MINUTES: CPT | Performed by: INTERNAL MEDICINE

## 2021-06-02 PROCEDURE — 85025 COMPLETE CBC W/AUTO DIFF WBC: CPT | Performed by: INTERNAL MEDICINE

## 2021-06-02 PROCEDURE — 82550 ASSAY OF CK (CPK): CPT | Performed by: INTERNAL MEDICINE

## 2021-06-02 PROCEDURE — 82553 CREATINE MB FRACTION: CPT | Performed by: INTERNAL MEDICINE

## 2021-06-02 PROCEDURE — 80048 BASIC METABOLIC PNL TOTAL CA: CPT | Performed by: INTERNAL MEDICINE

## 2021-06-02 PROCEDURE — 80053 COMPREHEN METABOLIC PANEL: CPT | Performed by: INTERNAL MEDICINE

## 2021-06-02 PROCEDURE — 82948 REAGENT STRIP/BLOOD GLUCOSE: CPT

## 2021-06-02 PROCEDURE — 82805 BLOOD GASES W/O2 SATURATION: CPT | Performed by: INTERNAL MEDICINE

## 2021-06-02 RX ORDER — DEXTROSE MONOHYDRATE 25 G/50ML
25 INJECTION, SOLUTION INTRAVENOUS ONCE
Status: COMPLETED | OUTPATIENT
Start: 2021-06-02 | End: 2021-06-02

## 2021-06-02 RX ORDER — SODIUM CHLORIDE, SODIUM GLUCONATE, SODIUM ACETATE, POTASSIUM CHLORIDE, MAGNESIUM CHLORIDE, SODIUM PHOSPHATE, DIBASIC, AND POTASSIUM PHOSPHATE .53; .5; .37; .037; .03; .012; .00082 G/100ML; G/100ML; G/100ML; G/100ML; G/100ML; G/100ML; G/100ML
125 INJECTION, SOLUTION INTRAVENOUS CONTINUOUS
Status: DISCONTINUED | OUTPATIENT
Start: 2021-06-02 | End: 2021-06-02

## 2021-06-02 RX ORDER — SODIUM CHLORIDE AND POTASSIUM CHLORIDE .9; .15 G/100ML; G/100ML
125 SOLUTION INTRAVENOUS CONTINUOUS
Status: DISCONTINUED | OUTPATIENT
Start: 2021-06-02 | End: 2021-06-02

## 2021-06-02 RX ORDER — ONDANSETRON 2 MG/ML
4 INJECTION INTRAMUSCULAR; INTRAVENOUS EVERY 6 HOURS PRN
Status: DISCONTINUED | OUTPATIENT
Start: 2021-06-02 | End: 2021-06-04 | Stop reason: HOSPADM

## 2021-06-02 RX ORDER — DEXTROSE, SODIUM CHLORIDE, SODIUM LACTATE, POTASSIUM CHLORIDE, AND CALCIUM CHLORIDE 5; .6; .31; .03; .02 G/100ML; G/100ML; G/100ML; G/100ML; G/100ML
100 INJECTION, SOLUTION INTRAVENOUS CONTINUOUS
Status: DISCONTINUED | OUTPATIENT
Start: 2021-06-02 | End: 2021-06-02

## 2021-06-02 RX ORDER — ACETAMINOPHEN 325 MG/1
650 TABLET ORAL EVERY 6 HOURS PRN
Status: DISCONTINUED | OUTPATIENT
Start: 2021-06-02 | End: 2021-06-04 | Stop reason: HOSPADM

## 2021-06-02 RX ADMIN — SODIUM CHLORIDE AND POTASSIUM CHLORIDE 125 ML/HR: .9; .15 SOLUTION INTRAVENOUS at 01:53

## 2021-06-02 RX ADMIN — SODIUM CHLORIDE AND POTASSIUM CHLORIDE 125 ML/HR: .9; .15 SOLUTION INTRAVENOUS at 10:30

## 2021-06-02 RX ADMIN — SODIUM BICARBONATE 100 ML/HR: 84 INJECTION, SOLUTION INTRAVENOUS at 14:32

## 2021-06-02 RX ADMIN — DEXTROSE MONOHYDRATE 25 ML: 500 INJECTION PARENTERAL at 11:49

## 2021-06-02 RX ADMIN — DEXTROSE, SODIUM CHLORIDE, SODIUM LACTATE, POTASSIUM CHLORIDE, AND CALCIUM CHLORIDE 100 ML/HR: 5; .6; .31; .03; .02 INJECTION, SOLUTION INTRAVENOUS at 12:09

## 2021-06-02 NOTE — ED PROCEDURE NOTE
PROCEDURE  Procedural Sedation    Date/Time: 6/1/2021 9:31 PM  Performed by: Magnolia Centeno MD  Authorized by: Magnolia Centeno MD     Procedure details (see MAR for exact dosages):     Preoxygenation:  Room air    Sedation:  Ketamine    Analgesia: sedation for CT scan     Intra-procedure events: none      Intra-procedure management: none  Sedation end time:  6/1/2021 10:30 PM    Total sedation time (minutes):  20  Post-procedure details:     Post-sedation assessments completed and reviewed: post-procedure nausea and vomiting status not reviewed      Post-sedation assessments completed and reviewed comment:  Stable, airway stable, no n/v, hemodynamically stable  Patient tolerance:   Tolerated well, no immediate complications         Magnolia Centeno MD  06/01/21 7643

## 2021-06-02 NOTE — ASSESSMENT & PLAN NOTE
· Placed in observation Medicine  · Obtain neuro checks q 4 hours  · Likely secondary to methamphetamine use and subsequent sedation in the ER to obtain studies  · Patient's vital signs and labs are unrevealing  · CT head was negative

## 2021-06-02 NOTE — PLAN OF CARE
Problem: SAFETY ADULT  Goal: Patient will remain free of falls  Description: INTERVENTIONS:  - Assess patient frequently for physical needs  -  Identify cognitive and physical deficits and behaviors that affect risk of falls    -  [place] fall precautions as indicated by assessment   - Educate patient/family on patient safety including physical limitations  - Instruct patient to call for assistance with activity based on assessment  - Modify environment to reduce risk of injury  - Consider OT/PT consult to assist with strengthening/mobility  Outcome: Progressing  Goal: Maintain or return to baseline ADL function  Description: INTERVENTIONS:  -  Assess patient's ability to carry out ADLs; assess patient's baseline for ADL function and identify physical deficits which impact ability to perform ADLs (bathing, care of mouth/teeth, toileting, grooming, dressing, etc )  - Assess/evaluate cause of self-care deficits   - Assess range of motion  - Assess patient's mobility; develop plan if impaired  - Assess patient's need for assistive devices and provide as appropriate  - Encourage maximum independence but intervene and supervise when necessary  - Involve family in performance of ADLs  - Assess for home care needs following discharge   - Consider OT consult to assist with ADL evaluation and planning for discharge  - Provide patient education as appropriate  Outcome: Progressing  Goal: Maintain or return mobility status to optimal level  Description: INTERVENTIONS:  - Assess patient's baseline mobility status (ambulation, transfers, stairs, etc )    - Identify cognitive and physical deficits and behaviors that affect mobility  - Identify mobility aids required to assist with transfers and/or ambulation (gait belt, sit-to-stand, lift, walker, cane, etc )  - [place] fall precautions as indicated by assessment  - Record patient progress and toleration of activity level on Mobility SBAR; progress patient to next Phase/Stage  - Instruct patient to call for assistance with activity based on assessment  - Consider rehabilitation consult to assist with strengthening/weightbearing, etc   Outcome: Progressing     Problem: DISCHARGE PLANNING  Goal: Discharge to home or other facility with appropriate resources  Description: INTERVENTIONS:  - Identify barriers to discharge w/patient and caregiver  - Arrange for needed discharge resources and transportation as appropriate  - Identify discharge learning needs (meds, wound care, etc )  - Arrange for interpretive services to assist at discharge as needed  - Refer to Case Management Department for coordinating discharge planning if the patient needs post-hospital services based on physician/advanced practitioner order or complex needs related to functional status, cognitive ability, or social support system  Outcome: Progressing     Problem: NEUROSENSORY - ADULT  Goal: Achieves stable or improved neurological status  Description: INTERVENTIONS  - Monitor and report changes in neurological status  - Monitor vital signs such as temperature, blood pressure, glucose, and any other labs ordered   - Initiate measures to prevent increased intracranial pressure  - Monitor for seizure activity and implement precautions if appropriate      Outcome: Progressing  Goal: Achieves maximal functionality and self care  Description: INTERVENTIONS  - Monitor swallowing and airway patency with patient fatigue and changes in neurological status  - Encourage and assist patient to increase activity and self care     - Encourage visually impaired, hearing impaired and aphasic patients to use assistive/communication devices  Outcome: Progressing     Problem: METABOLIC, FLUID AND ELECTROLYTES - ADULT  Goal: Electrolytes maintained within normal limits  Description: INTERVENTIONS:  - Monitor labs and assess patient for signs and symptoms of electrolyte imbalances  - Administer electrolyte replacement as ordered  - Monitor response to electrolyte replacements, including repeat lab results as appropriate  - Instruct patient on fluid and nutrition as appropriate  Outcome: Progressing     Problem: Prexisting or High Potential for Compromised Skin Integrity  Goal: Skin integrity is maintained or improved  Description: INTERVENTIONS:  - Identify patients at risk for skin breakdown  - Assess and monitor skin integrity  - Assess and monitor nutrition and hydration status  - Monitor labs   - Assess for incontinence   - Turn and reposition patient  - Assist with mobility/ambulation  - Relieve pressure over bony prominences  - Avoid friction and shearing  - Provide appropriate hygiene as needed including keeping skin clean and dry  - Evaluate need for skin moisturizer/barrier cream  - Collaborate with interdisciplinary team   - Patient/family teaching  - Consider wound care consult   Outcome: Progressing     Problem: Potential for Falls  Goal: Patient will remain free of falls  Description: INTERVENTIONS:  - Assess patient frequently for physical needs  -  Identify cognitive and physical deficits and behaviors that affect risk of falls    -  [place] fall precautions as indicated by assessment   - Educate patient/family on patient safety including physical limitations  - Instruct patient to call for assistance with activity based on assessment  - Modify environment to reduce risk of injury  - Consider OT/PT consult to assist with strengthening/mobility  Outcome: Progressing

## 2021-06-02 NOTE — PLAN OF CARE
Problem: SAFETY ADULT  Goal: Patient will remain free of falls  Description: INTERVENTIONS:  - Assess patient frequently for physical needs  -  Identify cognitive and physical deficits and behaviors that affect risk of falls    -  [place] fall precautions as indicated by assessment   - Educate patient/family on patient safety including physical limitations  - Instruct patient to call for assistance with activity based on assessment  - Modify environment to reduce risk of injury  - Consider OT/PT consult to assist with strengthening/mobility  6/2/2021 0610 by Zahida Benton RN  Outcome: Progressing  6/2/2021 0610 by Zahida Benton RN  Outcome: Progressing  6/2/2021 0609 by Zahida Benton RN  Outcome: Progressing  Goal: Maintain or return to baseline ADL function  Description: INTERVENTIONS:  -  Assess patient's ability to carry out ADLs; assess patient's baseline for ADL function and identify physical deficits which impact ability to perform ADLs (bathing, care of mouth/teeth, toileting, grooming, dressing, etc )  - Assess/evaluate cause of self-care deficits   - Assess range of motion  - Assess patient's mobility; develop plan if impaired  - Assess patient's need for assistive devices and provide as appropriate  - Encourage maximum independence but intervene and supervise when necessary  - Involve family in performance of ADLs  - Assess for home care needs following discharge   - Consider OT consult to assist with ADL evaluation and planning for discharge  - Provide patient education as appropriate  6/2/2021 0610 by Zahida Benton RN  Outcome: Progressing  6/2/2021 0610 by Zahida Benton RN  Outcome: Progressing  6/2/2021 0609 by Zahida Benton RN  Outcome: Progressing  Goal: Maintain or return mobility status to optimal level  Description: INTERVENTIONS:  - Assess patient's baseline mobility status (ambulation, transfers, stairs, etc )    - Identify cognitive and physical deficits and behaviors that affect mobility  - Identify mobility aids required to assist with transfers and/or ambulation (gait belt, sit-to-stand, lift, walker, cane, etc )  - [place] fall precautions as indicated by assessment  - Record patient progress and toleration of activity level on Mobility SBAR; progress patient to next Phase/Stage  - Instruct patient to call for assistance with activity based on assessment  - Consider rehabilitation consult to assist with strengthening/weightbearing, etc   6/2/2021 0610 by Delfino Ochoa RN  Outcome: Progressing  6/2/2021 0610 by Delfino Ochoa RN  Outcome: Progressing  6/2/2021 0609 by Delfino Ochoa RN  Outcome: Progressing     Problem: DISCHARGE PLANNING  Goal: Discharge to home or other facility with appropriate resources  Description: INTERVENTIONS:  - Identify barriers to discharge w/patient and caregiver  - Arrange for needed discharge resources and transportation as appropriate  - Identify discharge learning needs (meds, wound care, etc )  - Arrange for interpretive services to assist at discharge as needed  - Refer to Case Management Department for coordinating discharge planning if the patient needs post-hospital services based on physician/advanced practitioner order or complex needs related to functional status, cognitive ability, or social support system  6/2/2021 0610 by Delfino Ochoa RN  Outcome: Progressing  6/2/2021 0610 by Delfino Ochoa RN  Outcome: Progressing  6/2/2021 0609 by Delfino Ochoa RN  Outcome: Progressing     Problem: NEUROSENSORY - ADULT  Goal: Achieves stable or improved neurological status  Description: INTERVENTIONS  - Monitor and report changes in neurological status  - Monitor vital signs such as temperature, blood pressure, glucose, and any other labs ordered   - Initiate measures to prevent increased intracranial pressure  - Monitor for seizure activity and implement precautions if appropriate      6/2/2021 0610 by Delfino Ochoa RN  Outcome: Progressing  6/2/2021 0610 by Alesia Spence ANTWON Hoff  Outcome: Progressing  6/2/2021 0609 by Magan Conde RN  Outcome: Progressing  Goal: Achieves maximal functionality and self care  Description: INTERVENTIONS  - Monitor swallowing and airway patency with patient fatigue and changes in neurological status  - Encourage and assist patient to increase activity and self care     - Encourage visually impaired, hearing impaired and aphasic patients to use assistive/communication devices  6/2/2021 0610 by Magan Conde RN  Outcome: Progressing  6/2/2021 0610 by Magan Conde RN  Outcome: Progressing  6/2/2021 0609 by Magan Conde RN  Outcome: Progressing     Problem: METABOLIC, FLUID AND ELECTROLYTES - ADULT  Goal: Electrolytes maintained within normal limits  Description: INTERVENTIONS:  - Monitor labs and assess patient for signs and symptoms of electrolyte imbalances  - Administer electrolyte replacement as ordered  - Monitor response to electrolyte replacements, including repeat lab results as appropriate  - Instruct patient on fluid and nutrition as appropriate  6/2/2021 0610 by Magan Conde RN  Outcome: Progressing  6/2/2021 0610 by Magan Conde RN  Outcome: Progressing  6/2/2021 0609 by Magan Conde RN  Outcome: Progressing

## 2021-06-02 NOTE — ED PROCEDURE NOTE
PROCEDURE  CriticalCare Time  Performed by: Lia Encinas MD  Authorized by: Lia Encinas MD     Critical care provider statement:     Critical care time (minutes):  65    Critical care start time:  6/1/2021 9:00 PM    Critical care end time:  6/1/2021 11:42 PM    Critical care time was exclusive of:  Separately billable procedures and treating other patients    Critical care was necessary to treat or prevent imminent or life-threatening deterioration of the following conditions:  CNS failure or compromise and dehydration    Critical care was time spent personally by me on the following activities:  Examination of patient, evaluation of patient's response to treatment, re-evaluation of patient's condition, ordering and review of radiographic studies, ordering and review of laboratory studies and ordering and performing treatments and interventions         Lia Encinas MD  06/01/21 0189

## 2021-06-02 NOTE — ASSESSMENT & PLAN NOTE
· Mild at 694  · Will hydrate with normal saline with 20 mEq of KCl at 125 cc an hour  · Continue monitor with repeat CK in a m  · Consider nephrology evaluation in a m

## 2021-06-02 NOTE — UTILIZATION REVIEW
Initial Clinical Review    Admission: Date/Time/Statement:   Admission Orders (From admission, onward)     Ordered        06/01/21 2338  Place in Observation  Once                   Orders Placed This Encounter   Procedures    Place in Observation     Standing Status:   Standing     Number of Occurrences:   1     Order Specific Question:   Level of Care     Answer:   Med Surg [16]     ED Arrival Information     Expected Arrival Acuity Means of Arrival Escorted By Service Admission Type    - 6/1/2021 16:34 Emergent Ambulance [facility] Hospitalist Emergency    Arrival Complaint    -        Chief Complaint   Patient presents with    Altered Mental Status       Initial Presentation: 52 yo female to ED by ems admitted observation d/t  * Delirium  Assessment & Plan  · Placed in observation Medicine  · Obtain neuro checks q 4 hours  · Likely secondary to methamphetamine use and subsequent sedation in the ER to obtain studies  · Patient's vital signs and labs are unrevealing  · CT head was negative     Non-traumatic rhabdomyolysis  Assessment & Plan  · Mild at 694  · Will hydrate with normal saline with 20 mEq of KCl at 125 cc an hour  · Continue monitor with repeat CK in a m  · Consider nephrology evaluation in a m      Hypokalemia  Assessment & Plan  Will hydrate with normal saline with 20 of KCl at 125 cc/hour continue monitor with repeat labs in a m         Anticipated Length of Stay:  Patient will be admitted on an Observation basis with an anticipated length of stay of  < 2 midnights     Justification for Hospital Stay:  Delirium requiring close monitoring, mild rhabdomyolysis requiring IV fluid resuscitation           ED Triage Vitals   Temperature Pulse Respirations Blood Pressure SpO2   06/01/21 1903 06/01/21 1730 06/01/21 1730 06/01/21 1730 06/01/21 1730   98 6 °F (37 °C) 73 17 96/57 97 %      Temp Source Heart Rate Source Patient Position - Orthostatic VS BP Location FiO2 (%)   06/02/21 0005 06/01/21 1730 06/01/21 1730 06/01/21 1730 --   Temporal Monitor Sitting Left arm       Pain Score       06/02/21 0044       No Pain          Wt Readings from Last 1 Encounters:   06/02/21 74 7 kg (164 lb 10 9 oz)     Additional Vital Signs:   06/02/21 0731  97 1 °F (36 2 °C)Abnormal   56  16  110/68  --  99 %  None (Room air)  Lying   06/02/21 0005  96 3 °F (35 7 °C)Abnormal   66  14  105/62  --  97 %  None (Room air)  Lying   06/01/21 2345  --  55  19  109/64  81  97 %  --  --                                                            06/01/21 2230  --  57  21  97/65  76  100 %  --  --   06/01/21 2215  --  59  13  91/62  73  100 %  --  --   06/01/21 2200  --  59  13  94/62   74  100 %  --  --   BP: Dr Kady Soliz aware   at 06/01/21 2200 06/01/21 2145  --  60  13  110/70  83  100 %  --  --   06/01/21 2130  --  77  24Abnormal   --  --  97 %  --  --   06/01/21 2124  --  74  20  132/93  --  98 %  None (Room air)  Lying   06/01/21 2119  --  88  20  140/74  --  98 %  None (Room air)  Lying   06/01/21 2117  --  72  20  132/71  --  98 %  None (Room air)  Lying   06/01/21 2115  --  --  --  94/52  69  --  --  --   06/01/21 2114  --  96  20  87/54Abnormal    --  99 %  None (Room air)  Lying   BP: sukumar aware at 06/01/21 2114 06/01/21 2100  --  65  17  --  --  100 %  --  --   06/01/21 2045  --  60  15  93/53  66  99 %  --  --                                                            06/01/21 1903  98 6 °F (37 °C)  --  --  --  --  --  --  --   06/01/21 1900  --  117Abnormal   20  105/65  80  96 %  --  --   06/01/21 1845  --  76  20  107/68  82  --  --  --   06/01/21 1730  --  73  17  96/57  72  97 %  None (Room air)  Sitting       Pertinent Labs/Diagnostic Test Results:   Results from last 7 days   Lab Units 06/01/21  1813   SARS-COV-2  Negative     Results from last 7 days   Lab Units 06/01/21  1707   WBC Thousand/uL 6 60   HEMOGLOBIN g/dL 9 2*   HEMATOCRIT % 31 0*   PLATELETS Thousands/uL 317   NEUTROS ABS Thousands/µL 4 60 Results from last 7 days   Lab Units 06/01/21  1707   SODIUM mmol/L 140   POTASSIUM mmol/L 3 2*   CHLORIDE mmol/L 104   CO2 mmol/L 21   ANION GAP mmol/L 15*   BUN mg/dL 23   CREATININE mg/dL 0 78   EGFR ml/min/1 73sq m 90   CALCIUM mg/dL 9 0   MAGNESIUM mg/dL 1 8*     Results from last 7 days   Lab Units 06/01/21  1707   AST U/L 47*   ALT U/L 21   ALK PHOS U/L 57   TOTAL PROTEIN g/dL 6 8   ALBUMIN g/dL 4 2   TOTAL BILIRUBIN mg/dL 1 60*         Results from last 7 days   Lab Units 06/01/21  1707   GLUCOSE RANDOM mg/dL 72       Results from last 7 days   Lab Units 06/01/21  1707   CK TOTAL U/L 694*   CK MB INDEX % 3 6   CK MB ng/mL 25 0*     Results from last 7 days   Lab Units 06/01/21  1707   TROPONIN I ng/mL <0 03       Results from last 7 days   Lab Units 06/01/21  1707   LACTIC ACID mmol/L 1 5       Results from last 7 days   Lab Units 06/01/21  1707   LIPASE u/L <10*       Results from last 7 days   Lab Units 06/01/21  1813   CLARITY UA  Clear   COLOR UA  Yellow   SPEC GRAV UA  >=1 030*   PH UA  5 5   GLUCOSE UA mg/dl Negative   KETONES UA mg/dl 40 (2+)*   BLOOD UA  1+*   PROTEIN UA mg/dl Trace*   NITRITE UA  Negative   BILIRUBIN UA  1+*   UROBILINOGEN UA E U /dl 1 0   LEUKOCYTES UA  Negative   WBC UA /hpf 10-20*   RBC UA /hpf 4-10*   BACTERIA UA /hpf Occasional   EPITHELIAL CELLS WET PREP /hpf Occasional   MUCUS THREADS  Moderate*       Results from last 7 days   Lab Units 06/01/21  1812   AMPH/METH  Positive*   BARBITURATE UR  Negative   BENZODIAZEPINE UR  Negative   COCAINE UR  Negative   METHADONE URINE  Negative   OPIATE UR  Negative   PCP UR  Negative   THC UR  Negative     Results from last 7 days   Lab Units 06/01/21  1707   ETHANOL LVL mg/dL <10   ACETAMINOPHEN LVL ug/mL <96*   SALICYLATE LVL mg/dL <5*       Results from last 7 days   Lab Units 06/01/21  1707   BLOOD CULTURE  Received in Microbiology Lab  Culture in Progress  Received in Microbiology Lab  Culture in Progress       6/1 ct chest/abd:No discrete evidence of acute traumatic injury      Focal alveolar opacity in the anterior right apex, could represent atelectasis or infectious or inflammatory pneumonitis        Partially distended bladder  A catheter is seen within the bladder  Small amount of air within the bladder lumen, probably related to recent instrumentation  Consider a superimposed cystitis in the appropriate clinical setting      Status post gastric bypass, no evidence of bowel obstruction      Right-sided nephrolithiasis, no hydronephrosis    6/1 CT spine cervical:No evidence of acute cervical spine injury  6/1 CT head:No acute intracranial abnormality is seen  6/1 CXR:No acute cardiopulmonary disease          ED Treatment:   Medication Administration from 06/01/2021 1634 to 06/02/2021 0012       Date/Time Order Dose Route Action Action by Comments                06/01/2021 1645 sodium chloride 0 9 % bolus 1,000 mL 1,000 mL Intravenous New Bag       06/01/2021 1700 LORazepam (ATIVAN) injection 2 mg 2 mg Intravenous Given       06/01/2021 1746 LORazepam (FOR EMS ONLY) (ATIVAN) 2 mg/mL injection 2 mg 0 mg Does not apply Given to EMS       06/01/2021 1830 OLANZapine (ZyPREXA) IM injection 10 mg 10 mg Intramuscular Given                  06/01/2021 1902 sodium chloride 0 9 % bolus 2,000 mL 2,000 mL Intravenous New Bag                  06/01/2021 2138 magnesium sulfate 2 g/50 mL IVPB (premix) 2 g 2 g Intravenous New Bag                  06/01/2021 2138 sodium chloride 0 9 % bolus 1,000 mL 1,000 mL Intravenous New Bag       06/01/2021 2219 sodium chloride 0 9 % infusion 125 mL/hr Intravenous New Bag       06/01/2021 2115 Ketamine HCl 100 mg 100 mg Intravenous Given          History reviewed  No pertinent past medical history    Present on Admission:   Delirium   Non-traumatic rhabdomyolysis   Hypokalemia      Admitting Diagnosis: Rhabdomyolysis [M62 82]  Hypomagnesemia [E83 42]  Altered mental status [R41 82]  Amphetamine user ([facility] [address] ) [F15 10]  Age/Sex: 53 y o  female  Admission Orders:    Continuous IV Infusions:  sodium chloride, 125 mL/hr, Intravenous, Continuous  sodium chloride 0 9 % with KCl 20 mEq/L, 125 mL/hr, Intravenous, Continuous      PRN Meds:  acetaminophen, 650 mg, Oral, Q6H PRN  ondansetron, 4 mg, Intravenous, Q6H PRN    Neuro checks q4h  Ambulate  oob  Reg diet  Contact and airborne isolation        Network Utilization Review Department  ATTENTION: Please call with any questions or concerns to [phone] and carefully listen to the prompts so that you are directed to the right person  All voicemails are confidential   Sudie Crigler all requests for admission clinical reviews, approved or denied determinations and any other requests to dedicated fax number below belonging to the campus where the patient is receiving treatment   List of dedicated fax numbers for the Facilities:  [address] DENIALS (Administrative/Medical Necessity) [phone]   [address] (Maternity/NICU/Pediatrics) [phone]   [address] [place] [address] [address] [place] [address] [address] [place] [id number] [place] [address] [address] [facility] [facility] [phone]

## 2021-06-02 NOTE — CASE MANAGEMENT
LOS: 1, URR score: n/a, pt is not a 30 day re-admit  CM attempted to meet with pt at bedside to discuss discharge planning  Pt is confused, hypersomnolent and unable provide any history or engage in CM interview  History is obtained through chart review  As per ED chart patient was found [place] along a [place] walking trail and in what appeared to be a state of agitated delirium  There was some concern for possible drug abuse  Patient was unable to tell ED staff who she was upon arrival  No prior history or emergency contact to obtain additional information  Pt remains agitated and confused  Pt needs and disposition pending hospital course at this time  CM will continue to follow to assess for discharge needs

## 2021-06-02 NOTE — ED CARE HANDOFF
Emergency Department Sign Out Note        Sign out and transfer of care from DAVID Liriano  See Separate Emergency Department note  The patient, Dianne Blake, was evaluated by the previous provider for AMS  Workup Completed:  Labs    ED Course / Workup Pending (followup):    2100  Received in sign out                                    ED Course as of Jun 01 2341   Tue Jun 01, 2021 2059 Sign out:     Received from Pratik Liriano  Pt has AMS  Needs CT brain   Ketamine IV order, 1mg/kg      2131 Conscious sedation for CT scan went just fine without any complications whatsoever    CT scans were reviewed by myself I did not see any overt traumatic findings on the scans  At this point plan is to admit the patient for altered mental status      2159 Patient remains clinically stable  Vital signs stable      2320 CT BRAIN - WITHOUT CONTRAST     INDICATION:   Altered mental status  ams  Covid 19 test performed on 6/1/2021 is negative     COMPARISON:  None      TECHNIQUE:  CT examination of the brain was performed  In addition to axial images, sagittal and coronal 2D reformatted images were created and submitted for interpretation      Radiation dose length product (DLP) for this visit:  879 4 mGy-cm  (accession [id number]), 754 7 mGy-cm  (accession [id number]), 338 9 mGy-cm  (accession [id number])  This examination, like all CT scans performed in the [facility], was   performed utilizing techniques to minimize radiation dose exposure, including the use of iterative reconstruction and automated exposure control        IMAGE QUALITY:  Diagnostic      FINDINGS:     PARENCHYMA:  No intracranial mass, mass effect or midline shift  No CT signs of acute territorial infarction  No acute parenchymal hemorrhage    Gray-white differentiation appears maintained     VENTRICLES AND EXTRA-AXIAL SPACES:  Normal for the patient's age      VISUALIZED ORBITS AND PARANASAL SINUSES:  Small polyp/mucous retention cyst in the right maxillary sinus  Otherwise grossly unremarkable      CALVARIUM AND EXTRACRANIAL SOFT TISSUES:  Normal         IMPRESSION:     No acute intracranial abnormality is seen          2320 CT CERVICAL SPINE - WITHOUT CONTRAST     INDICATION:   Altered mental status  ams  Covid 19 test performed on 6/1/2021 is negative     COMPARISON:  None      TECHNIQUE:  CT examination of the cervical spine was performed without intravenous contrast   Contiguous axial images were obtained  Sagittal and coronal reconstructions were performed        Radiation dose length product (DLP) for this visit:  879 4 mGy-cm (accession [id number]), 754 7 mGy-cm (accession [id number]), 338 9 mGy-cm (accession [id number])  This examination, like all CT scans performed in the [facility], was performed   utilizing techniques to minimize radiation dose exposure, including the use of iterative reconstruction and automated exposure control        IMAGE QUALITY:  Diagnostic      FINDINGS:     ALIGNMENT:  Normal alignment of the cervical spine  No subluxation      VERTEBRAL BODIES:  No fracture  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     DEGENERATIVE CHANGES:  No significant cervical degenerative changes are noted      PREVERTEBRAL AND PARASPINAL SOFT TISSUES:  Unremarkable      THORACIC INLET:  Please refer to the concurrent chest, abdomen, and pelvic CT report for description of the thoracic inlet findings         IMPRESSION:     No evidence of acute cervical spine injury  12 CT CHEST, ABDOMEN AND PELVIS     LUNGS:  Focal alveolar opacity in the anterior right apex, could represent atelectasis or infectious or inflammatory pneumonitis  Mild dependent atelectasis bilaterally  Lungs otherwise appear grossly clear      PLEURA:  Unremarkable  HEART/GREAT VESSELS: Unremarkable for patient's age  No CT signs of aortic injury  MEDIASTINUM AND SIENA:  Unremarkable    CHEST WALL AND LOWER NECK: Normal   LIVER/BILIARY TREE: Unremarkable  GALLBLADDER:  Gallbladder is surgically absent  Pancreas and SPLEEN and Adrenal glands:  Unremarkable  KIDNEYS/URETERS:  Tiny nonobstructing stone in the lower pole of the right kidney  Bilateral kidneys otherwise unremarkable; no hydronephrosis      STOMACH AND BOWEL:  S/p gastric bypass  No evidence of bowel obstruction     APPENDIX:  No findings to suggest appendicitis      ABDOMINOPELVIC CAVITY:  No ascites or free intraperitoneal air  No lymphadenopathy      VESSELS:  Unremarkable for patient's age      PELVIS     REPRODUCTIVE ORGANS:  Unremarkable for patient's age      URINARY BLADDER:  Bladder is partially distended  A catheter is seen within the bladder  Small amount of air within the bladder lumen, probably related to recent instrumentation  Consider a superimposed cystitis in the appropriate clinical setting      ABDOMINAL WALL/INGUINAL REGIONS:  Unremarkable      OSSEOUS STRUCTURES:  No acute fracture or destructive osseous lesion         IMPRESSION:       No discrete evidence of acute traumatic injury      Focal alveolar opacity in the anterior right apex, could represent atelectasis or infectious or inflammatory pneumonitis        Partially distended bladder  A catheter is seen within the bladder  Small amount of air within the bladder lumen, probably related to recent instrumentation  Consider a superimposed cystitis in the appropriate clinical setting      Status post gastric bypass, no evidence of bowel obstruction      Right-sided nephrolithiasis, no hydronephrosis        412 [facility] hospitalist for admission       [address] Krystal Woodard obs for AMS, mild rhabdo and mild hypomag        Procedures  MDM    Disposition  Final diagnoses:   Rhabdomyolysis   Altered mental status   Amphetamine user ([facility] [address] )   Hypomagnesemia     Time reflects when diagnosis was documented in both MDM as applicable and the Disposition within this note     Time User Action Codes Description Comment 6/1/2021  6:15 PM Rolley Lick Add [M62 82] Rhabdomyolysis     6/1/2021  6:15 PM Rolley Lick Add [R41 82] Altered mental status     6/1/2021  6:45 PM Rolley Lick Add [F15 10] Amphetamine user ([address] )     6/1/2021  9:32 PM Sherrel Flight Add [E83 42] Hypomagnesemia       ED Disposition     ED Disposition Condition Date/Time Comment    Admit Stable Tue Jun 1, 2021 11:38 PM Case was discussed with Wing Barker and the patient's admission status was agreed to be Admission Status: observation status to the service of Dr Miguel Angel Steele   Follow-up Information    None       Patient's Medications    No medications on file     No discharge procedures on file         ED Provider  Electronically Signed by     Magnolia Centeno MD  06/01/21 5134

## 2021-06-02 NOTE — NURSING NOTE
Unable to obtain blood work  Pt swings upon approach and becomes agitated  Will pass on to oncoming shift  Pt in bed w/occasional outbursts  Call machuca is within reach

## 2021-06-02 NOTE — H&P
[address] [facility]  H&P- Aydee Nicholson 1970, 53 y o  female MRN: [id number]  Unit/Bed#: -01 Encounter: [id number]  Primary Care Provider: No primary care provider on file  Date and time admitted to hospital: 6/1/2021  4:35 PM    * Delirium  Assessment & Plan  · Placed in observation Medicine  · Obtain neuro checks q 4 hours  · Likely secondary to methamphetamine use and subsequent sedation in the ER to obtain studies  · Patient's vital signs and labs are unrevealing  · CT head was negative    Non-traumatic rhabdomyolysis  Assessment & Plan  · Mild at 694  · Will hydrate with normal saline with 20 mEq of KCl at 125 cc an hour  · Continue monitor with repeat CK in a m  · Consider nephrology evaluation in a m  Hypokalemia  Assessment & Plan  Will hydrate with normal saline with 20 of KCl at 125 cc/hour continue monitor with repeat labs in a m  VTE Prophylaxis: Patient is low risk, will ambulate  Code Status:  Level 1  POLST: There is no POLST form on file for this patient (pre-hospital)  Discussion with family:  None present at bedside at time of exam    Anticipated Length of Stay:  Patient will be admitted on an Observation basis with an anticipated length of stay of  < 2 midnights  Justification for Hospital Stay:  Delirium requiring close monitoring, mild rhabdomyolysis requiring IV fluid resuscitation    Chief Complaint:   Altered mental status x1 day    History of Present Illness:    Aydee Nicholson is a 53 y o  female who presents with altered mental status x1 day  Patient is sedated hypersomnolent unable provide any history so history is obtained through review of ER documentation  As per ER chart patient was found down on along a local walking trail and what appeared to be a state of agitated delirium    There was some concern for possible drug abuse and patient was unable to tell ER staff who she was upon arrival   Patient was not cooperating with physical exam then or now and was subsequently sedated to obtain imaging studies  Patient's urine drug screen was positive for methamphetamine but otherwise unrevealing  Review of Systems:  Review of Systems   Unable to perform ROS: Mental status change       Past Medical and Surgical History:   History reviewed  No pertinent past medical history  History reviewed  No pertinent surgical history  Meds/Allergies:  Prior to Admission medications    Not on File     I have reviewed home medications using allscripts  Allergies: Not on File    Social History:  Marital Status:     Occupation:  Unknown  Patient Pre-hospital Living Situation:  Unknown  Patient Pre-hospital Level of Mobility:  Unknown  Patient Pre-hospital Diet Restrictions:  Unknown  Substance Use History:   Social History     Substance and Sexual Activity   Alcohol Use None     Social History     Tobacco Use   Smoking Status Unknown If Ever Smoked     Social History     Substance and Sexual Activity   Drug Use Not on file       Family History:  I have reviewed the patients family history    Physical Exam:   Vitals:   Blood Pressure: 105/62 (06/02/21 0005)  Pulse: 66 (06/02/21 0005)  Temperature: (!) 96 3 °F (35 7 °C) (06/02/21 0005)  Temp Source: Temporal (06/02/21 0005)  Respirations: 14 (06/02/21 0005)  Weight - Scale: 74 7 kg (164 lb 10 9 oz) (06/02/21 0005)  SpO2: 97 % (06/02/21 0005)    Physical Exam  Vitals signs and nursing note reviewed  Constitutional:       General: She is sleeping  She is not in acute distress  Appearance: Normal appearance  Interventions: She is sedated  HENT:      Head: Normocephalic and atraumatic  Right Ear: Tympanic membrane normal       Left Ear: Tympanic membrane normal       Nose: Nose normal       Mouth/Throat:      Mouth: Mucous membranes are moist       Pharynx: Oropharynx is clear  No posterior oropharyngeal erythema     Eyes:      Conjunctiva/sclera: Conjunctivae normal    Neck: Musculoskeletal: Neck supple  No muscular tenderness  Cardiovascular:      Rate and Rhythm: Normal rate and regular rhythm  Pulses: Normal pulses  Heart sounds: Normal heart sounds  No murmur  Pulmonary:      Effort: Pulmonary effort is normal  No respiratory distress  Breath sounds: Normal breath sounds  No rhonchi or rales  Abdominal:      General: Bowel sounds are normal       Palpations: Abdomen is soft  Tenderness: There is no abdominal tenderness  Musculoskeletal:      Right lower leg: No edema  Left lower leg: No edema  Skin:     General: Skin is warm and dry  Capillary Refill: Capillary refill takes less than 2 seconds  Neurological:      General: No focal deficit present  Mental Status: She is lethargic and disoriented  Additional Data:   Lab Results: I have personally reviewed pertinent reports  Results from last 7 days   Lab Units 06/01/21  1707   WBC Thousand/uL 6 60   HEMOGLOBIN g/dL 9 2*   HEMATOCRIT % 31 0*   PLATELETS Thousands/uL 317   NEUTROS PCT % 69   LYMPHS PCT % 22   MONOS PCT % 7   EOS PCT % 1     Results from last 7 days   Lab Units 06/01/21  1707   SODIUM mmol/L 140   POTASSIUM mmol/L 3 2*   CHLORIDE mmol/L 104   CO2 mmol/L 21   BUN mg/dL 23   CREATININE mg/dL 0 78   CALCIUM mg/dL 9 0   ALK PHOS U/L 57   ALT U/L 21   AST U/L 47*                   Imaging: I have personally reviewed pertinent reports  CT chest abdomen pelvis wo contrast   Final Result by Rima Watkins DO (06/01 2246)      No acute intracranial abnormality is seen  CT CERVICAL SPINE - WITHOUT CONTRAST      INDICATION:   Altered mental status   ams  Covid 19 test performed on 6/1/2021 is negative      COMPARISON:  None  TECHNIQUE:  CT examination of the cervical spine was performed without intravenous contrast   Contiguous axial images were obtained  Sagittal and coronal reconstructions were performed          Radiation dose length product (DLP) for this visit:  879 4 mGy-cm (accession [id number]), 754 7 mGy-cm (accession [id number]), 338 9 mGy-cm (accession [id number])  This examination, like all CT scans performed in the [facility], was performed    utilizing techniques to minimize radiation dose exposure, including the use of iterative reconstruction and automated exposure control  IMAGE QUALITY:  Diagnostic  FINDINGS:      ALIGNMENT:  Normal alignment of the cervical spine  No subluxation  VERTEBRAL BODIES:  No fracture  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      DEGENERATIVE CHANGES:  No significant cervical degenerative changes are noted  PREVERTEBRAL AND PARASPINAL SOFT TISSUES:  Unremarkable  THORACIC INLET:  Please refer to the concurrent chest, abdomen, and pelvic CT report for description of the thoracic inlet findings  IMPRESSION:      No evidence of acute cervical spine injury  CT CHEST, ABDOMEN AND PELVIS WITH IV CONTRAST      INDICATION: Altered mental status, found down  Covid 19 test performed on 6/1/2021 is negative      COMPARISON: None  TECHNIQUE: CT examination of the chest, abdomen and pelvis was performed  Reformatted images were created in axial, sagittal, and coronal planes  Radiation dose length product (DLP) for this visit:  879 4 mGy-cm (accession [id number]), 754 7 mGy-cm (accession [id number]), 338 9 mGy-cm (accession [id number])  This examination, like all CT scans performed in the [facility], was performed    utilizing techniques to minimize radiation dose exposure, including the use of iterative reconstruction and automated exposure control  IV Contrast:   Enteric Contrast:  Enteric contrast was not administered  CHEST      LUNGS:  Focal alveolar opacity in the anterior right apex, could represent atelectasis or infectious or inflammatory pneumonitis  Mild dependent atelectasis bilaterally    Lungs otherwise appear grossly clear       PLEURA:  Unremarkable  HEART/GREAT VESSELS: Unremarkable for patient's age  No CT signs of aortic injury  MEDIASTINUM AND SIENA:  Unremarkable  CHEST WALL AND LOWER NECK: Normal       ABDOMEN      LIVER/BILIARY TREE:  Unremarkable  GALLBLADDER:  Gallbladder is surgically absent  SPLEEN:  Unremarkable  PANCREAS:  Unremarkable  ADRENAL GLANDS:  Unremarkable  KIDNEYS/URETERS:  Tiny nonobstructing stone in the lower pole of the right kidney  Bilateral kidneys otherwise appear grossly unremarkable; no hydronephrosis  STOMACH AND BOWEL:  Status post gastric bypass  No evidence of bowel obstruction  Otherwise grossly unremarkable  APPENDIX:  No findings to suggest appendicitis  ABDOMINOPELVIC CAVITY:  No ascites or free intraperitoneal air  No lymphadenopathy  VESSELS:  Unremarkable for patient's age  PELVIS      REPRODUCTIVE ORGANS:  Unremarkable for patient's age  URINARY BLADDER:  Bladder is partially distended  A catheter is seen within the bladder  Small amount of air within the bladder lumen, probably related to recent instrumentation  Consider a superimposed cystitis in the appropriate clinical setting  ABDOMINAL WALL/INGUINAL REGIONS:  Unremarkable  OSSEOUS STRUCTURES:  No acute fracture or destructive osseous lesion  IMPRESSION:        No discrete evidence of acute traumatic injury  Focal alveolar opacity in the anterior right apex, could represent atelectasis or infectious or inflammatory pneumonitis  Partially distended bladder  A catheter is seen within the bladder  Small amount of air within the bladder lumen, probably related to recent instrumentation  Consider a superimposed cystitis in the appropriate clinical setting  Status post gastric bypass, no evidence of bowel obstruction  Right-sided nephrolithiasis, no hydronephrosis  Other findings as above        Workstation performed: [id number]         CT spine cervical without contrast   Final Result by Stephanie Mosquera DO (06/01 2246)      No acute intracranial abnormality is seen  CT CERVICAL SPINE - WITHOUT CONTRAST      INDICATION:   Altered mental status   ams  Covid 19 test performed on 6/1/2021 is negative      COMPARISON:  None  TECHNIQUE:  CT examination of the cervical spine was performed without intravenous contrast   Contiguous axial images were obtained  Sagittal and coronal reconstructions were performed  Radiation dose length product (DLP) for this visit:  879 4 mGy-cm (accession [id number]), 754 7 mGy-cm (accession [id number]), 338 9 mGy-cm (accession [id number])  This examination, like all CT scans performed in the [facility], was performed    utilizing techniques to minimize radiation dose exposure, including the use of iterative reconstruction and automated exposure control  IMAGE QUALITY:  Diagnostic  FINDINGS:      ALIGNMENT:  Normal alignment of the cervical spine  No subluxation  VERTEBRAL BODIES:  No fracture  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      DEGENERATIVE CHANGES:  No significant cervical degenerative changes are noted  PREVERTEBRAL AND PARASPINAL SOFT TISSUES:  Unremarkable  THORACIC INLET:  Please refer to the concurrent chest, abdomen, and pelvic CT report for description of the thoracic inlet findings  IMPRESSION:      No evidence of acute cervical spine injury  CT CHEST, ABDOMEN AND PELVIS WITH IV CONTRAST      INDICATION: Altered mental status, found down  Covid 19 test performed on 6/1/2021 is negative      COMPARISON: None  TECHNIQUE: CT examination of the chest, abdomen and pelvis was performed  Reformatted images were created in axial, sagittal, and coronal planes          Radiation dose length product (DLP) for this visit:  879 4 mGy-cm (accession [id number]), 754 7 mGy-cm (accession [id number]), 338 9 mGy-cm (accession [id number])  This examination, like all CT scans performed in the [facility], was performed    utilizing techniques to minimize radiation dose exposure, including the use of iterative reconstruction and automated exposure control  IV Contrast:   Enteric Contrast:  Enteric contrast was not administered  CHEST      LUNGS:  Focal alveolar opacity in the anterior right apex, could represent atelectasis or infectious or inflammatory pneumonitis  Mild dependent atelectasis bilaterally  Lungs otherwise appear grossly clear  PLEURA:  Unremarkable  HEART/GREAT VESSELS: Unremarkable for patient's age  No CT signs of aortic injury  MEDIASTINUM AND SIENA:  Unremarkable  CHEST WALL AND LOWER NECK: Normal       ABDOMEN      LIVER/BILIARY TREE:  Unremarkable  GALLBLADDER:  Gallbladder is surgically absent  SPLEEN:  Unremarkable  PANCREAS:  Unremarkable  ADRENAL GLANDS:  Unremarkable  KIDNEYS/URETERS:  Tiny nonobstructing stone in the lower pole of the right kidney  Bilateral kidneys otherwise appear grossly unremarkable; no hydronephrosis  STOMACH AND BOWEL:  Status post gastric bypass  No evidence of bowel obstruction  Otherwise grossly unremarkable  APPENDIX:  No findings to suggest appendicitis  ABDOMINOPELVIC CAVITY:  No ascites or free intraperitoneal air  No lymphadenopathy  VESSELS:  Unremarkable for patient's age  PELVIS      REPRODUCTIVE ORGANS:  Unremarkable for patient's age  URINARY BLADDER:  Bladder is partially distended  A catheter is seen within the bladder  Small amount of air within the bladder lumen, probably related to recent instrumentation  Consider a superimposed cystitis in the appropriate clinical setting  ABDOMINAL WALL/INGUINAL REGIONS:  Unremarkable  OSSEOUS STRUCTURES:  No acute fracture or destructive osseous lesion           IMPRESSION:        No discrete evidence of acute traumatic injury  Focal alveolar opacity in the anterior right apex, could represent atelectasis or infectious or inflammatory pneumonitis  Partially distended bladder  A catheter is seen within the bladder  Small amount of air within the bladder lumen, probably related to recent instrumentation  Consider a superimposed cystitis in the appropriate clinical setting  Status post gastric bypass, no evidence of bowel obstruction  Right-sided nephrolithiasis, no hydronephrosis  Other findings as above  Workstation performed: [id number]         CT head without contrast   Final Result by Yudy Oneill DO (06/01 2246)      No acute intracranial abnormality is seen  CT CERVICAL SPINE - WITHOUT CONTRAST      INDICATION:   Altered mental status   ams  Covid 19 test performed on 6/1/2021 is negative      COMPARISON:  None  TECHNIQUE:  CT examination of the cervical spine was performed without intravenous contrast   Contiguous axial images were obtained  Sagittal and coronal reconstructions were performed  Radiation dose length product (DLP) for this visit:  879 4 mGy-cm (accession [id number]), 754 7 mGy-cm (accession [id number]), 338 9 mGy-cm (accession [id number])  This examination, like all CT scans performed in the [facility], was performed    utilizing techniques to minimize radiation dose exposure, including the use of iterative reconstruction and automated exposure control  IMAGE QUALITY:  Diagnostic  FINDINGS:      ALIGNMENT:  Normal alignment of the cervical spine  No subluxation  VERTEBRAL BODIES:  No fracture  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      DEGENERATIVE CHANGES:  No significant cervical degenerative changes are noted  PREVERTEBRAL AND PARASPINAL SOFT TISSUES:  Unremarkable        THORACIC INLET:  Please refer to the concurrent chest, abdomen, and pelvic CT report for description of the thoracic inlet findings  IMPRESSION:      No evidence of acute cervical spine injury  CT CHEST, ABDOMEN AND PELVIS WITH IV CONTRAST      INDICATION: Altered mental status, found down  Covid 19 test performed on 6/1/2021 is negative      COMPARISON: None  TECHNIQUE: CT examination of the chest, abdomen and pelvis was performed  Reformatted images were created in axial, sagittal, and coronal planes  Radiation dose length product (DLP) for this visit:  879 4 mGy-cm (accession [id number]), 754 7 mGy-cm (accession [id number]), 338 9 mGy-cm (accession [id number])  This examination, like all CT scans performed in the [facility], was performed    utilizing techniques to minimize radiation dose exposure, including the use of iterative reconstruction and automated exposure control  IV Contrast:   Enteric Contrast:  Enteric contrast was not administered  CHEST      LUNGS:  Focal alveolar opacity in the anterior right apex, could represent atelectasis or infectious or inflammatory pneumonitis  Mild dependent atelectasis bilaterally  Lungs otherwise appear grossly clear  PLEURA:  Unremarkable  HEART/GREAT VESSELS: Unremarkable for patient's age  No CT signs of aortic injury  MEDIASTINUM AND SIENA:  Unremarkable  CHEST WALL AND LOWER NECK: Normal       ABDOMEN      LIVER/BILIARY TREE:  Unremarkable  GALLBLADDER:  Gallbladder is surgically absent  SPLEEN:  Unremarkable  PANCREAS:  Unremarkable  ADRENAL GLANDS:  Unremarkable  KIDNEYS/URETERS:  Tiny nonobstructing stone in the lower pole of the right kidney  Bilateral kidneys otherwise appear grossly unremarkable; no hydronephrosis  STOMACH AND BOWEL:  Status post gastric bypass  No evidence of bowel obstruction  Otherwise grossly unremarkable  APPENDIX:  No findings to suggest appendicitis  ABDOMINOPELVIC CAVITY:  No ascites or free intraperitoneal air   No lymphadenopathy  VESSELS:  Unremarkable for patient's age  PELVIS      REPRODUCTIVE ORGANS:  Unremarkable for patient's age  URINARY BLADDER:  Bladder is partially distended  A catheter is seen within the bladder  Small amount of air within the bladder lumen, probably related to recent instrumentation  Consider a superimposed cystitis in the appropriate clinical setting  ABDOMINAL WALL/INGUINAL REGIONS:  Unremarkable  OSSEOUS STRUCTURES:  No acute fracture or destructive osseous lesion  IMPRESSION:        No discrete evidence of acute traumatic injury  Focal alveolar opacity in the anterior right apex, could represent atelectasis or infectious or inflammatory pneumonitis  Partially distended bladder  A catheter is seen within the bladder  Small amount of air within the bladder lumen, probably related to recent instrumentation  Consider a superimposed cystitis in the appropriate clinical setting  Status post gastric bypass, no evidence of bowel obstruction  Right-sided nephrolithiasis, no hydronephrosis  Other findings as above  Workstation performed: [id number]         XR chest 1 view portable    (Results Pending)       EKG, Pathology, and Other Studies Reviewed on Admission:   · EKG: N/A    Epic Records Reviewed: Yes     ** Please Note: This note has been constructed using a voice recognition system   **

## 2021-06-02 NOTE — NURSING NOTE
Pt sedated in ER to obtain studies  Pt unresponsive to assess or provide information related to admission process  Hospitalist aware  IV fluids administered  Pt sleeping with call bell and belongings within reach

## 2021-06-02 NOTE — QUICK NOTE
Attempted to meet with patient this afternoon for psychiatric consult  However, assessment was unable to be completed due to patient's increased confusion and delirium  Interview at that time was terminated  Will re-attempt evaluation tomorrow

## 2021-06-03 PROBLEM — R82.4 URINE KETONES: Status: ACTIVE | Noted: 2021-06-03

## 2021-06-03 PROBLEM — R78.89 ELEVATED BETA-HYDROXYBUTYRATE: Status: ACTIVE | Noted: 2021-06-03

## 2021-06-03 PROBLEM — E83.42 HYPOMAGNESEMIA: Status: ACTIVE | Noted: 2021-06-03

## 2021-06-03 LAB
ALBUMIN SERPL BCP-MCNC: 3.1 G/DL (ref 3.5–5.7)
ALP SERPL-CCNC: 48 U/L (ref 40–150)
ALT SERPL W P-5'-P-CCNC: 16 U/L (ref 7–52)
ANION GAP SERPL CALCULATED.3IONS-SCNC: 6 MMOL/L (ref 4–13)
AST SERPL W P-5'-P-CCNC: 29 U/L (ref 13–39)
BASOPHILS # BLD AUTO: 0 THOUSANDS/ΜL (ref 0–0.1)
BASOPHILS NFR BLD AUTO: 1 % (ref 0–2)
BILIRUB SERPL-MCNC: 0.9 MG/DL (ref 0.2–1)
BUN SERPL-MCNC: 4 MG/DL (ref 7–25)
CALCIUM ALBUM COR SERPL-MCNC: 8.7 MG/DL (ref 8.3–10.1)
CALCIUM SERPL-MCNC: 8 MG/DL (ref 8.6–10.5)
CHLORIDE SERPL-SCNC: 111 MMOL/L (ref 98–107)
CK MB SERPL-MCNC: 3.8 % (ref 0.6–6.3)
CK MB SERPL-MCNC: 8 NG/ML (ref 0.6–6.3)
CK SERPL-CCNC: 213 U/L (ref 30–192)
CO2 SERPL-SCNC: 26 MMOL/L (ref 21–31)
CORTIS SERPL-MCNC: 15.3 UG/DL
CREAT SERPL-MCNC: 0.54 MG/DL (ref 0.6–1.2)
CREAT UR-MCNC: 82.4 MG/DL
CREAT UR-MCNC: 82.4 MG/DL
EOSINOPHIL # BLD AUTO: 0.1 THOUSAND/ΜL (ref 0–0.61)
EOSINOPHIL NFR BLD AUTO: 2 % (ref 0–5)
ERYTHROCYTE [DISTWIDTH] IN BLOOD BY AUTOMATED COUNT: 20.1 % (ref 11.5–14.5)
GFR SERPL CREATININE-BSD FRML MDRD: 111 ML/MIN/1.73SQ M
GLUCOSE SERPL-MCNC: 100 MG/DL (ref 65–99)
GLUCOSE SERPL-MCNC: 105 MG/DL (ref 65–140)
GLUCOSE SERPL-MCNC: 109 MG/DL (ref 65–140)
GLUCOSE SERPL-MCNC: 110 MG/DL (ref 65–140)
GLUCOSE SERPL-MCNC: 116 MG/DL (ref 65–140)
GLUCOSE SERPL-MCNC: 120 MG/DL (ref 65–140)
GLUCOSE SERPL-MCNC: 125 MG/DL (ref 65–140)
GLUCOSE SERPL-MCNC: 129 MG/DL (ref 65–140)
GLUCOSE SERPL-MCNC: 99 MG/DL (ref 65–140)
HCT VFR BLD AUTO: 28.3 % (ref 42–47)
HGB BLD-MCNC: 8.9 G/DL (ref 12–16)
LYMPHOCYTES # BLD AUTO: 1.1 THOUSANDS/ΜL (ref 0.6–4.47)
LYMPHOCYTES NFR BLD AUTO: 20 % (ref 21–51)
MAGNESIUM SERPL-MCNC: 1.7 MG/DL (ref 1.9–2.7)
MCH RBC QN AUTO: 21.8 PG (ref 26–34)
MCHC RBC AUTO-ENTMCNC: 31.4 G/DL (ref 31–37)
MCV RBC AUTO: 70 FL (ref 81–99)
MONOCYTES # BLD AUTO: 0.3 THOUSAND/ΜL (ref 0.17–1.22)
MONOCYTES NFR BLD AUTO: 5 % (ref 2–12)
NEUTROPHILS # BLD AUTO: 3.9 THOUSANDS/ΜL (ref 1.4–6.5)
NEUTS SEG NFR BLD AUTO: 72 % (ref 42–75)
PH BLDV: 7.41 [PH] (ref 7.3–7.4)
PHOSPHATE SERPL-MCNC: 2.7 MG/DL (ref 3–5.5)
PLATELET # BLD AUTO: 244 THOUSANDS/UL (ref 149–390)
PMV BLD AUTO: 7 FL (ref 8.6–11.7)
POTASSIUM SERPL-SCNC: 3.9 MMOL/L (ref 3.5–5.5)
PROT SERPL-MCNC: 5.4 G/DL (ref 6.4–8.9)
PROT UR-MCNC: 35 MG/DL
PROT/CREAT UR: 0.42 MG/G{CREAT} (ref 0–0.1)
RBC # BLD AUTO: 4.06 MILLION/UL (ref 3.9–5.2)
SODIUM 24H UR-SCNC: 117 MOL/L
SODIUM SERPL-SCNC: 143 MMOL/L (ref 134–143)
WBC # BLD AUTO: 5.5 THOUSAND/UL (ref 4.8–10.8)

## 2021-06-03 PROCEDURE — 82088 ASSAY OF ALDOSTERONE: CPT | Performed by: PHYSICIAN ASSISTANT

## 2021-06-03 PROCEDURE — 99254 IP/OBS CNSLTJ NEW/EST MOD 60: CPT | Performed by: NURSE PRACTITIONER

## 2021-06-03 PROCEDURE — 82948 REAGENT STRIP/BLOOD GLUCOSE: CPT

## 2021-06-03 PROCEDURE — 82570 ASSAY OF URINE CREATININE: CPT | Performed by: PHYSICIAN ASSISTANT

## 2021-06-03 PROCEDURE — 82533 TOTAL CORTISOL: CPT | Performed by: PHYSICIAN ASSISTANT

## 2021-06-03 PROCEDURE — 82553 CREATINE MB FRACTION: CPT | Performed by: INTERNAL MEDICINE

## 2021-06-03 PROCEDURE — 82800 BLOOD PH: CPT | Performed by: PHYSICIAN ASSISTANT

## 2021-06-03 PROCEDURE — 85025 COMPLETE CBC W/AUTO DIFF WBC: CPT | Performed by: INTERNAL MEDICINE

## 2021-06-03 PROCEDURE — 99245 OFF/OP CONSLTJ NEW/EST HI 55: CPT | Performed by: INTERNAL MEDICINE

## 2021-06-03 PROCEDURE — 80053 COMPREHEN METABOLIC PANEL: CPT | Performed by: INTERNAL MEDICINE

## 2021-06-03 PROCEDURE — 84100 ASSAY OF PHOSPHORUS: CPT | Performed by: PHYSICIAN ASSISTANT

## 2021-06-03 PROCEDURE — 84300 ASSAY OF URINE SODIUM: CPT | Performed by: PHYSICIAN ASSISTANT

## 2021-06-03 PROCEDURE — 99232 SBSQ HOSP IP/OBS MODERATE 35: CPT | Performed by: INTERNAL MEDICINE

## 2021-06-03 PROCEDURE — 82550 ASSAY OF CK (CPK): CPT | Performed by: INTERNAL MEDICINE

## 2021-06-03 PROCEDURE — 83735 ASSAY OF MAGNESIUM: CPT | Performed by: INTERNAL MEDICINE

## 2021-06-03 PROCEDURE — 84156 ASSAY OF PROTEIN URINE: CPT | Performed by: PHYSICIAN ASSISTANT

## 2021-06-03 RX ORDER — POTASSIUM CHLORIDE 14.9 MG/ML
20 INJECTION INTRAVENOUS
Status: COMPLETED | OUTPATIENT
Start: 2021-06-03 | End: 2021-06-03

## 2021-06-03 RX ORDER — MAGNESIUM SULFATE HEPTAHYDRATE 40 MG/ML
2 INJECTION, SOLUTION INTRAVENOUS ONCE
Status: COMPLETED | OUTPATIENT
Start: 2021-06-03 | End: 2021-06-03

## 2021-06-03 RX ORDER — DIPHENHYDRAMINE HCL 25 MG
50 TABLET ORAL
Status: DISCONTINUED | OUTPATIENT
Start: 2021-06-03 | End: 2021-06-04 | Stop reason: HOSPADM

## 2021-06-03 RX ORDER — POTASSIUM CHLORIDE 20 MEQ/1
40 TABLET, EXTENDED RELEASE ORAL ONCE
Status: COMPLETED | OUTPATIENT
Start: 2021-06-03 | End: 2021-06-03

## 2021-06-03 RX ORDER — SODIUM CHLORIDE, SODIUM LACTATE, POTASSIUM CHLORIDE, CALCIUM CHLORIDE 600; 310; 30; 20 MG/100ML; MG/100ML; MG/100ML; MG/100ML
125 INJECTION, SOLUTION INTRAVENOUS CONTINUOUS
Status: DISCONTINUED | OUTPATIENT
Start: 2021-06-03 | End: 2021-06-03

## 2021-06-03 RX ORDER — POTASSIUM CHLORIDE 20 MEQ/1
20 TABLET, EXTENDED RELEASE ORAL DAILY
Status: DISCONTINUED | OUTPATIENT
Start: 2021-06-03 | End: 2021-06-03

## 2021-06-03 RX ORDER — DEXTROSE AND SODIUM CHLORIDE 5; .45 G/100ML; G/100ML
75 INJECTION, SOLUTION INTRAVENOUS CONTINUOUS
Status: DISCONTINUED | OUTPATIENT
Start: 2021-06-03 | End: 2021-06-03

## 2021-06-03 RX ORDER — DEXTROSE AND SODIUM CHLORIDE 5; .9 G/100ML; G/100ML
125 INJECTION, SOLUTION INTRAVENOUS CONTINUOUS
Status: DISCONTINUED | OUTPATIENT
Start: 2021-06-03 | End: 2021-06-03

## 2021-06-03 RX ADMIN — ACETAMINOPHEN 650 MG: 325 TABLET ORAL at 01:41

## 2021-06-03 RX ADMIN — ONDANSETRON 4 MG: 2 INJECTION INTRAMUSCULAR; INTRAVENOUS at 03:45

## 2021-06-03 RX ADMIN — ACETAMINOPHEN 650 MG: 325 TABLET ORAL at 10:38

## 2021-06-03 RX ADMIN — ACETAMINOPHEN 650 MG: 325 TABLET ORAL at 22:35

## 2021-06-03 RX ADMIN — MAGNESIUM OXIDE TAB 400 MG (241.3 MG ELEMENTAL MG) 400 MG: 400 (241.3 MG) TAB at 17:40

## 2021-06-03 RX ADMIN — MAGNESIUM SULFATE HEPTAHYDRATE 2 G: 40 INJECTION, SOLUTION INTRAVENOUS at 10:22

## 2021-06-03 RX ADMIN — POTASSIUM CHLORIDE 20 MEQ: 14.9 INJECTION, SOLUTION INTRAVENOUS at 01:09

## 2021-06-03 RX ADMIN — DEXTROSE AND SODIUM CHLORIDE 75 ML/HR: 5; .45 INJECTION, SOLUTION INTRAVENOUS at 11:58

## 2021-06-03 RX ADMIN — MAGNESIUM OXIDE TAB 400 MG (241.3 MG ELEMENTAL MG) 400 MG: 400 (241.3 MG) TAB at 10:06

## 2021-06-03 RX ADMIN — DIPHENHYDRAMINE HCL 50 MG: 25 TABLET ORAL at 22:27

## 2021-06-03 RX ADMIN — POTASSIUM CHLORIDE 40 MEQ: 1500 TABLET, EXTENDED RELEASE ORAL at 01:08

## 2021-06-03 RX ADMIN — SODIUM CHLORIDE, SODIUM LACTATE, POTASSIUM CHLORIDE, AND CALCIUM CHLORIDE 125 ML/HR: .6; .31; .03; .02 INJECTION, SOLUTION INTRAVENOUS at 01:08

## 2021-06-03 RX ADMIN — POTASSIUM CHLORIDE 20 MEQ: 14.9 INJECTION, SOLUTION INTRAVENOUS at 03:56

## 2021-06-03 NOTE — UTILIZATION REVIEW
Continued Stay Review    Date: 6-3-21            OBSERVATION CHANGED TO INPATIENT FOR CONTINUED TREATMENT AND EVALUATION OF DELIRIUM               Place in Observation Once     Question: Level of Care Answer: Med Surg    06/01/21 2338     Inpatient Admission Once     Question Answer   Level of Care Med Surg   Estimated length of stay More than 2 Midnights       06/03/21 1526       Current Patient Class: observation  Current Level of Care: med surg    HPI:50 y o  female initially admitted on 6-1 for agitated delirium and rhabdo likely related to drug use  Assessment/Plan: On 6-2  Patient remained confused, somewhat agitated  and lethargic  Nurse unable to obtain lab work due to patient resistance  Psychiatric consult unable to be completed  Continue iv fluids and neuro checks  Patient with poor po intake  Given iv D 50 x2  Monitor with finger sticks q2 hr       6-3  Nephrology consulted  Electrolyte abnormalities resolving  Recommend iv fluids  D% 1/ 2ns  Discontinue iv lactated ringers  Repeat labs  Urine electrolytes pending  Start   Ordered   06/03/21 0928  Protein / creatinine ratio, urine Once     Status: In process    06/03/21 0928   06/03/21 0926  Creatinine, urine, random Once     Status: In process    06/03/21 0928   06/03/21 0926  Sodium, urine, random Once     Status: In process    06/03/21 0928   06/03/21 0922  Aldosterone Once     Status: In process    06/03/21 0928   06/03/21 0922  Cortisol Once     Status:  In process    06/03/21 0928         Vital Signs:       Vitals:    06/02/21 1556 06/02/21 2330 06/03/21 0733 06/03/21 1038   BP: 116/58 114/65 115/67    BP Location: Right arm Right arm Right arm    Pulse: 78 62 67    Resp: 18 16 16    Temp: 97 6 °F (36 4 °C) 98 3 °F (36 8 °C) (!) 97 3 °F (36 3 °C)    TempSrc: Temporal Temporal Temporal    SpO2: 98% 97% 97%    Weight:       Height:    5' 6" (1 676 m)     Date and Time Eye Opening Best Verbal Response Best Motor Response [employer] Coma Scale Score   06/03/21 0700 4 5 6 15   06/03/21 0300 3 5 5 13   06/02/21 2300 3 5 5 13   06/02/21 1956 3 5 5 13   06/02/21 1900 3 5 5 13   06/02/21 1500 3 5 5 13   06/02/21 1100 3 5 5 13   06/02/21 0700 3 5 5 13   06/02/21 0400 3 2 5 10         Pertinent Labs/Diagnostic Results:   Results from last 7 days   Lab Units 06/01/21  1813   SARS-COV-2  Negative     Results from last 7 days   Lab Units 06/03/21  0524 06/02/21  1154 06/01/21  1707   WBC Thousand/uL 5 50 4 90 6 60   HEMOGLOBIN g/dL 8 9* 8 4* 9 2*   HEMATOCRIT % 28 3* 28 2* 31 0*   PLATELETS Thousands/uL 244 222 317   NEUTROS ABS Thousands/µL 3 90 3 10 4 60         Results from last 7 days   Lab Units 06/03/21  0524 06/02/21  1947 06/02/21  1101 06/01/21  1707   SODIUM mmol/L 143 142 139 140   POTASSIUM mmol/L 3 9 3 0* 3 8 3 2*   CHLORIDE mmol/L 111* 110* 112* 104   CO2 mmol/L 26 24 12* 21   ANION GAP mmol/L 6 8 15* 15*   BUN mg/dL 4* 5* 9 23   CREATININE mg/dL 0 54* 0 56* 0 49* 0 78   EGFR ml/min/1 73sq m 111 110 115 90   CALCIUM mg/dL 8 0* 8 1* 7 8* 9 0   MAGNESIUM mg/dL 1 7*  --   --  1 8*     Results from last 7 days   Lab Units 06/03/21  0524 06/02/21  1101 06/01/21  1707   AST U/L 29 42* 47*   ALT U/L 16 17 21   ALK PHOS U/L 48 51 57   TOTAL PROTEIN g/dL 5 4* 5 9* 6 8   ALBUMIN g/dL 3 1* 3 4* 4 2   TOTAL BILIRUBIN mg/dL 0 90 1 10* 1 60*     Results from last 7 days   Lab Units 06/03/21  1050 06/03/21  0851 06/03/21  0604 06/03/21  0405 06/03/21  0159 06/03/21  0001 06/02/21  2138 06/02/21  2007 06/02/21  1900 06/02/21  1611 06/02/21  1356 06/02/21  1209   POC GLUCOSE mg/dl 129 125 116 99 109 110 145* 137 124 100 102 121     Results from last 7 days   Lab Units 06/03/21  0524 06/02/21  1947 06/02/21  1101 06/01/21  1707   GLUCOSE RANDOM mg/dL 100* 92 50* 72             BETA-HYDROXYBUTYRATE   Date Value Ref Range Status   06/02/2021 4 4 (H) <0 6 mmol/L Final          Results from last 7 days   Lab Units 06/03/21  1033 06/02/21  1430   PH NITESH  7 410* 7 220*   PCO2 NITESH mm Hg  --  45 5   PO2 NITESH mm Hg  --  67 0*   HCO3 NITESH mmol/L  --  17 9*   BASE EXC NITESH mmol/L  --  -9 0   O2 CONTENT NITESH ml/dL  --  10 1   O2 HGB, VENOUS %  --  83 0         Results from last 7 days   Lab Units 06/03/21  0524 06/02/21  1101 06/01/21  1707   CK TOTAL U/L 213* 441* 694*   CK MB INDEX % 3 8 5 2 3 6   CK MB ng/mL 8 0* 23 1* 25 0*     Results from last 7 days   Lab Units 06/01/21  1707   TROPONIN I ng/mL <0 03       Results from last 7 days   Lab Units 06/02/21  1430 06/01/21  1707   LACTIC ACID mmol/L 0 7 1 5       Results from last 7 days   Lab Units 06/01/21  1707   LIPASE u/L <10*             Results from last 7 days   Lab Units 06/01/21  1813   CLARITY UA  Clear   COLOR UA  Yellow   SPEC GRAV UA  >=1 030*   PH UA  5 5   GLUCOSE UA mg/dl Negative   KETONES UA mg/dl 40 (2+)*   BLOOD UA  1+*   PROTEIN UA mg/dl Trace*   NITRITE UA  Negative   BILIRUBIN UA  1+*   UROBILINOGEN UA E U /dl 1 0   LEUKOCYTES UA  Negative   WBC UA /hpf 10-20*   RBC UA /hpf 4-10*   BACTERIA UA /hpf Occasional   EPITHELIAL CELLS WET PREP /hpf Occasional   MUCUS THREADS  Moderate*             Results from last 7 days   Lab Units 06/01/21  1812   AMPH/METH  Positive*   BARBITURATE UR  Negative   BENZODIAZEPINE UR  Negative   COCAINE UR  Negative   METHADONE URINE  Negative   OPIATE UR  Negative   PCP UR  Negative   THC UR  Negative     Results from last 7 days   Lab Units 06/01/21  1707   ETHANOL LVL mg/dL <10   ACETAMINOPHEN LVL ug/mL <13*   SALICYLATE LVL mg/dL <5*                 Results from last 7 days   Lab Units 06/01/21  1707   BLOOD CULTURE  No Growth at 24 hrs  No Growth at 24 hrs           Scheduled Medications:  magnesium oxide, 400 mg, Oral, BID      Continuous IV Infusions:  dextrose 5 % and sodium chloride 0 45 %, 75 mL/hr, Intravenous, Continuous      PRN Meds:  acetaminophen, 650 mg, Oral, Q6H PRN  ondansetron, 4 mg, Intravenous, Q6H PRN        Discharge Plan: to be [address] Utilization Review Department  ATTENTION: Please call with any questions or concerns to [phone] and carefully listen to the prompts so that you are directed to the right person  All voicemails are confidential   Candia Siemens all requests for admission clinical reviews, approved or denied determinations and any other requests to dedicated fax number below belonging to the campus where the patient is receiving treatment   List of dedicated fax numbers for the Facilities:  [address] DENIALS (Administrative/Medical Necessity) [phone]   [address] (Maternity/NICU/Pediatrics) [phone]   [address] [place] [address] [id number] [place] [address] [address] [facility] [place] [address] [phone]

## 2021-06-03 NOTE — ASSESSMENT & PLAN NOTE
· Possibly secondary to drug use  · Infectious workup negative thus far  · Metabolic abnormalities have improved  · Psychiatry input appreciated, no need for inpatient behavior health unit  · Mental status gradually improving

## 2021-06-03 NOTE — ASSESSMENT & PLAN NOTE
· Patient reports her last meal was the day prior to admission  · Fingerstick blood glucose at time of exam was 125

## 2021-06-03 NOTE — ASSESSMENT & PLAN NOTE
· Total creatinine kinase is 694 on 06/01/2021  · Reported poor p o  Intake prior to admission  · Will replete IV fluids

## 2021-06-03 NOTE — ASSESSMENT & PLAN NOTE
· Patient endorses history of low potassium and reports taking potassium supplementation in the past   · Potassium 3 0 on 06/02/2021  · Potassium improved to 3 9 on 06/03/2021  · With associated hypomagnesemia with magnesium of 1 7 on 06/03/2021  · Will replete

## 2021-06-03 NOTE — CONSULTS
Consultation - Nephrology   Mae Lopez 53 y o  female MRN: [id number]  Unit/Bed#: -01 Encounter: [id number]      Assessment & Plan:  · Hypokalemia  · Patient endorses history of low potassium and reports taking potassium supplementation in the past   · Potassium 3 0 on 06/02/2021  · Potassium improved to 3 9 on 06/03/2021  · With associated hypomagnesemia with magnesium of 1 7 on 06/03/2021  · Will replete  Check cortisol/ ginny  · Hypomagnesemia  · Magnesium 1 7 on 06/03/2021  · With associated hypokalemia  · Will replete  · Nontraumatic rhabdomyolysis  · Total creatinine kinase is 694 on 06/01/2021  · Reported poor p o  Intake prior to admission  · Urine was concentrated with a specific gravity greater than 1 030, positive ketones, 1+ hematuria, trace proteinuria, 4-10 RBC and 10-20 WBC with occasional bacteria  · A bladder scan 06/02/2021 reveals 150 mL urine  · Will replete IV fluids  Change LR to D5W  · Delirium  · Drug screen was positive for methamphetamines  · Patient is hypersomnolent but awakes awakes intermittently to participate in interview  · Elevated beta hydroxybutyrate  · Beta hydroxybutyrate was elevated at 4 4 and 06/02/2021  · Urine ketones  · Patient reports her last meal was the day prior to admission  · Fingerstick blood glucose at time of exam was 125  Thank you for allowing us to participate in the care of your patient  Please feel free to contact us regarding the care of this patient, or any other questions/concerns that may be applicable      Patient Active Problem List   Diagnosis    Delirium    Non-traumatic rhabdomyolysis    Hypokalemia    Hypomagnesemia    Elevated beta-hydroxybutyrate    Urine ketones       History of Present Illness   Physician Requesting Consult: Janina Drake MD  Reason for Consult / Principal Problem:  Hypokalemia  Hx and PE limited by:  Delirium    HPI: Mae Lopez is a 53y o  year old female who presents with delirium  Intake urine drug screen was positive for methamphetamine  Intake labs significant for hypokalemia with potassium 3 2 on 06/01/2021  Nephrology is consulted for management of hypokalemia  She has associated hypomagnesemia with magnesium 1 7 on 06/03/2021  Potassium has been successfully repleted to 3 9 on 06/03/2021  Patient reports home medications include only vitamin B and vitamin D  She believe she may have been prescribed potassium supplementation in the past, but she has not seen a physician in at least the past year  She and has a history of kidney infection but denies any history of hypertension, diabetes, nephrolithiasis, chronic kidney disease, hematuria or proteinuria  She does use NSAIDs as needed  Her renal function is excellent with a BUN of 4 with a creatinine of 0 54 estimated  mL/min on 06/03/2021  Urine was concentrated with a specific gravity greater than 1 030, positive ketones, 1+ hematuria, trace proteinuria, 4-10 RBC and 10-20 WBC with occasional bacteria  A bladder scan yesterday, 06/02/2021, reveals 150 mL urine  Her family history is significant for a mother with kidney problems he is not currently requiring dialysis  Patient is hypersomnolent but does awake long enough to participate in interview  She complains of some vague discomfort of her abdomen and tingling in her body  She denies any chest pain, dyspnea  History obtained from chart review and the patient  Review of Systems - Negative except as mentioned above in HPI, more specifics as mentioned below    Review of Systems - History obtained from chart review and the patient  General ROS: positive for  - fatigue  Psychological ROS: positive for - methamphetamine positive UDS, concentration difficulties, disorientation and irritability  Respiratory ROS: no cough, shortness of breath, or wheezing  Cardiovascular ROS: no chest pain or dyspnea on exertion  Gastrointestinal ROS: positive for - anorexia, abdominal pain, nausea, vomitting and constipation  negative for - blood in stools or diarrhea  Genito-Urinary ROS: positive for decreased urinary output  negative for - hematuria  Musculoskeletal ROS: positive for - muscle pain and muscular weakness  negative for - joint swelling  Neurological ROS: no TIA or stroke symptoms    Historical Information   Past Medical History:   Diagnosis Date    Hypokalemia     Pyelonephritis      Past Surgical History:   Procedure Laterality Date    CHOLECYSTECTOMY      SUBTOTAL COLECTOMY      TOOTH EXTRACTION       Social History   Social History     Substance and Sexual Activity   Alcohol Use Not Currently    Frequency: Never     Social History     Substance and Sexual Activity   Drug Use Yes    Types: Methamphetamines     Social History     Tobacco Use   Smoking Status Current Every Day Smoker    Packs/day: 1 00    Types: Cigarettes   Smokeless Tobacco Never Used     Family History   Problem Relation Age of Onset    Kidney disease Mother         Pt also reports "bone disease"    Hypertension Mother     Diabetes Mother     Colon cancer Father          motor cycle accident    Cancer Brother     Heart attack Brother        Meds/Allergies   all current active meds have been reviewed, current meds:   Current Facility-Administered Medications   Medication Dose Route Frequency    acetaminophen (TYLENOL) tablet 650 mg  650 mg Oral Q6H PRN    dextrose 5 % and sodium chloride 0 9 % infusion  125 mL/hr Intravenous Continuous    magnesium oxide (MAG-OX) tablet 400 mg  400 mg Oral BID    ondansetron (ZOFRAN) injection 4 mg  4 mg Intravenous Q6H PRN    and PTA meds:    No medications prior to admission           Not on File    Objective     Intake/Output Summary (Last 24 hours) at 6/3/2021 0939  Last data filed at 2021 1727  Gross per 24 hour   Intake 875 ml   Output 1200 ml   Net -325 ml       Invasive Devices:        Physical Exam      I/O last 3 completed shifts: In: 3924 [I V :875; IV Piggyback:3050]  Out: 2150 [Urine:2150]    Vitals:    06/03/21 0733   BP: 115/67   Pulse: 67   Resp: 16   Temp: (!) 97 3 °F (36 3 °C)   SpO2: 97%       Gen: in NAD, Alert/Awake  HEENT: no sclerous icterus, MMM, neck supple  CV: +S1/S2, RRR  Lungs: CTA bilaterally  Abd: +BS, soft NT/ND  Ext: all four extremities are warm  Skin: no rashes noted  Neuro: CN II-XII intact    Current Weight: Weight - Scale: 74 7 kg (164 lb 10 9 oz)  First Weight: Weight - Scale: 70 3 kg (155 lb)    Lab Results:  I have personally reviewed pertinent labs      CBC:   Lab Results   Component Value Date    WBC 5 50 06/03/2021    HGB 8 9 (L) 06/03/2021    HCT 28 3 (L) 06/03/2021    MCV 70 (L) 06/03/2021     06/03/2021    MCH 21 8 (L) 06/03/2021    MCHC 31 4 06/03/2021    RDW 20 1 (H) 06/03/2021    MPV 7 0 (L) 06/03/2021     CMP:   Lab Results   Component Value Date    K 3 9 06/03/2021     (H) 06/03/2021    CO2 26 06/03/2021    BUN 4 (L) 06/03/2021    CREATININE 0 54 (L) 06/03/2021    CALCIUM 8 0 (L) 06/03/2021    AST 29 06/03/2021    ALT 16 06/03/2021    ALKPHOS 48 06/03/2021    EGFR 111 06/03/2021     Phosphorus: No results found for: PHOS  Magnesium:   Lab Results   Component Value Date    MG 1 7 (L) 06/03/2021     Urinalysis: No results found for: Harlin Lites, SPECGRAV, PHUR, LEUKOCYTESUR, NITRITE, PROTEINUA, GLUCOSEU, KETONESU, BILIRUBINUR, BLOODU  Ionized Calcium: No results found for: CAION  Coagulation: No results found for: PT, INR, APTT  Troponin: No results found for: TROPONINI  ABG: No results found for: PHART, WGY8JBG, PO2ART, XSU6EBC, P1TOOYVV, BEART, SOURCE    Results from last 7 days   Lab Units 06/03/21  0524 06/02/21  1947 06/02/21  1101 06/01/21  1707   POTASSIUM mmol/L 3 9 3 0* 3 8 3 2*   CHLORIDE mmol/L 111* 110* 112* 104   CO2 mmol/L 26 24 12* 21   BUN mg/dL 4* 5* 9 23   CREATININE mg/dL 0 54* 0 56* 0 49* 0 78   CALCIUM mg/dL 8 0* 8 1* 7 8* 9 0   ALK PHOS U/L 48 --  51 57   ALT U/L 16  --  17 21   AST U/L 29  --  42* 47*       Radiology review:  Procedure: Ct Chest Abdomen Pelvis Wo Contrast    Result Date: 6/1/2021  Narrative: CT BRAIN - WITHOUT CONTRAST INDICATION:   Altered mental status ams  Covid 19 test performed on 6/1/2021 is negative COMPARISON:  None  TECHNIQUE:  CT examination of the brain was performed  In addition to axial images, sagittal and coronal 2D reformatted images were created and submitted for interpretation  Radiation dose length product (DLP) for this visit:  879 4 mGy-cm  (accession [id number]), 754 7 mGy-cm  (accession [id number]), 338 9 mGy-cm  (accession [id number])  This examination, like all CT scans performed in the [facility], was performed utilizing techniques to minimize radiation dose exposure, including the use of iterative reconstruction and automated exposure control  IMAGE QUALITY:  Diagnostic  FINDINGS: PARENCHYMA:  No intracranial mass, mass effect or midline shift  No CT signs of acute territorial infarction  No acute parenchymal hemorrhage  Gray-white differentiation appears maintained VENTRICLES AND EXTRA-AXIAL SPACES:  Normal for the patient's age  VISUALIZED ORBITS AND PARANASAL SINUSES:  Small polyp/mucous retention cyst in the right maxillary sinus  Otherwise grossly unremarkable  CALVARIUM AND EXTRACRANIAL SOFT TISSUES:  Normal      Impression: No acute intracranial abnormality is seen  CT CERVICAL SPINE - WITHOUT CONTRAST INDICATION:   Altered mental status ams  Covid 19 test performed on 6/1/2021 is negative COMPARISON:  None  TECHNIQUE:  CT examination of the cervical spine was performed without intravenous contrast   Contiguous axial images were obtained  Sagittal and coronal reconstructions were performed  Radiation dose length product (DLP) for this visit:  879 4 mGy-cm (accession [id number]), 754 7 mGy-cm (accession [id number]), 338 9 mGy-cm (accession [id number])    This examination, like all CT scans performed in the 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 DEGENERATIVE CHANGES:  No significant cervical degenerative changes are noted  PREVERTEBRAL AND PARASPINAL SOFT TISSUES:  Unremarkable  THORACIC INLET:  Please refer to the concurrent chest, abdomen, and pelvic CT report for description of the thoracic inlet findings  IMPRESSION: No evidence of acute cervical spine injury  CT CHEST, ABDOMEN AND PELVIS WITH IV CONTRAST INDICATION: Altered mental status, found down  Covid 19 test performed on 6/1/2021 is negative COMPARISON: None  TECHNIQUE: CT examination of the chest, abdomen and pelvis was performed  Reformatted images were created in axial, sagittal, and coronal planes  Radiation dose length product (DLP) for this visit:  879 4 mGy-cm (accession [id number]), 754 7 mGy-cm (accession [id number]), 338 9 mGy-cm (accession [id number])  This examination, like all CT scans performed in the [facility], was performed  utilizing techniques to minimize radiation dose exposure, including the use of iterative reconstruction and automated exposure control  IV Contrast: Enteric Contrast:  Enteric contrast was not administered  CHEST LUNGS:  Focal alveolar opacity in the anterior right apex, could represent atelectasis or infectious or inflammatory pneumonitis  Mild dependent atelectasis bilaterally  Lungs otherwise appear grossly clear  PLEURA:  Unremarkable  HEART/GREAT VESSELS: Unremarkable for patient's age  No CT signs of aortic injury  MEDIASTINUM AND SIENA:  Unremarkable  CHEST WALL AND LOWER NECK: Normal  ABDOMEN LIVER/BILIARY TREE:  Unremarkable   GALLBLADDER:  Gallbladder is surgically absent  SPLEEN:  Unremarkable  PANCREAS:  Unremarkable  ADRENAL GLANDS:  Unremarkable  KIDNEYS/URETERS:  Tiny nonobstructing stone in the lower pole of the right kidney  Bilateral kidneys otherwise appear grossly unremarkable; no hydronephrosis  STOMACH AND BOWEL:  Status post gastric bypass  No evidence of bowel obstruction  Otherwise grossly unremarkable  APPENDIX:  No findings to suggest appendicitis  ABDOMINOPELVIC CAVITY:  No ascites or free intraperitoneal air  No lymphadenopathy  VESSELS:  Unremarkable for patient's age  PELVIS REPRODUCTIVE ORGANS:  Unremarkable for patient's age  URINARY BLADDER:  Bladder is partially distended  A catheter is seen within the bladder  Small amount of air within the bladder lumen, probably related to recent instrumentation  Consider a superimposed cystitis in the appropriate clinical setting  ABDOMINAL WALL/INGUINAL REGIONS:  Unremarkable  OSSEOUS STRUCTURES:  No acute fracture or destructive osseous lesion  IMPRESSION:  No discrete evidence of acute traumatic injury  Focal alveolar opacity in the anterior right apex, could represent atelectasis or infectious or inflammatory pneumonitis  Partially distended bladder  A catheter is seen within the bladder  Small amount of air within the bladder lumen, probably related to recent instrumentation  Consider a superimposed cystitis in the appropriate clinical setting  Status post gastric bypass, no evidence of bowel obstruction  Right-sided nephrolithiasis, no hydronephrosis  Other findings as above  Workstation performed: [id number]     Procedure: Xr Chest 1 View Portable    Result Date: 6/2/2021  Narrative: CHEST INDICATION:   eval   Altered mental status COMPARISON:  None EXAM PERFORMED/VIEWS:  XR CHEST PORTABLE 1 image FINDINGS: Cardiomediastinal silhouette appears unremarkable  The lungs are clear  Monitoring leads and wires overlie and obscure the chest   No pneumothorax or pleural effusion   Osseous structures appear within normal limits for patient age  Impression: No acute cardiopulmonary disease  Workstation performed: [id number]     Procedure: Ct Head Without Contrast    Result Date: 6/1/2021  Narrative: CT BRAIN - WITHOUT CONTRAST INDICATION:   Altered mental status ams  Covid 19 test performed on 6/1/2021 is negative COMPARISON:  None  TECHNIQUE:  CT examination of the brain was performed  In addition to axial images, sagittal and coronal 2D reformatted images were created and submitted for interpretation  Radiation dose length product (DLP) for this visit:  879 4 mGy-cm  (accession [id number]), 754 7 mGy-cm  (accession [id number]), 338 9 mGy-cm  (accession [id number])  This examination, like all CT scans performed in the [facility], was performed utilizing techniques to minimize radiation dose exposure, including the use of iterative reconstruction and automated exposure control  IMAGE QUALITY:  Diagnostic  FINDINGS: PARENCHYMA:  No intracranial mass, mass effect or midline shift  No CT signs of acute territorial infarction  No acute parenchymal hemorrhage  Gray-white differentiation appears maintained VENTRICLES AND EXTRA-AXIAL SPACES:  Normal for the patient's age  VISUALIZED ORBITS AND PARANASAL SINUSES:  Small polyp/mucous retention cyst in the right maxillary sinus  Otherwise grossly unremarkable  CALVARIUM AND EXTRACRANIAL SOFT TISSUES:  Normal      Impression: No acute intracranial abnormality is seen  CT CERVICAL SPINE - WITHOUT CONTRAST INDICATION:   Altered mental status ams  Covid 19 test performed on 6/1/2021 is negative COMPARISON:  None  TECHNIQUE:  CT examination of the cervical spine was performed without intravenous contrast   Contiguous axial images were obtained  Sagittal and coronal reconstructions were performed    Radiation dose length product (DLP) for this visit:  879 4 mGy-cm (accession [id number]), 754 7 mGy-cm (accession [id number]), 338 9 mGy-cm (accession [id number])  This examination, like all CT scans performed in the [facility], was performed  utilizing techniques to minimize radiation dose exposure, including the use of iterative reconstruction and automated exposure control  IMAGE QUALITY:  Diagnostic  FINDINGS: ALIGNMENT:  Normal alignment of the cervical spine  No subluxation  VERTEBRAL BODIES:  No fracture  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 DEGENERATIVE CHANGES:  No significant cervical degenerative changes are noted  PREVERTEBRAL AND PARASPINAL SOFT TISSUES:  Unremarkable  THORACIC INLET:  Please refer to the concurrent chest, abdomen, and pelvic CT report for description of the thoracic inlet findings  IMPRESSION: No evidence of acute cervical spine injury  CT CHEST, ABDOMEN AND PELVIS WITH IV CONTRAST INDICATION: Altered mental status, found down  Covid 19 test performed on 6/1/2021 is negative COMPARISON: None  TECHNIQUE: CT examination of the chest, abdomen and pelvis was performed  Reformatted images were created in axial, sagittal, and coronal planes  Radiation dose length product (DLP) for this visit:  879 4 mGy-cm (accession [id number]), 754 7 mGy-cm (accession [id number]), 338 9 mGy-cm (accession [id number])  This examination, like all CT scans performed in the [facility], was performed  utilizing techniques to minimize radiation dose exposure, including the use of iterative reconstruction and automated exposure control  IV Contrast: Enteric Contrast:  Enteric contrast was not administered  CHEST LUNGS:  Focal alveolar opacity in the anterior right apex, could represent atelectasis or infectious or inflammatory pneumonitis  Mild dependent atelectasis bilaterally  Lungs otherwise appear grossly clear  PLEURA:  Unremarkable  HEART/GREAT VESSELS: Unremarkable for patient's age  No CT signs of aortic injury  MEDIASTINUM AND SIENA:  Unremarkable   CHEST WALL AND LOWER NECK: Normal  ABDOMEN LIVER/BILIARY TREE: Unremarkable  GALLBLADDER:  Gallbladder is surgically absent  SPLEEN:  Unremarkable  PANCREAS:  Unremarkable  ADRENAL GLANDS:  Unremarkable  KIDNEYS/URETERS:  Tiny nonobstructing stone in the lower pole of the right kidney  Bilateral kidneys otherwise appear grossly unremarkable; no hydronephrosis  STOMACH AND BOWEL:  Status post gastric bypass  No evidence of bowel obstruction  Otherwise grossly unremarkable  APPENDIX:  No findings to suggest appendicitis  ABDOMINOPELVIC CAVITY:  No ascites or free intraperitoneal air  No lymphadenopathy  VESSELS:  Unremarkable for patient's age  PELVIS REPRODUCTIVE ORGANS:  Unremarkable for patient's age  URINARY BLADDER:  Bladder is partially distended  A catheter is seen within the bladder  Small amount of air within the bladder lumen, probably related to recent instrumentation  Consider a superimposed cystitis in the appropriate clinical setting  ABDOMINAL WALL/INGUINAL REGIONS:  Unremarkable  OSSEOUS STRUCTURES:  No acute fracture or destructive osseous lesion  IMPRESSION:  No discrete evidence of acute traumatic injury  Focal alveolar opacity in the anterior right apex, could represent atelectasis or infectious or inflammatory pneumonitis  Partially distended bladder  A catheter is seen within the bladder  Small amount of air within the bladder lumen, probably related to recent instrumentation  Consider a superimposed cystitis in the appropriate clinical setting  Status post gastric bypass, no evidence of bowel obstruction  Right-sided nephrolithiasis, no hydronephrosis  Other findings as above  Workstation performed: [id number]     Procedure: Ct Spine Cervical Without Contrast    Result Date: 6/1/2021  Narrative: CT BRAIN - WITHOUT CONTRAST INDICATION:   Altered mental status ams  Covid 19 test performed on 6/1/2021 is negative COMPARISON:  None  TECHNIQUE:  CT examination of the brain was performed    In addition to axial images, sagittal and coronal 2D reformatted images were created and submitted for interpretation  Radiation dose length product (DLP) for this visit:  879 4 mGy-cm  (accession [id number]), 754 7 mGy-cm  (accession [id number]), 338 9 mGy-cm  (accession [id number])  This examination, like all CT scans performed in the [facility], was performed utilizing techniques to minimize radiation dose exposure, including the use of iterative reconstruction and automated exposure control  IMAGE QUALITY:  Diagnostic  FINDINGS: PARENCHYMA:  No intracranial mass, mass effect or midline shift  No CT signs of acute territorial infarction  No acute parenchymal hemorrhage  Gray-white differentiation appears maintained VENTRICLES AND EXTRA-AXIAL SPACES:  Normal for the patient's age  VISUALIZED ORBITS AND PARANASAL SINUSES:  Small polyp/mucous retention cyst in the right maxillary sinus  Otherwise grossly unremarkable  CALVARIUM AND EXTRACRANIAL SOFT TISSUES:  Normal      Impression: No acute intracranial abnormality is seen  CT CERVICAL SPINE - WITHOUT CONTRAST INDICATION:   Altered mental status ams  Covid 19 test performed on 6/1/2021 is negative COMPARISON:  None  TECHNIQUE:  CT examination of the cervical spine was performed without intravenous contrast   Contiguous axial images were obtained  Sagittal and coronal reconstructions were performed  Radiation dose length product (DLP) for this visit:  879 4 mGy-cm (accession [id number]), 754 7 mGy-cm (accession [id number]), 338 9 mGy-cm (accession [id number])  This examination, like all CT scans performed in the [facility], was performed  utilizing techniques to minimize radiation dose exposure, including the use of iterative reconstruction and automated exposure control  IMAGE QUALITY:  Diagnostic  FINDINGS: ALIGNMENT:  Normal alignment of the cervical spine  No subluxation  VERTEBRAL BODIES:  No fracture    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 DEGENERATIVE CHANGES:  No significant cervical degenerative changes are noted  PREVERTEBRAL AND PARASPINAL SOFT TISSUES:  Unremarkable  THORACIC INLET:  Please refer to the concurrent chest, abdomen, and pelvic CT report for description of the thoracic inlet findings  IMPRESSION: No evidence of acute cervical spine injury  CT CHEST, ABDOMEN AND PELVIS WITH IV CONTRAST INDICATION: Altered mental status, found down  Covid 19 test performed on 6/1/2021 is negative COMPARISON: None  TECHNIQUE: CT examination of the chest, abdomen and pelvis was performed  Reformatted images were created in axial, sagittal, and coronal planes  Radiation dose length product (DLP) for this visit:  879 4 mGy-cm (accession [id number]), 754 7 mGy-cm (accession [id number]), 338 9 mGy-cm (accession [id number])  This examination, like all CT scans performed in the [facility], was performed  utilizing techniques to minimize radiation dose exposure, including the use of iterative reconstruction and automated exposure control  IV Contrast: Enteric Contrast:  Enteric contrast was not administered  CHEST LUNGS:  Focal alveolar opacity in the anterior right apex, could represent atelectasis or infectious or inflammatory pneumonitis  Mild dependent atelectasis bilaterally  Lungs otherwise appear grossly clear  PLEURA:  Unremarkable  HEART/GREAT VESSELS: Unremarkable for patient's age  No CT signs of aortic injury  MEDIASTINUM AND SIENA:  Unremarkable  CHEST WALL AND LOWER NECK: Normal  ABDOMEN LIVER/BILIARY TREE:  Unremarkable  GALLBLADDER:  Gallbladder is surgically absent  SPLEEN:  Unremarkable  PANCREAS:  Unremarkable  ADRENAL GLANDS:  Unremarkable  KIDNEYS/URETERS:  Tiny nonobstructing stone in the lower pole of the right kidney  Bilateral kidneys otherwise appear grossly unremarkable; no hydronephrosis  STOMACH AND BOWEL:  Status post gastric bypass  No evidence of bowel obstruction  Otherwise grossly unremarkable   APPENDIX:  No findings to suggest appendicitis  ABDOMINOPELVIC CAVITY:  No ascites or free intraperitoneal air  No lymphadenopathy  VESSELS:  Unremarkable for patient's age  PELVIS REPRODUCTIVE ORGANS:  Unremarkable for patient's age  URINARY BLADDER:  Bladder is partially distended  A catheter is seen within the bladder  Small amount of air within the bladder lumen, probably related to recent instrumentation  Consider a superimposed cystitis in the appropriate clinical setting  ABDOMINAL WALL/INGUINAL REGIONS:  Unremarkable  OSSEOUS STRUCTURES:  No acute fracture or destructive osseous lesion  IMPRESSION:  No discrete evidence of acute traumatic injury  Focal alveolar opacity in the anterior right apex, could represent atelectasis or infectious or inflammatory pneumonitis  Partially distended bladder  A catheter is seen within the bladder  Small amount of air within the bladder lumen, probably related to recent instrumentation  Consider a superimposed cystitis in the appropriate clinical setting  Status post gastric bypass, no evidence of bowel obstruction  Right-sided nephrolithiasis, no hydronephrosis  Other findings as above  Workstation performed: [id number]         EKG, Pathology, and Other Studies:  Pertinent studies reviewed  Counseling / Coordination of Care  Total ADDITIONAL floor / unit time spent today 45 minutes  Greater than 50% of total time was spent with the patient and / or family counseling and / or coordination of care  Prudencio Castillo PA-C    Portions of the record may have been created with voice recognition software  Occasional wrong word or "sound a like" substitutions may have occurred due to the inherent limitations of voice recognition software  Read the chart carefully and recognize, using context, where substitutions have occurred

## 2021-06-03 NOTE — ASSESSMENT & PLAN NOTE
Likely secondary to starvation ketosis  Acidosis has improved with bicarb drip  Nephrology input appreciated    Encourage increased oral intake and better nutrition

## 2021-06-03 NOTE — ASSESSMENT & PLAN NOTE
· Drug screen was positive for methamphetamines  · Patient is hypersomnolent but awakes awakes intermittently to participate in interview

## 2021-06-03 NOTE — PLAN OF CARE
Problem: SAFETY ADULT  Goal: Patient will remain free of falls  Description: INTERVENTIONS:  - Assess patient frequently for physical needs  -  Identify cognitive and physical deficits and behaviors that affect risk of falls    -  [place] fall precautions as indicated by assessment   - Educate patient/family on patient safety including physical limitations  - Instruct patient to call for assistance with activity based on assessment  - Modify environment to reduce risk of injury  - Consider OT/PT consult to assist with strengthening/mobility  Outcome: Progressing  Goal: Maintain or return to baseline ADL function  Description: INTERVENTIONS:  -  Assess patient's ability to carry out ADLs; assess patient's baseline for ADL function and identify physical deficits which impact ability to perform ADLs (bathing, care of mouth/teeth, toileting, grooming, dressing, etc )  - Assess/evaluate cause of self-care deficits   - Assess range of motion  - Assess patient's mobility; develop plan if impaired  - Assess patient's need for assistive devices and provide as appropriate  - Encourage maximum independence but intervene and supervise when necessary  - Involve family in performance of ADLs  - Assess for home care needs following discharge   - Consider OT consult to assist with ADL evaluation and planning for discharge  - Provide patient education as appropriate  Outcome: Progressing  Goal: Maintain or return mobility status to optimal level  Description: INTERVENTIONS:  - Assess patient's baseline mobility status (ambulation, transfers, stairs, etc )    - Identify cognitive and physical deficits and behaviors that affect mobility  - Identify mobility aids required to assist with transfers and/or ambulation (gait belt, sit-to-stand, lift, walker, cane, etc )  - [place] fall precautions as indicated by assessment  - Record patient progress and toleration of activity level on Mobility SBAR; progress patient to next Phase/Stage  - Instruct patient to call for assistance with activity based on assessment  - Consider rehabilitation consult to assist with strengthening/weightbearing, etc   Outcome: Progressing     Problem: DISCHARGE PLANNING  Goal: Discharge to home or other facility with appropriate resources  Description: INTERVENTIONS:  - Identify barriers to discharge w/patient and caregiver  - Arrange for needed discharge resources and transportation as appropriate  - Identify discharge learning needs (meds, wound care, etc )  - Arrange for interpretive services to assist at discharge as needed  - Refer to Case Management Department for coordinating discharge planning if the patient needs post-hospital services based on physician/advanced practitioner order or complex needs related to functional status, cognitive ability, or social support system  Outcome: Progressing     Problem: NEUROSENSORY - ADULT  Goal: Achieves stable or improved neurological status  Description: INTERVENTIONS  - Monitor and report changes in neurological status  - Monitor vital signs such as temperature, blood pressure, glucose, and any other labs ordered   - Initiate measures to prevent increased intracranial pressure  - Monitor for seizure activity and implement precautions if appropriate      Outcome: Progressing  Goal: Achieves maximal functionality and self care  Description: INTERVENTIONS  - Monitor swallowing and airway patency with patient fatigue and changes in neurological status  - Encourage and assist patient to increase activity and self care     - Encourage visually impaired, hearing impaired and aphasic patients to use assistive/communication devices  Outcome: Progressing     Problem: METABOLIC, FLUID AND ELECTROLYTES - ADULT  Goal: Electrolytes maintained within normal limits  Description: INTERVENTIONS:  - Monitor labs and assess patient for signs and symptoms of electrolyte imbalances  - Administer electrolyte replacement as ordered  - Monitor response to electrolyte replacements, including repeat lab results as appropriate  - Instruct patient on fluid and nutrition as appropriate  Outcome: Progressing     Problem: Prexisting or High Potential for Compromised Skin Integrity  Goal: Skin integrity is maintained or improved  Description: INTERVENTIONS:  - Identify patients at risk for skin breakdown  - Assess and monitor skin integrity  - Assess and monitor nutrition and hydration status  - Monitor labs   - Assess for incontinence   - Turn and reposition patient  - Assist with mobility/ambulation  - Relieve pressure over bony prominences  - Avoid friction and shearing  - Provide appropriate hygiene as needed including keeping skin clean and dry  - Evaluate need for skin moisturizer/barrier cream  - Collaborate with interdisciplinary team   - Patient/family teaching  - Consider wound care consult   Outcome: Progressing     Problem: Potential for Falls  Goal: Patient will remain free of falls  Description: INTERVENTIONS:  - Assess patient frequently for physical needs  -  Identify cognitive and physical deficits and behaviors that affect risk of falls    -  [place] fall precautions as indicated by assessment   - Educate patient/family on patient safety including physical limitations  - Instruct patient to call for assistance with activity based on assessment  - Modify environment to reduce risk of injury  - Consider OT/PT consult to assist with strengthening/mobility  Outcome: Progressing

## 2021-06-03 NOTE — CONSULTS
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 mg Oral Q6H PRN Shaheen Braden PA-C      dextrose 5 % and sodium chloride 0 45 %  75 mL/hr Intravenous Continuous Florencia Joseph MD 75 mL/hr (06/03/21 0198)    magnesium oxide  400 mg Oral BID Jamal Renae PA-C      ondansetron  4 mg Intravenous Q6H PRN Shaheen Braden PA-C         Counseling / Coordination of Care: Total floor / unit time spent today 60 minutes  Greater than 50% of total time was spent with the patient and / or family counseling and / or coordination of care  A description of the counseling / coordination of care:   Events leading to admission reviewed with patient  Outpatient follow up discussed with patient  Supportive therapy provided to patient      ABDOULAYE Jones 06/03/21

## 2021-06-03 NOTE — NURSING NOTE
Pt has did not void by 2330  Asked her multiple times if she felt the need to urinate or if she could try to urinate  Pt refused  Bladder scan showed pt to have 150 mL in bladder  92 Alex Elizondo notified  Will continue to monitor  Pt currently in bed in no acute distress with call bell and belongings in reach

## 2021-06-03 NOTE — PLAN OF CARE
Problem: SAFETY ADULT  Goal: Patient will remain free of falls  Description: INTERVENTIONS:  - Assess patient frequently for physical needs  -  Identify cognitive and physical deficits and behaviors that affect risk of falls    -  [place] fall precautions as indicated by assessment   - Educate patient/family on patient safety including physical limitations  - Instruct patient to call for assistance with activity based on assessment  - Modify environment to reduce risk of injury  - Consider OT/PT consult to assist with strengthening/mobility  Outcome: Progressing  Goal: Maintain or return to baseline ADL function  Description: INTERVENTIONS:  -  Assess patient's ability to carry out ADLs; assess patient's baseline for ADL function and identify physical deficits which impact ability to perform ADLs (bathing, care of mouth/teeth, toileting, grooming, dressing, etc )  - Assess/evaluate cause of self-care deficits   - Assess range of motion  - Assess patient's mobility; develop plan if impaired  - Assess patient's need for assistive devices and provide as appropriate  - Encourage maximum independence but intervene and supervise when necessary  - Involve family in performance of ADLs  - Assess for home care needs following discharge   - Consider OT consult to assist with ADL evaluation and planning for discharge  - Provide patient education as appropriate  Outcome: Progressing  Goal: Maintain or return mobility status to optimal level  Description: INTERVENTIONS:  - Assess patient's baseline mobility status (ambulation, transfers, stairs, etc )    - Identify cognitive and physical deficits and behaviors that affect mobility  - Identify mobility aids required to assist with transfers and/or ambulation (gait belt, sit-to-stand, lift, walker, cane, etc )  - [place] fall precautions as indicated by assessment  - Record patient progress and toleration of activity level on Mobility SBAR; progress patient to next Phase/Stage  - Instruct patient to call for assistance with activity based on assessment  - Consider rehabilitation consult to assist with strengthening/weightbearing, etc   Outcome: Progressing     Problem: DISCHARGE PLANNING  Goal: Discharge to home or other facility with appropriate resources  Description: INTERVENTIONS:  - Identify barriers to discharge w/patient and caregiver  - Arrange for needed discharge resources and transportation as appropriate  - Identify discharge learning needs (meds, wound care, etc )  - Arrange for interpretive services to assist at discharge as needed  - Refer to Case Management Department for coordinating discharge planning if the patient needs post-hospital services based on physician/advanced practitioner order or complex needs related to functional status, cognitive ability, or social support system  Outcome: Progressing     Problem: NEUROSENSORY - ADULT  Goal: Achieves stable or improved neurological status  Description: INTERVENTIONS  - Monitor and report changes in neurological status  - Monitor vital signs such as temperature, blood pressure, glucose, and any other labs ordered   - Initiate measures to prevent increased intracranial pressure  - Monitor for seizure activity and implement precautions if appropriate      Outcome: Progressing  Goal: Achieves maximal functionality and self care  Description: INTERVENTIONS  - Monitor swallowing and airway patency with patient fatigue and changes in neurological status  - Encourage and assist patient to increase activity and self care     - Encourage visually impaired, hearing impaired and aphasic patients to use assistive/communication devices  Outcome: Progressing     Problem: METABOLIC, FLUID AND ELECTROLYTES - ADULT  Goal: Electrolytes maintained within normal limits  Description: INTERVENTIONS:  - Monitor labs and assess patient for signs and symptoms of electrolyte imbalances  - Administer electrolyte replacement as ordered  - Monitor response to electrolyte replacements, including repeat lab results as appropriate  - Instruct patient on fluid and nutrition as appropriate  Outcome: Progressing     Problem: Prexisting or High Potential for Compromised Skin Integrity  Goal: Skin integrity is maintained or improved  Description: INTERVENTIONS:  - Identify patients at risk for skin breakdown  - Assess and monitor skin integrity  - Assess and monitor nutrition and hydration status  - Monitor labs   - Assess for incontinence   - Turn and reposition patient  - Assist with mobility/ambulation  - Relieve pressure over bony prominences  - Avoid friction and shearing  - Provide appropriate hygiene as needed including keeping skin clean and dry  - Evaluate need for skin moisturizer/barrier cream  - Collaborate with interdisciplinary team   - Patient/family teaching  - Consider wound care consult   Outcome: Progressing     Problem: Potential for Falls  Goal: Patient will remain free of falls  Description: INTERVENTIONS:  - Assess patient frequently for physical needs  -  Identify cognitive and physical deficits and behaviors that affect risk of falls    -  [place] fall precautions as indicated by assessment   - Educate patient/family on patient safety including physical limitations  - Instruct patient to call for assistance with activity based on assessment  - Modify environment to reduce risk of injury  - Consider OT/PT consult to assist with strengthening/mobility  Outcome: Progressing

## 2021-06-03 NOTE — PROGRESS NOTES
[facility]  Progress Note Catherine Lopez 1970, 53 y o  female MRN: [id number]  Unit/Bed#: -01 Encounter: [id number]  Primary Care Provider: No primary care provider on file  Date and time admitted to hospital: 2021  4:35 PM    * Delirium  Assessment & Plan  · Possibly secondary to drug use  · Infectious workup negative thus far  · Metabolic abnormalities have improved  · Psychiatry input appreciated, no need for inpatient behavior health unit  · Mental status gradually improving    Elevated beta-hydroxybutyrate  Assessment & Plan  Likely secondary to starvation ketosis  Acidosis has improved with bicarb drip  Nephrology input appreciated  Encourage increased oral intake and better nutrition    Hypomagnesemia  Assessment & Plan  Replaced, will monitor    Non-traumatic rhabdomyolysis  Assessment & Plan  · Improved with IV fluids  VTE Prophylaxis:  Early aggressive ambulation    Patient Centered Rounds: I have performed bedside rounds with nursing staff today  Discussions with Specialists or Other Care Team Provider: yes  Education and Discussions with Family / Patient:  Attempted to call patient's family with no answer at number provided in chart    Current Length of Stay: 0 day(s)    Current Patient Status: Inpatient   Certification Statement: The patient will continue to require additional inpatient hospital stay due to Delirium    Discharge Plan:  Hopeful discharge planning in the next 24-48 hours    Code Status: Level 1 - Full Code    Subjective:   No overnight events noted  Mental status improved today  Decreased appetite  Denies any pain complaints    Objective:     Vitals:   Temp (24hrs), Av 1 °F (36 7 °C), Min:97 3 °F (36 3 °C), Max:98 7 °F (37 1 °C)    Temp:  [97 3 °F (36 3 °C)-98 7 °F (37 1 °C)] 98 7 °F (37 1 °C)  HR:  [62-67] 63  Resp:  [16-17] 17  BP: (114-119)/(65-76) 119/76  SpO2:  [97 %-98 %] 98 %  Body mass index is 26 58 kg/m²  Input and Output Summary (last 24 hours): Intake/Output Summary (Last 24 hours) at 6/3/2021 1632  Last data filed at 6/3/2021 1202  Gross per 24 hour   Intake 1730 ml   Output 1664 ml   Net 66 ml       Physical Exam:   Physical Exam  Constitutional:       General: She is not in acute distress  HENT:      Head: Normocephalic and atraumatic  Nose: Nose normal       Mouth/Throat:      Mouth: Mucous membranes are moist       Comments: Poor dentition  Eyes:      Extraocular Movements: Extraocular movements intact  Conjunctiva/sclera: Conjunctivae normal    Neck:      Musculoskeletal: Normal range of motion and neck supple  Cardiovascular:      Rate and Rhythm: Normal rate and regular rhythm  Pulmonary:      Effort: Pulmonary effort is normal  No respiratory distress  Abdominal:      Palpations: Abdomen is soft  Tenderness: There is no abdominal tenderness  Musculoskeletal:         General: No swelling  Comments: Generalized weakness   Skin:     General: Skin is warm and dry  Neurological:      General: No focal deficit present  Mental Status: She is alert  Cranial Nerves: No cranial nerve deficit  Comments: Drowsy but arousable  Following simple commands   Psychiatric:         Mood and Affect: Mood is depressed           Behavior: Behavior normal          Additional Data:     Labs:    Results from last 7 days   Lab Units 06/03/21  0524   WBC Thousand/uL 5 50   HEMOGLOBIN g/dL 8 9*   HEMATOCRIT % 28 3*   PLATELETS Thousands/uL 244   NEUTROS PCT % 72   LYMPHS PCT % 20*   MONOS PCT % 5   EOS PCT % 2     Results from last 7 days   Lab Units 06/03/21  0524   SODIUM mmol/L 143   POTASSIUM mmol/L 3 9   CHLORIDE mmol/L 111*   CO2 mmol/L 26   BUN mg/dL 4*   CREATININE mg/dL 0 54*   CALCIUM mg/dL 8 0*   ALK PHOS U/L 48   ALT U/L 16   AST U/L 29         Results from last 7 days   Lab Units 06/03/21  1613 06/03/21  1050 06/03/21  0851 06/03/21  0604 06/03/21  0405 06/03/21  0159 06/03/21  0001 06/02/21  2138 06/02/21  2007 06/02/21  1900 06/02/21  1611 06/02/21  1356   POC GLUCOSE mg/dl 120 129 125 116 99 109 110 145* 137 124 100 102           * I Have Reviewed All Lab Data Listed Above  * Additional Pertinent Lab Tests Reviewed: Tess 66 Admission  Reviewed    Imaging:  Imaging Reports Reviewed Today Include:  No new imaging    Recent Cultures (last 7 days):     Results from last 7 days   Lab Units 06/01/21  1707   BLOOD CULTURE  No Growth at 24 hrs  No Growth at 24 hrs  Last 24 Hours Medication List:   Current Facility-Administered Medications   Medication Dose Route Frequency Provider Last Rate    acetaminophen  650 mg Oral Q6H PRN Xochitl Zavaleta PA-C      magnesium oxide  400 mg Oral BID Malka Abdi PA-C      ondansetron  4 mg Intravenous Q6H PRN Xochitl Zavaleta PA-C          Today, Patient Was Seen By: Vlad Gomez MD    ** Please Note: Dictation voice to text software may have been used in the creation of this document   **

## 2021-06-04 VITALS
TEMPERATURE: 97.9 F | WEIGHT: 164.68 LBS | RESPIRATION RATE: 18 BRPM | OXYGEN SATURATION: 99 % | SYSTOLIC BLOOD PRESSURE: 101 MMHG | DIASTOLIC BLOOD PRESSURE: 62 MMHG | HEART RATE: 65 BPM | BODY MASS INDEX: 26.47 KG/M2 | HEIGHT: 66 IN

## 2021-06-04 PROBLEM — L25.9 CONTACT DERMATITIS: Status: ACTIVE | Noted: 2021-06-04

## 2021-06-04 PROBLEM — E87.2 METABOLIC ACIDOSIS: Status: ACTIVE | Noted: 2021-06-04

## 2021-06-04 PROBLEM — E87.20 METABOLIC ACIDOSIS: Status: ACTIVE | Noted: 2021-06-04

## 2021-06-04 LAB
ALBUMIN SERPL BCP-MCNC: 3 G/DL (ref 3.5–5.7)
ALP SERPL-CCNC: 43 U/L (ref 40–150)
ALT SERPL W P-5'-P-CCNC: 13 U/L (ref 7–52)
ANION GAP SERPL CALCULATED.3IONS-SCNC: 6 MMOL/L (ref 4–13)
AST SERPL W P-5'-P-CCNC: 22 U/L (ref 13–39)
BASOPHILS # BLD AUTO: 0 THOUSANDS/ΜL (ref 0–0.1)
BASOPHILS NFR BLD AUTO: 0 % (ref 0–2)
BILIRUB SERPL-MCNC: 0.5 MG/DL (ref 0.2–1)
BUN SERPL-MCNC: 5 MG/DL (ref 7–25)
CALCIUM ALBUM COR SERPL-MCNC: 8.8 MG/DL (ref 8.3–10.1)
CALCIUM SERPL-MCNC: 8 MG/DL (ref 8.6–10.5)
CHLORIDE SERPL-SCNC: 110 MMOL/L (ref 98–107)
CO2 SERPL-SCNC: 28 MMOL/L (ref 21–31)
CREAT SERPL-MCNC: 0.62 MG/DL (ref 0.6–1.2)
EOSINOPHIL # BLD AUTO: 0.3 THOUSAND/ΜL (ref 0–0.61)
EOSINOPHIL NFR BLD AUTO: 7 % (ref 0–5)
ERYTHROCYTE [DISTWIDTH] IN BLOOD BY AUTOMATED COUNT: 19.8 % (ref 11.5–14.5)
GFR SERPL CREATININE-BSD FRML MDRD: 106 ML/MIN/1.73SQ M
GLUCOSE SERPL-MCNC: 106 MG/DL (ref 65–140)
GLUCOSE SERPL-MCNC: 183 MG/DL (ref 65–140)
GLUCOSE SERPL-MCNC: 80 MG/DL (ref 65–99)
GLUCOSE SERPL-MCNC: 97 MG/DL (ref 65–140)
HCT VFR BLD AUTO: 29.6 % (ref 42–47)
HGB BLD-MCNC: 9.1 G/DL (ref 12–16)
LYMPHOCYTES # BLD AUTO: 1.8 THOUSANDS/ΜL (ref 0.6–4.47)
LYMPHOCYTES NFR BLD AUTO: 37 % (ref 21–51)
MAGNESIUM SERPL-MCNC: 2 MG/DL (ref 1.9–2.7)
MCH RBC QN AUTO: 21.7 PG (ref 26–34)
MCHC RBC AUTO-ENTMCNC: 30.8 G/DL (ref 31–37)
MCV RBC AUTO: 71 FL (ref 81–99)
MONOCYTES # BLD AUTO: 0.2 THOUSAND/ΜL (ref 0.17–1.22)
MONOCYTES NFR BLD AUTO: 5 % (ref 2–12)
NEUTROPHILS # BLD AUTO: 2.5 THOUSANDS/ΜL (ref 1.4–6.5)
NEUTS SEG NFR BLD AUTO: 51 % (ref 42–75)
PHOSPHATE SERPL-MCNC: 2.9 MG/DL (ref 3–5.5)
PLATELET # BLD AUTO: 253 THOUSANDS/UL (ref 149–390)
PMV BLD AUTO: 7.4 FL (ref 8.6–11.7)
POTASSIUM SERPL-SCNC: 3.3 MMOL/L (ref 3.5–5.5)
PROT SERPL-MCNC: 5.2 G/DL (ref 6.4–8.9)
RBC # BLD AUTO: 4.2 MILLION/UL (ref 3.9–5.2)
SODIUM SERPL-SCNC: 144 MMOL/L (ref 134–143)
WBC # BLD AUTO: 4.9 THOUSAND/UL (ref 4.8–10.8)

## 2021-06-04 PROCEDURE — 84100 ASSAY OF PHOSPHORUS: CPT | Performed by: INTERNAL MEDICINE

## 2021-06-04 PROCEDURE — 99238 HOSP IP/OBS DSCHRG MGMT 30/<: CPT | Performed by: INTERNAL MEDICINE

## 2021-06-04 PROCEDURE — 80053 COMPREHEN METABOLIC PANEL: CPT | Performed by: INTERNAL MEDICINE

## 2021-06-04 PROCEDURE — 83735 ASSAY OF MAGNESIUM: CPT | Performed by: INTERNAL MEDICINE

## 2021-06-04 PROCEDURE — 82948 REAGENT STRIP/BLOOD GLUCOSE: CPT

## 2021-06-04 PROCEDURE — 85025 COMPLETE CBC W/AUTO DIFF WBC: CPT | Performed by: INTERNAL MEDICINE

## 2021-06-04 RX ORDER — POTASSIUM CHLORIDE 20 MEQ/1
20 TABLET, EXTENDED RELEASE ORAL ONCE
Status: COMPLETED | OUTPATIENT
Start: 2021-06-04 | End: 2021-06-04

## 2021-06-04 RX ORDER — METHYLPREDNISOLONE SODIUM SUCCINATE 125 MG/2ML
60 INJECTION, POWDER, LYOPHILIZED, FOR SOLUTION INTRAMUSCULAR; INTRAVENOUS ONCE
Status: COMPLETED | OUTPATIENT
Start: 2021-06-04 | End: 2021-06-04

## 2021-06-04 RX ADMIN — DIBASIC SODIUM PHOSPHATE, MONOBASIC POTASSIUM PHOSPHATE AND MONOBASIC SODIUM PHOSPHATE 1 TABLET: 852; 155; 130 TABLET ORAL at 11:07

## 2021-06-04 RX ADMIN — METHYLPREDNISOLONE SODIUM SUCCINATE 60 MG: 125 INJECTION, POWDER, FOR SOLUTION INTRAMUSCULAR; INTRAVENOUS at 11:07

## 2021-06-04 RX ADMIN — MAGNESIUM OXIDE TAB 400 MG (241.3 MG ELEMENTAL MG) 400 MG: 400 (241.3 MG) TAB at 09:43

## 2021-06-04 RX ADMIN — POTASSIUM CHLORIDE 20 MEQ: 1500 TABLET, EXTENDED RELEASE ORAL at 11:07

## 2021-06-04 NOTE — OCCUPATIONAL THERAPY NOTE
Rachael     Patient Name: Devonte Cabrera  PMIFT'Z Date: 6/4/2021  Problem List  Principal Problem:    Delirium  Active Problems:    Non-traumatic rhabdomyolysis    Hypokalemia    Hypomagnesemia    Elevated beta-hydroxybutyrate    Urine ketones       06/04/21 1043   OT Last Visit   OT Visit Date 06/04/21   Note Type   Note type Screen     Order received and chart review performed; spoke with pt and pt feels she is (I) and does not require OT evaluation  Pt reports that she lives with her mother in a one level home and was previously performing all ADLs, IADLs, and functional mobility independently; pt does not require skilled OT needs at this time; will d/c OT orders at this time

## 2021-06-04 NOTE — CASE MANAGEMENT
CM spoke with pt mother Dianne Jurado via phone [phone]  She confirms pt lives with her at [address]  Pt is able to return when stable for discharge , Mother will transport if pt discharged during the day as she does not drive at night  Dr Amador Zarate aware  Pt for likely d/c later today

## 2021-06-04 NOTE — NURSING NOTE
Informed by PCA that patient was sitting up in bed crying r/t some wrist pain she was experiencing  With another patient at the time  When entered patient's room, patient was already back to sleep

## 2021-06-04 NOTE — DISCHARGE SUMMARY
[facility]  Discharge- Bridgette Radhames 1970, 53 y o  female MRN: [id number]  Unit/Bed#: -01 Encounter: [id number]  Primary Care Provider: No primary care provider on file  Date and time admitted to hospital: 6/1/2021  4:35 PM    * Delirium  Assessment & Plan  · Possibly secondary to drug use  · Infectious workup negative thus far  · Metabolic abnormalities have improved  · Psychiatry input appreciated, no need for inpatient behavior health unit  · Mental status gradually improving    Contact dermatitis  Assessment & Plan  · Patient states that she thinks it was secondary to contact with possible poison ivy or other allergen as she was outside and according to her she "rolled down a hill"  · Patient given dose of steroids  · Benadryl as needed    Metabolic acidosis  Assessment & Plan  · Possibly secondary to starvation ketosis  · Improved with IV fluids  · Nephrology input appreciated    Hypokalemia  Assessment & Plan  Replaced, recommended outpatient follow-up with PCP for lab monitoring    Non-traumatic rhabdomyolysis  Assessment & Plan  · Improved with IV fluids  Discharging Physician / Practitioner: Jose Danielle MD  PCP: No primary care provider on file  Admission Date:   Admission Orders (From admission, onward)     Ordered        06/03/21 1526  Inpatient Admission  Once         06/01/21 2338  Place in Observation  Once                   Discharge Date: 06/04/21    Patient left against medical advice    Resolved Problems  Date Reviewed: 6/3/2021    None          Consultations During Hospital Stay:  · Psychiatry, Nephrology    Procedures Performed:   · None    Significant Findings / Test Results:   · Metabolic acidosis, delirium    Incidental Findings:   · None     Test Results Pending at Discharge (will require follow up):    · None     Outpatient Tests Requested:  · Routine labs with PCP    Complications:     None    Reason for Admission:  Delirium/drug use    Hospital Course:     Yris Wagner is a 53 y o  female patient who originally presented to the hospital on 6/1/2021 due to altered mental status  Patient was positive for amphetamines on drug screen  Patient admitted to using drugs recently states that she snorts drugs  Patient was noted to have metabolic acidosis and seen by Nephrology  Acidosis improved with IV fluids  Patient was also noted to be hypoglycemic this also improved with the addition of dextrose in fluids  Patient gradually improved and was tolerating diet  Patient was seen by Psychiatry who cleared patient for discharge, no need for inpatient behavioral health unit  Patient had a rash on her legs that appear to be a contact dermatitis as she states that she "rolled down a hill" prior to admission  Given rash on lower extremities patient was recommended to stay for another 24 hours however she states that she does not want to stay  Patient signed out against medical advise  I stated to her that she could have worsening allergic reaction or infection however she refused to stay  Please see above list of diagnoses and related plan for additional information  Condition at Discharge: stable     Discharge Day Visit / Exam:     Subjective:  No complaints at this time aside from rash on lower extremities    Vitals: Blood Pressure: 101/62 (06/04/21 0808)  Pulse: 65 (06/04/21 0808)  Temperature: 97 9 °F (36 6 °C) (06/04/21 0808)  Temp Source: Temporal (06/04/21 0808)  Respirations: 18 (06/04/21 0808)  Height: 5' 6" (167 6 cm) (06/03/21 1038)  Weight - Scale: 74 7 kg (164 lb 10 9 oz) (06/02/21 0005)  SpO2: 99 % (06/04/21 0808)     Exam:   Physical Exam  Constitutional:       General: She is not in acute distress  HENT:      Head: Normocephalic and atraumatic  Nose: Nose normal       Mouth/Throat:      Mouth: Mucous membranes are dry  Eyes:      Extraocular Movements: Extraocular movements intact        Conjunctiva/sclera: Conjunctivae normal    Neck:      Musculoskeletal: Normal range of motion and neck supple  Cardiovascular:      Rate and Rhythm: Normal rate and regular rhythm  Pulmonary:      Effort: Pulmonary effort is normal  No respiratory distress  Abdominal:      Palpations: Abdomen is soft  Tenderness: There is no abdominal tenderness  Musculoskeletal: Normal range of motion  General: No swelling  Skin:     General: Skin is warm and dry  Findings: Erythema and rash present  Neurological:      General: No focal deficit present  Mental Status: She is alert  Cranial Nerves: No cranial nerve deficit  Psychiatric:         Mood and Affect: Mood normal          Behavior: Behavior normal          Discharge instructions/Information to patient and family:   See after visit summary for information provided to patient and family  Provisions for Follow-Up Care:  See after visit summary for information related to follow-up care and any pertinent home health orders  Disposition:     Home      Planned Readmission:    no     Discharge Statement:  I spent 25 minutes discharging the patient  This time was spent on the day of discharge  I had direct contact with the patient on the day of discharge  Greater than 50% of the total time was spent examining patient, answering all patient questions, arranging and discussing plan of care with patient as well as directly providing post-discharge instructions  Additional time then spent on discharge activities  Discharge Medications:  See after visit summary for reconciled discharge medications provided to patient and family        ** Please Note: This note has been constructed using a voice recognition system **

## 2021-06-04 NOTE — PHYSICAL THERAPY NOTE
Physical Therapy Screen    Patient Name: Catherine DAVID Date: 6/4/2021     Problem List  Principal Problem:    Delirium  Active Problems:    Non-traumatic rhabdomyolysis    Hypokalemia    Hypomagnesemia    Elevated beta-hydroxybutyrate    Urine ketones       Past Medical History  Past Medical History:   Diagnosis Date    Hypokalemia     Pyelonephritis         Past Surgical History  Past Surgical History:   Procedure Laterality Date    CHOLECYSTECTOMY      SUBTOTAL COLECTOMY      TOOTH EXTRACTION          SUBJECTIVE/OBJECTIVE:  PT eval and treat order received  Chart review performed  PT approached pt supine in bed  PT reviewed pt's PLOF and social history - pt reports living in 1  with mother + 3 BREA  Pt reports being independent with ADLs/IADLs, unemployed  Pt denies any use of AD or DME and denies any falls in the last 6 months  Pt denies numbness/tingling  Pt reports having low back pain presently  ASSESSMENT/RECOMMENDATION:  Pt denies need for skilled PT interventions at this time  Pt reports being able to mobilize independently at this time  When asked to perform OOB mobility, pt adamantly refusing & emotional at this time, reports she wants to go home today  From PT/mobility standpoint, pt appears to be functioning close to or at mobility baseline, therefore no further immediate skilled PT needs are warranted at this time  Recommend pt return to previous living environment once medically cleared  Will sign off, D/C PT  Please reconsult if further immediate skilled PT needs are warranted        Danni Randolph, PT, DPT

## 2021-06-04 NOTE — ASSESSMENT & PLAN NOTE
· Possibly secondary to starvation ketosis  · Improved with IV fluids  · Nephrology input appreciated

## 2021-06-04 NOTE — ASSESSMENT & PLAN NOTE
· Patient states that she thinks it was secondary to contact with possible poison ivy or other allergen as she was outside and according to her she "rolled down a hill"  · Patient given dose of steroids  · Benadryl as needed

## 2021-06-04 NOTE — NURSING NOTE
Patient left AMA  This RN and hospitalist explained potential risks with leaving  Patient states understanding and still insists on leaving  Patient had no clothes  Given gown, hospital pants and socks upon discharge

## 2021-06-04 NOTE — NURSING NOTE
Patient with complaints of chronic back pain and requiring something to assist her in getting sleep  Physician contacted and order for benadryl received  Patient medicated with both tylenol and benadryl  Will monitor

## 2021-06-07 LAB
ALDOST SERPL-MCNC: 1.7 NG/DL (ref 0–30)
BACTERIA BLD CULT: NORMAL
BACTERIA BLD CULT: NORMAL

## 2021-06-07 NOTE — UTILIZATION REVIEW
Effective 6/1/2021 [facility] is becoming a new legal entity and will have the same TAX ID # as our Graham Valerio which is [phone]  [facility] NPI number will remain the same (NPI: [id number])  Inpatient Admission Authorization Request   NOTIFICATION OF INPATIENT ADMISSION/INPATIENT AUTHORIZATION REQUEST   SERVICING FACILITY:   [address] [place] [address] [address], [address]  Tax ID: [id number]  NPI: [id number]  Place of Service: Inpatient [facility]  [address]  Place of Service Code: 24     ATTENDING PROVIDER:  Attending Name and NPI#: Kwesi Velasquez Md [[id number]]  Address: [facility] [address], [address]  Phone: [phone]     UTILIZATION REVIEW CONTACT:  Rhonda Gillespie, Utilization   Network Utilization Review Department  Phone: [phone]  Fax [phone]  Email: [email or web address]     PHYSICIAN ADVISORY SERVICES:  FOR AYSU-AT-RSSI REVIEW - MEDICAL NECESSITY DENIAL  Phone: [phone]  Fax: [phone]  Email: [email or web address]     TYPE OF REQUEST:  Inpatient Status     ADMISSION INFORMATION:  ADMISSION DATE/TIME: 6/3/21 1526  PATIENT DIAGNOSIS CODE/DESCRIPTION:  Rhabdomyolysis [M62 82]  Hypomagnesemia [E83 42]  Altered mental status [R41 82]  Amphetamine user ([facility] [address] ) [F15 10]  DISCHARGE DATE/TIME: 6/4/2021  1:10 PM  DISCHARGE DISPOSITION (IF DISCHARGED): Left against medical advice or discontinued care     IMPORTANT INFORMATION:  Please contact the Rhonda Gillespie directly with any questions or concerns regarding this request  Department voicemails are confidential     Send requests for admission clinical reviews, concurrent reviews, approvals, and administrative denials due to lack of clinical to fax [phone]

## 2021-06-09 NOTE — UTILIZATION REVIEW
Continued Stay Review    Date: 6-3-21            OBSERVATION CHANGED TO INPATIENT FOR CONTINUED TREATMENT AND EVALUATION OF DELIRIUM               Place in Observation Once     Question: Level of Care Answer: Med Surg    06/01/21 2338     Inpatient Admission Once     Question Answer   Level of Care Med Surg   Estimated length of stay More than 2 Midnights       06/03/21 1526       Current Patient Class: observation  Current Level of Care: med surg    HPI:51 y o  female initially admitted on 6-1 for agitated delirium and rhabdo likely related to drug use  Assessment/Plan: On 6-2  Patient remained confused, somewhat agitated  and lethargic  Nurse unable to obtain lab work due to patient resistance  Psychiatric consult unable to be completed  Continue iv fluids and neuro checks  Patient with poor po intake  Given iv D 50 x2  Monitor with finger sticks q2 hr       6-3  Nephrology consulted  Electrolyte abnormalities resolving  Recommend iv fluids  D% 1/ 2ns  Discontinue iv lactated ringers  Repeat labs  Urine electrolytes pending  Start   Ordered   06/03/21 0928  Protein / creatinine ratio, urine Once     Status: In process    06/03/21 0928   06/03/21 0926  Creatinine, urine, random Once     Status: In process    06/03/21 0928   06/03/21 0926  Sodium, urine, random Once     Status: In process    06/03/21 0928   06/03/21 0922  Aldosterone Once     Status: In process    06/03/21 0928   06/03/21 0922  Cortisol Once     Status:  In process    06/03/21 0928         Vital Signs:       Vitals:    06/02/21 1556 06/02/21 2330 06/03/21 0733 06/03/21 1038   BP: 116/58 114/65 115/67    BP Location: Right arm Right arm Right arm    Pulse: 78 62 67    Resp: 18 16 16    Temp: 97 6 °F (36 4 °C) 98 3 °F (36 8 °C) (!) 97 3 °F (36 3 °C)    TempSrc: Temporal Temporal Temporal    SpO2: 98% 97% 97%    Weight:       Height:    5' 6" (1 676 m)     Date and Time Eye Opening Best Verbal Response Best Motor Response [employer] Coma Scale Score   06/03/21 0700 4 5 6 15   06/03/21 0300 3 5 5 13   06/02/21 2300 3 5 5 13   06/02/21 1956 3 5 5 13   06/02/21 1900 3 5 5 13   06/02/21 1500 3 5 5 13   06/02/21 1100 3 5 5 13   06/02/21 0700 3 5 5 13   06/02/21 0400 3 2 5 10         Pertinent Labs/Diagnostic Results:       Results from last 7 days   Lab Units 06/04/21  0440 06/03/21  0524 06/02/21  1154   WBC Thousand/uL 4 90 5 50 4 90   HEMOGLOBIN g/dL 9 1* 8 9* 8 4*   HEMATOCRIT % 29 6* 28 3* 28 2*   PLATELETS Thousands/uL 253 244 222   NEUTROS ABS Thousands/µL 2 50 3 90 3 10         Results from last 7 days   Lab Units 06/04/21  0440 06/03/21  0524 06/02/21  1947 06/02/21  1101   SODIUM mmol/L 144* 143 142 139   POTASSIUM mmol/L 3 3* 3 9 3 0* 3 8   CHLORIDE mmol/L 110* 111* 110* 112*   CO2 mmol/L 28 26 24 12*   ANION GAP mmol/L 6 6 8 15*   BUN mg/dL 5* 4* 5* 9   CREATININE mg/dL 0 62 0 54* 0 56* 0 49*   EGFR ml/min/1 73sq m 106 111 110 115   CALCIUM mg/dL 8 0* 8 0* 8 1* 7 8*   MAGNESIUM mg/dL 2 0 1 7*  --   --    PHOSPHORUS mg/dL 2 9* 2 7*  --   --      Results from last 7 days   Lab Units 06/04/21  0440 06/03/21  0524 06/02/21  1101   AST U/L 22 29 42*   ALT U/L 13 16 17   ALK PHOS U/L 43 48 51   TOTAL PROTEIN g/dL 5 2* 5 4* 5 9*   ALBUMIN g/dL 3 0* 3 1* 3 4*   TOTAL BILIRUBIN mg/dL 0 50 0 90 1 10*     Results from last 7 days   Lab Units 06/04/21  1126 06/04/21  0524 06/04/21  0015 06/03/21  2129 06/03/21  1613 06/03/21  1050 06/03/21  0851 06/03/21  0604 06/03/21  0405 06/03/21  0159 06/03/21  0001 06/02/21  2138   POC GLUCOSE mg/dl 97 106 183* 105 120 129 125 116 99 109 110 145*     Results from last 7 days   Lab Units 06/04/21  0440 06/03/21  0524 06/02/21  1947 06/02/21  1101   GLUCOSE RANDOM mg/dL 80 100* 92 50*             BETA-HYDROXYBUTYRATE   Date Value Ref Range Status   06/02/2021 4 4 (H) <0 6 mmol/L Final          Results from last 7 days   Lab Units 06/03/21  1033 06/02/21  1430   PH NITESH  7 410* 7 220*   PCO2 NITESH mm Hg  --  45 5 PO2 NITESH mm Hg  --  67 0*   HCO3 NITESH mmol/L  --  17 9*   BASE EXC NITESH mmol/L  --  -9 0   O2 CONTENT NITESH ml/dL  --  10 1   O2 HGB, VENOUS %  --  83 0         Results from last 7 days   Lab Units 06/03/21  0524 06/02/21  1101   CK TOTAL U/L 213* 441*   CK MB INDEX % 3 8 5 2   CK MB ng/mL 8 0* 23 1*           Results from last 7 days   Lab Units 06/02/21  1430   LACTIC ACID mmol/L 0 7                   Results from last 7 days   Lab Units 06/03/21  1156   SODIUM UR  117   CREATININE UR mg/dL 82 4  82 4   PROTEIN UR mg/dL 35   PROT/CREAT RATIO UR  0 42*                                         Scheduled Medications:  No current facility-administered medications for this encounter  Continuous IV Infusions:  No current facility-administered medications for this encounter  PRN Meds:  No current facility-administered medications for this encounter  Discharge Plan: to be dertermined     Network Utilization Review Department  ATTENTION: Please call with any questions or concerns to [phone] and carefully listen to the prompts so that you are directed to the right person  All voicemails are confidential   Uzma Grand all requests for admission clinical reviews, approved or denied determinations and any other requests to dedicated fax number below belonging to the campus where the patient is receiving treatment   List of dedicated fax numbers for the Facilities:  [address] DENIALS (Administrative/Medical Necessity) [phone]   [address] (Maternity/NICU/Pediatrics) [phone]   [address] [address] [facility] Betina Barrios 4415 [address] [address] 489 [facility] [phone]   Chad Burt 37 [address] [place] [address] [phone]

## 2021-06-09 NOTE — UTILIZATION REVIEW
Effective 6/1/2021 [facility] is becoming a new legal entity and will have the same TAX ID # as our [address] which is [id number]  [place]s NPI number will remain the same (NPI: [id number])  Inpatient Admission Authorization Request   NOTIFICATION OF INPATIENT ADMISSION/INPATIENT AUTHORIZATION REQUEST   SERVICING FACILITY:   [facility]  [address], [address], [address]  Tax ID: [id number]  NPI: [id number]  Place of Service: Inpatient [address] [facility]  [address]  Place of Service Code: 24     ATTENDING PROVIDER:  Attending Name and NPI#: Vlad Gomez Md [[id number]]  [facility], [address]  Phone: [phone]     UTILIZATION REVIEW CONTACT:  Stuart Siu, Utilization   Network Utilization Review Department  Phone: [phone]  Fax [phone]  Email: [email or web address]     PHYSICIAN ADVISORY SERVICES:  FOR NQIE-IL-NUKY REVIEW - MEDICAL NECESSITY DENIAL  Phone: [phone]  Fax: [phone]  Email: [email or web address]     TYPE OF REQUEST:  Inpatient Status     ADMISSION INFORMATION:  ADMISSION DATE/TIME: 6/3/21 1526  PATIENT DIAGNOSIS CODE/DESCRIPTION:  Rhabdomyolysis [M62 82]  Hypomagnesemia [E83 42]  Altered mental status [R41 82]  Amphetamine user ([facility] [address] ) [F15 10]  DISCHARGE DATE/TIME: 6/4/2021  1:10 PM  DISCHARGE DISPOSITION (IF DISCHARGED): Left against medical advice or discontinued care     IMPORTANT INFORMATION:  Please contact the Stuart Siu directly with any questions or concerns regarding this request  Department voicemails are confidential     Send requests for admission clinical reviews, concurrent reviews, approvals, and administrative denials due to lack of clinical to fax [phone]

## 2021-06-09 NOTE — UTILIZATION REVIEW
Initial Clinical Review    Admission: Date/Time/Statement:   Admission Orders (From admission, onward)     Ordered        06/03/21 1526  Inpatient Admission  Once         06/01/21 2338  Place in Observation  Once                   Orders Placed This Encounter   Procedures    Place in Observation     Standing Status:   Standing     Number of Occurrences:   1     Order Specific Question:   Level of Care     Answer:   Med Surg [16]    Inpatient Admission     Standing Status:   Standing     Number of Occurrences:   1     Order Specific Question:   Level of Care     Answer:   Med Surg [16]     Order Specific Question:   Estimated length of stay     Answer:   More than 2 Midnights     Order Specific Question:   Certification     Answer:   I certify that inpatient services are medically necessary for this patient for a duration of greater than two midnights  See H&P and MD Progress Notes for additional information about the patient's course of treatment  ED Arrival Information     Expected Arrival Acuity Means of Arrival Escorted By Service Admission Type    - 6/1/2021 16:34 Emergent Ambulance [address] [facility]    Arrival Complaint    -        Chief Complaint   Patient presents with    Altered Mental Status       Initial Presentation: 54 yo female to ED by ems admitted observation d/t  * Delirium  Assessment & Plan  · Placed in observation Medicine  · Obtain neuro checks q 4 hours  · Likely secondary to methamphetamine use and subsequent sedation in the ER to obtain studies  · Patient's vital signs and labs are unrevealing  · CT head was negative     Non-traumatic rhabdomyolysis  Assessment & Plan  · Mild at 694  · Will hydrate with normal saline with 20 mEq of KCl at 125 cc an hour  · Continue monitor with repeat CK in a m    · Consider nephrology evaluation in a m      Hypokalemia  Assessment & Plan  Will hydrate with normal saline with 20 of KCl at 125 cc/hour continue monitor with repeat labs in a m         Anticipated Length of Stay:  Patient will be admitted on an Observation basis with an anticipated length of stay of  < 2 midnights  Justification for Hospital Stay:  Delirium requiring close monitoring, mild rhabdomyolysis requiring IV fluid resuscitation           ED Triage Vitals   Temperature Pulse Respirations Blood Pressure SpO2   06/01/21 1903 06/01/21 1730 06/01/21 1730 06/01/21 1730 06/01/21 1730   98 6 °F (37 °C) 73 17 96/57 97 %      Temp Source Heart Rate Source Patient Position - Orthostatic VS BP Location FiO2 (%)   06/02/21 0005 06/01/21 1730 06/01/21 1730 06/01/21 1730 --   Temporal Monitor Sitting Left arm       Pain Score       06/02/21 0044       No Pain          Wt Readings from Last 1 Encounters:   06/02/21 74 7 kg (164 lb 10 9 oz)     Additional Vital Signs:   06/02/21 0731  97 1 °F (36 2 °C)Abnormal   56  16  110/68  --  99 %  None (Room air)  Lying   06/02/21 0005  96 3 °F (35 7 °C)Abnormal   66  14  105/62  --  97 %  None (Room air)  Lying   06/01/21 2345  --  55  19  109/64  81  97 %  --  --                                                            06/01/21 2230  --  57  21  97/65  76  100 %  --  --   06/01/21 2215  --  59  13  91/62  73  100 %  --  --   06/01/21 2200  --  59  13  94/62   74  100 %  --  --   BP: Dr Tavia Kwan aware   at 06/01/21 2200 06/01/21 2145  --  60  13  110/70  83  100 %  --  --   06/01/21 2130  --  77  24Abnormal   --  --  97 %  --  --   06/01/21 2124  --  74  20  132/93  --  98 %  None (Room air)  Lying   06/01/21 2119  --  88  20  140/74  --  98 %  None (Room air)  Lying   06/01/21 2117  --  72  20  132/71  --  98 %  None (Room air)  Lying   06/01/21 2115  --  --  --  94/52  69  --  --  --   06/01/21 2114  --  96  20  87/54Abnormal    --  99 %  None (Room air)  Lying   BP: sukumar aware at 06/01/21 2114 06/01/21 2100  --  65  17  --  --  100 %  --  --   06/01/21 2045  --  60  15  93/53  66  99 %  --  -- 06/01/21 1903  98 6 °F (37 °C)  --  --  --  --  --  --  --   06/01/21 1900  --  117Abnormal   20  105/65  80  96 %  --  --   06/01/21 1845  --  76  20  107/68  82  --  --  --   06/01/21 1730  --  73  17  96/57  72  97 %  None (Room air)  Sitting       Pertinent Labs/Diagnostic Test Results:       Results from last 7 days   Lab Units 06/04/21  0440 06/03/21  0524 06/02/21  1154   WBC Thousand/uL 4 90 5 50 4 90   HEMOGLOBIN g/dL 9 1* 8 9* 8 4*   HEMATOCRIT % 29 6* 28 3* 28 2*   PLATELETS Thousands/uL 253 244 222   NEUTROS ABS Thousands/µL 2 50 3 90 3 10         Results from last 7 days   Lab Units 06/04/21  0440 06/03/21  0524 06/02/21  1947 06/02/21  1101   SODIUM mmol/L 144* 143 142 139   POTASSIUM mmol/L 3 3* 3 9 3 0* 3 8   CHLORIDE mmol/L 110* 111* 110* 112*   CO2 mmol/L 28 26 24 12*   ANION GAP mmol/L 6 6 8 15*   BUN mg/dL 5* 4* 5* 9   CREATININE mg/dL 0 62 0 54* 0 56* 0 49*   EGFR ml/min/1 73sq m 106 111 110 115   CALCIUM mg/dL 8 0* 8 0* 8 1* 7 8*   MAGNESIUM mg/dL 2 0 1 7*  --   --    PHOSPHORUS mg/dL 2 9* 2 7*  --   --      Results from last 7 days   Lab Units 06/04/21 0440 06/03/21  0524 06/02/21  1101   AST U/L 22 29 42*   ALT U/L 13 16 17   ALK PHOS U/L 43 48 51   TOTAL PROTEIN g/dL 5 2* 5 4* 5 9*   ALBUMIN g/dL 3 0* 3 1* 3 4*   TOTAL BILIRUBIN mg/dL 0 50 0 90 1 10*     Results from last 7 days   Lab Units 06/04/21  1126 06/04/21  0524 06/04/21  0015 06/03/21  2129 06/03/21  1613 06/03/21  1050 06/03/21  0851 06/03/21  0604 06/03/21  0405 06/03/21  0159 06/03/21  0001 06/02/21  2138   POC GLUCOSE mg/dl 97 106 183* 105 120 129 125 116 99 109 110 145*     Results from last 7 days   Lab Units 06/04/21  0440 06/03/21  0524 06/02/21  1947 06/02/21  1101   GLUCOSE RANDOM mg/dL 80 100* 92 50*       Results from last 7 days   Lab Units 06/03/21  0524 06/02/21  1101   CK TOTAL U/L 213* 441*   CK MB INDEX % 3 8 5 2   CK MB ng/mL 8 0* 23 1*           Results from last 7 days   Lab Units 06/02/21  1430 LACTIC ACID mmol/L 0 7             Results from last 7 days   Lab Units 06/03/21  1156   SODIUM UR  117   CREATININE UR mg/dL 82 4  82 4   PROTEIN UR mg/dL 35   PROT/CREAT RATIO UR  0 42*                     6/1 ct chest/abd:No discrete evidence of acute traumatic injury      Focal alveolar opacity in the anterior right apex, could represent atelectasis or infectious or inflammatory pneumonitis        Partially distended bladder  A catheter is seen within the bladder  Small amount of air within the bladder lumen, probably related to recent instrumentation  Consider a superimposed cystitis in the appropriate clinical setting      Status post gastric bypass, no evidence of bowel obstruction      Right-sided nephrolithiasis, no hydronephrosis    6/1 CT spine cervical:No evidence of acute cervical spine injury  6/1 CT head:No acute intracranial abnormality is seen      6/1 CXR:No acute cardiopulmonary disease          ED Treatment:   Medication Administration from 06/01/2021 1634 to 06/02/2021 0012       Date/Time Order Dose Route Action Action by Comments                06/01/2021 1645 sodium chloride 0 9 % bolus 1,000 mL 1,000 mL Intravenous New Bag       06/01/2021 1700 LORazepam (ATIVAN) injection 2 mg 2 mg Intravenous Given       06/01/2021 1746 LORazepam (FOR EMS ONLY) (ATIVAN) 2 mg/mL injection 2 mg 0 mg Does not apply Given to EMS       06/01/2021 1830 OLANZapine (ZyPREXA) IM injection 10 mg 10 mg Intramuscular Given                  06/01/2021 1902 sodium chloride 0 9 % bolus 2,000 mL 2,000 mL Intravenous New Bag                  06/01/2021 2138 magnesium sulfate 2 g/50 mL IVPB (premix) 2 g 2 g Intravenous New Bag                  06/01/2021 2138 sodium chloride 0 9 % bolus 1,000 mL 1,000 mL Intravenous New Bag       06/01/2021 2219 sodium chloride 0 9 % infusion 125 mL/hr Intravenous New Bag       06/01/2021 2115 Ketamine HCl 100 mg 100 mg Intravenous Given          Past Medical History:   Diagnosis Date    Asthma     GERD (gastroesophageal reflux disease)     H/O gastric bypass     Hypokalemia     Methamphetamine abuse ([facility] [address] )     Positive UDS; Feb 2020    Pyelonephritis      Present on Admission:   Delirium   Non-traumatic rhabdomyolysis   Hypokalemia   Elevated beta-hydroxybutyrate   Urine ketones      Admitting Diagnosis: Rhabdomyolysis [M62 82]  Hypomagnesemia [E83 42]  Altered mental status [R41 82]  Amphetamine user ([facility] [address] ) [F15 10]  Age/Sex: 49 y o  female  Admission Orders:    Continuous IV Infusions:  sodium chloride, 125 mL/hr, Intravenous, Continuous  sodium chloride 0 9 % with KCl 20 mEq/L, 125 mL/hr, Intravenous, Continuous      PRN Meds:  acetaminophen, 650 mg, Oral, Q6H PRN  ondansetron, 4 mg, Intravenous, Q6H PRN    Neuro checks q4h  Ambulate  oob  Reg diet  Contact and airborne isolation        Network Utilization Review Department  ATTENTION: Please call with any questions or concerns to [phone] and carefully listen to the prompts so that you are directed to the right person  All voicemails are confidential   Sharon Leal all requests for admission clinical reviews, approved or denied determinations and any other requests to dedicated fax number below belonging to the campus where the patient is receiving treatment   List of dedicated fax numbers for the Facilities:  [address] DENIALS (Administrative/Medical Necessity) [phone]   [address] (Maternity/NICU/Pediatrics) [phone]   [address] [address] [facility] 4645 [address] [address] [place] [address] [place] [address] [phone]

## 2021-06-10 NOTE — UTILIZATION REVIEW
Notification of Discharge   This is a Notification of Discharge from our facility [address]  Please be advised that this patient has been discharge from our facility  Below you will find the admission and discharge date and time including the patients disposition  UTILIZATION REVIEW CONTACT:  Symone Luo  Utilization   Network Utilization Review Department  Phone: [phone] x carefully listen to the prompts  All voicemails are confidential   Email: [email or web address]     PHYSICIAN ADVISORY SERVICES:  FOR [id number] REVIEW - MEDICAL NECESSITY DENIAL  Phone: [phone]  Fax: [phone]  Email: [email or web address]     PRESENTATION DATE: 6/1/2021  4:35 PM  OBERVATION ADMISSION DATE:  INPATIENT ADMISSION DATE: 6/3/21 1526   DISCHARGE DATE: 6/4/2021  1:10 PM  DISPOSITION: Left against medical advice or discontinued care Left against medical advice or discontinued care      IMPORTANT INFORMATION:  Send all requests for admission clinical reviews, approved or denied determinations and any other requests to dedicated fax number below belonging to the campus where the patient is receiving treatment   List of dedicated fax numbers:  [address] [place] [address] DENIALS (Administrative/Medical Necessity) [phone]   [address]  (Maternity/NICU/Pediatrics) [phone]   Gordo Melendez [phone]   [facility] [phone]   Rishi Silver [phone]   [facility] [address] [facility] [phone]   [facility]  [phone]   [address] [facility]  [address] [facility] 1000 [address] [facility] [phone]

## 2021-06-21 DIAGNOSIS — Z12.4 SCREENING FOR CERVICAL CANCER: Primary | ICD-10-CM

## 2021-07-14 ENCOUNTER — CLINICAL SUPPORT (OUTPATIENT)
Dept: URGENT CARE | Facility: CLINIC | Age: 51
End: 2021-07-14
Payer: COMMERCIAL

## 2021-07-14 ENCOUNTER — OFFICE VISIT (OUTPATIENT)
Dept: FAMILY MEDICINE CLINIC | Facility: CLINIC | Age: 51
End: 2021-07-14
Payer: COMMERCIAL

## 2021-07-14 ENCOUNTER — OFFICE VISIT (OUTPATIENT)
Dept: OBGYN CLINIC | Facility: CLINIC | Age: 51
End: 2021-07-14
Payer: COMMERCIAL

## 2021-07-14 ENCOUNTER — LAB (OUTPATIENT)
Dept: LAB | Facility: CLINIC | Age: 51
End: 2021-07-14
Payer: COMMERCIAL

## 2021-07-14 VITALS
WEIGHT: 162.2 LBS | HEIGHT: 66 IN | TEMPERATURE: 97.5 F | BODY MASS INDEX: 26.07 KG/M2 | HEART RATE: 69 BPM | RESPIRATION RATE: 20 BRPM | SYSTOLIC BLOOD PRESSURE: 102 MMHG | DIASTOLIC BLOOD PRESSURE: 62 MMHG | OXYGEN SATURATION: 100 %

## 2021-07-14 VITALS
SYSTOLIC BLOOD PRESSURE: 104 MMHG | WEIGHT: 162.6 LBS | HEIGHT: 66 IN | BODY MASS INDEX: 26.13 KG/M2 | DIASTOLIC BLOOD PRESSURE: 74 MMHG

## 2021-07-14 DIAGNOSIS — Z98.84 HISTORY OF GASTRIC BYPASS: ICD-10-CM

## 2021-07-14 DIAGNOSIS — K21.9 GASTROESOPHAGEAL REFLUX DISEASE, UNSPECIFIED WHETHER ESOPHAGITIS PRESENT: ICD-10-CM

## 2021-07-14 DIAGNOSIS — Z01.419 ROUTINE GYNECOLOGICAL EXAMINATION: Primary | ICD-10-CM

## 2021-07-14 DIAGNOSIS — Z12.4 SCREENING FOR CERVICAL CANCER: ICD-10-CM

## 2021-07-14 DIAGNOSIS — J45.40 MODERATE PERSISTENT ASTHMA WITHOUT COMPLICATION: ICD-10-CM

## 2021-07-14 DIAGNOSIS — Z12.31 ENCOUNTER FOR SCREENING MAMMOGRAM FOR BREAST CANCER: ICD-10-CM

## 2021-07-14 DIAGNOSIS — Z12.11 ENCOUNTER FOR SCREENING COLONOSCOPY: ICD-10-CM

## 2021-07-14 DIAGNOSIS — F41.9 ANXIETY: ICD-10-CM

## 2021-07-14 DIAGNOSIS — F41.9 ANXIETY: Primary | ICD-10-CM

## 2021-07-14 DIAGNOSIS — Z12.4 SCREENING FOR MALIGNANT NEOPLASM OF CERVIX: ICD-10-CM

## 2021-07-14 LAB
25(OH)D3 SERPL-MCNC: 17.6 NG/ML (ref 30–100)
ALBUMIN SERPL BCP-MCNC: 3.4 G/DL (ref 3.5–5)
ALP SERPL-CCNC: 76 U/L (ref 46–116)
ALT SERPL W P-5'-P-CCNC: 21 U/L (ref 12–78)
ANION GAP SERPL CALCULATED.3IONS-SCNC: 5 MMOL/L (ref 4–13)
AST SERPL W P-5'-P-CCNC: 21 U/L (ref 5–45)
ATRIAL RATE: 53 BPM
BASOPHILS # BLD AUTO: 0.04 THOUSANDS/ΜL (ref 0–0.1)
BASOPHILS NFR BLD AUTO: 1 % (ref 0–1)
BILIRUB SERPL-MCNC: 0.49 MG/DL (ref 0.2–1)
BUN SERPL-MCNC: 10 MG/DL (ref 5–25)
CALCIUM ALBUM COR SERPL-MCNC: 9.7 MG/DL (ref 8.3–10.1)
CALCIUM SERPL-MCNC: 9.2 MG/DL (ref 8.3–10.1)
CHLORIDE SERPL-SCNC: 108 MMOL/L (ref 100–108)
CHOLEST SERPL-MCNC: 140 MG/DL (ref 50–200)
CO2 SERPL-SCNC: 27 MMOL/L (ref 21–32)
CREAT SERPL-MCNC: 0.56 MG/DL (ref 0.6–1.3)
EOSINOPHIL # BLD AUTO: 0.08 THOUSAND/ΜL (ref 0–0.61)
EOSINOPHIL NFR BLD AUTO: 2 % (ref 0–6)
ERYTHROCYTE [DISTWIDTH] IN BLOOD BY AUTOMATED COUNT: 20.2 % (ref 11.6–15.1)
GFR SERPL CREATININE-BSD FRML MDRD: 109 ML/MIN/1.73SQ M
GLUCOSE SERPL-MCNC: 88 MG/DL (ref 65–140)
HCT VFR BLD AUTO: 35.6 % (ref 34.8–46.1)
HDLC SERPL-MCNC: 57 MG/DL
HGB BLD-MCNC: 9.8 G/DL (ref 11.5–15.4)
IMM GRANULOCYTES # BLD AUTO: 0.01 THOUSAND/UL (ref 0–0.2)
IMM GRANULOCYTES NFR BLD AUTO: 0 % (ref 0–2)
LDLC SERPL CALC-MCNC: 69 MG/DL (ref 0–100)
LYMPHOCYTES # BLD AUTO: 2.17 THOUSANDS/ΜL (ref 0.6–4.47)
LYMPHOCYTES NFR BLD AUTO: 43 % (ref 14–44)
MAGNESIUM SERPL-MCNC: 2.1 MG/DL (ref 1.6–2.6)
MCH RBC QN AUTO: 20.9 PG (ref 26.8–34.3)
MCHC RBC AUTO-ENTMCNC: 27.5 G/DL (ref 31.4–37.4)
MCV RBC AUTO: 76 FL (ref 82–98)
MONOCYTES # BLD AUTO: 0.29 THOUSAND/ΜL (ref 0.17–1.22)
MONOCYTES NFR BLD AUTO: 6 % (ref 4–12)
NEUTROPHILS # BLD AUTO: 2.45 THOUSANDS/ΜL (ref 1.85–7.62)
NEUTS SEG NFR BLD AUTO: 48 % (ref 43–75)
NRBC BLD AUTO-RTO: 0 /100 WBCS
P AXIS: 46 DEGREES
PLATELET # BLD AUTO: 366 THOUSANDS/UL (ref 149–390)
PMV BLD AUTO: 9.5 FL (ref 8.9–12.7)
POTASSIUM SERPL-SCNC: 4.5 MMOL/L (ref 3.5–5.3)
PR INTERVAL: 130 MS
PROT SERPL-MCNC: 7 G/DL (ref 6.4–8.2)
QRS AXIS: 34 DEGREES
QRSD INTERVAL: 70 MS
QT INTERVAL: 422 MS
QTC INTERVAL: 395 MS
RBC # BLD AUTO: 4.68 MILLION/UL (ref 3.81–5.12)
SODIUM SERPL-SCNC: 140 MMOL/L (ref 136–145)
T WAVE AXIS: 31 DEGREES
TRIGL SERPL-MCNC: 68 MG/DL
TSH SERPL DL<=0.05 MIU/L-ACNC: 0.88 UIU/ML (ref 0.36–3.74)
VENTRICULAR RATE: 53 BPM
VIT B12 SERPL-MCNC: 311 PG/ML (ref 100–900)
WBC # BLD AUTO: 5.04 THOUSAND/UL (ref 4.31–10.16)

## 2021-07-14 PROCEDURE — 83735 ASSAY OF MAGNESIUM: CPT

## 2021-07-14 PROCEDURE — 80061 LIPID PANEL: CPT

## 2021-07-14 PROCEDURE — 0503F POSTPARTUM CARE VISIT: CPT | Performed by: FAMILY MEDICINE

## 2021-07-14 PROCEDURE — 84443 ASSAY THYROID STIM HORMONE: CPT

## 2021-07-14 PROCEDURE — 4004F PT TOBACCO SCREEN RCVD TLK: CPT | Performed by: FAMILY MEDICINE

## 2021-07-14 PROCEDURE — 82306 VITAMIN D 25 HYDROXY: CPT

## 2021-07-14 PROCEDURE — G0124 SCREEN C/V THIN LAYER BY MD: HCPCS | Performed by: PATHOLOGY

## 2021-07-14 PROCEDURE — 36415 COLL VENOUS BLD VENIPUNCTURE: CPT

## 2021-07-14 PROCEDURE — G0476 HPV COMBO ASSAY CA SCREEN: HCPCS | Performed by: ADVANCED PRACTICE MIDWIFE

## 2021-07-14 PROCEDURE — 93005 ELECTROCARDIOGRAM TRACING: CPT

## 2021-07-14 PROCEDURE — 0503F POSTPARTUM CARE VISIT: CPT | Performed by: ADVANCED PRACTICE MIDWIFE

## 2021-07-14 PROCEDURE — 99214 OFFICE O/P EST MOD 30 MIN: CPT | Performed by: FAMILY MEDICINE

## 2021-07-14 PROCEDURE — 80053 COMPREHEN METABOLIC PANEL: CPT

## 2021-07-14 PROCEDURE — 99386 PREV VISIT NEW AGE 40-64: CPT | Performed by: ADVANCED PRACTICE MIDWIFE

## 2021-07-14 PROCEDURE — 3725F SCREEN DEPRESSION PERFORMED: CPT | Performed by: FAMILY MEDICINE

## 2021-07-14 PROCEDURE — 85025 COMPLETE CBC W/AUTO DIFF WBC: CPT

## 2021-07-14 PROCEDURE — 93010 ELECTROCARDIOGRAM REPORT: CPT | Performed by: INTERNAL MEDICINE

## 2021-07-14 PROCEDURE — 3008F BODY MASS INDEX DOCD: CPT | Performed by: FAMILY MEDICINE

## 2021-07-14 PROCEDURE — 82607 VITAMIN B-12: CPT

## 2021-07-14 PROCEDURE — G0145 SCR C/V CYTO,THINLAYER,RESCR: HCPCS | Performed by: PATHOLOGY

## 2021-07-14 RX ORDER — CHOLECALCIFEROL (VITAMIN D3) 1250 MCG
1 CAPSULE ORAL DAILY
Qty: 90 CAPSULE | Refills: 1 | Status: CANCELLED | OUTPATIENT
Start: 2021-07-14

## 2021-07-14 RX ORDER — PANTOPRAZOLE SODIUM 40 MG/1
40 TABLET, DELAYED RELEASE ORAL DAILY
Qty: 90 TABLET | Refills: 0 | Status: SHIPPED | OUTPATIENT
Start: 2021-07-14 | End: 2022-04-01

## 2021-07-14 RX ORDER — ALBUTEROL SULFATE 90 UG/1
2 AEROSOL, METERED RESPIRATORY (INHALATION) EVERY 6 HOURS PRN
Qty: 8 G | Refills: 1 | Status: SHIPPED | OUTPATIENT
Start: 2021-07-14 | End: 2022-04-01 | Stop reason: SDUPTHER

## 2021-07-14 RX ORDER — ESCITALOPRAM OXALATE 10 MG/1
10 TABLET ORAL DAILY
Qty: 30 TABLET | Refills: 5 | Status: SHIPPED | OUTPATIENT
Start: 2021-07-14 | End: 2022-04-01

## 2021-07-14 RX ORDER — HYDROXYZINE 50 MG/1
50 TABLET, FILM COATED ORAL 3 TIMES DAILY PRN
Qty: 30 TABLET | Refills: 0 | Status: SHIPPED | OUTPATIENT
Start: 2021-07-14 | End: 2022-05-26

## 2021-07-14 NOTE — LETTER
July 14, 2021     Patient: Larry Gabriel   YOB: 1970   Date of Visit: 7/14/2021       To Whom it May Concern:    Bruce Pink is under my professional care  She was seen in my office on 7/14/2021  Please allow her dog to move with her to her new housing, for emotional support due to anxiety  If you have any questions or concerns, please don't hesitate to call           Sincerely,          Dany Boyer DO        CC: No Recipients

## 2021-07-14 NOTE — PROGRESS NOTES
[facility] [address] [place] [address]    ASSESSMENT/PLAN: Guillermo Randall is a 49 y o  No obstetric history on file  who presents for annual gynecologic exam     Encounter for routine gynecologic examination  - Routine well woman exam completed today  - Cervical Cancer Screening: Current ASCCP Guidelines reviewed  Last Pap: Not on file 2014  Pap smear done today as part of visit  - HPV Vaccination status: Not immunized  - STI screening offered including HIV: not requested or indicated by hx  - Breast Cancer Screening: Last Mammogram 05/15/2015, script given  - Colorectal cancer screening was ordered  - The following were reviewed in today's visit: breast self exam, mammography screening ordered, menopause, adequate intake of calcium and vitamin D and exercise  - reviewed use of vaginal lubricants and moisturizers    - RTO 1 yr    Additional problems addressed at this visit:  1  Routine gynecological examination  -     Liquid-based pap, screening  -     Mammo screening bilateral w 3d & cad; Future; Expected date: 07/14/2021    2  Screening for malignant neoplasm of cervix  -     Liquid-based pap, screening    3  Screening for cervical cancer  -     Ambulatory referral to Obstetrics / Gynecology    4  Encounter for screening mammogram for breast cancer  -     Mammo screening bilateral w 3d & cad; Future; Expected date: 07/14/2021    5  Encounter for screening colonoscopy  -     Ambulatory referral to Gastroenterology; Future          CC:  Annual Gynecologic Examination    HPI: Guillermo Randall is a 49 y o  No obstetric history on file  who presents for annual gynecologic examination  Marylou presents today for gyn exam  She states that her last pap smear was 2014, denies hx of abnormal pap smears  Last period was 2 yrs ago  Recent Ct scan - unremarkable  Mammogram 2015, does not perform SBE   Has not had colonoscopy- will give referral  States that her anxiety has increased and she is having difficulty sleeping, notes some hot flashes and cold intolerance  Has a limited diet due to gastric surgery, but notes that she eats a lot of cheese  Walks daily  1 serving of tea daily  The following portions of the patient's history were reviewed and updated as appropriate: allergies, current medications, past family history, past medical history, obstetric history, gynecologic history, past social history, past surgical history and problem list     Review of Systems   Constitutional: Negative for activity change, appetite change, fatigue and fever  Respiratory: Negative for cough and shortness of breath  Cardiovascular: Negative for chest pain, palpitations and leg swelling  Gastrointestinal: Negative for constipation and diarrhea  Endocrine: Positive for cold intolerance and heat intolerance  Genitourinary: Negative for difficulty urinating, dysuria, frequency, menstrual problem, urgency, vaginal bleeding, vaginal discharge and vaginal pain  Neurological: Negative for light-headedness and headaches  Psychiatric/Behavioral: The patient is nervous/anxious  Objective:  /74 (BP Location: Right arm, Patient Position: Sitting, Cuff Size: Standard)   Ht 5' 6" (1 676 m)   Wt 73 8 kg (162 lb 9 6 oz)   LMP  (LMP Unknown)   BMI 26 24 kg/m²    Physical Exam  Vitals reviewed  Constitutional:       Appearance: Normal appearance  HENT:      Head: Normocephalic  Neck:      Thyroid: No thyroid mass or thyroid tenderness  Cardiovascular:      Rate and Rhythm: Normal rate and regular rhythm  Heart sounds: Normal heart sounds  Pulmonary:      Effort: Pulmonary effort is normal       Breath sounds: Normal breath sounds  Chest:      Breasts:         Right: No mass, nipple discharge, skin change or tenderness  Left: No mass, nipple discharge, skin change or tenderness  Abdominal:      General: There is no distension  Palpations: There is no mass  Tenderness: There is no abdominal tenderness  There is no guarding  Genitourinary:     General: Normal vulva  Exam position: Lithotomy position  Labia:         Right: No tenderness or lesion  Left: No tenderness or lesion  Vagina: No vaginal discharge, tenderness, bleeding or lesions  Cervix: No discharge, lesion, erythema or cervical bleeding  Uterus: Normal  Not enlarged and not tender  Adnexa:         Right: No mass, tenderness or fullness  Left: No mass, tenderness or fullness  Comments: Mild atrophic area at introitus  Recommend K-Y gel with intercourse  Musculoskeletal:      Cervical back: Normal range of motion  Lymphadenopathy:      Upper Body:      Right upper body: No axillary adenopathy  Left upper body: No axillary adenopathy  Skin:     General: Skin is warm and dry  Neurological:      Mental Status: She is alert     Psychiatric:         Mood and Affect: Mood normal          Behavior: Behavior normal          Judgment: Judgment normal              Elan Greene CNM  [facility]  07/14/21 10:59 AM

## 2021-07-14 NOTE — PROGRESS NOTES
Assessment/Plan:       Problem List Items Addressed This Visit        Digestive    Gastroesophageal reflux disease    Relevant Medications    pantoprazole (PROTONIX) 40 mg tablet       Respiratory    Asthma    Relevant Medications    albuterol (PROVENTIL HFA,VENTOLIN HFA) 90 mcg/act inhaler       Other    History of gastric bypass    Relevant Orders    Vitamin D 25 hydroxy    Lipid Panel with Direct LDL reflex    Vitamin B12      Other Visit Diagnoses     Anxiety    -  Primary    Relevant Medications    escitalopram (LEXAPRO) 10 mg tablet    hydrOXYzine HCL (ATARAX) 50 mg tablet    Other Relevant Orders    CBC and differential    Comprehensive metabolic panel    TSH, 3rd generation with Free T4 reflex    Magnesium    ECG 12 lead            Subjective:      Patient ID: Jin Babb is a 49 y o  female  HPI     Anxiety- Her anxiety is uncontrolled, prior medication but unknown what she took  Her heart races, and then feels like crying, and then next minute she'll be fine  Panic attacks  A bit of depression, brother recently passed away  Her dog is her emotional support  No suicidal thoughts  Anxiety for a long time, but recently spiked, occurs on daily basis, not sleeping, up every hour, wakes up feeling anxious  She needs a letter stating that her dog is her emotional support, she becomes tearful at the thought of having to move without him, big support in her life  Letter provided  Will start Lexapro 10 mg daily with Atarax PRN  Follow-up in 4 weeks  PHQ-9 Depression Screening    PHQ-9:   Frequency of the following problems over the past two weeks:      Little interest or pleasure in doing things: 0 - not at all  Feeling down, depressed, or hopeless: 0 - not at all  PHQ-2 Score: 0       At the end of the visit the patient mentions she continues to have abdominal pain, same as we discussed in May when I referred her to the ED  Occuring for many months intermittently   Advised we can discuss further next visit, any worsening in meantime ED eval  Blood work and EKG ordered  The following portions of the patient's history were reviewed and updated as appropriate: allergies, current medications, past family history, past medical history, past social history, past surgical history and problem list     Review of Systems   Constitutional: Negative for chills and fever  Respiratory: Negative for cough, chest tightness, shortness of breath and wheezing  Cardiovascular: Positive for palpitations  Negative for chest pain  Gastrointestinal: Positive for abdominal pain  Genitourinary: Negative for decreased urine volume, difficulty urinating and dysuria  Skin: Negative for rash  Neurological: Negative for dizziness, light-headedness and headaches  Psychiatric/Behavioral: Positive for dysphoric mood and sleep disturbance  Negative for suicidal ideas  The patient is nervous/anxious  Objective:      /62 (BP Location: Left arm, Patient Position: Sitting, Cuff Size: Standard)   Pulse 69   Temp 97 5 °F (36 4 °C)   Resp 20   Ht 5' 6" (1 676 m)   Wt 73 6 kg (162 lb 3 2 oz)   SpO2 100%   BMI 26 18 kg/m²          Physical Exam  Vitals reviewed  Constitutional:       General: She is not in acute distress  Appearance: Normal appearance  She is not ill-appearing, toxic-appearing or diaphoretic  HENT:      Head: Normocephalic and atraumatic  Eyes:      General:         Right eye: No discharge  Left eye: No discharge  Extraocular Movements: Extraocular movements intact  Conjunctiva/sclera: Conjunctivae normal    Cardiovascular:      Rate and Rhythm: Normal rate and regular rhythm  Heart sounds: Normal heart sounds  No murmur heard  No friction rub  No gallop  Pulmonary:      Effort: Pulmonary effort is normal  No respiratory distress  Breath sounds: Normal breath sounds  No stridor  No wheezing or rhonchi  Abdominal:      Palpations: Abdomen is soft  Tenderness: There is abdominal tenderness  There is no guarding or rebound  Skin:     General: Skin is warm  Coloration: Skin is not pale  Findings: No erythema or rash  Neurological:      Mental Status: She is alert and oriented to person, place, and time  Psychiatric:         Mood and Affect: Mood is anxious  Affect is tearful             Linda Forbes DO  [address] [facility]

## 2021-07-16 LAB
HPV HR 12 DNA CVX QL NAA+PROBE: NEGATIVE
HPV16 DNA CVX QL NAA+PROBE: NEGATIVE
HPV18 DNA CVX QL NAA+PROBE: NEGATIVE

## 2021-07-23 ENCOUNTER — TELEPHONE (OUTPATIENT)
Dept: GASTROENTEROLOGY | Facility: CLINIC | Age: 51
End: 2021-07-23

## 2021-07-23 LAB
LAB AP GYN PRIMARY INTERPRETATION: ABNORMAL
Lab: ABNORMAL
PATH INTERP SPEC-IMP: ABNORMAL

## 2021-07-23 NOTE — TELEPHONE ENCOUNTER
Patient was in our office for an appointment with another specialty and scheduled with gastroenterology  Release of Health Information was faxed to [facility] and attached to this phone note

## 2021-07-27 ENCOUNTER — TELEPHONE (OUTPATIENT)
Dept: FAMILY MEDICINE CLINIC | Facility: CLINIC | Age: 51
End: 2021-07-27

## 2021-11-11 ENCOUNTER — DOCUMENTATION (OUTPATIENT)
Dept: GASTROENTEROLOGY | Facility: CLINIC | Age: 51
End: 2021-11-11

## 2022-02-03 NOTE — ASSESSMENT & PLAN NOTE
Will hydrate with normal saline with 20 of KCl at 125 cc/hour continue monitor with repeat labs in a m  oriented to person, place, time and situation

## 2022-03-14 ENCOUNTER — OFFICE VISIT (OUTPATIENT)
Dept: INTERNAL MEDICINE CLINIC | Facility: CLINIC | Age: 52
End: 2022-03-14
Payer: COMMERCIAL

## 2022-03-14 VITALS
BODY MASS INDEX: 28.8 KG/M2 | DIASTOLIC BLOOD PRESSURE: 76 MMHG | WEIGHT: 179.2 LBS | HEIGHT: 66 IN | TEMPERATURE: 98.4 F | SYSTOLIC BLOOD PRESSURE: 112 MMHG | OXYGEN SATURATION: 95 % | HEART RATE: 72 BPM

## 2022-03-14 DIAGNOSIS — F19.20 DRUG DEPENDENCE (HCC): Primary | ICD-10-CM

## 2022-03-14 DIAGNOSIS — Z79.899 ENCOUNTER FOR MONITORING SUBOXONE MAINTENANCE THERAPY: ICD-10-CM

## 2022-03-14 DIAGNOSIS — Z51.81 ENCOUNTER FOR MONITORING SUBOXONE MAINTENANCE THERAPY: ICD-10-CM

## 2022-03-14 PROBLEM — Z78.0 POST-MENOPAUSAL: Status: ACTIVE | Noted: 2022-03-14

## 2022-03-14 PROCEDURE — 80307 DRUG TEST PRSMV CHEM ANLYZR: CPT | Performed by: INTERNAL MEDICINE

## 2022-03-14 PROCEDURE — 99243 OFF/OP CNSLTJ NEW/EST LOW 30: CPT | Performed by: INTERNAL MEDICINE

## 2022-03-14 RX ORDER — BUPRENORPHINE AND NALOXONE 8; 2 MG/1; MG/1
FILM, SOLUBLE BUCCAL; SUBLINGUAL
Qty: 14 FILM | Refills: 0 | Status: SHIPPED | OUTPATIENT
Start: 2022-03-14 | End: 2022-03-21

## 2022-03-14 RX ORDER — BUPRENORPHINE AND NALOXONE 8; 2 MG/1; MG/1
FILM, SOLUBLE BUCCAL; SUBLINGUAL
Qty: 2 FILM | Refills: 0 | Status: SHIPPED | OUTPATIENT
Start: 2022-03-14 | End: 2022-03-14

## 2022-03-14 RX ORDER — NALOXONE HYDROCHLORIDE 4 MG/.1ML
1 SPRAY NASAL AS NEEDED
Qty: 1 EACH | Refills: 1 | Status: SHIPPED | OUTPATIENT
Start: 2022-03-14 | End: 2022-05-26

## 2022-03-14 NOTE — PROGRESS NOTES
Assessment/Plan:  Problem List Items Addressed This Visit     None      Visit Diagnoses     Drug dependence ([facility] )    -  Primary    Relevant Medications    naloxone (Narcan) 4 mg/0 1 mL nasal spray    buprenorphine-naloxone (Suboxone) 8-2 mg    Other Relevant Orders    Suboxone    Toxicology screen, urine    Encounter for monitoring Suboxone maintenance therapy        Relevant Medications    naloxone (Narcan) 4 mg/0 1 mL nasal spray    buprenorphine-naloxone (Suboxone) 8-2 mg    Other Relevant Orders    Suboxone    Toxicology screen, urine           Diagnoses and all orders for this visit:    Drug dependence ([facility] )  -     Suboxone  -     Toxicology screen, urine  -     naloxone (Narcan) 4 mg/0 1 mL nasal spray; 0 1 mL (4 mg total) into each nostril as needed (respiratory depression or possible OD)  -     buprenorphine-naloxone (Suboxone) 8-2 mg; Return to the office with the med for dosing  Encounter for monitoring Suboxone maintenance therapy  -     Suboxone  -     Toxicology screen, urine  -     naloxone (Narcan) 4 mg/0 1 mL nasal spray; 0 1 mL (4 mg total) into each nostril as needed (respiratory depression or possible OD)  -     buprenorphine-naloxone (Suboxone) 8-2 mg; Return to the office with the med for dosing  Other orders  -     fluticasone-vilanterol (Breo Ellipta) 200-25 MCG/INH inhaler; Inhale 1 puff daily Rinse mouth after use  -     cyanocobalamin (VITAMIN B-12) 500 MCG tablet; Take 500 mcg by mouth daily        No problem-specific Assessment & Plan notes found for this encounter  A/P: Pt RTC with the med and was dosed  After about 15 minutes, started to feel better and no side effects  UDT obtained  Will d/c'd to home on 16mg, films, dose 1/6 and get into counseling  Keep med safe and out of reach of children  RTC one week for f/u  Subjective:      Patient ID: Meena Casarez is a 49 y o  female  WF, pt of Dr Therese Jackson, presents for her initial suboxone visit   Pt reports starting to abuse illicit drugs at age 16 in the form of cocaine and meth nasally  Used of a short period of time and then relapsed around age 22  From that point on, has been nasally and smoking mainly meth  Reached a max of 20bags/day  No IV use or OD's  One DUI and currently not on probation  No counseling  One detox attempt several years ago  Last used about one week ago and in withdraw currently  The following portions of the patient's history were reviewed and updated as appropriate:   She has a past medical history of Asthma, GERD (gastroesophageal reflux disease), H/O gastric bypass, Hypokalemia, Methamphetamine abuse ([facility] ), and Pyelonephritis  ,  does not have any pertinent problems on file  ,   has a past surgical history that includes Dental surgery; [place]-en-y procedure; Tubal ligation; Gastric bypass; Esophageal dilation; Cholecystectomy; Subtotal colectomy; and Tooth extraction  ,  family history includes Arthritis in her mother; Cancer in her brother; Colon cancer in her father; Coronary artery disease in her brother and sister; Diabetes in her mother; Heart attack in her brother; Heart disease in her brother, mother, and sister; Hyperlipidemia in her mother; Hypertension in her mother; Kidney disease in her mother; Lung cancer in her cousin, maternal grandfather, maternal grandmother, paternal grandfather, paternal grandmother, and paternal uncle; Stroke in her mother  ,   reports that she has been smoking cigarettes  She has been smoking about 1 00 pack per day  She has never used smokeless tobacco  She reports previous drug use  Drug: Methamphetamines  She reports that she does not drink alcohol ,  is allergic to morphine, morphine, sulfamethoxazole-trimethoprim, and sulfamethoxazole-trimethoprim     Current Outpatient Medications   Medication Sig Dispense Refill    albuterol (PROVENTIL HFA,VENTOLIN HFA) 90 mcg/act inhaler Inhale 2 puffs every 6 (six) hours as needed for wheezing or shortness of breath 8 g 1  Cholecalciferol (VITAMIN D3) 73438 units CAPS Take 1 capsule by mouth in the morning        cyanocobalamin (VITAMIN B-12) 500 MCG tablet Take 500 mcg by mouth daily      fluticasone-vilanterol (Breo Ellipta) 200-25 MCG/INH inhaler Inhale 1 puff daily Rinse mouth after use   budesonide-formoterol (SYMBICORT) 160-4 5 mcg/act inhaler Inhale 2 puffs 2 (two) times a day Rinse mouth after use  (Patient not taking: Reported on 3/14/2022 ) 10 2 g 1    buprenorphine-naloxone (Suboxone) 8-2 mg Return to the office with the med for dosing  2 Film 0    escitalopram (LEXAPRO) 10 mg tablet Take 1 tablet (10 mg total) by mouth daily 30 tablet 5    hydrOXYzine HCL (ATARAX) 50 mg tablet Take 1 tablet (50 mg total) by mouth 3 (three) times a day as needed for anxiety (Patient not taking: Reported on 3/14/2022 ) 30 tablet 0    naloxone (Narcan) 4 mg/0 1 mL nasal spray 0 1 mL (4 mg total) into each nostril as needed (respiratory depression or possible OD) 1 each 1    pantoprazole (PROTONIX) 40 mg tablet Take 1 tablet (40 mg total) by mouth daily (Patient not taking: Reported on 3/14/2022 ) 90 tablet 0     No current facility-administered medications for this visit  Review of Systems   Constitutional: Positive for chills and fatigue  Negative for activity change, diaphoresis and fever  HENT: Negative  Eyes: Negative for visual disturbance  Respiratory: Positive for chest tightness  Negative for cough, shortness of breath and wheezing  Cardiovascular: Negative for chest pain, palpitations and leg swelling  Gastrointestinal: Positive for abdominal pain and nausea  Negative for constipation, diarrhea and vomiting  Endocrine: Negative for cold intolerance and heat intolerance  Genitourinary: Negative for difficulty urinating, dysuria and frequency  Musculoskeletal: Positive for myalgias  Negative for arthralgias and gait problem  Neurological: Positive for headaches   Negative for dizziness, seizures, syncope, weakness and light-headedness  Psychiatric/Behavioral: Negative for confusion, dysphoric mood, self-injury, sleep disturbance and suicidal ideas  The patient is not nervous/anxious  PHQ-2/9 Depression Screening          Objective:  Vitals:    03/14/22 1154   BP: 112/76   Pulse: 72   Temp: 98 4 °F (36 9 °C)   TempSrc: Tympanic   SpO2: 95%   Weight: 81 3 kg (179 lb 3 2 oz)   Height: 5' 6" (1 676 m)     Body mass index is 28 92 kg/m²  Physical Exam  Vitals and nursing note reviewed  Constitutional:       General: She is not in acute distress  Appearance: Normal appearance  She is ill-appearing  HENT:      Head: Normocephalic and atraumatic  Mouth/Throat:      Mouth: Mucous membranes are moist    Eyes:      Extraocular Movements: Extraocular movements intact  Conjunctiva/sclera: Conjunctivae normal       Pupils: Pupils are equal, round, and reactive to light  Neck:      Vascular: No carotid bruit  Cardiovascular:      Rate and Rhythm: Normal rate and regular rhythm  Heart sounds: Normal heart sounds  Pulmonary:      Effort: Pulmonary effort is normal  No respiratory distress  Breath sounds: Normal breath sounds  No wheezing or rales  Abdominal:      General: Bowel sounds are normal  There is no distension  Palpations: Abdomen is soft  Tenderness: There is no abdominal tenderness  Musculoskeletal:      Cervical back: Neck supple  Neurological:      General: No focal deficit present  Mental Status: She is alert and oriented to person, place, and time  Mental status is at baseline  Cranial Nerves: No cranial nerve deficit  Motor: No weakness  Gait: Gait normal       Deep Tendon Reflexes: Reflexes normal    Psychiatric:         Mood and Affect: Mood normal          Behavior: Behavior normal          Thought Content:  Thought content normal          Judgment: Judgment normal       Comments: anxious

## 2022-03-14 NOTE — PATIENT INSTRUCTIONS
Naloxone (Into the nose)   Naloxone Hydrochloride (nal-OX-one shar-droe-KLOR-toro)  Treats opioid overdose in an emergency situation  Must be given as soon as possible  Brand Name(s): Kloxcameron Narcsophia   There may be other brand names for this medicine  When This Medicine Should Not Be Used: This medicine is not right for everyone  Do not use it if you had an allergic reaction to naloxone  How to Use This Medicine:   Spray  · Your doctor will tell you how much medicine to use  Do not use more than directed  · Your doctor or a pharmacist can tell you where to buy this medicine  It is available without a doctor's order at most major pharmacies  · This medicine must be given to you (the patient) by someone else  Talk with people close to you so they know what to do in case of an emergency  · Read and follow the patient instructions that come with this medicine  Talk to your doctor or pharmacist if you have any questions  · This medicine is for use only in the nose  Do not get any of it in your eyes or on your skin  If it does get on these areas, rinse it off right away  · To use:   ? Each nasal spray contains only one dose of naloxone  Do not prime or test the nasal spray  ? Hold the nasal spray with your thumb on the bottom of the plunger and your first and middle fingers on either side of the nozzle  ? Ebbie Common the patient on his or her back  Support the patient's neck with your hand and let the head tilt back  ? Gently insert the tip of the nozzle into one nostril, until your fingers on either side of the nozzle are against the bottom of the patient's nose  ? Press the plunger firmly to give the dose  Remove the nasal spray from the patient's nose  ? Move the patient on his or her side (recovery position)  Get emergency medical help right away  ? Watch the patient closely  If needed, you may give more doses every 2 to 3 minutes until the patient responds   Use a new nasal spray for each dose and spray the medicine into the other nostril each time  · Store the medicine in a closed container at room temperature, away from heat, moisture, and direct light  Do not freeze or expose to excessive heat  If the spray is frozen and is needed in an emergency, do not wait for the spray to thaw  Get medical help right away  However, the spray may be thawed for 15 minutes in room temperature, and it can still be used if it has been thawed after being frozen before  · Ask your pharmacist about the best way to dispose of medicine that you do not use  Drugs and Foods to Avoid:      Ask your doctor or pharmacist before using any other medicine, including over-the-counter medicines, vitamins, and herbal products  Warnings While Using This Medicine:   · Tell your doctor if you are pregnant or breastfeeding, or if you have heart or blood vessel disease  · This medicine should be given right away after a suspected or known overdose of an opioid (narcotic) medicine  This will help prevent serious breathing problems and severe sleepiness that can lead to death  · The effects of the opioid medicine may last longer than the effects of the naloxone  This means the breathing problems and sleepiness could come back  Always call for emergency help after the first dose of naloxone  · This medicine could cause withdrawal symptoms from the opioid medicine  · Keep all medicine out of the reach of children  Never share your medicine with anyone    Possible Side Effects While Using This Medicine:   Call your doctor right away if you notice any of these side effects:  · Allergic reaction: Itching or hives, swelling in your face or hands, swelling or tingling in your mouth or throat, chest tightness, trouble breathing  · Crying more than usual (in babies)  · Diarrhea, nausea, vomiting, stomach cramps or pain  · Fast, pounding, or uneven heartbeat  · Fever, runny nose, sneezing, sweating, yawning, discomfort in the nose  · Lightheadedness, dizziness, fainting  · Ongoing trouble breathing  · Seizure, shaking, or feeling restless, nervous, or irritable  · Unusual tiredness or weakness  If you notice these less serious side effects, talk with your doctor:   · Headache  · Joint or muscle pain  If you notice other side effects that you think are caused by this medicine, tell your doctor  Call your doctor for medical advice about side effects  You may report side effects to FDA at 7-559-ZGT-0880    © Copyright [employer] 2022 Information is for End User's use only and may not be sold, redistributed or otherwise used for commercial purposes  The above information is an  only  It is not intended as medical advice for individual conditions or treatments  Talk to your doctor, nurse or pharmacist before following any medical regimen to see if it is safe and effective for you  Buprenorphine/Naloxone (Into the mouth)   Buprenorphine (bue-pre-NOR-feen), Naloxone (nal-OX-one)  Treats narcotic dependence  Brand Name(s): Suboxone, Zubsolv   There may be other brand names for this medicine  When This Medicine Should Not Be Used: This medicine is not right for everyone  Do not use it if you had an allergic reaction to buprenorphine or naloxone  How to Use This Medicine: Thin Sheet, Tablet  · Take your medicine as directed  Your dose may need to be changed several times to find what works best for you  · You must let the medicine dissolve  Never swallow the film or tablet  Your body may not absorb enough of the medicine if you swallow it  · Your health caregiver should show you how to use the medicine  If you do not understand, ask for help  It is important to use the medicine correctly  · Do not talk while the medicine is inside your mouth  · Buccal film: Rinse your mouth with water to moisten it  Place the film against the inside of your cheek   If your doctor told you to use more than 1 film, place the second film inside your other cheek  Do not place more than 2 films inside of 1 cheek at a time  Do not move or touch the film  Do not eat or drink anything until the film is completely dissolved  · Sublingual tablet: Place the tablet under your tongue  If your doctor told you to use more than 1 tablet, place all of the tablets in different places under your tongue at the same time  You can use 2 tablets at a time until you have taken all of the medicine, if that is easier for you  Let the tablets dissolve completely in your mouth  Do not eat or drink anything until the tablets are completely dissolved  · Sublingual film: Drink some water to help moisten your mouth  Place the film under your tongue  If your doctor told you to use more than 1 film, place the second film on the opposite side from the first one  Do not move the film after you placed it under your tongue  If you are supposed to use more than 2 films, use them the same way, but do not start until the first 2 films are completely dissolved  Do not eat or drink anything until the film is completely dissolved  · Do not break, crush, chew, or cut the film or tablet  · This medicine should come with a Medication Guide  Ask your pharmacist for a copy if you do not have one  · Missed dose: Take a dose as soon as you remember  If it is almost time for your next dose, wait until then and take a regular dose  Do not take extra medicine to make up for a missed dose  · Store the medicine in a closed container at room temperature, away from heat, moisture, and direct light  Drop off any unused narcotic medicine at a drug take-back location right away  If you do not have a drug take-back location near you, flush any unused narcotic medicine down the toilet  Check your local drug store and clinics for take-back locations  You can also check the [employer] web site for locations   Here is the link to the FDA safe disposal of medicines [employer] com ee  Drugs and Foods to Avoid:   Ask your doctor or pharmacist before using any other medicine, including over-the-counter medicines, vitamins, and herbal products  · Do not use this medicine if you are using or have used an MAO inhibitor within the past 14 days  · Some medicines can affect how buprenorphine/naloxone works  Tell your doctor if you are using the following:   ? Carbamazepine, cyclobenzaprine, erythromycin, ketoconazole, metaxalone, mirtazapine, phenobarbital, phenytoin, rifampin, tramadol, trazodone  ? Diuretic (water pill)  ? Medicine to treat depression or mental health problems (including SNRIs, SSRIs, TCAs)  ? Medicine to treat HIV/AIDS (including atazanavir, delavirdine, efavirenz, etravirine, nevirapine, ritonavir)  ? Phenothiazine medicine  ? Triptan medicine to treat migraine headaches  · Do not drink alcohol while you are using this medicine  · Tell your doctor if you use anything else that makes you sleepy  Some examples are allergy medicine, narcotic pain medicine, and alcohol  Tell your doctor if you are also using butorphanol, nalbuphine, pentazocine, or a muscle relaxer  Warnings While Using This Medicine:   · Tell your doctor if you are pregnant or breastfeeding, or if you have kidney disease, liver disease (including hepatitis), lung or breathing problems (including sleep apnea), adrenal gland problems, an enlarged prostate, trouble urinating, gallbladder problems, thyroid problems, stomach problems, or a history of depression, brain tumor, head injury, or alcohol or drug abuse  · This medicine may cause the following problems:  ? High risk of overdose, which can lead to death  ? Respiratory depression (serious breathing problem that can be life-threatening)  ? Adrenal gland problems  ?  Sleep-related breathing problems (including sleep apnea, sleep-related hypoxemia)  ? Low blood pressure  ? Liver problems  ? Serotonin syndrome, when used with certain medicines  · This medicine may make you dizzy or drowsy  Do not drive or do anything else that could be dangerous until you know how this medicine affects you  Sit or lie down if you feel dizzy  Stand up carefully  · Tell any doctor or dentist who treats you that you are using this medicine  · This medicine can be habit-forming  Do not use more than your prescribed dose  Call your doctor if you think your medicine is not working  · This medicine may cause constipation, especially with long-term use  Ask your doctor if you should use a laxative to prevent and treat constipation  · Do not stop using this medicine suddenly  Your doctor will need to slowly decrease your dose before you stop it completely  · This medicine could cause infertility  Talk with your doctor before using this medicine if you plan to have children  · Your doctor will do lab tests at regular visits to check on the effects of this medicine  Keep all appointments  · Keep all medicine out of the reach of children  Never share your medicine with anyone    Possible Side Effects While Using This Medicine:   Call your doctor right away if you notice any of these side effects:  · Allergic reaction: Itching or hives, swelling in your face or hands, swelling or tingling in your mouth or throat, chest tightness, trouble breathing  · Blue lips, fingernails, or skin, trouble breathing  · Changes in skin color, dark freckles, cold feeling, tiredness, weight loss  · Dark urine or pale stools, nausea, vomiting, loss of appetite, stomach pain, yellow skin or eyes  · Extreme dizziness, drowsiness, or weakness, slow heartbeat, sweating, seizures, cold or clammy skin  · Severe confusion, lightheadedness, dizziness, or fainting  If you notice these less serious side effects, talk with your doctor:   · Constipation, diarrhea, nausea, vomiting, stomach upset  · Headache  · Stuffy or runny nose  If you notice other side effects that you think are caused by this medicine, tell your doctor  Call your doctor for medical advice about side effects  You may report side effects to FDA at 6-718-FDA-1872    © Copyright [employer] 2022 Information is for End User's use only and may not be sold, redistributed or otherwise used for commercial purposes  The above information is an  only  It is not intended as medical advice for individual conditions or treatments  Talk to your doctor, nurse or pharmacist before following any medical regimen to see if it is safe and effective for you

## 2022-03-15 ENCOUNTER — TELEPHONE (OUTPATIENT)
Dept: INTERNAL MEDICINE CLINIC | Facility: CLINIC | Age: 52
End: 2022-03-15

## 2022-03-15 LAB
AMPHETAMINES UR QL SCN: NEGATIVE NG/ML
BARBITURATES UR QL SCN: NEGATIVE NG/ML
BENZODIAZ UR QL: NEGATIVE NG/ML
BUPRENORPHINE UR QL CFM: NEGATIVE NG/ML
BZE UR QL: NEGATIVE NG/ML
CANNABINOIDS UR QL SCN: NEGATIVE NG/ML
METHADONE UR QL SCN: NEGATIVE NG/ML
OPIATES UR QL: NEGATIVE NG/ML
PCP UR QL: NEGATIVE NG/ML
PROPOXYPH UR QL SCN: NEGATIVE NG/ML

## 2022-03-15 NOTE — TELEPHONE ENCOUNTER
Called patient again, the male picked up and told she is not available told me to stop calling and hung up on me

## 2022-03-15 NOTE — TELEPHONE ENCOUNTER
Pt called back she is really sick to her stomach she can not eat or drink anything, she said its worse than withdrawls

## 2022-03-15 NOTE — TELEPHONE ENCOUNTER
Pt is refusing to go the ER, she is refusing to get out of bed, she says the suboxone is to strong for her she refused to take any today

## 2022-03-15 NOTE — TELEPHONE ENCOUNTER
Pt left me a message that is not handling the suboxone well, that something is wrong  I called the number back no answer, I called it again and a male answered and would not let me talk to her and hung up on me  I tired to call the other contact and it is not in service

## 2022-03-21 ENCOUNTER — OFFICE VISIT (OUTPATIENT)
Dept: INTERNAL MEDICINE CLINIC | Facility: CLINIC | Age: 52
End: 2022-03-21
Payer: COMMERCIAL

## 2022-03-21 VITALS
WEIGHT: 181.2 LBS | DIASTOLIC BLOOD PRESSURE: 76 MMHG | TEMPERATURE: 96 F | HEIGHT: 66 IN | OXYGEN SATURATION: 99 % | BODY MASS INDEX: 29.12 KG/M2 | HEART RATE: 72 BPM | SYSTOLIC BLOOD PRESSURE: 115 MMHG

## 2022-03-21 DIAGNOSIS — Z51.81 ENCOUNTER FOR MONITORING SUBOXONE MAINTENANCE THERAPY: ICD-10-CM

## 2022-03-21 DIAGNOSIS — F19.20 DRUG DEPENDENCE (HCC): Primary | ICD-10-CM

## 2022-03-21 DIAGNOSIS — T50.905A ADVERSE EFFECT OF DRUG, INITIAL ENCOUNTER: ICD-10-CM

## 2022-03-21 DIAGNOSIS — Z79.899 ENCOUNTER FOR MONITORING SUBOXONE MAINTENANCE THERAPY: ICD-10-CM

## 2022-03-21 DIAGNOSIS — Z79.899 DRUG THERAPY CONTINUED: ICD-10-CM

## 2022-03-21 PROCEDURE — 99213 OFFICE O/P EST LOW 20 MIN: CPT | Performed by: INTERNAL MEDICINE

## 2022-03-21 NOTE — PROGRESS NOTES
Assessment/Plan:  Problem List Items Addressed This Visit        Other    Drug therapy continued      Other Visit Diagnoses     Drug dependence ([facility] )    -  Primary    Encounter for monitoring Suboxone maintenance therapy        Adverse effect of drug, initial encounter               Diagnoses and all orders for this visit:    Drug dependence ([facility] )    Encounter for monitoring Suboxone maintenance therapy    Drug therapy continued    Adverse effect of drug, initial encounter        No problem-specific Assessment & Plan notes found for this encounter  Scheduled Medication Review:  Pt's scheduled medication use was reviewed by myself/staff via the [employer] website  Pt's use has been found to be appropriate w/o any concerns for misuse by the patient  Pt's current conditions require continued scheduled medication use at this time  Future review for continued appropriate medication use and misuse will continue  A/P: Doing well now and reports the medicine caused her cravings to go away  Doesn't want to try subuxone  Discussed alternatives, including vivitrol  Pt to consider  Highly recommend she get into counseling and NA   RTC as needed if she wants meds  Subjective: WF presents for f/u suboxone  Reports doing poorly the day after starting the med with bad abdominal pain  S/s resolved  Doing well and no c/o's  Not using and no withdraw, but not taking suboxone  Doesn't  attend counseling  UDT not done today  Patient ID: Santino Beavers is a 49 y o  female  HPI    The following portions of the patient's history were reviewed and updated as appropriate:   She has a past medical history of Asthma, GERD (gastroesophageal reflux disease), H/O gastric bypass, Hypokalemia, [facility] ([facility] ), and Pyelonephritis  ,  does not have any pertinent problems on file  ,   has a past surgical history that includes Dental surgery; [place]-en-y procedure;  Tubal ligation; Gastric bypass; Esophageal dilation; Cholecystectomy; Subtotal colectomy; and Tooth extraction  ,  family history includes Arthritis in her mother; Cancer in her brother; Colon cancer in her father; Coronary artery disease in her brother and sister; Diabetes in her mother; Heart attack in her brother; Heart disease in her brother, mother, and sister; Hyperlipidemia in her mother; Hypertension in her mother; Kidney disease in her mother; Lung cancer in her cousin, maternal grandfather, maternal grandmother, paternal grandfather, paternal grandmother, and paternal uncle; Stroke in her mother  ,   reports that she has been smoking cigarettes  She has been smoking about 1 00 pack per day  She has never used smokeless tobacco  She reports current drug use  Drug: Methamphetamines  She reports that she does not drink alcohol ,  is allergic to morphine, morphine, suboxone [buprenorphine-naloxone], sulfamethoxazole-trimethoprim, and sulfamethoxazole-trimethoprim     Current Outpatient Medications   Medication Sig Dispense Refill    albuterol (PROVENTIL HFA,VENTOLIN HFA) 90 mcg/act inhaler Inhale 2 puffs every 6 (six) hours as needed for wheezing or shortness of breath 8 g 1    Cholecalciferol (VITAMIN D3) 53593 units CAPS Take 1 capsule by mouth in the morning        cyanocobalamin (VITAMIN B-12) 500 MCG tablet Take 500 mcg by mouth daily      escitalopram (LEXAPRO) 10 mg tablet Take 1 tablet (10 mg total) by mouth daily 30 tablet 5    fluticasone-vilanterol (Breo Ellipta) 200-25 MCG/INH inhaler Inhale 1 puff daily Rinse mouth after use   naloxone (Narcan) 4 mg/0 1 mL nasal spray 0 1 mL (4 mg total) into each nostril as needed (respiratory depression or possible OD) 1 each 1    budesonide-formoterol (SYMBICORT) 160-4 5 mcg/act inhaler Inhale 2 puffs 2 (two) times a day Rinse mouth after use   (Patient not taking: Reported on 3/14/2022 ) 10 2 g 1    hydrOXYzine HCL (ATARAX) 50 mg tablet Take 1 tablet (50 mg total) by mouth 3 (three) times a day as needed for anxiety (Patient not taking: Reported on 3/14/2022 ) 30 tablet 0    pantoprazole (PROTONIX) 40 mg tablet Take 1 tablet (40 mg total) by mouth daily (Patient not taking: Reported on 3/14/2022 ) 90 tablet 0     No current facility-administered medications for this visit  Review of Systems   Constitutional: Negative for activity change, chills, diaphoresis, fatigue and fever  Respiratory: Negative for cough, chest tightness, shortness of breath and wheezing  Cardiovascular: Negative for chest pain, palpitations and leg swelling  Gastrointestinal: Negative for abdominal pain, constipation, diarrhea, nausea and vomiting  Genitourinary: Negative for difficulty urinating, dysuria and frequency  Musculoskeletal: Negative for arthralgias, gait problem and myalgias  Neurological: Negative for dizziness, seizures, syncope, weakness, light-headedness and headaches  Psychiatric/Behavioral: Negative for confusion, dysphoric mood, self-injury, sleep disturbance and suicidal ideas  The patient is not nervous/anxious  PHQ-2/9 Depression Screening          Objective:  Vitals:    03/21/22 0835   BP: 115/76   Pulse: 72   Temp: (!) 96 °F (35 6 °C)   SpO2: 99%   Weight: 82 2 kg (181 lb 3 2 oz)   Height: 5' 6" (1 676 m)     Body mass index is 29 25 kg/m²  Physical Exam  Vitals and nursing note reviewed  Constitutional:       General: She is not in acute distress  Appearance: Normal appearance  She is not ill-appearing  HENT:      Head: Normocephalic and atraumatic  Mouth/Throat:      Mouth: Mucous membranes are moist    Eyes:      Extraocular Movements: Extraocular movements intact  Conjunctiva/sclera: Conjunctivae normal       Pupils: Pupils are equal, round, and reactive to light  Cardiovascular:      Rate and Rhythm: Normal rate and regular rhythm  Heart sounds: Normal heart sounds  Pulmonary:      Effort: Pulmonary effort is normal  No respiratory distress        Breath sounds: Normal breath sounds  No wheezing or rales  Abdominal:      General: Bowel sounds are normal  There is no distension  Palpations: Abdomen is soft  Tenderness: There is no abdominal tenderness  Neurological:      General: No focal deficit present  Mental Status: She is alert and oriented to person, place, and time  Mental status is at baseline  Psychiatric:         Mood and Affect: Mood normal          Behavior: Behavior normal          Thought Content:  Thought content normal          Judgment: Judgment normal

## 2022-03-21 NOTE — PATIENT INSTRUCTIONS
Buprenorphine/Naloxone (Into the mouth)   Buprenorphine (bue-pre-NOR-feen), Naloxone (nal-OX-one)  Treats narcotic dependence  Brand Name(s): Suboxone, Zubsolv   There may be other brand names for this medicine  When This Medicine Should Not Be Used: This medicine is not right for everyone  Do not use it if you had an allergic reaction to buprenorphine or naloxone  How to Use This Medicine: Thin Sheet, Tablet  · Take your medicine as directed  Your dose may need to be changed several times to find what works best for you  · You must let the medicine dissolve  Never swallow the film or tablet  Your body may not absorb enough of the medicine if you swallow it  · Your health caregiver should show you how to use the medicine  If you do not understand, ask for help  It is important to use the medicine correctly  · Do not talk while the medicine is inside your mouth  · Buccal film: Rinse your mouth with water to moisten it  Place the film against the inside of your cheek  If your doctor told you to use more than 1 film, place the second film inside your other cheek  Do not place more than 2 films inside of 1 cheek at a time  Do not move or touch the film  Do not eat or drink anything until the film is completely dissolved  · Sublingual tablet: Place the tablet under your tongue  If your doctor told you to use more than 1 tablet, place all of the tablets in different places under your tongue at the same time  You can use 2 tablets at a time until you have taken all of the medicine, if that is easier for you  Let the tablets dissolve completely in your mouth  Do not eat or drink anything until the tablets are completely dissolved  · Sublingual film: Drink some water to help moisten your mouth  Place the film under your tongue  If your doctor told you to use more than 1 film, place the second film on the opposite side from the first one  Do not move the film after you placed it under your tongue   If you are supposed to use more than 2 films, use them the same way, but do not start until the first 2 films are completely dissolved  Do not eat or drink anything until the film is completely dissolved  · Do not break, crush, chew, or cut the film or tablet  · This medicine should come with a Medication Guide  Ask your pharmacist for a copy if you do not have one  · Missed dose: Take a dose as soon as you remember  If it is almost time for your next dose, wait until then and take a regular dose  Do not take extra medicine to make up for a missed dose  · Store the medicine in a closed container at room temperature, away from heat, moisture, and direct light  Drop off any unused narcotic medicine at a drug take-back location right away  If you do not have a drug take-back location near you, flush any unused narcotic medicine down the toilet  Check your local drug store and clinics for take-back locations  You can also check the [employer] web site for locations  Here is the link to the [employer] safe disposal of medicines [employer] com ee  Drugs and Foods to Avoid:   Ask your doctor or pharmacist before using any other medicine, including over-the-counter medicines, vitamins, and herbal products  · Do not use this medicine if you are using or have used an MAO inhibitor within the past 14 days  · Some medicines can affect how buprenorphine/naloxone works  Tell your doctor if you are using the following:   ? Carbamazepine, cyclobenzaprine, erythromycin, ketoconazole, metaxalone, mirtazapine, phenobarbital, phenytoin, rifampin, tramadol, trazodone  ? Diuretic (water pill)  ? Medicine to treat depression or mental health problems (including SNRIs, SSRIs, TCAs)  ? Medicine to treat HIV/AIDS (including atazanavir, delavirdine, efavirenz, etravirine, nevirapine, ritonavir)  ? Phenothiazine medicine  ?  Triptan medicine to treat migraine headaches  · Do not drink alcohol while you are using this medicine  · Tell your doctor if you use anything else that makes you sleepy  Some examples are allergy medicine, narcotic pain medicine, and alcohol  Tell your doctor if you are also using butorphanol, nalbuphine, pentazocine, or a muscle relaxer  Warnings While Using This Medicine:   · Tell your doctor if you are pregnant or breastfeeding, or if you have kidney disease, liver disease (including hepatitis), lung or breathing problems (including sleep apnea), adrenal gland problems, an enlarged prostate, trouble urinating, gallbladder problems, thyroid problems, stomach problems, or a history of depression, brain tumor, head injury, or alcohol or drug abuse  · This medicine may cause the following problems:  ? High risk of overdose, which can lead to death  ? Respiratory depression (serious breathing problem that can be life-threatening)  ? Adrenal gland problems  ? Sleep-related breathing problems (including sleep apnea, sleep-related hypoxemia)  ? Low blood pressure  ? Liver problems  ? Serotonin syndrome, when used with certain medicines  · This medicine may make you dizzy or drowsy  Do not drive or do anything else that could be dangerous until you know how this medicine affects you  Sit or lie down if you feel dizzy  Stand up carefully  · Tell any doctor or dentist who treats you that you are using this medicine  · This medicine can be habit-forming  Do not use more than your prescribed dose  Call your doctor if you think your medicine is not working  · This medicine may cause constipation, especially with long-term use  Ask your doctor if you should use a laxative to prevent and treat constipation  · Do not stop using this medicine suddenly  Your doctor will need to slowly decrease your dose before you stop it completely  · This medicine could cause infertility   Talk with your doctor before using this medicine if you plan to have children  · Your doctor will do lab tests at regular visits to check on the effects of this medicine  Keep all appointments  · Keep all medicine out of the reach of children  Never share your medicine with anyone  Possible Side Effects While Using This Medicine:   Call your doctor right away if you notice any of these side effects:  · Allergic reaction: Itching or hives, swelling in your face or hands, swelling or tingling in your mouth or throat, chest tightness, trouble breathing  · Blue lips, fingernails, or skin, trouble breathing  · Changes in skin color, dark freckles, cold feeling, tiredness, weight loss  · Dark urine or pale stools, nausea, vomiting, loss of appetite, stomach pain, yellow skin or eyes  · Extreme dizziness, drowsiness, or weakness, slow heartbeat, sweating, seizures, cold or clammy skin  · Severe confusion, lightheadedness, dizziness, or fainting  If you notice these less serious side effects, talk with your doctor:   · Constipation, diarrhea, nausea, vomiting, stomach upset  · Headache  · Stuffy or runny nose  If you notice other side effects that you think are caused by this medicine, tell your doctor  Call your doctor for medical advice about side effects  You may report side effects to FDA at 1-398-FDA-6111    © Copyright [employer] 2022 Information is for End User's use only and may not be sold, redistributed or otherwise used for commercial purposes  The above information is an  only  It is not intended as medical advice for individual conditions or treatments  Talk to your doctor, nurse or pharmacist before following any medical regimen to see if it is safe and effective for you

## 2022-04-01 ENCOUNTER — OFFICE VISIT (OUTPATIENT)
Dept: FAMILY MEDICINE CLINIC | Facility: CLINIC | Age: 52
End: 2022-04-01
Payer: COMMERCIAL

## 2022-04-01 VITALS
HEART RATE: 72 BPM | OXYGEN SATURATION: 98 % | WEIGHT: 177 LBS | SYSTOLIC BLOOD PRESSURE: 126 MMHG | DIASTOLIC BLOOD PRESSURE: 60 MMHG | TEMPERATURE: 98.2 F | HEIGHT: 66 IN | BODY MASS INDEX: 28.45 KG/M2

## 2022-04-01 DIAGNOSIS — F41.9 ANXIETY: ICD-10-CM

## 2022-04-01 DIAGNOSIS — J45.40 MODERATE PERSISTENT ASTHMA WITHOUT COMPLICATION: ICD-10-CM

## 2022-04-01 DIAGNOSIS — Z98.84 HISTORY OF GASTRIC BYPASS: Primary | ICD-10-CM

## 2022-04-01 DIAGNOSIS — E55.9 VITAMIN D DEFICIENCY: ICD-10-CM

## 2022-04-01 PROCEDURE — 99214 OFFICE O/P EST MOD 30 MIN: CPT | Performed by: NURSE PRACTITIONER

## 2022-04-01 RX ORDER — CHOLECALCIFEROL (VITAMIN D3) 1250 MCG
1 CAPSULE ORAL DAILY
Status: CANCELLED | OUTPATIENT
Start: 2022-04-01

## 2022-04-01 RX ORDER — ALBUTEROL SULFATE 90 UG/1
2 AEROSOL, METERED RESPIRATORY (INHALATION) EVERY 6 HOURS PRN
Qty: 8 G | Refills: 1 | Status: SHIPPED | OUTPATIENT
Start: 2022-04-01 | End: 2022-04-01

## 2022-04-01 RX ORDER — SERTRALINE HYDROCHLORIDE 25 MG/1
25 TABLET, FILM COATED ORAL DAILY
Qty: 30 TABLET | Refills: 1 | Status: SHIPPED | OUTPATIENT
Start: 2022-04-01 | End: 2022-05-04 | Stop reason: SDUPTHER

## 2022-04-01 RX ORDER — SERTRALINE HYDROCHLORIDE 25 MG/1
12.5 TABLET, FILM COATED ORAL DAILY
Qty: 45 TABLET | Refills: 3 | Status: SHIPPED | OUTPATIENT
Start: 2022-04-01 | End: 2022-04-01

## 2022-04-01 RX ORDER — ALBUTEROL SULFATE 90 UG/1
2 AEROSOL, METERED RESPIRATORY (INHALATION) EVERY 6 HOURS PRN
Qty: 8 G | Refills: 1 | Status: SHIPPED | OUTPATIENT
Start: 2022-04-01
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
## 2022-04-02 ENCOUNTER — APPOINTMENT (OUTPATIENT)
Dept: LAB | Facility: CLINIC | Age: 52
End: 2022-04-02
Payer: COMMERCIAL

## 2022-04-02 DIAGNOSIS — E55.9 VITAMIN D DEFICIENCY: ICD-10-CM

## 2022-04-02 DIAGNOSIS — Z98.84 HISTORY OF GASTRIC BYPASS: ICD-10-CM

## 2022-04-02 DIAGNOSIS — F41.9 ANXIETY: ICD-10-CM

## 2022-04-02 LAB
25(OH)D3 SERPL-MCNC: 10.3 NG/ML (ref 30–100)
ALBUMIN SERPL BCP-MCNC: 3.9 G/DL (ref 3.5–5)
ALP SERPL-CCNC: 79 U/L (ref 46–116)
ALT SERPL W P-5'-P-CCNC: 24 U/L (ref 12–78)
ANION GAP SERPL CALCULATED.3IONS-SCNC: 3 MMOL/L (ref 4–13)
AST SERPL W P-5'-P-CCNC: 19 U/L (ref 5–45)
BASOPHILS # BLD AUTO: 0.06 THOUSANDS/ΜL (ref 0–0.1)
BASOPHILS NFR BLD AUTO: 1 % (ref 0–1)
BILIRUB SERPL-MCNC: 1.15 MG/DL (ref 0.2–1)
BUN SERPL-MCNC: 15 MG/DL (ref 5–25)
CALCIUM SERPL-MCNC: 9.2 MG/DL (ref 8.3–10.1)
CHLORIDE SERPL-SCNC: 110 MMOL/L (ref 100–108)
CO2 SERPL-SCNC: 28 MMOL/L (ref 21–32)
CREAT SERPL-MCNC: 0.78 MG/DL (ref 0.6–1.3)
EOSINOPHIL # BLD AUTO: 0.07 THOUSAND/ΜL (ref 0–0.61)
EOSINOPHIL NFR BLD AUTO: 1 % (ref 0–6)
ERYTHROCYTE [DISTWIDTH] IN BLOOD BY AUTOMATED COUNT: 18.6 % (ref 11.6–15.1)
GFR SERPL CREATININE-BSD FRML MDRD: 88 ML/MIN/1.73SQ M
GLUCOSE P FAST SERPL-MCNC: 85 MG/DL (ref 65–99)
HCT VFR BLD AUTO: 38.4 % (ref 34.8–46.1)
HGB BLD-MCNC: 11.5 G/DL (ref 11.5–15.4)
IMM GRANULOCYTES # BLD AUTO: 0.01 THOUSAND/UL (ref 0–0.2)
IMM GRANULOCYTES NFR BLD AUTO: 0 % (ref 0–2)
LYMPHOCYTES # BLD AUTO: 2.14 THOUSANDS/ΜL (ref 0.6–4.47)
LYMPHOCYTES NFR BLD AUTO: 38 % (ref 14–44)
MCH RBC QN AUTO: 24.3 PG (ref 26.8–34.3)
MCHC RBC AUTO-ENTMCNC: 29.9 G/DL (ref 31.4–37.4)
MCV RBC AUTO: 81 FL (ref 82–98)
MONOCYTES # BLD AUTO: 0.31 THOUSAND/ΜL (ref 0.17–1.22)
MONOCYTES NFR BLD AUTO: 6 % (ref 4–12)
NEUTROPHILS # BLD AUTO: 2.99 THOUSANDS/ΜL (ref 1.85–7.62)
NEUTS SEG NFR BLD AUTO: 54 % (ref 43–75)
NRBC BLD AUTO-RTO: 0 /100 WBCS
PLATELET # BLD AUTO: 354 THOUSANDS/UL (ref 149–390)
PMV BLD AUTO: 9.4 FL (ref 8.9–12.7)
POTASSIUM SERPL-SCNC: 3.9 MMOL/L (ref 3.5–5.3)
PROT SERPL-MCNC: 7.4 G/DL (ref 6.4–8.2)
RBC # BLD AUTO: 4.74 MILLION/UL (ref 3.81–5.12)
SODIUM SERPL-SCNC: 141 MMOL/L (ref 136–145)
TSH SERPL DL<=0.05 MIU/L-ACNC: 1.45 UIU/ML (ref 0.36–3.74)
VIT B12 SERPL-MCNC: 998 PG/ML (ref 100–900)
WBC # BLD AUTO: 5.58 THOUSAND/UL (ref 4.31–10.16)

## 2022-04-02 PROCEDURE — 82306 VITAMIN D 25 HYDROXY: CPT

## 2022-04-02 PROCEDURE — 36415 COLL VENOUS BLD VENIPUNCTURE: CPT

## 2022-04-02 PROCEDURE — 85025 COMPLETE CBC W/AUTO DIFF WBC: CPT

## 2022-04-02 PROCEDURE — 82607 VITAMIN B-12: CPT

## 2022-04-02 PROCEDURE — 84443 ASSAY THYROID STIM HORMONE: CPT

## 2022-04-02 PROCEDURE — 80053 COMPREHEN METABOLIC PANEL: CPT

## 2022-04-04 ENCOUNTER — TELEPHONE (OUTPATIENT)
Dept: FAMILY MEDICINE CLINIC | Facility: CLINIC | Age: 52
End: 2022-04-04

## 2022-04-04 NOTE — TELEPHONE ENCOUNTER
Prior auth started for patients Ny ellipta inhaler  These are the inhalers they are covering  Prior auth completed Key: [id number]          Advair -21 Mcg/Actuation HFAA Not Required   Anoro Ellipta Not Required   Bevespi Aerosphere Not Required   Combivent Respimat Not Required   Dulera 200-5 Mcg/Actuation HFAA Not Required   Flovent  Mcg/Actuation HFAA Not Required   Fluticasone Propion-Salmeterol 250-50 Mcg/Dose Dsdv Not Required   Spiriva Respimat 1 25 Mcg/Actuation Mist Not Required   Spiriva Respimat 2 5 Mcg/Actuation Mist Not Required   Spiriva With HandiHaler Not Required   Stiolto Respimat Not Required   Symbicort 160-4 5 Mcg/Actuation [facility] Not Required   Trelegy Ellipta 100-62 5-25 Mcg Dsdv

## 2022-04-05 ENCOUNTER — TELEPHONE (OUTPATIENT)
Dept: FAMILY MEDICINE CLINIC | Facility: CLINIC | Age: 52
End: 2022-04-05

## 2022-04-05 DIAGNOSIS — E55.9 VITAMIN D DEFICIENCY: Primary | ICD-10-CM

## 2022-04-05 RX ORDER — ERGOCALCIFEROL 1.25 MG/1
50000 CAPSULE ORAL WEEKLY
Qty: 12 CAPSULE | Refills: 2 | Status: SHIPPED | OUTPATIENT
Start: 2022-04-05 | End: 2022-04-06 | Stop reason: SDUPTHER

## 2022-04-05 NOTE — TELEPHONE ENCOUNTER
Called and spoke with patient  Informed her of results and providers recommendations  Patient verbalized understanding  Patient wondering what could be causing her vitamin D to be so low  Please advise

## 2022-04-05 NOTE — TELEPHONE ENCOUNTER
Please let her know kidney function decreased a little compared to July but still normal range, liver enzymes normal  No anemia, vitamin D is very low at 10  I have sent high-dose D2 to her pharmacy  Take weekly for 3-6 months

## 2022-04-06 DIAGNOSIS — E55.9 VITAMIN D DEFICIENCY: ICD-10-CM

## 2022-04-06 RX ORDER — ERGOCALCIFEROL 1.25 MG/1
50000 CAPSULE ORAL WEEKLY
Qty: 12 CAPSULE | Refills: 2 | Status: SHIPPED | OUTPATIENT
Start: 2022-04-06

## 2022-04-06 NOTE — TELEPHONE ENCOUNTER
Patient would like this sent to [facility] [place] instead  She is waiting at pharmacy for it

## 2022-04-06 NOTE — TELEPHONE ENCOUNTER
Patient stopped in and her medication was sent to [facility] and it was supposed to go to [facility] patient is waiting can this please be switched   For Her Vitamin D

## 2022-05-04 ENCOUNTER — HOSPITAL ENCOUNTER (OUTPATIENT)
Dept: NON INVASIVE DIAGNOSTICS | Facility: HOSPITAL | Age: 52
Discharge: HOME | End: 2022-05-04
Payer: COMMERCIAL

## 2022-05-04 ENCOUNTER — TELEPHONE (OUTPATIENT)
Dept: FAMILY MEDICINE CLINIC | Facility: CLINIC | Age: 52
End: 2022-05-04

## 2022-05-04 ENCOUNTER — OFFICE VISIT (OUTPATIENT)
Dept: FAMILY MEDICINE CLINIC | Facility: CLINIC | Age: 52
End: 2022-05-04
Payer: COMMERCIAL

## 2022-05-04 ENCOUNTER — HOSPITAL ENCOUNTER (OUTPATIENT)
Dept: RADIOLOGY | Facility: HOSPITAL | Age: 52
Discharge: HOME | End: 2022-05-04
Payer: COMMERCIAL

## 2022-05-04 VITALS
HEART RATE: 84 BPM | SYSTOLIC BLOOD PRESSURE: 118 MMHG | HEIGHT: 66 IN | TEMPERATURE: 96.9 F | DIASTOLIC BLOOD PRESSURE: 70 MMHG | RESPIRATION RATE: 16 BRPM | BODY MASS INDEX: 30.18 KG/M2 | WEIGHT: 187.8 LBS | OXYGEN SATURATION: 99 %

## 2022-05-04 DIAGNOSIS — M79.605 LEFT LEG PAIN: ICD-10-CM

## 2022-05-04 DIAGNOSIS — M79.89 LEFT LEG SWELLING: ICD-10-CM

## 2022-05-04 DIAGNOSIS — G89.29 CHRONIC BILATERAL LOW BACK PAIN WITH LEFT-SIDED SCIATICA: ICD-10-CM

## 2022-05-04 DIAGNOSIS — R06.00 DOE (DYSPNEA ON EXERTION): ICD-10-CM

## 2022-05-04 DIAGNOSIS — Z98.84 HISTORY OF GASTRIC BYPASS: ICD-10-CM

## 2022-05-04 DIAGNOSIS — Z12.11 SCREENING FOR COLON CANCER: ICD-10-CM

## 2022-05-04 DIAGNOSIS — M54.42 CHRONIC BILATERAL LOW BACK PAIN WITH LEFT-SIDED SCIATICA: ICD-10-CM

## 2022-05-04 DIAGNOSIS — R06.81 APNEA: ICD-10-CM

## 2022-05-04 DIAGNOSIS — J45.40 MODERATE PERSISTENT ASTHMA WITHOUT COMPLICATION: ICD-10-CM

## 2022-05-04 DIAGNOSIS — Z72.0 TOBACCO USE: ICD-10-CM

## 2022-05-04 DIAGNOSIS — F41.8 DEPRESSION WITH ANXIETY: ICD-10-CM

## 2022-05-04 DIAGNOSIS — B37.2 CANDIDAL INTERTRIGO: ICD-10-CM

## 2022-05-04 DIAGNOSIS — B37.3 VAGINAL CANDIDIASIS: ICD-10-CM

## 2022-05-04 DIAGNOSIS — F41.9 ANXIETY: Primary | ICD-10-CM

## 2022-05-04 PROCEDURE — 93971 EXTREMITY STUDY: CPT

## 2022-05-04 PROCEDURE — 72110 X-RAY EXAM L-2 SPINE 4/>VWS: CPT

## 2022-05-04 PROCEDURE — 99215 OFFICE O/P EST HI 40 MIN: CPT | Performed by: FAMILY MEDICINE

## 2022-05-04 PROCEDURE — 87510 GARDNER VAG DNA DIR PROBE: CPT | Performed by: FAMILY MEDICINE

## 2022-05-04 PROCEDURE — 87660 TRICHOMONAS VAGIN DIR PROBE: CPT | Performed by: FAMILY MEDICINE

## 2022-05-04 PROCEDURE — 87480 CANDIDA DNA DIR PROBE: CPT | Performed by: FAMILY MEDICINE

## 2022-05-04 PROCEDURE — 93971 EXTREMITY STUDY: CPT | Performed by: SURGERY

## 2022-05-04 RX ORDER — SERTRALINE HYDROCHLORIDE 100 MG/1
100 TABLET, FILM COATED ORAL DAILY
Qty: 30 TABLET | Refills: 1 | Status: SHIPPED | OUTPATIENT
Start: 2022-05-04 | End: 2022-05-26 | Stop reason: SDUPTHER

## 2022-05-04 RX ORDER — NYSTATIN 100000 U/G
CREAM TOPICAL 2 TIMES DAILY
Qty: 30 G | Refills: 0 | Status: SHIPPED | OUTPATIENT
Start: 2022-05-04 | End: 2022-07-22

## 2022-05-04 RX ORDER — NICOTINE 21 MG/24HR
1 PATCH, TRANSDERMAL 24 HOURS TRANSDERMAL EVERY 24 HOURS
Qty: 28 PATCH | Refills: 3 | Status: SHIPPED | OUTPATIENT
Start: 2022-05-04 | End: 2022-07-22 | Stop reason: ALTCHOICE

## 2022-05-04 RX ORDER — FLUCONAZOLE 150 MG/1
150 TABLET ORAL ONCE
Qty: 1 TABLET | Refills: 0 | Status: SHIPPED | OUTPATIENT
Start: 2022-05-04 | End: 2022-05-04
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
## 2022-05-05 LAB
CANDIDA RRNA VAG QL PROBE: NEGATIVE
G VAGINALIS RRNA GENITAL QL PROBE: POSITIVE
T VAGINALIS RRNA GENITAL QL PROBE: NEGATIVE

## 2022-05-09 DIAGNOSIS — B96.89 BV (BACTERIAL VAGINOSIS): Primary | ICD-10-CM

## 2022-05-09 DIAGNOSIS — N76.0 BV (BACTERIAL VAGINOSIS): Primary | ICD-10-CM

## 2022-05-09 RX ORDER — METRONIDAZOLE 500 MG/1
500 TABLET ORAL EVERY 12 HOURS SCHEDULED
Qty: 14 TABLET | Refills: 0 | Status: SHIPPED | OUTPATIENT
Start: 2022-05-09 | End: 2022-05-16

## 2022-05-24 ENCOUNTER — HOSPITAL ENCOUNTER (OUTPATIENT)
Dept: PULMONOLOGY | Facility: HOSPITAL | Age: 52
Discharge: HOME | End: 2022-05-24
Payer: COMMERCIAL

## 2022-05-24 DIAGNOSIS — Z72.0 TOBACCO USE: ICD-10-CM

## 2022-05-24 DIAGNOSIS — R06.00 DOE (DYSPNEA ON EXERTION): ICD-10-CM

## 2022-05-24 DIAGNOSIS — J45.40 MODERATE PERSISTENT ASTHMA WITHOUT COMPLICATION: ICD-10-CM

## 2022-05-24 PROCEDURE — 94729 DIFFUSING CAPACITY: CPT

## 2022-05-24 PROCEDURE — 94726 PLETHYSMOGRAPHY LUNG VOLUMES: CPT

## 2022-05-24 PROCEDURE — 94729 DIFFUSING CAPACITY: CPT | Performed by: INTERNAL MEDICINE

## 2022-05-24 PROCEDURE — 94760 N-INVAS EAR/PLS OXIMETRY 1: CPT

## 2022-05-24 PROCEDURE — 94060 EVALUATION OF WHEEZING: CPT

## 2022-05-24 PROCEDURE — 94726 PLETHYSMOGRAPHY LUNG VOLUMES: CPT | Performed by: INTERNAL MEDICINE

## 2022-05-24 PROCEDURE — 94060 EVALUATION OF WHEEZING: CPT | Performed by: INTERNAL MEDICINE

## 2022-05-24 RX ORDER — FLUCONAZOLE 150 MG/1
TABLET ORAL
COMMUNITY
Start: 2022-05-04 | End: 2022-07-22 | Stop reason: SDUPTHER

## 2022-05-24 RX ORDER — ALBUTEROL SULFATE 2.5 MG/3ML
2.5 SOLUTION RESPIRATORY (INHALATION) ONCE AS NEEDED
Status: COMPLETED | OUTPATIENT
Start: 2022-05-24 | End: 2022-05-24

## 2022-05-24 RX ADMIN — ALBUTEROL SULFATE 2.5 MG: 2.5 SOLUTION RESPIRATORY (INHALATION) at 09:52

## 2022-05-26 ENCOUNTER — OFFICE VISIT (OUTPATIENT)
Dept: FAMILY MEDICINE CLINIC | Facility: CLINIC | Age: 52
End: 2022-05-26
Payer: COMMERCIAL

## 2022-05-26 VITALS
HEIGHT: 66 IN | SYSTOLIC BLOOD PRESSURE: 120 MMHG | OXYGEN SATURATION: 98 % | TEMPERATURE: 97.7 F | HEART RATE: 78 BPM | WEIGHT: 189 LBS | RESPIRATION RATE: 20 BRPM | DIASTOLIC BLOOD PRESSURE: 72 MMHG | BODY MASS INDEX: 30.37 KG/M2

## 2022-05-26 DIAGNOSIS — F41.8 DEPRESSION WITH ANXIETY: ICD-10-CM

## 2022-05-26 DIAGNOSIS — R07.9 INTERMITTENT CHEST PAIN: ICD-10-CM

## 2022-05-26 DIAGNOSIS — M54.42 CHRONIC BILATERAL LOW BACK PAIN WITH LEFT-SIDED SCIATICA: ICD-10-CM

## 2022-05-26 DIAGNOSIS — R06.00 DOE (DYSPNEA ON EXERTION): Primary | ICD-10-CM

## 2022-05-26 DIAGNOSIS — G89.29 CHRONIC BILATERAL LOW BACK PAIN WITH LEFT-SIDED SCIATICA: ICD-10-CM

## 2022-05-26 DIAGNOSIS — R94.2 DECREASED DIFFUSION CAPACITY: ICD-10-CM

## 2022-05-26 DIAGNOSIS — M79.605 LEFT LEG PAIN: ICD-10-CM

## 2022-05-26 DIAGNOSIS — F41.9 ANXIETY: ICD-10-CM

## 2022-05-26 PROCEDURE — 99214 OFFICE O/P EST MOD 30 MIN: CPT | Performed by: FAMILY MEDICINE

## 2022-05-26 RX ORDER — SERTRALINE HYDROCHLORIDE 100 MG/1
100 TABLET, FILM COATED ORAL DAILY
Qty: 90 TABLET | Refills: 1 | Status: SHIPPED | OUTPATIENT
Start: 2022-05-26

## 2022-05-26 RX ORDER — METHOCARBAMOL 500 MG/1
500 TABLET, FILM COATED ORAL 3 TIMES DAILY PRN
Qty: 30 TABLET | Refills: 0 | Status: SHIPPED | OUTPATIENT
Start: 2022-05-26 | End: 2022-07-22 | Stop reason: ALTCHOICE

## 2022-05-26 NOTE — ASSESSMENT & PLAN NOTE
- PFT not consistent with COPD or asthma, however study quality not excellent   - Advised to continue Breo for now   - CT chest   - Stress testing for cardiac etiology   - Consider pulmonology referral

## 2022-05-27 ENCOUNTER — HOSPITAL ENCOUNTER (OUTPATIENT)
Dept: NON INVASIVE DIAGNOSTICS | Facility: HOSPITAL | Age: 52
Discharge: HOME | End: 2022-05-27
Payer: COMMERCIAL

## 2022-05-27 DIAGNOSIS — M79.605 LEFT LEG PAIN: ICD-10-CM

## 2022-05-27 PROCEDURE — 93923 UPR/LXTR ART STDY 3+ LVLS: CPT | Performed by: SURGERY

## 2022-05-27 PROCEDURE — 93923 UPR/LXTR ART STDY 3+ LVLS: CPT

## 2022-06-21 DIAGNOSIS — R06.00 DOE (DYSPNEA ON EXERTION): Primary | ICD-10-CM

## 2022-06-21 NOTE — PROGRESS NOTES
Order placed  Called and spoke with patient informing her that XR is needed  Patient verbalized an understanding and stated she will go to care now to have completed

## 2022-06-22 ENCOUNTER — APPOINTMENT (OUTPATIENT)
Dept: RADIOLOGY | Facility: CLINIC | Age: 52
End: 2022-06-22
Payer: COMMERCIAL

## 2022-06-22 DIAGNOSIS — G89.29 CHRONIC BILATERAL LOW BACK PAIN WITH LEFT-SIDED SCIATICA: ICD-10-CM

## 2022-06-22 DIAGNOSIS — R06.00 DOE (DYSPNEA ON EXERTION): ICD-10-CM

## 2022-06-22 DIAGNOSIS — M54.42 CHRONIC BILATERAL LOW BACK PAIN WITH LEFT-SIDED SCIATICA: ICD-10-CM

## 2022-06-22 DIAGNOSIS — M79.605 LEFT LEG PAIN: ICD-10-CM

## 2022-06-22 PROCEDURE — 73502 X-RAY EXAM HIP UNI 2-3 VIEWS: CPT

## 2022-06-22 PROCEDURE — 71046 X-RAY EXAM CHEST 2 VIEWS: CPT

## 2022-06-27 ENCOUNTER — HOSPITAL ENCOUNTER (OUTPATIENT)
Dept: NON INVASIVE DIAGNOSTICS | Facility: HOSPITAL | Age: 52
Discharge: HOME | End: 2022-06-27

## 2022-06-27 DIAGNOSIS — R07.9 INTERMITTENT CHEST PAIN: ICD-10-CM

## 2022-06-28 ENCOUNTER — TELEPHONE (OUTPATIENT)
Dept: FAMILY MEDICINE CLINIC | Facility: CLINIC | Age: 52
End: 2022-06-28

## 2022-06-28 NOTE — TELEPHONE ENCOUNTER
[facility] Prior Auth called to let our office know Patient was approved for her Chest CT Scan, just Facility Must be Fre[facility] from 06/16/2022-08/15/2022   Confirmation # [id number]

## 2022-06-30 NOTE — TELEPHONE ENCOUNTER
Called Patient to tell her CT Chest was Approved from [id number] gave her the confirmation # and number to central scheduling for patient to schedule

## 2022-07-08 ENCOUNTER — TELEPHONE (OUTPATIENT)
Dept: FAMILY MEDICINE CLINIC | Facility: CLINIC | Age: 52
End: 2022-07-08

## 2022-07-08 ENCOUNTER — OFFICE VISIT (OUTPATIENT)
Dept: FAMILY MEDICINE CLINIC | Facility: CLINIC | Age: 52
End: 2022-07-08
Payer: COMMERCIAL

## 2022-07-08 VITALS
SYSTOLIC BLOOD PRESSURE: 118 MMHG | HEART RATE: 76 BPM | BODY MASS INDEX: 31.5 KG/M2 | HEIGHT: 66 IN | TEMPERATURE: 97 F | RESPIRATION RATE: 20 BRPM | WEIGHT: 196 LBS | OXYGEN SATURATION: 98 % | DIASTOLIC BLOOD PRESSURE: 72 MMHG

## 2022-07-08 DIAGNOSIS — F41.8 DEPRESSION WITH ANXIETY: Primary | Chronic | ICD-10-CM

## 2022-07-08 PROCEDURE — 99213 OFFICE O/P EST LOW 20 MIN: CPT | Performed by: FAMILY MEDICINE

## 2022-07-08 NOTE — TELEPHONE ENCOUNTER
Peer to peer to be done at 10am for patient      [facility] TRACKING # [id number]     [phone]

## 2022-07-08 NOTE — TELEPHONE ENCOUNTER
Per contact at peer to peer approved but having computer issues and couldn't give me approval number, will call IT and then fax us approval/call us when it goes through

## 2022-07-08 NOTE — TELEPHONE ENCOUNTER
Called and left voicemail for Sulma from the prior [facility] department informing her of the same

## 2022-07-08 NOTE — PROGRESS NOTES
Assessment/Plan:       Problem List Items Addressed This Visit        Other    Depression with anxiety - Primary (Chronic)     - Continue current regimen                    CT chest pending to work-up AGUILLON, will call with results  Subjective:      Patient ID: Dakotah Ontiveros is a 47 y o  female  HPI     Overall she reports she is so much better, leg pain much better, chest pressure went away  Depression gone, anxiety better  Breathing okay  No new complaints, happy with how she feels  Looking to get a new dog  The following portions of the patient's history were reviewed and updated as appropriate: allergies, current medications, past family history, past medical history, past social history, past surgical history, and problem list     Review of Systems   All other systems reviewed and are negative  Objective:      /72   Pulse 76   Temp (!) 97 °F (36 1 °C) (Tympanic)   Resp 20   Ht 5' 6" (1 676 m)   Wt 88 9 kg (196 lb)   SpO2 98%   BMI 31 64 kg/m²          Physical Exam  Vitals reviewed  Constitutional:       General: She is not in acute distress  Appearance: Normal appearance  She is not ill-appearing, toxic-appearing or diaphoretic  HENT:      Head: Normocephalic and atraumatic  Eyes:      General:         Right eye: No discharge  Left eye: No discharge  Extraocular Movements: Extraocular movements intact  Conjunctiva/sclera: Conjunctivae normal    Cardiovascular:      Rate and Rhythm: Normal rate and regular rhythm  Heart sounds: Normal heart sounds  No murmur heard  No friction rub  No gallop  Pulmonary:      Effort: Pulmonary effort is normal  No respiratory distress  Breath sounds: Normal breath sounds  No stridor  No wheezing or rhonchi  Musculoskeletal:         General: No swelling, tenderness or signs of injury  Right lower leg: No edema  Left lower leg: No edema  Skin:     General: Skin is warm        Coloration: Skin is not pale  Findings: No erythema or rash  Neurological:      Mental Status: She is alert and oriented to person, place, and time  Motor: No weakness     Psychiatric:         Mood and Affect: Mood normal          Behavior: Behavior normal              Willaidorothy LoveDO [facility]

## 2022-07-12 DIAGNOSIS — Z98.84 HISTORY OF GASTRIC BYPASS: ICD-10-CM

## 2022-07-14 RX ORDER — CHOLECALCIFEROL (VITAMIN D3) 125 MCG
CAPSULE ORAL
Qty: 30 TABLET | Refills: 1 | Status: SHIPPED | OUTPATIENT
Start: 2022-07-14

## 2022-07-22 ENCOUNTER — ANNUAL EXAM (OUTPATIENT)
Dept: OBGYN CLINIC | Facility: CLINIC | Age: 52
End: 2022-07-22
Payer: COMMERCIAL

## 2022-07-22 VITALS
DIASTOLIC BLOOD PRESSURE: 76 MMHG | HEIGHT: 66 IN | SYSTOLIC BLOOD PRESSURE: 120 MMHG | BODY MASS INDEX: 31.92 KG/M2 | WEIGHT: 198.6 LBS

## 2022-07-22 DIAGNOSIS — B37.49 CANDIDIASIS OF PERINEUM: ICD-10-CM

## 2022-07-22 DIAGNOSIS — Z12.31 ENCOUNTER FOR SCREENING MAMMOGRAM FOR BREAST CANCER: ICD-10-CM

## 2022-07-22 DIAGNOSIS — Z01.419 ROUTINE GYNECOLOGICAL EXAMINATION: Primary | ICD-10-CM

## 2022-07-22 PROCEDURE — G0145 SCR C/V CYTO,THINLAYER,RESCR: HCPCS | Performed by: ADVANCED PRACTICE MIDWIFE

## 2022-07-22 PROCEDURE — 99396 PREV VISIT EST AGE 40-64: CPT | Performed by: ADVANCED PRACTICE MIDWIFE

## 2022-07-22 PROCEDURE — 0503F POSTPARTUM CARE VISIT: CPT | Performed by: ADVANCED PRACTICE MIDWIFE

## 2022-07-22 PROCEDURE — G0476 HPV COMBO ASSAY CA SCREEN: HCPCS | Performed by: ADVANCED PRACTICE MIDWIFE

## 2022-07-22 RX ORDER — NYSTATIN 100000 [USP'U]/G
POWDER TOPICAL 3 TIMES DAILY
Qty: 60 G | Refills: 1 | Status: SHIPPED | OUTPATIENT
Start: 2022-07-22

## 2022-07-22 RX ORDER — FLUCONAZOLE 150 MG/1
TABLET ORAL
Qty: 2 TABLET | Refills: 0 | Status: SHIPPED | OUTPATIENT
Start: 2022-07-22 | End: 2022-07-29

## 2022-07-22 RX ORDER — CEPHALEXIN 500 MG/1
500 CAPSULE ORAL EVERY 12 HOURS SCHEDULED
Qty: 14 CAPSULE | Refills: 0 | Status: SHIPPED | OUTPATIENT
Start: 2022-07-22 | End: 2022-07-29

## 2022-07-22 NOTE — PROGRESS NOTES
[facility] [address] [place], [place]    ASSESSMENT/PLAN: Stephanie Litten is a 47 y o   who presents for annual gynecologic exam     Encounter for routine gynecologic examination  - Routine well woman exam completed today  - Cervical Cancer Screening: Current ASCCP Guidelines reviewed  Last Pap: 22- ASCUS with negative HPV  Next Pap Due: today  - HPV Vaccination status: Not immunized  - STI screening offered including HIV: not indicated based on hx or requested at time of visit  - Breast Cancer Screening: Last Mammogram 05/15/2015, script given  - Colorectal cancer screening was not ordered  She Cologuard to complete  - The following were reviewed in today's visit: breast self exam, mammography screening ordered, menopause, adequate intake of calcium and vitamin D, exercise and age related changes  - RTO 1 yr    Additional problems addressed at this visit:  1  Routine gynecological examination  -     Liquid-based pap, screening  -     Mammo screening bilateral w 3d & cad; Future    2  Encounter for screening mammogram for breast cancer  -     Mammo screening bilateral w 3d & cad; Future    3  Candidiasis of perineum  -     fluconazole (DIFLUCAN) 150 mg tablet; Take 1 dose pill by mouth and repeat in 3 days with second dose  -     cephalexin (KEFLEX) 500 mg capsule; Take 1 capsule (500 mg total) by mouth every 12 (twelve) hours for 7 days  -     nystatin (MYCOSTATIN) powder; Apply topically 3 (three) times a day    - use Nystatin powder for 14 days and then PRN  - take oral meds as ordered  - reviewed preventive and comfort measures  - To call if no improvement      CC:  Annual Gynecologic Examination    HPI: Stephanie Litten is a 47 y o   who presents for annual gynecologic examination  Marylou presents today for gyn exam  No vaginal bleeding since onset of menopause   last pap smear-  ASCUS with normal HPV, Hx of abnormal pap smear -  No hx   Sexually active- ocassionally  Needs mammogram- script given , and has Cologuard at home  Reports 6-8 hrs of sleep daily- gets up once during the night, large amount of calcium rich foods daily  Exercises walks 2 times per day 5-6 days  per week  2 servings of caffeine daily  Ocassionally SBE  Concerns: Hot flashes improved  Stopped alcohol, drug use and is working on tobacco  Has swelling and irritation of the perineal area and labial area  Was given Diflucan and Nystatin  The following portions of the patient's history were reviewed and updated as appropriate: allergies, current medications, past family history, past medical history, obstetric history, gynecologic history, past social history, past surgical history and problem list     Review of Systems   Constitutional: Negative for activity change, appetite change, fatigue and fever  Respiratory: Negative for cough and shortness of breath  Cardiovascular: Negative for chest pain, palpitations and leg swelling  Gastrointestinal: Negative for constipation and diarrhea  Genitourinary: Negative for difficulty urinating and frequency  Neurological: Negative for light-headedness and headaches  Psychiatric/Behavioral: The patient is not nervous/anxious  Objective:  /76   Ht 5' 6" (1 676 m)   Wt 90 1 kg (198 lb 9 6 oz)   LMP  (LMP Unknown)   BMI 32 05 kg/m²    Physical Exam  Vitals reviewed  Constitutional:       Appearance: Normal appearance  HENT:      Head: Normocephalic  Neck:      Thyroid: No thyroid mass or thyroid tenderness  Cardiovascular:      Rate and Rhythm: Normal rate and regular rhythm  Heart sounds: Normal heart sounds  Pulmonary:      Effort: Pulmonary effort is normal       Breath sounds: Normal breath sounds  Chest:   Breasts:      Right: No mass, nipple discharge, skin change, tenderness or axillary adenopathy  Left: No mass, nipple discharge, skin change, tenderness or axillary adenopathy         Abdominal: General: There is no distension  Palpations: There is no mass  Tenderness: There is no abdominal tenderness  There is no guarding  Genitourinary:     General: Normal vulva  Exam position: Lithotomy position  Labia:         Right: No tenderness or lesion  Left: No tenderness or lesion  Vagina: No vaginal discharge, tenderness, bleeding or lesions  Cervix: No discharge, lesion, erythema or cervical bleeding  Uterus: Normal  Not enlarged and not tender  Adnexa:         Right: No mass, tenderness or fullness  Left: No mass, tenderness or fullness  Comments: Bilateral groin area - erythema, irritation for scratching  No pustules or drainage  Positive for yeast    Musculoskeletal:      Cervical back: Normal range of motion  Lymphadenopathy:      Upper Body:      Right upper body: No axillary adenopathy  Left upper body: No axillary adenopathy  Skin:     General: Skin is warm and dry  Neurological:      Mental Status: She is alert     Psychiatric:         Mood and Affect: Mood normal          Behavior: Behavior normal          Judgment: Judgment normal              Zita Jackson CNM  [facility]  07/22/22 2:21 PM

## 2022-07-26 ENCOUNTER — HOSPITAL ENCOUNTER (OUTPATIENT)
Dept: CT IMAGING | Facility: HOSPITAL | Age: 52
Discharge: HOME | End: 2022-07-26
Payer: COMMERCIAL

## 2022-07-26 DIAGNOSIS — R06.09 DOE (DYSPNEA ON EXERTION): ICD-10-CM

## 2022-07-26 DIAGNOSIS — R94.2 DECREASED DIFFUSION CAPACITY: ICD-10-CM

## 2022-07-26 PROCEDURE — 71250 CT THORAX DX C-: CPT

## 2022-07-26 PROCEDURE — G1004 CDSM NDSC: HCPCS

## 2022-07-27 LAB
LAB AP GYN PRIMARY INTERPRETATION: NORMAL
Lab: NORMAL

## 2022-08-09 ENCOUNTER — TELEPHONE (OUTPATIENT)
Dept: FAMILY MEDICINE CLINIC | Facility: CLINIC | Age: 52
End: 2022-08-09

## 2022-08-09 NOTE — TELEPHONE ENCOUNTER
We would need to assess her oxygen levels in office via walk test, does she have a way to check her oxygen at home? Also is her SOB worsening? Please schedule follow-up with me  Any concerns in meantime ED  CT chest shows evidence of bronchiolitis, which is a smoking-related lung disease  They do say subtle so I interpret that as mild  We get a better of idea of lung function with a pulmonary function test, which was overall normal didn't show asthma or COPD

## 2022-08-09 NOTE — TELEPHONE ENCOUNTER
Patient called office in regards to CT Lung results  Asking if condition is severe and if there is anything to be concerned about  Asking if she needs to be on O2 due to continued SOB  Informed PCP is back in office tomorrow, and would get back to her then  Please advise

## 2022-08-15 NOTE — TELEPHONE ENCOUNTER
Advised as per provider, patient verbally understood, she will seek ed if there is any worsening of symptoms, patient checked her 02 at home and it read 99% , follow up apt made

## 2022-08-23 ENCOUNTER — OFFICE VISIT (OUTPATIENT)
Dept: FAMILY MEDICINE CLINIC | Facility: CLINIC | Age: 52
End: 2022-08-23
Payer: COMMERCIAL

## 2022-08-23 VITALS
BODY MASS INDEX: 31.96 KG/M2 | DIASTOLIC BLOOD PRESSURE: 70 MMHG | RESPIRATION RATE: 18 BRPM | HEART RATE: 93 BPM | SYSTOLIC BLOOD PRESSURE: 124 MMHG | TEMPERATURE: 97.5 F | OXYGEN SATURATION: 99 % | WEIGHT: 198 LBS

## 2022-08-23 DIAGNOSIS — F51.01 PRIMARY INSOMNIA: ICD-10-CM

## 2022-08-23 DIAGNOSIS — R06.02 SOB (SHORTNESS OF BREATH): Primary | ICD-10-CM

## 2022-08-23 DIAGNOSIS — K21.9 GASTROESOPHAGEAL REFLUX DISEASE, UNSPECIFIED WHETHER ESOPHAGITIS PRESENT: ICD-10-CM

## 2022-08-23 DIAGNOSIS — R07.89 CHEST PRESSURE: ICD-10-CM

## 2022-08-23 PROCEDURE — 99214 OFFICE O/P EST MOD 30 MIN: CPT | Performed by: FAMILY MEDICINE

## 2022-08-23 RX ORDER — PANTOPRAZOLE SODIUM 40 MG/1
40 TABLET, DELAYED RELEASE ORAL DAILY
Qty: 30 TABLET | Refills: 5 | Status: SHIPPED | OUTPATIENT
Start: 2022-08-23

## 2022-08-23 RX ORDER — TRAZODONE HYDROCHLORIDE 50 MG/1
50 TABLET ORAL
Qty: 30 TABLET | Refills: 5 | Status: SHIPPED | OUTPATIENT
Start: 2022-08-23

## 2022-08-23 NOTE — PROGRESS NOTES
Assessment/Plan:       Problem List Items Addressed This Visit        Digestive    Gastroesophageal reflux disease     - Likely GERD component, recommended H pylori testing prior to treatment          Relevant Medications    pantoprazole (PROTONIX) 40 mg tablet    Other Relevant Orders    H  pylori antigen, stool       Other    Insomnia    Relevant Medications    traZODone (DESYREL) 50 mg tablet    Chest pressure     - Asymptomatic today   - Further testing ordered         Relevant Orders    Stress test only, exercise    Echo complete w/ contrast if indicated    SOB (shortness of breath) - Primary     - Likely multifactorial, deconditioning, mild bronchiolitis changes on CT/PFT fairly unremarkable although advised to quit smoking  - Concern for cardiac etiology, stress testing and ECHO ordered          Relevant Orders    Stress test only, exercise    Echo complete w/ contrast if indicated            Subjective:      Patient ID: Sawyer Wang is a 47 y o  female  HPI     CT chest reviewed- "Subtle upper lung predominant centrilobular nodules, likely due to respiratory bronchiolitis, a smoking-related lung disease " PFT testing unremarkable  Sometimes feels like fluid is coming up, feels like choking her, feels like something comes up/suddenly feels like something stops her breathing, lasts for couple seconds, choking in sleep  Happening during day but worse at night  She is always short of breath, also pains in knees and hips stops her from exercising  She vomits once or twice a week with eating, burns when coming up  No blood in vomit  Occasional abdominal pain, epigastric region, no fevers  Occurring for long time  She does get chest pains, feels like heaviness/squeezing, voice raspy, runs out of air  Last episode couple days ago  Also requesting something for sleep       The following portions of the patient's history were reviewed and updated as appropriate: allergies, current medications, past family history, past medical history, past social history, past surgical history, and problem list     Review of Systems   All other systems reviewed and are negative  Objective:      /70 (BP Location: Left arm, Patient Position: Sitting)   Pulse 93   Temp 97 5 °F (36 4 °C)   Resp 18   Wt 89 8 kg (198 lb)   LMP  (LMP Unknown)   SpO2 99%   BMI 31 96 kg/m²          Physical Exam  Vitals reviewed  Constitutional:       General: She is not in acute distress  Appearance: Normal appearance  She is not ill-appearing, toxic-appearing or diaphoretic  HENT:      Head: Normocephalic and atraumatic  Eyes:      General:         Right eye: No discharge  Left eye: No discharge  Extraocular Movements: Extraocular movements intact  Conjunctiva/sclera: Conjunctivae normal    Cardiovascular:      Rate and Rhythm: Normal rate and regular rhythm  Heart sounds: Normal heart sounds  No murmur heard  No friction rub  No gallop  Pulmonary:      Effort: Pulmonary effort is normal  No respiratory distress  Breath sounds: Normal breath sounds  No stridor  No wheezing or rhonchi  Musculoskeletal:         General: No swelling, tenderness or signs of injury  Right lower leg: No edema  Left lower leg: No edema  Skin:     General: Skin is warm  Coloration: Skin is not pale  Findings: No erythema or rash  Neurological:      Mental Status: She is alert and oriented to person, place, and time  Motor: No weakness     Psychiatric:         Mood and Affect: Mood normal          Behavior: Behavior normal              DO Moises Clement [address] [place] [address]

## 2022-08-26 NOTE — ASSESSMENT & PLAN NOTE
- Likely multifactorial, deconditioning, mild bronchiolitis changes on CT/PFT fairly unremarkable although advised to quit smoking  - Concern for cardiac etiology, stress testing and ECHO ordered

## 2022-09-02 ENCOUNTER — HOSPITAL ENCOUNTER (OUTPATIENT)
Dept: MAMMOGRAPHY | Facility: HOSPITAL | Age: 52
End: 2022-09-02
Payer: COMMERCIAL

## 2022-09-02 VITALS — HEIGHT: 60 IN | BODY MASS INDEX: 38.87 KG/M2 | WEIGHT: 198 LBS

## 2022-09-02 DIAGNOSIS — Z01.419 ROUTINE GYNECOLOGICAL EXAMINATION: ICD-10-CM

## 2022-09-02 DIAGNOSIS — Z12.31 ENCOUNTER FOR SCREENING MAMMOGRAM FOR BREAST CANCER: ICD-10-CM

## 2022-09-02 PROCEDURE — 77067 SCR MAMMO BI INCL CAD: CPT

## 2022-09-02 PROCEDURE — 77063 BREAST TOMOSYNTHESIS BI: CPT

## 2022-09-27 ENCOUNTER — HOSPITAL ENCOUNTER (OUTPATIENT)
Dept: NON INVASIVE DIAGNOSTICS | Facility: HOSPITAL | Age: 52
Discharge: HOME | End: 2022-09-27
Payer: COMMERCIAL

## 2022-09-27 VITALS
HEART RATE: 60 BPM | BODY MASS INDEX: 38.87 KG/M2 | SYSTOLIC BLOOD PRESSURE: 124 MMHG | WEIGHT: 198 LBS | DIASTOLIC BLOOD PRESSURE: 70 MMHG | HEIGHT: 60 IN

## 2022-09-27 DIAGNOSIS — R06.02 SOB (SHORTNESS OF BREATH): ICD-10-CM

## 2022-09-27 DIAGNOSIS — R07.89 CHEST PRESSURE: ICD-10-CM

## 2022-09-27 LAB
AORTIC ROOT: 2.8 CM
AORTIC VALVE MEAN VELOCITY: 6.8 M/S
APICAL FOUR CHAMBER EJECTION FRACTION: 59 %
ASCENDING AORTA: 3 CM
AV AREA BY CONTINUOUS VTI: 2.8 CM2
AV AREA PEAK VELOCITY: 2.9 CM2
AV LVOT MEAN GRADIENT: 2 MMHG
AV LVOT PEAK GRADIENT: 4 MMHG
AV MEAN GRADIENT: 2 MMHG
AV PEAK GRADIENT: 5 MMHG
AV VALVE AREA: 2.83 CM2
AV VELOCITY RATIO: 0.93
DOP CALC AO PEAK VEL: 1.13 M/S
DOP CALC AO VTI: 26.74 CM
DOP CALC LVOT AREA: 3.14 CM2
DOP CALC LVOT DIAMETER: 2 CM
DOP CALC LVOT PEAK VEL VTI: 24.08 CM
DOP CALC LVOT PEAK VEL: 1.05 M/S
DOP CALC LVOT STROKE INDEX: 39.2 ML/M2
DOP CALC LVOT STROKE VOLUME: 75.61
E WAVE DECELERATION TIME: 215 MS
FRACTIONAL SHORTENING: 30 (ref 28–44)
GLOBAL LONGITUIDAL STRAIN: -20 %
INTERVENTRICULAR SEPTUM IN DIASTOLE (PARASTERNAL SHORT AXIS VIEW): 0.8 CM
INTERVENTRICULAR SEPTUM: 0.8 CM (ref 0.6–1.1)
LAAS-AP2: 19.9 CM2
LAAS-AP4: 17.5 CM2
LEFT ATRIUM SIZE: 4.1 CM
LEFT INTERNAL DIMENSION IN SYSTOLE: 3.5 CM (ref 2.1–4)
LEFT VENTRICULAR INTERNAL DIMENSION IN DIASTOLE: 5 CM (ref 3.5–6)
LEFT VENTRICULAR POSTERIOR WALL IN END DIASTOLE: 0.8 CM
LEFT VENTRICULAR STROKE VOLUME: 66 ML
LVSV (TEICH): 66 ML
MV E'TISSUE VEL-SEP: 10 CM/S
MV PEAK A VEL: 0.56 M/S
MV PEAK E VEL: 82 CM/S
MV STENOSIS PRESSURE HALF TIME: 62 MS
MV VALVE AREA P 1/2 METHOD: 3.55
RA PRESSURE ESTIMATED: 5 MMHG
RIGHT ATRIUM AREA SYSTOLE A4C: 12.7 CM2
RIGHT VENTRICLE ID DIMENSION: 3.5 CM
RV PSP: 27 MMHG
SL CV LEFT ATRIUM LENGTH A2C: 5.2 CM
SL CV LV EF: 60
SL CV PED ECHO LEFT VENTRICLE DIASTOLIC VOLUME (MOD BIPLANE) 2D: 118 ML
SL CV PED ECHO LEFT VENTRICLE SYSTOLIC VOLUME (MOD BIPLANE) 2D: 52 ML
TR MAX PG: 22 MMHG
TR PEAK VELOCITY: 2.3 M/S
TRICUSPID VALVE PEAK REGURGITATION VELOCITY: 2.34 M/S

## 2022-09-27 PROCEDURE — 93306 TTE W/DOPPLER COMPLETE: CPT | Performed by: INTERNAL MEDICINE

## 2022-09-27 PROCEDURE — 93306 TTE W/DOPPLER COMPLETE: CPT

## 2022-09-30 ENCOUNTER — HOSPITAL ENCOUNTER (OUTPATIENT)
Dept: NON INVASIVE DIAGNOSTICS | Facility: HOSPITAL | Age: 52
Discharge: HOME | End: 2022-09-30
Payer: COMMERCIAL

## 2022-09-30 VITALS
HEIGHT: 60 IN | DIASTOLIC BLOOD PRESSURE: 84 MMHG | HEART RATE: 82 BPM | WEIGHT: 189 LBS | SYSTOLIC BLOOD PRESSURE: 126 MMHG | BODY MASS INDEX: 37.11 KG/M2 | OXYGEN SATURATION: 99 % | RESPIRATION RATE: 16 BRPM

## 2022-09-30 DIAGNOSIS — R06.02 SOB (SHORTNESS OF BREATH): ICD-10-CM

## 2022-09-30 DIAGNOSIS — R07.89 CHEST PRESSURE: ICD-10-CM

## 2022-09-30 LAB
CHEST PAIN STATEMENT: NORMAL
MAX DIASTOLIC BP: 94 MMHG
MAX HEART RATE: 157 BPM
MAX HR PERCENT: 92 %
MAX HR: 157 BPM
MAX PREDICTED HEART RATE: 169 BPM
MAX. SYSTOLIC BP: 170 MMHG
PROTOCOL NAME: NORMAL
RATE PRESSURE PRODUCT: NORMAL
SL CV STRESS RECOVERY BP: NORMAL MMHG
SL CV STRESS RECOVERY HR: 82 BPM
SL CV STRESS RECOVERY O2 SAT: 99 %
SL CV STRESS STAGE REACHED: 2
STRESS ANGINA INDEX: 0
STRESS BASELINE BP: NORMAL MMHG
STRESS BASELINE HR: 82 BPM
STRESS O2 SAT REST: 99 %
STRESS PEAK HR: 157 BPM
STRESS POST ESTIMATED WORKLOAD: 5.8 METS
STRESS POST EXERCISE DUR MIN: 4 MIN
STRESS POST EXERCISE DUR SEC: 1 SEC
STRESS POST O2 SAT PEAK: 95 %
STRESS POST PEAK BP: 170 MMHG
TARGET HR FORMULA: NORMAL
TEST INDICATION: NORMAL
TIME IN EXERCISE PHASE: NORMAL

## 2022-09-30 PROCEDURE — 93017 CV STRESS TEST TRACING ONLY: CPT

## 2022-09-30 PROCEDURE — 93016 CV STRESS TEST SUPVJ ONLY: CPT | Performed by: INTERNAL MEDICINE

## 2022-09-30 PROCEDURE — 93018 CV STRESS TEST I&R ONLY: CPT | Performed by: INTERNAL MEDICINE

## 2022-10-14 LAB
CHEST PAIN STATEMENT: NORMAL
MAX DIASTOLIC BP: 94 MMHG
MAX HEART RATE: 157 BPM
MAX PREDICTED HEART RATE: 169 BPM
MAX. SYSTOLIC BP: 170 MMHG
PROTOCOL NAME: NORMAL
TARGET HR FORMULA: NORMAL
TEST INDICATION: NORMAL
TIME IN EXERCISE PHASE: NORMAL

## 2022-10-31 DIAGNOSIS — Z98.84 HISTORY OF GASTRIC BYPASS: ICD-10-CM

## 2022-10-31 RX ORDER — CHOLECALCIFEROL (VITAMIN D3) 125 MCG
CAPSULE ORAL
Qty: 30 TABLET | Refills: 1 | Status: SHIPPED | OUTPATIENT
Start: 2022-10-31

## 2022-12-20 ENCOUNTER — TELEPHONE (OUTPATIENT)
Dept: FAMILY MEDICINE CLINIC | Facility: CLINIC | Age: 52
End: 2022-12-20

## 2022-12-21 DIAGNOSIS — R11.2 NAUSEA AND VOMITING, UNSPECIFIED VOMITING TYPE: Primary | ICD-10-CM

## 2023-02-21 DIAGNOSIS — B37.49 CANDIDIASIS OF PERINEUM: ICD-10-CM

## 2023-02-22 RX ORDER — NYSTATIN 100000 [USP'U]/G
POWDER TOPICAL
Qty: 60 G | Refills: 1 | Status: SHIPPED | OUTPATIENT
Start: 2023-02-22

## 2023-05-12 DIAGNOSIS — Z98.84 HISTORY OF GASTRIC BYPASS: ICD-10-CM

## 2023-05-12 DIAGNOSIS — E55.9 VITAMIN D DEFICIENCY: ICD-10-CM

## 2023-05-12 RX ORDER — ERGOCALCIFEROL 1.25 MG/1
50000 CAPSULE ORAL WEEKLY
Qty: 12 CAPSULE | Refills: 2 | Status: SHIPPED | OUTPATIENT
Start: 2023-05-12

## 2023-05-12 RX ORDER — CHOLECALCIFEROL (VITAMIN D3) 125 MCG
500 CAPSULE ORAL DAILY
Qty: 30 TABLET | Refills: 2 | Status: SHIPPED | OUTPATIENT
Start: 2023-05-12

## 2023-05-25 ENCOUNTER — APPOINTMENT (EMERGENCY)
Dept: CT IMAGING | Facility: HOSPITAL | Age: 53
End: 2023-05-25

## 2023-05-25 ENCOUNTER — HOSPITAL ENCOUNTER (EMERGENCY)
Facility: HOSPITAL | Age: 53
Discharge: HOME | End: 2023-05-26
Attending: EMERGENCY MEDICINE

## 2023-05-25 VITALS
SYSTOLIC BLOOD PRESSURE: 139 MMHG | RESPIRATION RATE: 18 BRPM | OXYGEN SATURATION: 99 % | HEART RATE: 65 BPM | TEMPERATURE: 98 F | DIASTOLIC BLOOD PRESSURE: 90 MMHG

## 2023-05-25 DIAGNOSIS — Z98.84 HX OF GASTRIC BYPASS: ICD-10-CM

## 2023-05-25 DIAGNOSIS — R10.9 ABDOMINAL PAIN: Primary | ICD-10-CM

## 2023-05-25 DIAGNOSIS — R51.9 HEADACHE: ICD-10-CM

## 2023-05-25 DIAGNOSIS — N20.0 KIDNEY STONE: ICD-10-CM

## 2023-05-25 LAB
ALBUMIN SERPL BCP-MCNC: 4 G/DL (ref 3.5–5)
ALP SERPL-CCNC: 71 U/L (ref 34–104)
ALT SERPL W P-5'-P-CCNC: 9 U/L (ref 7–52)
AMPHETAMINES SERPL QL SCN: NEGATIVE
ANION GAP SERPL CALCULATED.3IONS-SCNC: 7 MMOL/L (ref 4–13)
AST SERPL W P-5'-P-CCNC: 23 U/L (ref 13–39)
ATRIAL RATE: 63 BPM
BARBITURATES UR QL: NEGATIVE
BASOPHILS # BLD AUTO: 0.03 THOUSANDS/ÂΜL (ref 0–0.1)
BASOPHILS NFR BLD AUTO: 0 % (ref 0–1)
BENZODIAZ UR QL: NEGATIVE
BILIRUB SERPL-MCNC: 0.61 MG/DL (ref 0.2–1)
BILIRUB UR QL STRIP: NEGATIVE
BUN SERPL-MCNC: 16 MG/DL (ref 5–25)
CALCIUM SERPL-MCNC: 9.1 MG/DL (ref 8.4–10.2)
CHLORIDE SERPL-SCNC: 109 MMOL/L (ref 96–108)
CLARITY UR: CLEAR
CO2 SERPL-SCNC: 25 MMOL/L (ref 21–32)
COCAINE UR QL: NEGATIVE
COLOR UR: YELLOW
CREAT SERPL-MCNC: 0.7 MG/DL (ref 0.6–1.3)
EOSINOPHIL # BLD AUTO: 0.08 THOUSAND/ÂΜL (ref 0–0.61)
EOSINOPHIL NFR BLD AUTO: 1 % (ref 0–6)
ERYTHROCYTE [DISTWIDTH] IN BLOOD BY AUTOMATED COUNT: 18.8 % (ref 11.6–15.1)
GFR SERPL CREATININE-BSD FRML MDRD: 100 ML/MIN/1.73SQ M
GLUCOSE SERPL-MCNC: 70 MG/DL (ref 65–140)
GLUCOSE UR STRIP-MCNC: NEGATIVE MG/DL
HCT VFR BLD AUTO: 36.6 % (ref 34.8–46.1)
HGB BLD-MCNC: 11.1 G/DL (ref 11.5–15.4)
HGB UR QL STRIP.AUTO: NEGATIVE
IMM GRANULOCYTES # BLD AUTO: 0.01 THOUSAND/UL (ref 0–0.2)
IMM GRANULOCYTES NFR BLD AUTO: 0 % (ref 0–2)
KETONES UR STRIP-MCNC: NEGATIVE MG/DL
LEUKOCYTE ESTERASE UR QL STRIP: NEGATIVE
LIPASE SERPL-CCNC: 26 U/L (ref 11–82)
LYMPHOCYTES # BLD AUTO: 3.14 THOUSANDS/ÂΜL (ref 0.6–4.47)
LYMPHOCYTES NFR BLD AUTO: 44 % (ref 14–44)
MAGNESIUM SERPL-MCNC: 2 MG/DL (ref 1.9–2.7)
MCH RBC QN AUTO: 26.1 PG (ref 26.8–34.3)
MCHC RBC AUTO-ENTMCNC: 30.3 G/DL (ref 31.4–37.4)
MCV RBC AUTO: 86 FL (ref 82–98)
METHADONE UR QL: NEGATIVE
MONOCYTES # BLD AUTO: 0.39 THOUSAND/ÂΜL (ref 0.17–1.22)
MONOCYTES NFR BLD AUTO: 5 % (ref 4–12)
NEUTROPHILS # BLD AUTO: 3.51 THOUSANDS/ÂΜL (ref 1.85–7.62)
NEUTS SEG NFR BLD AUTO: 50 % (ref 43–75)
NITRITE UR QL STRIP: NEGATIVE
NRBC BLD AUTO-RTO: 0 /100 WBCS
OPIATES UR QL SCN: NEGATIVE
OXYCODONE+OXYMORPHONE UR QL SCN: NEGATIVE
P AXIS: 58 DEGREES
PCP UR QL: NEGATIVE
PH UR STRIP.AUTO: 6.5 [PH]
PLATELET # BLD AUTO: 318 THOUSANDS/UL (ref 149–390)
PMV BLD AUTO: 9.3 FL (ref 8.9–12.7)
POTASSIUM SERPL-SCNC: 3.7 MMOL/L (ref 3.5–5.3)
PR INTERVAL: 128 MS
PROT SERPL-MCNC: 6.8 G/DL (ref 6.4–8.4)
PROT UR STRIP-MCNC: NEGATIVE MG/DL
QRS AXIS: 59 DEGREES
QRSD INTERVAL: 82 MS
QT INTERVAL: 408 MS
QTC INTERVAL: 417 MS
RBC # BLD AUTO: 4.25 MILLION/UL (ref 3.81–5.12)
SODIUM SERPL-SCNC: 141 MMOL/L (ref 135–147)
SP GR UR STRIP.AUTO: 1.02
T WAVE AXIS: 63 DEGREES
THC UR QL: NEGATIVE
UROBILINOGEN UR QL STRIP.AUTO: 0.2 E.U./DL
VENTRICULAR RATE: 63 BPM
WBC # BLD AUTO: 7.16 THOUSAND/UL (ref 4.31–10.16)

## 2023-05-25 RX ORDER — DICYCLOMINE HCL 20 MG
20 TABLET ORAL 2 TIMES DAILY PRN
Qty: 20 TABLET | Refills: 0 | Status: SHIPPED | OUTPATIENT
Start: 2023-05-25

## 2023-05-25 RX ORDER — FLUTICASONE PROPIONATE 50 MCG
1 SPRAY, SUSPENSION (ML) NASAL DAILY
Qty: 16 G | Refills: 0 | Status: SHIPPED | OUTPATIENT
Start: 2023-05-25

## 2023-05-25 RX ADMIN — IOHEXOL 100 ML: 350 INJECTION, SOLUTION INTRAVENOUS at 22:36

## 2023-05-26 ENCOUNTER — TELEPHONE (OUTPATIENT)
Dept: FAMILY MEDICINE CLINIC | Facility: CLINIC | Age: 53
End: 2023-05-26

## 2023-05-26 NOTE — DISCHARGE INSTRUCTIONS
Recommend Tylenol and ibuprofen as needed for pain  Take Bentyl as needed for abdominal cramps  Use Flonase daily for nasal congestion    Follow-up with gastroenterology, return for any worsening symptoms

## 2023-05-26 NOTE — TELEPHONE ENCOUNTER
"Received call from patient stating she was seen in the ED last night and she reports \"they did not tell me anything\"  She went to ED due to feeling of the back of her head \"feeling hot\" and just feeling \"unwell\"  Patient is asking if provider can review ED visit and advise on results of testing, etc     Patient already has appointment scheduled 6/21 and no sooner appointments available  Please advise    "

## 2023-05-26 NOTE — ED PROVIDER NOTES
"History  Chief Complaint   Patient presents with   • Headache     Dizziness, nausea, blurred vision, frontal headache   • Abdominal Pain     More tired recently, pain in the right side- feels like a side sticker     50-year-old female presents to the emergency department for evaluation of headache and abdominal pain  Patient reports she has been having intermittent right-sided abdominal pain that has been ongoing for approximately 1 month  She states it happens a few times a day  The pain tends to occur following meals  Is associated with nausea and nonbloody emesis  She denies any constipation or diarrhea  No dysuria or hematuria, but she does state that her urine is foul-smelling compared to usual   She denies any associated fevers or chills  She does have a history of gastric bypass surgery in 2013  She states the symptoms \"feel like infection from years ago  \"  She is very vague about this infection and cannot tell me more details  She also endorses a posterior headache that again is intermittent waxing and waning  She describes the headache as a sensation like the back of her head is hot  She does continues to state that she does not feel well overall; ever, she is currently pain-free  Prior to Admission Medications   Prescriptions Last Dose Informant Patient Reported? Taking?    albuterol (PROVENTIL HFA,VENTOLIN HFA) 90 mcg/act inhaler   No No   Sig: Inhale 2 puffs every 6 (six) hours as needed for wheezing or shortness of breath   Patient not taking: Reported on 8/23/2022   ergocalciferol (VITAMIN D2) 50,000 units   No No   Sig: Take 1 capsule (50,000 Units total) by mouth once a week   nystatin powder   No No   Sig: apply to affected area three times a day   pantoprazole (PROTONIX) 40 mg tablet   No No   Sig: Take 1 tablet (40 mg total) by mouth daily   sertraline (ZOLOFT) 100 mg tablet   No No   Sig: Take 1 tablet (100 mg total) by mouth daily   traZODone (DESYREL) 50 mg tablet   No No " "  Sig: Take 1 tablet (50 mg total) by mouth daily at bedtime   vitamin B-12 (VITAMIN B-12) 500 mcg tablet   No No   Sig: Take 1 tablet (500 mcg total) by mouth daily      Facility-Administered Medications: None       Past Medical History:   Diagnosis Date   • Asthma    • GERD (gastroesophageal reflux disease)    • H/O gastric bypass    • Hypokalemia    • Methamphetamine abuse (HCC)     Positive UDS; 2020   • Pyelonephritis        Past Surgical History:   Procedure Laterality Date   • CHOLECYSTECTOMY     • DENTAL SURGERY     • ESOPHAGEAL DILATION     • GASTRIC BYPASS     • EILEEN-EN-Y PROCEDURE     • SUBTOTAL COLECTOMY     • TOOTH EXTRACTION     • TUBAL LIGATION         Family History   Problem Relation Age of Onset   • Kidney disease Mother         Pt also reports \"bone disease\"   • Hypertension Mother    • Diabetes Mother    • Heart disease Mother    • Stroke Mother    • Arthritis Mother    • Hyperlipidemia Mother    • Colon cancer Father          motor cycle accident   • Heart disease Sister    • Coronary artery disease Sister    • No Known Problems Sister    • No Known Problems Sister    • No Known Problems Daughter    • No Known Problems Daughter    • Breast cancer Maternal Grandmother    • Lung cancer Maternal Grandmother    • Lung cancer Maternal Grandfather    • Lung cancer Paternal Grandmother    • Lung cancer Paternal Grandfather    • Cancer Brother    • Heart attack Brother    • Heart disease Brother    • Coronary artery disease Brother    • Lung cancer Paternal Uncle    • Lung cancer Cousin    • Uterine cancer Maternal Aunt    • No Known Problems Maternal Aunt    • No Known Problems Maternal Aunt    • Lung cancer Maternal Aunt    • Lung cancer Maternal Aunt    • No Known Problems Paternal Aunt    • No Known Problems Paternal Aunt      I have reviewed and agree with the history as documented      E-Cigarette/Vaping   • E-Cigarette Use Never User    • Cartridges/Day 1      E-Cigarette/Vaping " Substances   • Nicotine No    • THC No    • CBD No    • Flavoring No    • Other No    • Unknown No      Social History     Tobacco Use   • Smoking status: Every Day     Packs/day: 0 50     Types: Cigarettes   • Smokeless tobacco: Never   Vaping Use   • Vaping Use: Never used   Substance Use Topics   • Alcohol use: Never   • Drug use: Not Currently     Types: Methamphetamines     Comment: reports use this week (12/2020)       Review of Systems   Constitutional: Negative for chills and fever  HENT: Negative for ear pain and sore throat  Eyes: Negative for pain and visual disturbance  Respiratory: Negative for cough and shortness of breath  Cardiovascular: Negative for chest pain and palpitations  Gastrointestinal: Positive for abdominal pain, nausea and vomiting  Genitourinary: Negative for dysuria and hematuria  Musculoskeletal: Negative for arthralgias and back pain  Skin: Negative for color change and rash  Neurological: Positive for headaches  Negative for seizures and syncope  All other systems reviewed and are negative  Physical Exam  Physical Exam  Vitals and nursing note reviewed  Constitutional:       General: She is not in acute distress  HENT:      Head: Normocephalic and atraumatic  Right Ear: External ear normal       Left Ear: External ear normal       Nose: Nose normal       Mouth/Throat:      Mouth: Mucous membranes are moist    Eyes:      General: No scleral icterus  Extraocular Movements: Extraocular movements intact  Pupils: Pupils are equal, round, and reactive to light  Cardiovascular:      Rate and Rhythm: Normal rate and regular rhythm  Heart sounds: Normal heart sounds  No murmur heard  Pulmonary:      Effort: Pulmonary effort is normal  No respiratory distress  Breath sounds: Normal breath sounds  No stridor  Abdominal:      General: Abdomen is flat  Bowel sounds are normal       Palpations: Abdomen is soft  Tenderness:  There is abdominal tenderness in the right upper quadrant and right lower quadrant  There is no guarding or rebound  Negative signs include Khan's sign and McBurney's sign  Musculoskeletal:         General: No deformity  Normal range of motion  Cervical back: Normal range of motion  Skin:     General: Skin is warm and dry  Capillary Refill: Capillary refill takes less than 2 seconds  Neurological:      General: No focal deficit present  Mental Status: She is alert and oriented to person, place, and time  Psychiatric:         Mood and Affect: Mood normal          Behavior: Behavior normal          Vital Signs  ED Triage Vitals [05/25/23 2108]   Temperature Pulse Respirations Blood Pressure SpO2   98 °F (36 7 °C) 65 18 139/90 99 %      Temp Source Heart Rate Source Patient Position - Orthostatic VS BP Location FiO2 (%)   Oral Monitor Sitting Left arm --      Pain Score       No Pain           Vitals:    05/25/23 2108   BP: 139/90   Pulse: 65   Patient Position - Orthostatic VS: Sitting         Visual Acuity      ED Medications  Medications   iohexol (OMNIPAQUE) 350 MG/ML injection (SINGLE-DOSE) 100 mL (100 mL Intravenous Given 5/25/23 2236)   iohexol (OMNIPAQUE) 240 MG/ML solution 25 mL (25 mL Oral Given 5/25/23 2130)       Diagnostic Studies  Results Reviewed     Procedure Component Value Units Date/Time    Rapid drug screen, urine [[id number]]  (Normal) Collected: 05/25/23 2238    Lab Status: Final result Specimen: Urine, Clean Catch Updated: 05/25/23 2253     Amph/Meth UR Negative     Barbiturate Ur Negative     Benzodiazepine Urine Negative     Cocaine Urine Negative     Methadone Urine Negative     Opiate Urine Negative     PCP Ur Negative     THC Urine Negative     Oxycodone Urine Negative    Narrative:      FOR MEDICAL PURPOSES ONLY  IF CONFIRMATION NEEDED PLEASE CONTACT THE LAB WITHIN 5 DAYS      Drug Screen Cutoff Levels:  AMPHETAMINE/METHAMPHETAMINES  1000 ng/mL  BARBITURATES     200 ng/mL  BENZODIAZEPINES     200 ng/mL  COCAINE      300 ng/mL  METHADONE      300 ng/mL  OPIATES      300 ng/mL  PHENCYCLIDINE     25 ng/mL  THC       50 ng/mL  OXYCODONE      100 ng/mL    UA (URINE) with reflex to Scope [[id number]]  (Normal) Collected: 05/25/23 2238    Lab Status: Final result Specimen: Urine, Clean Catch Updated: 05/25/23 2245     Color, UA Yellow     Clarity, UA Clear     Specific Dante, UA 1 020     pH, UA 6 5     Leukocytes, UA Negative     Nitrite, UA Negative     Protein, UA Negative mg/dl      Glucose, UA Negative mg/dl      Ketones, UA Negative mg/dl      Urobilinogen, UA 0 2 E U /dl      Bilirubin, UA Negative     Occult Blood, UA Negative    Comprehensive metabolic panel [[id number]]  (Abnormal) Collected: 05/25/23 2143    Lab Status: Final result Specimen: Blood from Arm, Right Updated: 05/25/23 2206     Sodium 141 mmol/L      Potassium 3 7 mmol/L      Chloride 109 mmol/L      CO2 25 mmol/L      ANION GAP 7 mmol/L      BUN 16 mg/dL      Creatinine 0 70 mg/dL      Glucose 70 mg/dL      Calcium 9 1 mg/dL      AST 23 U/L      ALT 9 U/L      Alkaline Phosphatase 71 U/L      Total Protein 6 8 g/dL      Albumin 4 0 g/dL      Total Bilirubin 0 61 mg/dL      eGFR 100 ml/min/1 73sq m     Narrative:      [place] guidelines for Chronic Kidney Disease (CKD):   •  Stage 1 with normal or high GFR (GFR > 90 mL/min/1 73 square meters)  •  Stage 2 Mild CKD (GFR = 60-89 mL/min/1 73 square meters)  •  Stage 3A Moderate CKD (GFR = 45-59 mL/min/1 73 square meters)  •  Stage 3B Moderate CKD (GFR = 30-44 mL/min/1 73 square meters)  •  Stage 4 Severe CKD (GFR = 15-29 mL/min/1 73 square meters)  •  Stage 5 End Stage CKD (GFR <15 mL/min/1 73 square meters)  Note: GFR calculation is accurate only with a steady state creatinine    Magnesium [[id number]]  (Normal) Collected: 05/25/23 2143    Lab Status: Final result Specimen: Blood from Arm, Right Updated: 05/25/23 2206     Magnesium 2 0 mg/dL     Lipase [[id number]]  (Normal) Collected: 05/25/23 2143    Lab Status: Final result Specimen: Blood from Arm, Right Updated: 05/25/23 2206     Lipase 26 u/L     CBC and differential [[id number]]  (Abnormal) Collected: 05/25/23 2143    Lab Status: Final result Specimen: Blood from Arm, Right Updated: 05/25/23 2148     WBC 7 16 Thousand/uL      RBC 4 25 Million/uL      Hemoglobin 11 1 g/dL      Hematocrit 36 6 %      MCV 86 fL      MCH 26 1 pg      MCHC 30 3 g/dL      RDW 18 8 %      MPV 9 3 fL      Platelets 946 Thousands/uL      nRBC 0 /100 WBCs      Neutrophils Relative 50 %      Immat GRANS % 0 %      Lymphocytes Relative 44 %      Monocytes Relative 5 %      Eosinophils Relative 1 %      Basophils Relative 0 %      Neutrophils Absolute 3 51 Thousands/µL      Immature Grans Absolute 0 01 Thousand/uL      Lymphocytes Absolute 3 14 Thousands/µL      Monocytes Absolute 0 39 Thousand/µL      Eosinophils Absolute 0 08 Thousand/µL      Basophils Absolute 0 03 Thousands/µL                  CT abdomen pelvis with contrast   Final Result by Edna Walker MD (05/25 2344)      1  No acute abnormality   2   1 mm nonobstructing right renal stone            Workstation performed: [id number]                    Procedures  Procedures         ED Course  ED Course as of 05/26/23 0143   u May 25, 2023   2203 Ultrasound-guided IV placed by myself secondary to difficulty obtaining access   2209 Hemoglobin(!): 11 1  stable                                             Medical Decision Making  79-year-old female presents emergency department for evaluation of headache and abdominal pain  On exam, she is anxious appearing, but in no acute distress  She has right-sided abdominal pain without rebound or guarding  Differential diagnosis includes but is not limited to pancreatitis, peptic ulcer disease, gastritis, appendicitis, bowel obstruction, perforation, viral illness, arrhythmia    Will check abdominal labs, CT scan of the abdomen and pelvis and treat symptomatically  Labs grossly unremarkable, CT scan was negative for any acute findings, did demonstrate a small kidney stone  On repeat assessment, patient resting comfortably in bed in no acute distress  Symptoms currently well controlled at this time  Plan to discharge home with GI follow-up  Patient was given strict return precautions  Abdominal pain: acute illness or injury  Headache: acute illness or injury  Hx of gastric bypass: chronic illness or injury  Kidney stone: acute illness or injury  Amount and/or Complexity of Data Reviewed  External Data Reviewed: notes  Labs: ordered  Decision-making details documented in ED Course  Radiology: ordered and independent interpretation performed  Risk  OTC drugs  Prescription drug management  Disposition  Final diagnoses:   Abdominal pain   Kidney stone   Hx of gastric bypass   Headache     Time reflects when diagnosis was documented in both MDM as applicable and the Disposition within this note     Time User Action Codes Description Comment    5/25/2023 11:47 PM Check, Shaaron Locket Add [R10 9] Abdominal pain     5/25/2023 11:47 PM Check, Shaaron Locket Add [N20 0] Kidney stone     5/25/2023 11:47 PM Check, Shaaron Locket Add [Z98 84] Hx of gastric bypass     5/25/2023 11:47 PM Check, Shaaron Locket Add [R51 9] Headache       ED Disposition     ED Disposition   Discharge    Condition   Stable    Date/Time   Thu May 25, 2023 11:54 PM    Comment   Marylou Morris discharge to home/self care                 Follow-up Information     Follow up With Specialties Details Why Contact Info Additional [address]  Emergency Department Emergency Medicine  If symptoms worsen [address]  [place] [id number]  [facility] Emergency Department, [address] [facility] [address], [address]    Logan Khan DO Family Medicine Schedule an appointment as soon as possible for a visit   [address] [facility]  [phone]       [facility] Schedule an appointment as soon as possible for a visit   [address] [id number] [address] [facility] [place], [address] [facility],  [place], [place], [address], [phone]          Patient's Medications   Discharge Prescriptions    DICYCLOMINE (BENTYL) 20 MG TABLET    Take 1 tablet (20 mg total) by mouth 2 (two) times a day as needed (abd pain)       Start Date: 5/25/2023 End Date: --       Order Dose: 20 mg       Quantity: 20 tablet    Refills: 0    FLUTICASONE (FLONASE) 50 MCG/ACT NASAL SPRAY    1 spray into each nostril daily       Start Date: 5/25/2023 End Date: --       Order Dose: 1 spray       Quantity: 16 g    Refills: 0       No discharge procedures on file      PDMP Review       Value Time User    PDMP Reviewed  Yes 3/14/2022 12:20 PM Kenya Rojas DO          ED Provider  Electronically Signed by           Feliz Saha MD  05/26/23 8779

## 2023-05-26 NOTE — TELEPHONE ENCOUNTER
Work up looked largely unremarkable, slight anemia, CT showed small kidney stone  It's hard for me to say without seeing her in person and discussing her symptoms- would see if we can get her in with anyone next week  If any worsening symptoms in meantime ED or urgent care

## 2023-06-01 ENCOUNTER — OFFICE VISIT (OUTPATIENT)
Dept: FAMILY MEDICINE CLINIC | Facility: CLINIC | Age: 53
End: 2023-06-01

## 2023-06-01 ENCOUNTER — APPOINTMENT (OUTPATIENT)
Dept: LAB | Facility: CLINIC | Age: 53
End: 2023-06-01
Payer: COMMERCIAL

## 2023-06-01 VITALS
RESPIRATION RATE: 18 BRPM | WEIGHT: 176.8 LBS | DIASTOLIC BLOOD PRESSURE: 74 MMHG | HEIGHT: 60 IN | TEMPERATURE: 97.6 F | BODY MASS INDEX: 34.71 KG/M2 | SYSTOLIC BLOOD PRESSURE: 122 MMHG | OXYGEN SATURATION: 100 % | HEART RATE: 66 BPM

## 2023-06-01 DIAGNOSIS — N20.0 KIDNEY STONE: ICD-10-CM

## 2023-06-01 DIAGNOSIS — Z80.0 FAMILY HISTORY OF COLON CANCER IN FATHER: ICD-10-CM

## 2023-06-01 DIAGNOSIS — E55.9 VITAMIN D DEFICIENCY: ICD-10-CM

## 2023-06-01 DIAGNOSIS — Z98.84 HISTORY OF GASTRIC BYPASS: Primary | ICD-10-CM

## 2023-06-01 DIAGNOSIS — K21.9 GASTROESOPHAGEAL REFLUX DISEASE, UNSPECIFIED WHETHER ESOPHAGITIS PRESENT: ICD-10-CM

## 2023-06-01 DIAGNOSIS — E16.2 HYPOGLYCEMIA: ICD-10-CM

## 2023-06-01 DIAGNOSIS — Z98.84 HISTORY OF GASTRIC BYPASS: ICD-10-CM

## 2023-06-01 DIAGNOSIS — D64.9 ANEMIA, UNSPECIFIED TYPE: ICD-10-CM

## 2023-06-01 DIAGNOSIS — K59.00 CONSTIPATION, UNSPECIFIED CONSTIPATION TYPE: ICD-10-CM

## 2023-06-01 LAB
25(OH)D3 SERPL-MCNC: 40.4 NG/ML (ref 30–100)
ALBUMIN SERPL BCP-MCNC: 3.9 G/DL (ref 3.5–5)
ALP SERPL-CCNC: 80 U/L (ref 46–116)
ALT SERPL W P-5'-P-CCNC: 18 U/L (ref 12–78)
ANION GAP SERPL CALCULATED.3IONS-SCNC: 2 MMOL/L (ref 4–13)
AST SERPL W P-5'-P-CCNC: 23 U/L (ref 5–45)
BACTERIA UR QL AUTO: ABNORMAL /HPF
BASOPHILS # BLD AUTO: 0.04 THOUSANDS/ÂΜL (ref 0–0.1)
BASOPHILS NFR BLD AUTO: 1 % (ref 0–1)
BILIRUB SERPL-MCNC: 0.62 MG/DL (ref 0.2–1)
BILIRUB UR QL STRIP: NEGATIVE
BUN SERPL-MCNC: 12 MG/DL (ref 5–25)
CALCIUM SERPL-MCNC: 9.5 MG/DL (ref 8.3–10.1)
CHLORIDE SERPL-SCNC: 109 MMOL/L (ref 96–108)
CLARITY UR: CLEAR
CO2 SERPL-SCNC: 25 MMOL/L (ref 21–32)
COLOR UR: YELLOW
CREAT SERPL-MCNC: 0.65 MG/DL (ref 0.6–1.3)
EOSINOPHIL # BLD AUTO: 0.05 THOUSAND/ÂΜL (ref 0–0.61)
EOSINOPHIL NFR BLD AUTO: 1 % (ref 0–6)
ERYTHROCYTE [DISTWIDTH] IN BLOOD BY AUTOMATED COUNT: 18.9 % (ref 11.6–15.1)
EST. AVERAGE GLUCOSE BLD GHB EST-MCNC: 94 MG/DL
FERRITIN SERPL-MCNC: 5 NG/ML (ref 11–307)
FOLATE SERPL-MCNC: 13.2 NG/ML
GFR SERPL CREATININE-BSD FRML MDRD: 103 ML/MIN/1.73SQ M
GLUCOSE P FAST SERPL-MCNC: 86 MG/DL (ref 65–99)
GLUCOSE UR STRIP-MCNC: NEGATIVE MG/DL
HBA1C MFR BLD: 4.9 %
HCT VFR BLD AUTO: 38.9 % (ref 34.8–46.1)
HGB BLD-MCNC: 12.1 G/DL (ref 11.5–15.4)
HGB UR QL STRIP.AUTO: NEGATIVE
IMM GRANULOCYTES # BLD AUTO: 0.02 THOUSAND/UL (ref 0–0.2)
IMM GRANULOCYTES NFR BLD AUTO: 0 % (ref 0–2)
INSULIN SERPL-ACNC: 3.24 UIU/ML (ref 1.9–23)
IRON SATN MFR SERPL: 15 % (ref 15–50)
IRON SERPL-MCNC: 77 UG/DL (ref 50–170)
KETONES UR STRIP-MCNC: NEGATIVE MG/DL
LEUKOCYTE ESTERASE UR QL STRIP: ABNORMAL
LYMPHOCYTES # BLD AUTO: 2.13 THOUSANDS/ÂΜL (ref 0.6–4.47)
LYMPHOCYTES NFR BLD AUTO: 31 % (ref 14–44)
MCH RBC QN AUTO: 26.2 PG (ref 26.8–34.3)
MCHC RBC AUTO-ENTMCNC: 31.1 G/DL (ref 31.4–37.4)
MCV RBC AUTO: 84 FL (ref 82–98)
MONOCYTES # BLD AUTO: 0.35 THOUSAND/ÂΜL (ref 0.17–1.22)
MONOCYTES NFR BLD AUTO: 5 % (ref 4–12)
MUCOUS THREADS UR QL AUTO: ABNORMAL
NEUTROPHILS # BLD AUTO: 4.32 THOUSANDS/ÂΜL (ref 1.85–7.62)
NEUTS SEG NFR BLD AUTO: 62 % (ref 43–75)
NITRITE UR QL STRIP: NEGATIVE
NON-SQ EPI CELLS URNS QL MICRO: ABNORMAL /HPF
NRBC BLD AUTO-RTO: 0 /100 WBCS
PH UR STRIP.AUTO: 6 [PH]
PLATELET # BLD AUTO: 371 THOUSANDS/UL (ref 149–390)
PMV BLD AUTO: 9.4 FL (ref 8.9–12.7)
POTASSIUM SERPL-SCNC: 4 MMOL/L (ref 3.5–5.3)
PROT SERPL-MCNC: 7.7 G/DL (ref 6.4–8.4)
PROT UR STRIP-MCNC: ABNORMAL MG/DL
RBC # BLD AUTO: 4.61 MILLION/UL (ref 3.81–5.12)
RBC #/AREA URNS AUTO: ABNORMAL /HPF
SODIUM SERPL-SCNC: 136 MMOL/L (ref 135–147)
SP GR UR STRIP.AUTO: 1.03 (ref 1–1.03)
TIBC SERPL-MCNC: 501 UG/DL (ref 250–450)
UROBILINOGEN UR STRIP-ACNC: <2 MG/DL
VIT B12 SERPL-MCNC: 844 PG/ML (ref 180–914)
WBC # BLD AUTO: 6.91 THOUSAND/UL (ref 4.31–10.16)
WBC #/AREA URNS AUTO: ABNORMAL /HPF

## 2023-06-01 PROCEDURE — 84681 ASSAY OF C-PEPTIDE: CPT

## 2023-06-01 PROCEDURE — 82306 VITAMIN D 25 HYDROXY: CPT

## 2023-06-01 PROCEDURE — 84425 ASSAY OF VITAMIN B-1: CPT

## 2023-06-01 PROCEDURE — 83540 ASSAY OF IRON: CPT

## 2023-06-01 PROCEDURE — 82728 ASSAY OF FERRITIN: CPT

## 2023-06-01 PROCEDURE — 85025 COMPLETE CBC W/AUTO DIFF WBC: CPT

## 2023-06-01 PROCEDURE — 83036 HEMOGLOBIN GLYCOSYLATED A1C: CPT

## 2023-06-01 PROCEDURE — 36415 COLL VENOUS BLD VENIPUNCTURE: CPT

## 2023-06-01 PROCEDURE — 82607 VITAMIN B-12: CPT

## 2023-06-01 PROCEDURE — 80053 COMPREHEN METABOLIC PANEL: CPT

## 2023-06-01 PROCEDURE — 83525 ASSAY OF INSULIN: CPT

## 2023-06-01 PROCEDURE — 83550 IRON BINDING TEST: CPT

## 2023-06-01 PROCEDURE — 82746 ASSAY OF FOLIC ACID SERUM: CPT

## 2023-06-01 PROCEDURE — 84590 ASSAY OF VITAMIN A: CPT

## 2023-06-01 RX ORDER — LANCETS 33 GAUGE
EACH MISCELLANEOUS
Qty: 100 EACH | Refills: 3 | Status: SHIPPED | OUTPATIENT
Start: 2023-06-01

## 2023-06-01 RX ORDER — BLOOD-GLUCOSE METER
KIT MISCELLANEOUS
Qty: 1 KIT | Refills: 0 | Status: SHIPPED | OUTPATIENT
Start: 2023-06-01

## 2023-06-01 RX ORDER — BLOOD SUGAR DIAGNOSTIC
STRIP MISCELLANEOUS
Qty: 100 EACH | Refills: 3 | Status: SHIPPED | OUTPATIENT
Start: 2023-06-01

## 2023-06-01 NOTE — PROGRESS NOTES
[facility]        NAME: Ahsan Fried is a 47 y o  female  : 1970    MRN: [id number]  DATE: 2023  TIME: 3:22 PM    Assessment and Plan   1  History of gastric bypass  -Symptoms concerning for symptomatic hypoglycemia, BG 60s in the field  Pt has history of RnY bypass and currently following carb-restricted diet  She will get fasting labs done today     -  Vitamin D 25 hydroxy; Future  -     Vitamin B12; Future  -     Vitamin B1, whole blood; Future  -     Vitamin A; Future  -     C-peptide; Future  -     Insulin, fasting; Future  -     HEMOGLOBIN A1C W/ EAG ESTIMATION; Future  -     CBC and differential; Future  -     Iron Panel (Includes Ferritin, Iron Sat%, Iron, and TIBC); Future  -     Comprehensive metabolic panel; Future  -     Folate; Future  -     Blood Glucose Monitoring Suppl (OneTouch Verio Reflect) w/Device KIT; Check blood sugars once daily  Please substitute with appropriate alternative as covered by patient's insurance  Dx: E11 65  -     glucose blood (OneTouch Verio) test strip; Check blood sugars once daily  Please substitute with appropriate alternative as covered by patient's insurance  Dx: E11 65  -     OneTouch Delica Lancets 14B MISC; Check blood sugars once daily  Please substitute with appropriate alternative as covered by patient's insurance  Dx: E11 65    2  Vitamin D deficiency  -     Vitamin D 25 hydroxy; Future    3  Hypoglycemia  -     C-peptide; Future  -     Insulin, fasting; Future  -     HEMOGLOBIN A1C W/ EAG ESTIMATION; Future  -     Comprehensive metabolic panel; Future  -     Blood Glucose Monitoring Suppl (OneTouch Verio Reflect) w/Device KIT; Check blood sugars once daily  Please substitute with appropriate alternative as covered by patient's insurance  Dx: E11 65  -     glucose blood (OneTouch Verio) test strip; Check blood sugars once daily  Please substitute with appropriate alternative as covered by patient's insurance   Dx: E11 65  - "  OneTouch Delica Lancets 16W MISC; Check blood sugars once daily  Please substitute with appropriate alternative as covered by patient's insurance  Dx: E11 65    4  Anemia, unspecified type  -     Vitamin B12; Future  -     CBC and differential; Future  -     Iron Panel (Includes Ferritin, Iron Sat%, Iron, and TIBC); Future  -     Comprehensive metabolic panel; Future  -     Folate; Future    5  Family history of colon cancer in father    10  Constipation, unspecified constipation type    7  Kidney stone  -     Urinalysis with microscopic             Chief Complaint     Chief Complaint   Patient presents with   • Follow-up     ED follow up         History of Present Illness       53yo female here for ER follow up  Went to ER last week due to head feeling \"hot\", diaphoretic and lightheaded  Per EMS, BG 63 in the field  She has been following low-carb diet in attempts to lose weight  History of RnY bypass in 2012  Pt is taking iron, B12, vitamin D  Reports episodes of feeling warm, head is \"hot and sweaty\", lightheaded and palpitations, tremors  She is concerned about infection but denies localizing symptoms like sore throat, fever, cough, dysuria  Does struggle with chronic RUQ pain, constipation  ED records notable for large stool burden in colon  1mm right non-obstructing kidney stone  Hgb 11 1  Review of Systems   Review of Systems   Constitutional: Positive for diaphoresis and fatigue  Negative for appetite change, chills and fever  Gastrointestinal: Positive for abdominal pain, constipation and nausea  Negative for vomiting  Genitourinary: Positive for flank pain  Negative for difficulty urinating, dysuria and hematuria  Neurological: Positive for dizziness and light-headedness  Current Medications       Current Outpatient Medications:   •  Blood Glucose Monitoring Suppl (OneTouch Verio Reflect) w/Device KIT, Check blood sugars once daily   Please substitute with appropriate " alternative as covered by patient's insurance  Dx: E11 65, Disp: 1 kit, Rfl: 0  •  dicyclomine (BENTYL) 20 mg tablet, Take 1 tablet (20 mg total) by mouth 2 (two) times a day as needed (abd pain), Disp: 20 tablet, Rfl: 0  •  ergocalciferol (VITAMIN D2) 50,000 units, Take 1 capsule (50,000 Units total) by mouth once a week, Disp: 12 capsule, Rfl: 2  •  fluticasone (FLONASE) 50 mcg/act nasal spray, 1 spray into each nostril daily, Disp: 16 g, Rfl: 0  •  glucose blood (OneTouch Verio) test strip, Check blood sugars once daily  Please substitute with appropriate alternative as covered by patient's insurance  Dx: E11 65, Disp: 100 each, Rfl: 3  •  nystatin powder, apply to affected area three times a day, Disp: 60 g, Rfl: 1  •  OneTouch Delica Lancets 00N MISC, Check blood sugars once daily  Please substitute with appropriate alternative as covered by patient's insurance   Dx: E11 65, Disp: 100 each, Rfl: 3  •  pantoprazole (PROTONIX) 40 mg tablet, Take 1 tablet (40 mg total) by mouth daily, Disp: 30 tablet, Rfl: 5  •  sertraline (ZOLOFT) 100 mg tablet, Take 1 tablet (100 mg total) by mouth daily, Disp: 90 tablet, Rfl: 1  •  traZODone (DESYREL) 50 mg tablet, Take 1 tablet (50 mg total) by mouth daily at bedtime, Disp: 30 tablet, Rfl: 5  •  vitamin B-12 (VITAMIN B-12) 500 mcg tablet, Take 1 tablet (500 mcg total) by mouth daily, Disp: 30 tablet, Rfl: 2  •  albuterol (PROVENTIL HFA,VENTOLIN HFA) 90 mcg/act inhaler, Inhale 2 puffs every 6 (six) hours as needed for wheezing or shortness of breath (Patient not taking: Reported on 8/23/2022), Disp: 8 g, Rfl: 1    Current Allergies     Allergies as of 06/01/2023 - Reviewed 06/01/2023   Allergen Reaction Noted   • Morphine  03/26/2017   • Morphine Vomiting 06/03/2021   • Suboxone [buprenorphine-naloxone] Abdominal Pain 03/21/2022   • Sulfamethoxazole-trimethoprim Hives 06/03/2021   • Sulfamethoxazole-trimethoprim Nausea Only 09/30/2013            The following portions of the "patient's history were reviewed and updated as appropriate: allergies, current medications, past family history, past medical history, past social history, past surgical history and problem list      Past Medical History:   Diagnosis Date   • Asthma    • GERD (gastroesophageal reflux disease)    • H/O gastric bypass    • Hypokalemia    • Methamphetamine abuse ([facility] [address] )     Positive UDS; 2020   • Pyelonephritis        Past Surgical History:   Procedure Laterality Date   • CHOLECYSTECTOMY     • DENTAL SURGERY     • ESOPHAGEAL DILATION     • GASTRIC BYPASS     • EILEEN-EN-Y PROCEDURE     • SUBTOTAL COLECTOMY     • TOOTH EXTRACTION     • TUBAL LIGATION         Family History   Problem Relation Age of Onset   • Kidney disease Mother         Pt also reports \"bone disease\"   • Hypertension Mother    • Diabetes Mother    • Heart disease Mother    • Stroke Mother    • Arthritis Mother    • Hyperlipidemia Mother    • Colon cancer Father          motor cycle accident   • Heart disease Sister    • Coronary artery disease Sister    • No Known Problems Sister    • No Known Problems Sister    • No Known Problems Daughter    • No Known Problems Daughter    • Breast cancer Maternal Grandmother    • Lung cancer Maternal Grandmother    • Lung cancer Maternal Grandfather    • Lung cancer Paternal Grandmother    • Lung cancer Paternal Grandfather    • Cancer Brother    • Heart attack Brother    • Heart disease Brother    • Coronary artery disease Brother    • Lung cancer Paternal Uncle    • Lung cancer Cousin    • Uterine cancer Maternal Aunt    • No Known Problems Maternal Aunt    • No Known Problems Maternal Aunt    • Lung cancer Maternal Aunt    • Lung cancer Maternal Aunt    • No Known Problems Paternal Aunt    • No Known Problems Paternal Aunt          Medications have been verified          Objective   /74   Pulse 66   Temp 97 6 °F (36 4 °C)   Resp 18   Ht 5' (1 524 m)   Wt 80 2 kg (176 lb 12 8 oz)   LMP  (LMP " Unknown)   SpO2 100%   BMI 34 53 kg/m²        Physical Exam     Physical Exam  Vitals reviewed  Constitutional:       General: She is not in acute distress  Appearance: She is normal weight  Cardiovascular:      Rate and Rhythm: Normal rate and regular rhythm  Heart sounds: No murmur heard  No friction rub  No gallop  Pulmonary:      Effort: Pulmonary effort is normal  No respiratory distress  Breath sounds: No wheezing, rhonchi or rales  Abdominal:      General: Abdomen is flat  Bowel sounds are normal       Palpations: Abdomen is soft  There is no mass  Tenderness: There is abdominal tenderness in the right upper quadrant  There is no guarding or rebound  Neurological:      General: No focal deficit present  Mental Status: She is alert  Psychiatric:         Mood and Affect: Affect normal  Mood is anxious  Speech: Speech normal          Behavior: Behavior normal  Behavior is cooperative               Results:  Lab Results   Component Value Date    BUN 16 05/25/2023    CALCIUM 9 1 05/25/2023     (H) 05/25/2023    CO2 25 05/25/2023    CREATININE 0 70 05/25/2023    GLUC 70 05/25/2023    K 3 7 05/25/2023    SODIUM 141 05/25/2023       Lab Results   Component Value Date    HGBA1C 4 9 06/01/2023       Lab Results   Component Value Date    HCT 38 9 06/01/2023    HGB 12 1 06/01/2023    MCV 84 06/01/2023     06/01/2023    WBC 6 91 06/01/2023

## 2023-06-01 NOTE — PATIENT INSTRUCTIONS
What to Do if Your Blood Sugar is Low   AMBULATORY CARE:   Low blood sugar levels  (hypoglycemia) can happen with Type 1 and Type 2 diabetes  Low levels are more likely to happen if you use insulin  Hypoglycemia can cause you to have falls, accidents, and injuries  A blood sugar level that gets too low can lead to seizures, coma, and death  Learn to recognize the symptoms early so you can get treatment quickly  When your blood sugar is low you may feel:  • Sweaty    • Nervous or shaky    • Anxious or irritable    • Confused    • A fast, pounding heartbeat    • Extremely hungry    Have someone call your local emergency number (911 in the [address]) if:   • You cannot be woken  • You have a seizure  Call your doctor if:   • You have symptoms of a low blood sugar level, such as trouble thinking, sweating, or a pounding heartbeat  • Your blood sugar level is lower than normal and it does not improve with treatment  • You often have lower blood sugar levels than your target goals  • You have trouble coping with your illness, or you feel anxious or depressed  • You have questions or concerns about your condition or care  What to do if you have symptoms of low blood sugar:   • Check your blood sugar level, if possible  Your blood sugar level is too low if it is at or below 70 mg/dL  • Eat or drink 15 grams of fast-acting carbohydrate  Fast-acting carbohydrates will raise your blood sugar level quickly  Examples of 15 grams of fast-acting carbohydrates:     ? 4 ounces (½ cup) of fruit juice     ? 4 ounces of regular soda    ? 2 tablespoons of raisins     ? 1 tube of glucose gel or 3 to 4 glucose tablets       • Check your blood sugar level 15 minutes later  If the level is still low (less than 100 mg/dL), eat another 15 grams of carbohydrate  When the level returns to 100 mg/dL, eat a snack or meal that contains carbohydrates  This will help prevent another drop in blood sugar      • Teach people close to you how to use your glucagon kit  Your blood sugar may be too low for you to be awake  People need to know when and how to use your kit  Prevent low blood sugar levels:  Prevent low blood sugar by knowing what increases your risk  Ask your healthcare provider for ways to prevent low blood sugar levels  Any of the following can increase your risk of low blood sugar:  • Fasting for tests or procedures    • During or after intense exercise    • Late or postponed meals    • Sleeping (you may need a bedtime snack)     • Drinking alcohol if you use insulin or insulin releasing pills    Follow up with your doctor as directed:  Write down your questions so you remember to ask them during your visits  © Copyright [employer] 2022 Information is for End User's use only and may not be sold, redistributed or otherwise used for commercial purposes  The above information is an  only  It is not intended as medical advice for individual conditions or treatments  Talk to your doctor, nurse or pharmacist before following any medical regimen to see if it is safe and effective for you

## 2023-06-02 LAB — C PEPTIDE SERPL-MCNC: 1.6 NG/ML (ref 1.1–4.4)

## 2023-06-05 LAB
VIT A SERPL-MCNC: 31.7 UG/DL (ref 20.1–62)
VIT B1 BLD-SCNC: 126.6 NMOL/L (ref 66.5–200)

## 2023-06-08 DIAGNOSIS — K21.9 GASTROESOPHAGEAL REFLUX DISEASE, UNSPECIFIED WHETHER ESOPHAGITIS PRESENT: ICD-10-CM

## 2023-06-08 RX ORDER — PANTOPRAZOLE SODIUM 40 MG/1
40 TABLET, DELAYED RELEASE ORAL DAILY
Qty: 30 TABLET | Refills: 0 | Status: SHIPPED | OUTPATIENT
Start: 2023-06-08

## 2023-06-08 NOTE — TELEPHONE ENCOUNTER
Patient requesting refill(s) of: Protonix 40mg    Last filled: 8/23/22 #30x5  Last appt: 6/1/23  Next appt: 7/17/23  Pharmacy: [facility]     Patient states she is completely out of medication

## 2023-06-13 DIAGNOSIS — Z12.11 SCREENING FOR COLON CANCER: Primary | ICD-10-CM

## 2023-06-13 NOTE — TELEPHONE ENCOUNTER
Attempted to contact pt to set up OA colonoscopy  No answer and voicemail not set up  Will try again

## 2023-06-16 ENCOUNTER — PREP FOR PROCEDURE (OUTPATIENT)
Dept: GASTROENTEROLOGY | Facility: CLINIC | Age: 53
End: 2023-06-16

## 2023-06-16 DIAGNOSIS — Z12.11 SCREENING FOR COLON CANCER: Primary | ICD-10-CM

## 2023-06-16 NOTE — TELEPHONE ENCOUNTER
OA colonoscopy scheduled for 7/25/23  Prep instructions reviewed with pt, and emailed to her per request      Please approve [facility] to [facility]

## 2023-07-03 ENCOUNTER — TELEPHONE (OUTPATIENT)
Dept: FAMILY MEDICINE CLINIC | Facility: CLINIC | Age: 53
End: 2023-07-03

## 2023-07-03 DIAGNOSIS — K21.9 GASTROESOPHAGEAL REFLUX DISEASE, UNSPECIFIED WHETHER ESOPHAGITIS PRESENT: ICD-10-CM

## 2023-07-03 DIAGNOSIS — R10.9 ABDOMINAL PAIN: ICD-10-CM

## 2023-07-03 DIAGNOSIS — Z98.84 HISTORY OF GASTRIC BYPASS: ICD-10-CM

## 2023-07-03 DIAGNOSIS — E16.2 HYPOGLYCEMIA: ICD-10-CM

## 2023-07-03 DIAGNOSIS — R51.9 HEADACHE: ICD-10-CM

## 2023-07-03 DIAGNOSIS — B37.49 CANDIDIASIS OF PERINEUM: ICD-10-CM
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
## 2023-07-04 RX ORDER — DICYCLOMINE HCL 20 MG
20 TABLET ORAL 2 TIMES DAILY PRN
Qty: 20 TABLET | Refills: 1 | Status: SHIPPED | OUTPATIENT
Start: 2023-07-04

## 2023-07-04 RX ORDER — NYSTATIN 100000 [USP'U]/G
POWDER TOPICAL 2 TIMES DAILY
Qty: 60 G | Refills: 1 | Status: SHIPPED | OUTPATIENT
Start: 2023-07-04

## 2023-07-04 RX ORDER — FLUTICASONE PROPIONATE 50 MCG
1 SPRAY, SUSPENSION (ML) NASAL DAILY
Qty: 16 G | Refills: 1 | Status: SHIPPED | OUTPATIENT
Start: 2023-07-04

## 2023-07-04 RX ORDER — PANTOPRAZOLE SODIUM 40 MG/1
40 TABLET, DELAYED RELEASE ORAL DAILY
Qty: 30 TABLET | Refills: 1 | Status: SHIPPED | OUTPATIENT
Start: 2023-07-04

## 2023-07-04 RX ORDER — BLOOD SUGAR DIAGNOSTIC
1 STRIP MISCELLANEOUS
Qty: 100 EACH | Refills: 3 | Status: SHIPPED | OUTPATIENT
Start: 2023-07-04 | End: 2023-07-11 | Stop reason: SDUPTHER

## 2023-07-04 NOTE — TELEPHONE ENCOUNTER
Sent, how long has she been experiencing breathing trouble for, and is it a wheezing/shortness of breath that she typically experiences with her asthma? If this is chronic/ongoing we can consider trial of steroid inhaler, or if acute may need prednisone. Thanks!
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
## 2023-07-05 NOTE — TELEPHONE ENCOUNTER
I would recommend a visit so we can figure out what's going on, can she come in sooner than the 17th?

## 2023-07-11 ENCOUNTER — APPOINTMENT (EMERGENCY)
Dept: CT IMAGING | Facility: HOSPITAL | Age: 53
End: 2023-07-11
Payer: COMMERCIAL

## 2023-07-11 ENCOUNTER — APPOINTMENT (OUTPATIENT)
Dept: MRI IMAGING | Facility: HOSPITAL | Age: 53
End: 2023-07-11
Payer: COMMERCIAL

## 2023-07-11 ENCOUNTER — OFFICE VISIT (OUTPATIENT)
Dept: FAMILY MEDICINE CLINIC | Facility: CLINIC | Age: 53
End: 2023-07-11
Payer: COMMERCIAL

## 2023-07-11 ENCOUNTER — HOSPITAL ENCOUNTER (OUTPATIENT)
Facility: HOSPITAL | Age: 53
Setting detail: OBSERVATION
Discharge: HOME | End: 2023-07-13
Attending: EMERGENCY MEDICINE | Admitting: INTERNAL MEDICINE
Payer: COMMERCIAL

## 2023-07-11 ENCOUNTER — APPOINTMENT (EMERGENCY)
Dept: RADIOLOGY | Facility: HOSPITAL | Age: 53
End: 2023-07-11
Payer: COMMERCIAL

## 2023-07-11 VITALS
HEIGHT: 60 IN | BODY MASS INDEX: 36.52 KG/M2 | DIASTOLIC BLOOD PRESSURE: 68 MMHG | OXYGEN SATURATION: 99 % | HEART RATE: 59 BPM | SYSTOLIC BLOOD PRESSURE: 106 MMHG | TEMPERATURE: 97.8 F | RESPIRATION RATE: 16 BRPM | WEIGHT: 186 LBS

## 2023-07-11 DIAGNOSIS — R29.90 STROKE-LIKE SYMPTOMS: ICD-10-CM

## 2023-07-11 DIAGNOSIS — R41.3 MEMORY LOSS: ICD-10-CM

## 2023-07-11 DIAGNOSIS — R47.1 DYSARTHRIA: ICD-10-CM

## 2023-07-11 DIAGNOSIS — Z98.84 HISTORY OF GASTRIC BYPASS: Primary | ICD-10-CM

## 2023-07-11 DIAGNOSIS — H53.9 VISION DISTURBANCE: ICD-10-CM

## 2023-07-11 DIAGNOSIS — R47.01 APHASIA: Primary | ICD-10-CM

## 2023-07-11 DIAGNOSIS — R51.9 INTERMITTENT HEADACHE: ICD-10-CM

## 2023-07-11 DIAGNOSIS — F41.8 DEPRESSION WITH ANXIETY: Chronic | ICD-10-CM

## 2023-07-11 DIAGNOSIS — E16.2 HYPOGLYCEMIA: ICD-10-CM

## 2023-07-11 DIAGNOSIS — R07.89 CHEST TIGHTNESS: ICD-10-CM

## 2023-07-11 DIAGNOSIS — R10.9 ACUTE RIGHT FLANK PAIN: ICD-10-CM

## 2023-07-11 DIAGNOSIS — R39.9 UTI SYMPTOMS: ICD-10-CM

## 2023-07-11 DIAGNOSIS — R06.02 SOB (SHORTNESS OF BREATH): ICD-10-CM

## 2023-07-11 LAB
ALBUMIN SERPL BCP-MCNC: 4.1 G/DL (ref 3.5–5)
ALP SERPL-CCNC: 74 U/L (ref 34–104)
ALT SERPL W P-5'-P-CCNC: 13 U/L (ref 7–52)
AMPHETAMINES SERPL QL SCN: NEGATIVE
ANION GAP SERPL CALCULATED.3IONS-SCNC: 6 MMOL/L
APTT PPP: 23 SECONDS (ref 23–37)
AST SERPL W P-5'-P-CCNC: 21 U/L (ref 13–39)
ATRIAL RATE: 54 BPM
ATRIAL RATE: 64 BPM
BARBITURATES UR QL: NEGATIVE
BASOPHILS # BLD AUTO: 0.02 THOUSANDS/ÂΜL (ref 0–0.1)
BASOPHILS NFR BLD AUTO: 0 % (ref 0–1)
BENZODIAZ UR QL: NEGATIVE
BILIRUB SERPL-MCNC: 0.52 MG/DL (ref 0.2–1)
BUN SERPL-MCNC: 11 MG/DL (ref 5–25)
CALCIUM SERPL-MCNC: 9.5 MG/DL (ref 8.4–10.2)
CARDIAC TROPONIN I PNL SERPL HS: <2 NG/L
CHLORIDE SERPL-SCNC: 105 MMOL/L (ref 96–108)
CO2 SERPL-SCNC: 28 MMOL/L (ref 21–32)
COCAINE UR QL: NEGATIVE
CREAT SERPL-MCNC: 0.58 MG/DL (ref 0.6–1.3)
EOSINOPHIL # BLD AUTO: 0.06 THOUSAND/ÂΜL (ref 0–0.61)
EOSINOPHIL NFR BLD AUTO: 1 % (ref 0–6)
ERYTHROCYTE [DISTWIDTH] IN BLOOD BY AUTOMATED COUNT: 19.3 % (ref 11.6–15.1)
ETHANOL SERPL-MCNC: <10 MG/DL
EXT PREGNANCY TEST URINE: NEGATIVE
EXT. CONTROL: NORMAL
GFR SERPL CREATININE-BSD FRML MDRD: 106 ML/MIN/1.73SQ M
GLUCOSE SERPL-MCNC: 76 MG/DL (ref 65–140)
HCT VFR BLD AUTO: 41.5 % (ref 34.8–46.1)
HGB BLD-MCNC: 12.8 G/DL (ref 11.5–15.4)
IMM GRANULOCYTES # BLD AUTO: 0 THOUSAND/UL (ref 0–0.2)
IMM GRANULOCYTES NFR BLD AUTO: 0 % (ref 0–2)
INR PPP: 0.92 (ref 0.84–1.19)
LYMPHOCYTES # BLD AUTO: 2.42 THOUSANDS/ÂΜL (ref 0.6–4.47)
LYMPHOCYTES NFR BLD AUTO: 36 % (ref 14–44)
MAGNESIUM SERPL-MCNC: 2.1 MG/DL (ref 1.9–2.7)
MCH RBC QN AUTO: 27.6 PG (ref 26.8–34.3)
MCHC RBC AUTO-ENTMCNC: 30.8 G/DL (ref 31.4–37.4)
MCV RBC AUTO: 89 FL (ref 82–98)
METHADONE UR QL: NEGATIVE
MONOCYTES # BLD AUTO: 0.29 THOUSAND/ÂΜL (ref 0.17–1.22)
MONOCYTES NFR BLD AUTO: 4 % (ref 4–12)
NEUTROPHILS # BLD AUTO: 3.86 THOUSANDS/ÂΜL (ref 1.85–7.62)
NEUTS SEG NFR BLD AUTO: 59 % (ref 43–75)
NRBC BLD AUTO-RTO: 0 /100 WBCS
OPIATES UR QL SCN: NEGATIVE
OXYCODONE+OXYMORPHONE UR QL SCN: NEGATIVE
P AXIS: 54 DEGREES
P AXIS: 67 DEGREES
PCP UR QL: NEGATIVE
PHOSPHATE SERPL-MCNC: 3.6 MG/DL (ref 2.7–4.5)
PLATELET # BLD AUTO: 249 THOUSANDS/UL (ref 149–390)
PMV BLD AUTO: 9.4 FL (ref 8.9–12.7)
POTASSIUM SERPL-SCNC: 3.9 MMOL/L (ref 3.5–5.3)
PR INTERVAL: 140 MS
PR INTERVAL: 148 MS
PROT SERPL-MCNC: 6.9 G/DL (ref 6.4–8.4)
PROTHROMBIN TIME: 12.4 SECONDS (ref 11.6–14.5)
QRS AXIS: 39 DEGREES
QRS AXIS: 55 DEGREES
QRSD INTERVAL: 80 MS
QRSD INTERVAL: 84 MS
QT INTERVAL: 432 MS
QT INTERVAL: 458 MS
QTC INTERVAL: 434 MS
QTC INTERVAL: 445 MS
RBC # BLD AUTO: 4.64 MILLION/UL (ref 3.81–5.12)
SL AMB  POCT GLUCOSE, UA: NEGATIVE
SL AMB LEUKOCYTE ESTERASE,UA: NEGATIVE
SL AMB POCT BILIRUBIN,UA: NEGATIVE
SL AMB POCT BLOOD,UA: NEGATIVE
SL AMB POCT CLARITY,UA: CLEAR
SL AMB POCT COLOR,UA: YELLOW
SL AMB POCT GLUCOSE BLD: 108
SL AMB POCT KETONES,UA: NEGATIVE
SL AMB POCT NITRITE,UA: NEGATIVE
SL AMB POCT PH,UA: 6.5
SL AMB POCT SPECIFIC GRAVITY,UA: 1.02
SL AMB POCT URINE PROTEIN: NEGATIVE
SL AMB POCT UROBILINOGEN: NORMAL
SODIUM SERPL-SCNC: 139 MMOL/L (ref 135–147)
T WAVE AXIS: 58 DEGREES
T WAVE AXIS: 69 DEGREES
THC UR QL: NEGATIVE
VENTRICULAR RATE: 54 BPM
VENTRICULAR RATE: 64 BPM
WBC # BLD AUTO: 6.65 THOUSAND/UL (ref 4.31–10.16)

## 2023-07-11 PROCEDURE — 80053 COMPREHEN METABOLIC PANEL: CPT | Performed by: EMERGENCY MEDICINE

## 2023-07-11 PROCEDURE — 81002 URINALYSIS NONAUTO W/O SCOPE: CPT | Performed by: FAMILY MEDICINE

## 2023-07-11 PROCEDURE — 83735 ASSAY OF MAGNESIUM: CPT | Performed by: EMERGENCY MEDICINE

## 2023-07-11 PROCEDURE — 85025 COMPLETE CBC W/AUTO DIFF WBC: CPT | Performed by: EMERGENCY MEDICINE

## 2023-07-11 PROCEDURE — 71045 X-RAY EXAM CHEST 1 VIEW: CPT

## 2023-07-11 PROCEDURE — 84484 ASSAY OF TROPONIN QUANT: CPT | Performed by: INTERNAL MEDICINE

## 2023-07-11 PROCEDURE — G1004 CDSM NDSC: HCPCS

## 2023-07-11 PROCEDURE — 99223 1ST HOSP IP/OBS HIGH 75: CPT | Performed by: INTERNAL MEDICINE

## 2023-07-11 PROCEDURE — 84100 ASSAY OF PHOSPHORUS: CPT | Performed by: EMERGENCY MEDICINE

## 2023-07-11 PROCEDURE — 70498 CT ANGIOGRAPHY NECK: CPT

## 2023-07-11 PROCEDURE — 85730 THROMBOPLASTIN TIME PARTIAL: CPT | Performed by: EMERGENCY MEDICINE

## 2023-07-11 PROCEDURE — 70496 CT ANGIOGRAPHY HEAD: CPT

## 2023-07-11 PROCEDURE — 85610 PROTHROMBIN TIME: CPT | Performed by: EMERGENCY MEDICINE

## 2023-07-11 PROCEDURE — 82948 REAGENT STRIP/BLOOD GLUCOSE: CPT | Performed by: FAMILY MEDICINE

## 2023-07-11 PROCEDURE — 70450 CT HEAD/BRAIN W/O DYE: CPT

## 2023-07-11 PROCEDURE — 93005 ELECTROCARDIOGRAM TRACING: CPT

## 2023-07-11 PROCEDURE — 81025 URINE PREGNANCY TEST: CPT | Performed by: EMERGENCY MEDICINE

## 2023-07-11 PROCEDURE — 36415 COLL VENOUS BLD VENIPUNCTURE: CPT | Performed by: EMERGENCY MEDICINE

## 2023-07-11 PROCEDURE — 99215 OFFICE O/P EST HI 40 MIN: CPT | Performed by: FAMILY MEDICINE

## 2023-07-11 PROCEDURE — 96374 THER/PROPH/DIAG INJ IV PUSH: CPT

## 2023-07-11 PROCEDURE — 82077 ASSAY SPEC XCP UR&BREATH IA: CPT | Performed by: EMERGENCY MEDICINE

## 2023-07-11 PROCEDURE — 80307 DRUG TEST PRSMV CHEM ANLYZR: CPT | Performed by: EMERGENCY MEDICINE

## 2023-07-11 PROCEDURE — 84484 ASSAY OF TROPONIN QUANT: CPT | Performed by: EMERGENCY MEDICINE

## 2023-07-11 PROCEDURE — 99285 EMERGENCY DEPT VISIT HI MDM: CPT | Performed by: EMERGENCY MEDICINE

## 2023-07-11 PROCEDURE — 99285 EMERGENCY DEPT VISIT HI MDM: CPT

## 2023-07-11 RX ORDER — LORAZEPAM 2 MG/ML
2 INJECTION INTRAMUSCULAR ONCE AS NEEDED
Status: COMPLETED | OUTPATIENT
Start: 2023-07-12 | End: 2023-07-12

## 2023-07-11 RX ORDER — PANTOPRAZOLE SODIUM 40 MG/1
40 TABLET, DELAYED RELEASE ORAL DAILY
Status: DISCONTINUED | OUTPATIENT
Start: 2023-07-12 | End: 2023-07-13 | Stop reason: HOSPADM

## 2023-07-11 RX ORDER — ONDANSETRON 2 MG/ML
4 INJECTION INTRAMUSCULAR; INTRAVENOUS ONCE
Status: COMPLETED | OUTPATIENT
Start: 2023-07-11 | End: 2023-07-11

## 2023-07-11 RX ORDER — ALBUTEROL SULFATE 90 UG/1
2 AEROSOL, METERED RESPIRATORY (INHALATION) EVERY 6 HOURS PRN
Status: DISCONTINUED | OUTPATIENT
Start: 2023-07-11 | End: 2023-07-13 | Stop reason: HOSPADM

## 2023-07-11 RX ORDER — ENOXAPARIN SODIUM 100 MG/ML
40 INJECTION SUBCUTANEOUS
Status: DISCONTINUED | OUTPATIENT
Start: 2023-07-12 | End: 2023-07-13 | Stop reason: HOSPADM

## 2023-07-11 RX ORDER — LORAZEPAM 2 MG/ML
1 INJECTION INTRAMUSCULAR ONCE
Status: COMPLETED | OUTPATIENT
Start: 2023-07-11 | End: 2023-07-11

## 2023-07-11 RX ORDER — ASPIRIN 325 MG
325 TABLET ORAL ONCE
Status: COMPLETED | OUTPATIENT
Start: 2023-07-11 | End: 2023-07-11

## 2023-07-11 RX ORDER — NICOTINE 21 MG/24HR
1 PATCH, TRANSDERMAL 24 HOURS TRANSDERMAL DAILY
Status: DISCONTINUED | OUTPATIENT
Start: 2023-07-12 | End: 2023-07-13 | Stop reason: HOSPADM

## 2023-07-11 RX ORDER — FLUTICASONE PROPIONATE 50 MCG
1 SPRAY, SUSPENSION (ML) NASAL DAILY
Status: DISCONTINUED | OUTPATIENT
Start: 2023-07-12 | End: 2023-07-13 | Stop reason: HOSPADM

## 2023-07-11 RX ORDER — SERTRALINE HYDROCHLORIDE 100 MG/1
100 TABLET, FILM COATED ORAL DAILY
Status: DISCONTINUED | OUTPATIENT
Start: 2023-07-12 | End: 2023-07-13 | Stop reason: HOSPADM

## 2023-07-11 RX ORDER — ACETAMINOPHEN 325 MG/1
650 TABLET ORAL EVERY 4 HOURS PRN
Status: DISCONTINUED | OUTPATIENT
Start: 2023-07-11 | End: 2023-07-13 | Stop reason: HOSPADM

## 2023-07-11 RX ORDER — LORAZEPAM 2 MG/ML
2 INJECTION INTRAMUSCULAR ONCE
Status: DISCONTINUED | OUTPATIENT
Start: 2023-07-11 | End: 2023-07-11

## 2023-07-11 RX ORDER — ATORVASTATIN CALCIUM 40 MG/1
40 TABLET, FILM COATED ORAL EVERY EVENING
Status: DISCONTINUED | OUTPATIENT
Start: 2023-07-11 | End: 2023-07-13 | Stop reason: HOSPADM

## 2023-07-11 RX ORDER — BLOOD SUGAR DIAGNOSTIC
1 STRIP MISCELLANEOUS
Qty: 100 EACH | Refills: 3 | Status: SHIPPED | OUTPATIENT
Start: 2023-07-11

## 2023-07-11 RX ORDER — NICOTINE 21 MG/24HR
14 PATCH, TRANSDERMAL 24 HOURS TRANSDERMAL DAILY
Status: COMPLETED | OUTPATIENT
Start: 2023-07-11 | End: 2023-07-12

## 2023-07-11 RX ORDER — M-VIT,TX,IRON,MINS/CALC/FOLIC 27MG-0.4MG
1 TABLET ORAL DAILY
COMMUNITY

## 2023-07-11 RX ORDER — ASPIRIN 81 MG/1
81 TABLET, CHEWABLE ORAL DAILY
Status: DISCONTINUED | OUTPATIENT
Start: 2023-07-12 | End: 2023-07-13 | Stop reason: HOSPADM

## 2023-07-11 RX ORDER — ONDANSETRON 2 MG/ML
4 INJECTION INTRAMUSCULAR; INTRAVENOUS EVERY 6 HOURS PRN
Status: DISCONTINUED | OUTPATIENT
Start: 2023-07-11 | End: 2023-07-12

## 2023-07-11 RX ORDER — TRAZODONE HYDROCHLORIDE 50 MG/1
50 TABLET ORAL
Status: DISCONTINUED | OUTPATIENT
Start: 2023-07-11 | End: 2023-07-11

## 2023-07-11 RX ADMIN — LORAZEPAM 1 MG: 2 INJECTION INTRAMUSCULAR; INTRAVENOUS at 22:35

## 2023-07-11 RX ADMIN — IOHEXOL 100 ML: 350 INJECTION, SOLUTION INTRAVENOUS at 16:33

## 2023-07-11 RX ADMIN — ATORVASTATIN CALCIUM 40 MG: 40 TABLET, FILM COATED ORAL at 20:45

## 2023-07-11 RX ADMIN — ASPIRIN 325 MG: 325 TABLET ORAL at 15:46

## 2023-07-11 RX ADMIN — ONDANSETRON 4 MG: 2 INJECTION INTRAMUSCULAR; INTRAVENOUS at 19:46

## 2023-07-11 RX ADMIN — ONDANSETRON 4 MG: 2 INJECTION INTRAMUSCULAR; INTRAVENOUS at 16:42

## 2023-07-11 RX ADMIN — NICOTINE 14 MG: 14 PATCH, EXTENDED RELEASE TRANSDERMAL at 22:35

## 2023-07-11 NOTE — NURSING NOTE
Pt from the er to room 227, oriented to the unit and the callbell, family at the pts bedside Complex Repair And Graft Additional Text (Will Appearing After The Standard Complex Repair Text): The complex repair was not sufficient to completely close the primary defect. The remaining additional defect was repaired with the graft mentioned below.
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
## 2023-07-11 NOTE — H&P
[address]  H&P  Name: Ernst Leiva 47 y.o. female I MRN: [id number]  Unit/Bed#: ED 13 I Date of Admission: 7/11/2023   Date of Service: 7/11/2023 I Hospital Day: 0      Assessment/Plan   * Stroke-like symptoms  Assessment & Plan  · Patient presents with aphasia over the course of the past few days  · No focal neurological deficits  · CT head within normal limits  · CTA with no large vessel occlusion  · Assigned observation for CVA rule out  · Initiate aspirin and statin therapy  · MRI brain  · 2D echocardiogram  · Monitor on telemetry  · Frequent neurochecks  · PT/OT/speech  · Allow for permissive hypertension    Depression with anxiety  Assessment & Plan  · Continue home sertraline and trazodone    Gastroesophageal reflux disease  Assessment & Plan  · Continue home PPI         VTE Prophylaxis: Lovenox  Code Status: Level 1 Full Code    Anticipated Length of Stay:  Patient will be admitted on an Observation basis with an anticipated length of stay of less than 2 midnights. Justification for Hospital Stay: CVA rule out    Total Time for Visit, including Counseling / Coordination of Care: I have spent a total time of 45 minutes on 07/11/23 in caring for this patient including Diagnostic results, Prognosis, Risks and benefits of tx options, Instructions for management, Patient and family education, Importance of tx compliance, Risk factor reductions, Impressions, Counseling / Coordination of care, Documenting in the medical record, Reviewing / ordering tests, medicine, procedures  , Obtaining or reviewing history   and Communicating with other healthcare professionals . Chief Complaint: Aphasia    History of Present Illness:    Ernst Leiva is a 47 y.o. female with a past medical history significant for GERD, anxiety with depression who presents with several days of aphasia. The patient has no other complaints at this time.   CT head within normal limits and CTA head and neck with no large vessel occlusion. Hemodynamically stable and saturating well on room air. Neurology recommended CVA rule out. Review of Systems:    Review of Systems   Constitutional: Negative for chills and fever. HENT: Negative for ear pain and sore throat. Eyes: Negative for pain and visual disturbance. Respiratory: Negative for cough and shortness of breath. Cardiovascular: Negative for chest pain and palpitations. Gastrointestinal: Negative for abdominal pain and vomiting. Genitourinary: Negative for dysuria and hematuria. Musculoskeletal: Negative for arthralgias and back pain. Skin: Negative for color change and rash. Neurological: Negative for seizures and syncope. All other systems reviewed and are negative. Past Medical and Surgical History:     Past Medical History:   Diagnosis Date   • Asthma    • GERD (gastroesophageal reflux disease)    • H/O gastric bypass    • Hypokalemia    • Methamphetamine abuse ([address])     Positive UDS; Feb 2020   • Pyelonephritis        Past Surgical History:   Procedure Laterality Date   • CHOLECYSTECTOMY     • DENTAL SURGERY     • ESOPHAGEAL DILATION     • GASTRIC BYPASS     • EILEEN-EN-Y PROCEDURE     • SUBTOTAL COLECTOMY     • TOOTH EXTRACTION     • TUBAL LIGATION         Meds/Allergies:    Prior to Admission medications    Medication Sig Start Date End Date Taking? Authorizing Provider   albuterol (PROVENTIL HFA,VENTOLIN HFA) 90 mcg/act inhaler Inhale 2 puffs every 6 (six) hours as needed for wheezing or shortness of breath 4/1/22   ABDOULAYE Quintanilla   Blood Glucose Monitoring Suppl (OneTouch Verio Reflect) w/Device KIT Check blood sugars once daily. Please substitute with appropriate alternative as covered by patient's insurance.  Dx: E11.65 6/1/23   Clif Ramirez MD   dicyclomine (BENTYL) 20 mg tablet Take 1 tablet (20 mg total) by mouth 2 (two) times a day as needed (abd pain) 7/4/23   Gray Humphrey DO   ergocalciferol (VITAMIN D2) 50,000 units Take 1 capsule (50,000 Units total) by mouth once a week 5/12/23   Lidia Gagnon DO   fluticasone [facility]) 50 mcg/act nasal spray 1 spray into each nostril daily 7/4/23   Lidia Gagnon, DO   glucose blood (OneTouch Verio) test strip Use 1 each 4 (four) times a day (before meals and at bedtime) Check blood sugars once daily. Please substitute with appropriate alternative as covered by patient's insurance. Dx: E11.65 7/11/23   Lidia Gagnon DO   nystatin powder Apply topically 2 (two) times a day 7/4/23   Lidia Gagnon, DO   OneTouch Delica Lancets 02Z MISC Check blood sugars once daily. Please substitute with appropriate alternative as covered by patient's insurance. Dx: E11.65 6/1/23   Carl Valdovinos MD   pantoprazole (PROTONIX) 40 mg tablet Take 1 tablet (40 mg total) by mouth daily 7/4/23   Lidia Gagnon DO   polyethylene glycol (GOLYTELY) 4000 mL solution Take 4,000 mL by mouth once for 1 dose 6/16/23 6/16/23  Carl Valdovinos MD   sertraline (ZOLOFT) 100 mg tablet Take 1 tablet (100 mg total) by mouth daily 5/26/22   Lidia Gagnon DO   traZODone (DESYREL) 50 mg tablet Take 1 tablet (50 mg total) by mouth daily at bedtime 8/23/22   Lidia Gagnon DO   vitamin B-12 (VITAMIN B-12) 500 mcg tablet Take 1 tablet (500 mcg total) by mouth daily 5/12/23   Lidia Gagnon, DO   glucose blood (OneTouch Verio) test strip Use 1 each 4 (four) times a day (before meals and at bedtime) Check blood sugars once daily. Please substitute with appropriate alternative as covered by patient's insurance. Dx: E11.65 7/4/23 7/11/23  Lidia Gagnon DO       Allergies:    Allergies   Allergen Reactions   • Morphine      anaphylactic   • Morphine Vomiting   • Suboxone [Buprenorphine-Naloxone] Abdominal Pain   • Sulfamethoxazole-Trimethoprim Hives   • Sulfamethoxazole-Trimethoprim Nausea Only       Social History:     Marital Status: Single   Substance Use History:   Social History     Substance and Sexual Activity   Alcohol Use Never     Social History     Tobacco Use   Smoking Status Every Day   • Packs/day: 0.50   • Types: Cigarettes   Smokeless Tobacco Never     Social History     Substance and Sexual Activity   Drug Use Not Currently   • Types: Methamphetamines    Comment: reports use this week (12/2020)       Family History:    Pertinent family history reviewed    Physical Exam:     Vitals:   Blood Pressure: 126/64 (07/11/23 1700)  Pulse: 59 (07/11/23 1700)  Temperature: (!) 97.3 °F (36.3 °C) (07/11/23 1334)  Temp Source: Temporal (07/11/23 1334)  Respirations: 18 (07/11/23 1700)  Weight - Scale: 84.4 kg (186 lb) (07/11/23 1334)  SpO2: 97 % (07/11/23 1700)    Physical Exam  Vitals and nursing note reviewed. Constitutional:       General: She is not in acute distress. Appearance: She is well-developed. HENT:      Head: Normocephalic and atraumatic. Eyes:      Conjunctiva/sclera: Conjunctivae normal.   Cardiovascular:      Rate and Rhythm: Normal rate and regular rhythm. Heart sounds: No murmur heard. Pulmonary:      Effort: Pulmonary effort is normal. No respiratory distress. Breath sounds: Normal breath sounds. Abdominal:      Palpations: Abdomen is soft. Tenderness: There is no abdominal tenderness. Musculoskeletal:         General: No swelling. Cervical back: Neck supple. Skin:     General: Skin is warm and dry. Capillary Refill: Capillary refill takes less than 2 seconds. Neurological:      Mental Status: She is alert.    Psychiatric:         Mood and Affect: Mood normal.          Additional Data:     Lab Results: I have reviewed pertinent results     Results from last 7 days   Lab Units 07/11/23  1438   WBC Thousand/uL 6.65   HEMOGLOBIN g/dL 12.8   HEMATOCRIT % 41.5   PLATELETS Thousands/uL 249   NEUTROS PCT % 59   LYMPHS PCT % 36   MONOS PCT % 4   EOS PCT % 1     Results from last 7 days   Lab Units 07/11/23  1438   SODIUM mmol/L 139   POTASSIUM mmol/L 3. 9   CHLORIDE mmol/L 105   CO2 mmol/L 28   BUN mg/dL 11   CREATININE mg/dL 0.58*   ANION GAP mmol/L 6   CALCIUM mg/dL 9.5   ALBUMIN g/dL 4.1   TOTAL BILIRUBIN mg/dL 0.52   ALK PHOS U/L 74   ALT U/L 13   AST U/L 21   GLUCOSE RANDOM mg/dL 76     Results from last 7 days   Lab Units 07/11/23  1438   INR  0.92                   Imaging: I have reviewed pertinent imaging     CTA head and neck with and without contrast   Final Result by Avril Daniels MD (07/11 1708)         1. No evidence of acute intracranial hemorrhage. 2. No evidence of hemodynamic significant stenosis, aneurysm or dissection. 3. Nonspecific rim-enhancing 7 mm left tonsillar lesion could relate to a cyst, correlate with either direct visualization or contrast-enhanced neck MRI on a nonemergent outpatient basis. Workstation performed: [id number]         CT head without contrast   Final Result by Vaughn Yap MD (07/11 1423)      No acute intracranial abnormality. Sinus disease. Workstation performed: [id number]         XR chest 1 view portable   Final Result by Margarito Brown MD (07/11 1528)      No acute cardiopulmonary disease. Workstation performed: [id number]         MRI Inpatient Order    (Results Pending)       EKG, Pathology, and Other Studies Reviewed on Admission:   · EKG: NSR    Allscripts / Epic Records Reviewed    ** Please Note: This note has been constructed using a voice recognition system.  **

## 2023-07-11 NOTE — ASSESSMENT & PLAN NOTE
· Patient presents with aphasia over the course of the past few days  · No focal neurological deficits  · CT head within normal limits  · CTA with no large vessel occlusion  · Assigned observation for CVA rule out  · Initiate aspirin and statin therapy  · MRI brain  · 2D echocardiogram  · Monitor on telemetry  · Frequent neurochecks  · PT/OT/speech  · Allow for permissive hypertension

## 2023-07-11 NOTE — PLAN OF CARE
Problem: PAIN - ADULT  Goal: Verbalizes/displays adequate comfort level or baseline comfort level  Description: Interventions:  - Encourage patient to monitor pain and request assistance  - Assess pain using appropriate pain scale  - Administer analgesics based on type and severity of pain and evaluate response  - Implement non-pharmacological measures as appropriate and evaluate response  - Consider cultural and social influences on pain and pain management  - Notify physician/advanced practitioner if interventions unsuccessful or patient reports new pain  Outcome: Progressing     Problem: INFECTION - ADULT  Goal: Absence or prevention of progression during hospitalization  Description: INTERVENTIONS:  - Assess and monitor for signs and symptoms of infection  - Monitor lab/diagnostic results  - Monitor all insertion sites, i.e. indwelling lines, tubes, and drains  - Monitor endotracheal if appropriate and nasal secretions for changes in amount and color  - [place] appropriate cooling/warming therapies per order  - Administer medications as ordered  - Instruct and encourage patient and family to use good hand hygiene technique  - Identify and instruct in appropriate isolation precautions for identified infection/condition  Outcome: Progressing  Goal: Absence of fever/infection during neutropenic period  Description: INTERVENTIONS:  - Monitor WBC    Outcome: Progressing     Problem: SAFETY ADULT  Goal: Patient will remain free of falls  Description: INTERVENTIONS:  - Educate patient/family on patient safety including physical limitations  - Instruct patient to call for assistance with activity   - Consult OT/PT to assist with strengthening/mobility   - Keep Call bell within reach  - Keep bed low and locked with side rails adjusted as appropriate  - Keep care items and personal belongings within reach  - Initiate and maintain comfort rounds  - Make Fall Risk Sign visible to staff  - Offer Toileting every 1 Hours, in advance of need  - Initiate/Maintain bed alarm  - Obtain necessary fall risk management equipment: bed   - Apply yellow socks and bracelet for high fall risk patients  - Consider moving patient to room near nurses station  Outcome: Progressing  Goal: Maintain or return to baseline ADL function  Description: INTERVENTIONS:  -  Assess patient's ability to carry out ADLs; assess patient's baseline for ADL function and identify physical deficits which impact ability to perform ADLs (bathing, care of mouth/teeth, toileting, grooming, dressing, etc.)  - Assess/evaluate cause of self-care deficits   - Assess range of motion  - Assess patient's mobility; develop plan if impaired  - Assess patient's need for assistive devices and provide as appropriate  - Encourage maximum independence but intervene and supervise when necessary  - Involve family in performance of ADLs  - Assess for home care needs following discharge   - Consider OT consult to assist with ADL evaluation and planning for discharge  - Provide patient education as appropriate  Outcome: Progressing  Goal: Maintains/Returns to pre admission functional level  Description: INTERVENTIONS:  - Perform BMAT or MOVE assessment daily.   - Set and communicate daily mobility goal to care team and patient/family/caregiver. - Collaborate with rehabilitation services on mobility goals if consulted  - Perform Range of Motion 3 times a day. - Reposition patient every 2 hours.   - Dangle patient 3 times a day  - Stand patient 3 times a day  - Ambulate patient 3 times a day  - Out of bed to chair 3 times a day   - Out of bed for meals 3 times a day  - Out of bed for toileting  - Record patient progress and toleration of activity level   Outcome: Progressing     Problem: DISCHARGE PLANNING  Goal: Discharge to home or other facility with appropriate resources  Description: INTERVENTIONS:  - Identify barriers to discharge w/patient and caregiver  - Arrange for needed discharge resources and transportation as appropriate  - Identify discharge learning needs (meds, wound care, etc.)  - Arrange for interpretive services to assist at discharge as needed  - Refer to Case Management Department for coordinating discharge planning if the patient needs post-hospital services based on physician/advanced practitioner order or complex needs related to functional status, cognitive ability, or social support system  Outcome: Progressing     Problem: Knowledge Deficit  Goal: Patient/family/caregiver demonstrates understanding of disease process, treatment plan, medications, and discharge instructions  Description: Complete learning assessment and assess knowledge base. Interventions:  - Provide teaching at level of understanding  - Provide teaching via preferred learning methods  Outcome: Progressing     Problem: Neurological Deficit  Goal: Neurological status is stable or improving  Description: Interventions:  - Monitor and assess patient's level of consciousness, motor function, sensory function, and level of assistance needed for ADLs. - Monitor and report changes from baseline. Collaborate with interdisciplinary team to initiate plan and implement interventions as ordered. - Provide and maintain a safe environment. - Consider seizure precautions. - Consider fall precautions. - Consider aspiration precautions. - Consider bleeding precautions. Outcome: Progressing     Problem: Activity Intolerance/Impaired Mobility  Goal: Mobility/activity is maintained at optimum level for patient  Description: Interventions:  - Assess and monitor patient  barriers to mobility and need for assistive/adaptive devices. - Assess patient's emotional response to limitations. - Collaborate with interdisciplinary team and initiate plans and interventions as ordered. - Encourage independent activity per ability.  - Maintain proper body alignment. - Perform active/passive rom as tolerated/ordered.   - Plan activities to conserve energy.  - Turn patient as appropriate  Outcome: Progressing     Problem: Communication Impairment  Goal: Ability to express needs and understand communication  Description: Assess patient's communication skills and ability to understand information. Patient will demonstrate use of effective communication techniques, alternative methods of communication and understanding even if not able to speak. - Encourage communication and provide alternate methods of communication as needed. - Collaborate with case management/ for discharge needs. - Include patient/family/caregiver in decisions related to communication. Outcome: Progressing     Problem: Potential for Aspiration  Goal: Non-ventilated patient's risk of aspiration is minimized  Description: Assess and monitor vital signs, respiratory status, and labs (WBC). Monitor for signs of aspiration (tachypnea, cough, rales, wheezing, cyanosis, fever). - Assess and monitor patient's ability to swallow. - Place patient up in chair to eat if possible. - HOB up at 90 degrees to eat if unable to get patient up into chair.  - Supervise patient during oral intake. - Instruct patient/ family to take small bites. - Instruct patient/ family to take small single sips when taking liquids. - Follow patient-specific strategies generated by speech pathologist.  Outcome: Progressing  Goal: Ventilated patient's risk of aspiration is minimized  Description: Assess and monitor vital signs, respiratory status, airway cuff pressure, and labs (WBC). Monitor for signs of aspiration (tachypnea, cough, rales, wheezing, cyanosis, fever). - Elevate head of bed 30 degrees if patient has tube feeding.  - Monitor tube feeding.   Outcome: Progressing     Problem: Nutrition  Goal: Nutrition/Hydration status is improving  Description: Monitor and assess patient's nutrition/hydration status for malnutrition (ex- brittle hair, bruises, dry skin, pale skin and conjunctiva, muscle wasting, smooth red tongue, and disorientation). Collaborate with interdisciplinary team and initiate plan and interventions as ordered. Monitor patient's weight and dietary intake as ordered or per policy. Utilize nutrition screening tool and intervene per policy. Determine patient's food preferences and provide high-protein, high-caloric foods as appropriate. - Assist patient with eating.  - Allow adequate time for meals.  - Encourage patient to take dietary supplement as ordered. - Collaborate with clinical nutritionist.  - Include patient/family/caregiver in decisions related to nutrition.   Outcome: Progressing

## 2023-07-12 ENCOUNTER — APPOINTMENT (OUTPATIENT)
Dept: MRI IMAGING | Facility: HOSPITAL | Age: 53
End: 2023-07-12
Payer: COMMERCIAL

## 2023-07-12 ENCOUNTER — APPOINTMENT (OUTPATIENT)
Dept: NON INVASIVE DIAGNOSTICS | Facility: HOSPITAL | Age: 53
End: 2023-07-12
Payer: COMMERCIAL

## 2023-07-12 ENCOUNTER — APPOINTMENT (OUTPATIENT)
Dept: RADIOLOGY | Facility: HOSPITAL | Age: 53
End: 2023-07-12
Payer: COMMERCIAL

## 2023-07-12 LAB
ANION GAP SERPL CALCULATED.3IONS-SCNC: 4 MMOL/L
AORTIC ROOT: 3 CM
AORTIC VALVE MEAN VELOCITY: 6.8 M/S
APICAL FOUR CHAMBER EJECTION FRACTION: 65 %
ASCENDING AORTA: 3.1 CM
ATRIAL RATE: 52 BPM
AV AREA BY CONTINUOUS VTI: 3.1 CM2
AV AREA PEAK VELOCITY: 2.8 CM2
AV LVOT MEAN GRADIENT: 2 MMHG
AV LVOT PEAK GRADIENT: 4 MMHG
AV MEAN GRADIENT: 2 MMHG
AV PEAK GRADIENT: 5 MMHG
AV VALVE AREA: 3.08 CM2
AV VELOCITY RATIO: 0.89
BASOPHILS # BLD AUTO: 0.03 THOUSANDS/ÂΜL (ref 0–0.1)
BASOPHILS NFR BLD AUTO: 1 % (ref 0–1)
BUN SERPL-MCNC: 10 MG/DL (ref 5–25)
CALCIUM SERPL-MCNC: 8.5 MG/DL (ref 8.4–10.2)
CHLORIDE SERPL-SCNC: 107 MMOL/L (ref 96–108)
CHOLEST SERPL-MCNC: 136 MG/DL
CO2 SERPL-SCNC: 26 MMOL/L (ref 21–32)
CREAT SERPL-MCNC: 0.57 MG/DL (ref 0.6–1.3)
DOP CALC AO PEAK VEL: 1.09 M/S
DOP CALC AO VTI: 23.39 CM
DOP CALC LVOT AREA: 3.14 CM2
DOP CALC LVOT CARDIAC INDEX: 2.31 L/MIN/M2
DOP CALC LVOT CARDIAC OUTPUT: 4.11 L/MIN
DOP CALC LVOT DIAMETER: 2 CM
DOP CALC LVOT PEAK VEL VTI: 22.93 CM
DOP CALC LVOT PEAK VEL: 0.97 M/S
DOP CALC LVOT STROKE INDEX: 39.3 ML/M2
DOP CALC LVOT STROKE VOLUME: 72
E WAVE DECELERATION TIME: 268 MS
EOSINOPHIL # BLD AUTO: 0.07 THOUSAND/ÂΜL (ref 0–0.61)
EOSINOPHIL NFR BLD AUTO: 1 % (ref 0–6)
ERYTHROCYTE [DISTWIDTH] IN BLOOD BY AUTOMATED COUNT: 19.1 % (ref 11.6–15.1)
FRACTIONAL SHORTENING: 29 (ref 28–44)
GFR SERPL CREATININE-BSD FRML MDRD: 106 ML/MIN/1.73SQ M
GLUCOSE P FAST SERPL-MCNC: 80 MG/DL (ref 65–99)
GLUCOSE SERPL-MCNC: 80 MG/DL (ref 65–140)
HCT VFR BLD AUTO: 37.6 % (ref 34.8–46.1)
HDLC SERPL-MCNC: 49 MG/DL
HGB BLD-MCNC: 11.6 G/DL (ref 11.5–15.4)
IMM GRANULOCYTES # BLD AUTO: 0 THOUSAND/UL (ref 0–0.2)
IMM GRANULOCYTES NFR BLD AUTO: 0 % (ref 0–2)
INTERVENTRICULAR SEPTUM IN DIASTOLE (PARASTERNAL SHORT AXIS VIEW): 1 CM
INTERVENTRICULAR SEPTUM: 1 CM (ref 0.6–1.1)
LAAS-AP2: 14.4 CM2
LAAS-AP4: 15.3 CM2
LDLC SERPL CALC-MCNC: 66 MG/DL (ref 0–100)
LEFT ATRIUM SIZE: 3.7 CM
LEFT ATRIUM VOLUME (MOD BIPLANE): 35 ML
LEFT INTERNAL DIMENSION IN SYSTOLE: 2.9 CM (ref 2.1–4)
LEFT VENTRICULAR INTERNAL DIMENSION IN DIASTOLE: 4.1 CM (ref 3.5–6)
LEFT VENTRICULAR POSTERIOR WALL IN END DIASTOLE: 1 CM
LEFT VENTRICULAR STROKE VOLUME: 43 ML
LVSV (TEICH): 43 ML
LYMPHOCYTES # BLD AUTO: 2.35 THOUSANDS/ÂΜL (ref 0.6–4.47)
LYMPHOCYTES NFR BLD AUTO: 46 % (ref 14–44)
MAGNESIUM SERPL-MCNC: 1.9 MG/DL (ref 1.9–2.7)
MCH RBC QN AUTO: 27.6 PG (ref 26.8–34.3)
MCHC RBC AUTO-ENTMCNC: 30.9 G/DL (ref 31.4–37.4)
MCV RBC AUTO: 90 FL (ref 82–98)
MONOCYTES # BLD AUTO: 0.29 THOUSAND/ÂΜL (ref 0.17–1.22)
MONOCYTES NFR BLD AUTO: 6 % (ref 4–12)
MV E'TISSUE VEL-SEP: 14 CM/S
MV PEAK A VEL: 0.55 M/S
MV PEAK E VEL: 80 CM/S
MV STENOSIS PRESSURE HALF TIME: 78 MS
MV VALVE AREA P 1/2 METHOD: 2.82
NEUTROPHILS # BLD AUTO: 2.38 THOUSANDS/ÂΜL (ref 1.85–7.62)
NEUTS SEG NFR BLD AUTO: 46 % (ref 43–75)
NRBC BLD AUTO-RTO: 0 /100 WBCS
P AXIS: 42 DEGREES
PHOSPHATE SERPL-MCNC: 3.9 MG/DL (ref 2.7–4.5)
PLATELET # BLD AUTO: 223 THOUSANDS/UL (ref 149–390)
PMV BLD AUTO: 9.1 FL (ref 8.9–12.7)
POTASSIUM SERPL-SCNC: 3.8 MMOL/L (ref 3.5–5.3)
PR INTERVAL: 116 MS
QRS AXIS: 89 DEGREES
QRSD INTERVAL: 84 MS
QT INTERVAL: 462 MS
QTC INTERVAL: 429 MS
RBC # BLD AUTO: 4.2 MILLION/UL (ref 3.81–5.12)
RIGHT ATRIUM AREA SYSTOLE A4C: 10 CM2
RIGHT VENTRICLE ID DIMENSION: 3 CM
SL CV LEFT ATRIUM LENGTH A2C: 5.1 CM
SL CV LV EF: 60
SL CV PED ECHO LEFT VENTRICLE DIASTOLIC VOLUME (MOD BIPLANE) 2D: 76 ML
SL CV PED ECHO LEFT VENTRICLE SYSTOLIC VOLUME (MOD BIPLANE) 2D: 33 ML
SODIUM SERPL-SCNC: 137 MMOL/L (ref 135–147)
T WAVE AXIS: 34 DEGREES
TR MAX PG: 17 MMHG
TR PEAK VELOCITY: 2.1 M/S
TRICUSPID ANNULAR PLANE SYSTOLIC EXCURSION: 2.3 CM
TRICUSPID VALVE PEAK REGURGITATION VELOCITY: 2.08 M/S
TRIGL SERPL-MCNC: 106 MG/DL
VENTRICULAR RATE: 52 BPM
WBC # BLD AUTO: 5.12 THOUSAND/UL (ref 4.31–10.16)

## 2023-07-12 PROCEDURE — 93010 ELECTROCARDIOGRAM REPORT: CPT | Performed by: INTERNAL MEDICINE

## 2023-07-12 PROCEDURE — 84100 ASSAY OF PHOSPHORUS: CPT | Performed by: INTERNAL MEDICINE

## 2023-07-12 PROCEDURE — 99233 SBSQ HOSP IP/OBS HIGH 50: CPT

## 2023-07-12 PROCEDURE — 93306 TTE W/DOPPLER COMPLETE: CPT | Performed by: INTERNAL MEDICINE

## 2023-07-12 PROCEDURE — 92610 EVALUATE SWALLOWING FUNCTION: CPT | Performed by: NURSE PRACTITIONER

## 2023-07-12 PROCEDURE — 83735 ASSAY OF MAGNESIUM: CPT | Performed by: INTERNAL MEDICINE

## 2023-07-12 PROCEDURE — 80048 BASIC METABOLIC PNL TOTAL CA: CPT | Performed by: INTERNAL MEDICINE

## 2023-07-12 PROCEDURE — 92523 SPEECH SOUND LANG COMPREHEN: CPT | Performed by: NURSE PRACTITIONER

## 2023-07-12 PROCEDURE — 85025 COMPLETE CBC W/AUTO DIFF WBC: CPT | Performed by: INTERNAL MEDICINE

## 2023-07-12 PROCEDURE — 97163 PT EVAL HIGH COMPLEX 45 MIN: CPT

## 2023-07-12 PROCEDURE — 93306 TTE W/DOPPLER COMPLETE: CPT

## 2023-07-12 PROCEDURE — 80061 LIPID PANEL: CPT | Performed by: INTERNAL MEDICINE

## 2023-07-12 PROCEDURE — 70551 MRI BRAIN STEM W/O DYE: CPT

## 2023-07-12 PROCEDURE — 97110 THERAPEUTIC EXERCISES: CPT

## 2023-07-12 PROCEDURE — 74022 RADEX COMPL AQT ABD SERIES: CPT

## 2023-07-12 RX ORDER — LORAZEPAM 0.5 MG/1
0.5 TABLET ORAL ONCE
Status: COMPLETED | OUTPATIENT
Start: 2023-07-12 | End: 2023-07-12

## 2023-07-12 RX ORDER — DICYCLOMINE HCL 20 MG
20 TABLET ORAL 4 TIMES DAILY PRN
Status: DISCONTINUED | OUTPATIENT
Start: 2023-07-12 | End: 2023-07-13 | Stop reason: HOSPADM

## 2023-07-12 RX ORDER — ONDANSETRON 2 MG/ML
4 INJECTION INTRAMUSCULAR; INTRAVENOUS EVERY 4 HOURS PRN
Status: DISCONTINUED | OUTPATIENT
Start: 2023-07-12 | End: 2023-07-13 | Stop reason: HOSPADM

## 2023-07-12 RX ORDER — KETOROLAC TROMETHAMINE 30 MG/ML
15 INJECTION, SOLUTION INTRAMUSCULAR; INTRAVENOUS EVERY 6 HOURS PRN
Status: DISCONTINUED | OUTPATIENT
Start: 2023-07-12 | End: 2023-07-13 | Stop reason: HOSPADM

## 2023-07-12 RX ADMIN — ONDANSETRON 4 MG: 2 INJECTION INTRAMUSCULAR; INTRAVENOUS at 12:02

## 2023-07-12 RX ADMIN — PANTOPRAZOLE SODIUM 40 MG: 40 TABLET, DELAYED RELEASE ORAL at 08:10

## 2023-07-12 RX ADMIN — FLUTICASONE PROPIONATE 1 SPRAY: 50 SPRAY, METERED NASAL at 08:12

## 2023-07-12 RX ADMIN — NICOTINE 1 PATCH: 21 PATCH, EXTENDED RELEASE TRANSDERMAL at 08:09

## 2023-07-12 RX ADMIN — ONDANSETRON 4 MG: 2 INJECTION INTRAMUSCULAR; INTRAVENOUS at 18:18

## 2023-07-12 RX ADMIN — ASPIRIN 81 MG 81 MG: 81 TABLET ORAL at 08:10

## 2023-07-12 RX ADMIN — ONDANSETRON 4 MG: 2 INJECTION INTRAMUSCULAR; INTRAVENOUS at 01:06

## 2023-07-12 RX ADMIN — CYANOCOBALAMIN TAB 500 MCG 500 MCG: 500 TAB at 08:10

## 2023-07-12 RX ADMIN — KETOROLAC TROMETHAMINE 15 MG: 30 INJECTION, SOLUTION INTRAMUSCULAR at 22:47

## 2023-07-12 RX ADMIN — ATORVASTATIN CALCIUM 40 MG: 40 TABLET, FILM COATED ORAL at 17:35

## 2023-07-12 RX ADMIN — LORAZEPAM 2 MG: 2 INJECTION INTRAMUSCULAR; INTRAVENOUS at 19:50

## 2023-07-12 RX ADMIN — ENOXAPARIN SODIUM 40 MG: 40 INJECTION SUBCUTANEOUS at 08:09

## 2023-07-12 RX ADMIN — LORAZEPAM 0.5 MG: 0.5 TABLET ORAL at 13:38

## 2023-07-12 NOTE — UTILIZATION REVIEW
Initial Clinical Review    Admission: Date/Time/Statement:   Admission Orders (From admission, onward)     Ordered        07/11/23 1740  Place in Observation  Once                      Orders Placed This Encounter   Procedures   • Place in Observation     Standing Status:   Standing     Number of Occurrences:   1     Order Specific Question:   Level of Care     Answer:   Med Surg [16]     ED Arrival Information     Expected   7/11/2023     Arrival   7/11/2023 13:25    Acuity   Emergent            Means of arrival   Wheelchair    Escorted by   Self    Service   Hospitalist    Admission type   Emergency            Arrival complaint   Hx gastric bypass           Chief Complaint   Patient presents with   • CVA/TIA-like Symptoms     Patient arrives with complaints of periods blurred vision and confusion that has been on and off the last few days. Initial Presentation: 47 y.o. female to the ED from PCP with complaints of blurred vision, confusion for a few days prior to arrival. Admitted under observation for stroke like symptoms. H/O GERD, anxiety, depression. Has had aphagia for the past few days. Arrives with gcs 15, mild dysarthria, occasional aphagia. CT head shows no acute finding. Started on asa, statin. Check MRi. Monitor neuro checks.      Date:     Day 2:      ED Triage Vitals   Temperature Pulse Respirations Blood Pressure SpO2   07/11/23 1334 07/11/23 1334 07/11/23 1334 07/11/23 1334 07/11/23 1334   (!) 97.3 °F (36.3 °C) 57 18 131/79 99 %      Temp Source Heart Rate Source Patient Position - Orthostatic VS BP Location FiO2 (%)   07/11/23 1334 07/11/23 1334 07/11/23 1334 07/11/23 1334 --   Temporal Monitor Sitting Left arm       Pain Score       07/11/23 1830       No Pain          Wt Readings from Last 1 Encounters:   07/12/23 81.6 kg (180 lb)     Additional Vital Signs:   Time Temp Pulse Resp BP MAP (mmHg) SpO2 O2 Device Patient Position - Orthostatic VS   07/12/23 0804 -- 57 -- 98/66 -- 98 % -- -- 07/12/23 07:09:39 97.6 °F (36.4 °C) 59 18 98/66 77 95 % -- Lying   07/12/23 0700 97.6 °F (36.4 °C) 56 18 98/66 77 91 % None (Room air) Lying   07/12/23 0500 97.8 °F (36.6 °C) 51 Abnormal  18 100/62 75 96 % None (Room air) Lying   07/12/23 0300 97.4 °F (36.3 °C) Abnormal  64 18 99/64 75 98 % None (Room air) Lying   07/12/23 01:00:08 97.6 °F (36.4 °C) 50 Abnormal  18 91/55 67 98 % None (Room air) Lying   07/12/23 0000 98.1 °F (36.7 °C) 51 Abnormal  18 105/71 82 95 % -- Lying   07/11/23 2300 97.7 °F (36.5 °C) 54 Abnormal  18 95/62 73 95 % None (Room air) Lying   07/11/23 21:00:11 97.4 °F (36.3 °C) Abnormal  58 18 114/70 85 94 % None (Room air) Lying   07/11/23 2000 98.2 °F (36.8 °C) 52 Abnormal  18 105/66 79 96 % None (Room air) Sitting   07/11/23 1900 98.8 °F (37.1 °C) 54 Abnormal  18 115/74 88 97 % None (Room air) Sitting   07/11/23 18:33:19 -- 54 Abnormal  18 97/68 78 98 % None (Room air) --   07/11/23 1830 98.3 °F (36.8 °C) 56 18 97/68 -- 96 % None (Room air) --   07/11/23 1700 -- 59 18 126/64 92 97 % -- --   07/11/23 1334 97.3 °F (36.3 °C) Abnormal  57 18 131/79 -- 99 % None (Room air) Sitting       Pertinent Labs/Diagnostic Test Results:   7/12 ECHO:  Left Ventricle: Left ventricular cavity size is normal. Wall thickness is normal. The left ventricular ejection fraction is 48%  Systolic function is normal. Wall motion is normal. Diastolic function is normal.     Normal study.   7/11 EKG: Normal sinus rhythm with sinus arrhythmia  Tracing is within normal limits  When compared with ECG of 25-MAY-2023 21:37,  No significant change was found    CTA head and neck with and without contrast   Final Result by Celeste Lombardo MD (07/11 4455)         1. No evidence of acute intracranial hemorrhage. 2. No evidence of hemodynamic significant stenosis, aneurysm or dissection.    3. Nonspecific rim-enhancing 7 mm left tonsillar lesion could relate to a cyst, correlate with either direct visualization or contrast-enhanced neck MRI on a nonemergent outpatient basis. Workstation performed: [id number]         CT head without contrast   Final Result by Angélica Randall MD (07/11 1423)      No acute intracranial abnormality. Sinus disease. Workstation performed: [id number]         XR chest 1 view portable   Final Result by Jodee Mcqueen MD (07/11 1528)      No acute cardiopulmonary disease.                   Workstation performed: [id number]         MRI brain wo contrast    (Results Pending)         Results from last 7 days   Lab Units 07/12/23  0517 07/11/23  1438   WBC Thousand/uL 5.12 6.65   HEMOGLOBIN g/dL 11.6 12.8   HEMATOCRIT % 37.6 41.5   PLATELETS Thousands/uL 223 249   NEUTROS ABS Thousands/µL 2.38 3.86         Results from last 7 days   Lab Units 07/12/23  0517 07/11/23  1438   SODIUM mmol/L 137 139   POTASSIUM mmol/L 3.8 3.9   CHLORIDE mmol/L 107 105   CO2 mmol/L 26 28   ANION GAP mmol/L 4 6   BUN mg/dL 10 11   CREATININE mg/dL 0.57* 0.58*   EGFR ml/min/1.73sq m 106 106   CALCIUM mg/dL 8.5 9.5   MAGNESIUM mg/dL 1.9 2.1   PHOSPHORUS mg/dL 3.9 3.6     Results from last 7 days   Lab Units 07/11/23  1438   AST U/L 21   ALT U/L 13   ALK PHOS U/L 74   TOTAL PROTEIN g/dL 6.9   ALBUMIN g/dL 4.1   TOTAL BILIRUBIN mg/dL 0.52         Results from last 7 days   Lab Units 07/12/23  0517 07/11/23  1438 07/11/23  1219   GLUCOSE RANDOM mg/dL 80 76 108             BETA-HYDROXYBUTYRATE   Date Value Ref Range Status   06/02/2021 4.4 (H) <0.6 mmol/L Final        Results from last 7 days   Lab Units 07/11/23  1957 07/11/23  1654 07/11/23  1438   HS TNI 0HR ng/L  --   --  <2   HS TNI 2HR ng/L  --  <2  --    HS TNI 4HR ng/L <2  --   --          Results from last 7 days   Lab Units 07/11/23  1438   PROTIME seconds 12.4   INR  0.92   PTT seconds 23         Results from last 7 days   Lab Units 07/11/23  1220   CLARITY UA  Clear   COLOR UA  Yellow   GLUCOSE UA  Negative   KETONES UA  Negative   BLOOD UA  Negative PROTEIN UA  Negative   NITRITE UA  Negative   BILIRUBIN UA POC  Negative   UROBILINOGEN UA  0.2 e.u   LEUKOCYTES UA  Negative       Results from last 7 days   Lab Units 07/11/23  1441   AMPH/METH  Negative   BARBITURATE UR  Negative   BENZODIAZEPINE UR  Negative   COCAINE UR  Negative   METHADONE URINE  Negative   OPIATE UR  Negative   PCP UR  Negative   THC UR  Negative     Results from last 7 days   Lab Units 07/11/23  1438   ETHANOL LVL mg/dL <10     ED Treatment:   Medication Administration from 07/11/2023 1257 to 07/11/2023 1823       Date/Time Order Dose Route Action Comments     07/11/2023 1546 EDT aspirin tablet 325 mg 325 mg Oral Given --     07/11/2023 1642 EDT ondansetron (ZOFRAN) injection 4 mg 4 mg Intravenous Given --        Past Medical History:   Diagnosis Date   • Asthma    • GERD (gastroesophageal reflux disease)    • H/O gastric bypass    • Hypokalemia    • Methamphetamine abuse ([facility])     Positive UDS;  Feb 2020   • Pyelonephritis        Admitting Diagnosis: Aphasia [R47.01]  Stroke-like symptoms [R29.90]  Age/Sex: 47 y.o. female  Admission Orders:  Scheduled Medications:  aspirin, 81 mg, Oral, Daily  atorvastatin, 40 mg, Oral, QPM  vitamin B-12, 500 mcg, Oral, Daily  enoxaparin, 40 mg, Subcutaneous, Q24H KEIRA  fluticasone, 1 spray, Nasal, Daily  nicotine, 14 mg, Transdermal, Daily  nicotine, 1 patch, Transdermal, Daily  pantoprazole, 40 mg, Oral, Daily  sertraline, 100 mg, Oral, Daily      Continuous IV Infusions:     PRN Meds:  acetaminophen, 650 mg, Oral, Q4H PRN  albuterol, 2 puff, Inhalation, Q6H PRN  LORazepam, 2 mg, Intravenous, Once PRN  ondansetron, 4 mg, Intravenous, Q6H PRN  pneumococcal 20-jazz conj vacc, 0.5 mL, Intramuscular, Prior to discharge        IP CONSULT TO CASE MANAGEMENT  IP CONSULT TO NEUROLOGY    Network Utilization Review Department  ATTENTION: Please call with any questions or concerns to [phone] and carefully listen to the prompts so that you are directed to the right person. All voicemails are confidential.  Tori April all requests for admission clinical reviews, approved or denied determinations and any other requests to dedicated fax number below belonging to the campus where the patient is receiving treatment.  List of dedicated fax numbers for the Facilities:  [place] DENIALS (Administrative/Medical Necessity) [phone] [address] [facility] (Maternity/NICU/Pediatrics) [phone]   [address] [place] [address] [phone]   [facility] [address] [facility] [phone]   [address] [place] [address] [address] [place] [address] [id number] [place] [address] [phone]   [address] [address] [phone]
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
## 2023-07-12 NOTE — PLAN OF CARE
Problem: PHYSICAL THERAPY ADULT  Goal: Performs mobility at highest level of function for planned discharge setting. See evaluation for individualized goals. Description: Treatment/Interventions: Functional transfer training, LE strengthening/ROM, Elevations, Therapeutic exercise, Endurance training, Patient/family training, Equipment eval/education, Bed mobility, Gait training, Spoke to nursing, Spoke to case management          See flowsheet documentation for full assessment, interventions and recommendations. Note: Prognosis: Fair  Problem List: Decreased strength, Decreased endurance, Impaired balance, Decreased mobility, Decreased coordination, Impaired judgement, Decreased safety awareness  Assessment: Pt is 47 y.o. female seen for PT evaluation s/p admit to [address] [place] [facility] [place] on 7/11/2023 w/ Stroke-like symptoms. PT consulted to assess pt's functional mobility and d/c needs. Order placed for PT eval and tx, w/ up and OOB as tolerated order. Comorbidities affecting pt's physical performance at time of assessment include: stroke-like symptoms,depression with anxiety,asthma, history of noncompliance, insomnia . PTA, pt was independent w/ all functional mobility w/ o AD utilization. Personal factors affecting pt at time of IE include: stairs to enter home, inability to navigate community distances, unable to perform dynamic tasks in community, inability to perform IADLs and noncompliance. Please find objective findings from PT assessment regarding body systems outlined above with impairments and limitations including weakness, impaired balance, decreased endurance, impaired coordination, gait deviations, decreased activity tolerance, decreased functional mobility tolerance, fall risk and nausea. From PT/mobility standpoint, recommendation at time of d/c would be home with outpatient rehabilitation pending progress in order to facilitate return to PLOF.         PT Discharge Recommendation: Home with outpatient rehabilitation    See flowsheet documentation for full assessment.

## 2023-07-12 NOTE — CASE MANAGEMENT
Case Management Assessment & Discharge Planning Note    Patient name Krissy Muller  Location /-03 MRN [id number]  : 1970 Date 2023       Current Admission Date: 2023  Current Admission Diagnosis:Stroke-like symptoms   Patient Active Problem List    Diagnosis Date Noted   • Stroke-like symptoms 2023   • Family history of colon cancer in father 2023   • Constipation 2023   • Chest pressure 2022   • SOB (shortness of breath) 2022   • Decreased diffusion capacity 2022   • Left leg pain 2022   • Intermittent chest pain 2022   • Tobacco use    • AGUILLON (dyspnea on exertion)    • Drug therapy continued 2022   • Post-menopausal    • Metabolic acidosis    • Contact dermatitis 2021   • Hypomagnesemia 2021   • Elevated beta-hydroxybutyrate 2021   • Urine ketones 2021   • Delirium 2021   • Non-traumatic rhabdomyolysis 2021   • Hypokalemia 2021   • History of methamphetamine abuse ([address] [facility]) 10/15/2020   • Perimenopausal 10/15/2020   • Gait abnormality 10/15/2020   • Gastrojejunal anastomotic stricture 2019   • History of noncompliance with medical treatment 2019   • Insomnia 2019   • Abdominal pain 2019   • Gastroesophageal reflux disease 2019   • History of gastric bypass 2019   • Depression with anxiety 2017   • Asthma 2016   • Herpes zoster 2016   • Atopic dermatitis 2016   • Vitamin D deficiency 12/15/2015   • Nicotine dependence 2015      LOS (days): 0  Geometric Mean LOS (GMLOS) (days):   Days to GMLOS:     OBJECTIVE:     Current admission status: Observation    Preferred Pharmacy:   [address] [place] [address]  Phone: [phone] Fax: [address] [address]  [address] [facility] [address]  Phone: [phone] Fax: [phone]    Primary Care Provider: Vijay Moreno DO    Primary Insurance: Dara Schroeder  Secondary Insurance:     ASSESSMENT:  Brandon Proxies    There are no active Health Care Proxies on file. Readmission Root Cause  30 Day Readmission: No    Patient Information  Admitted from[de-identified] Home  Mental Status: Alert  During Assessment patient was accompanied by: Not accompanied during assessment  Assessment information provided by[de-identified] Patient  Primary Caregiver: Self  Support Systems: Family members  [place] of Residence: [address] [place] [address] do you live in?: [place] C/ Devan Guillermo. [place] entry access options.  Select all that apply.: Ramp  Type of Current Residence: [facility]  In the last 12 months, was there a time when you were not able to pay the mortgage or rent on time?: No  In the last 12 months, how many places have you lived?: 1  In the last 12 months, was there a time when you did not have a steady place to sleep or slept in a shelter (including now)?: No  Homeless/housing insecurity resource given?: N/A  Living Arrangements: Lives w/ Parent(s)  Is patient a ?: No    Activities of Daily Living Prior to Admission  Functional Status: Independent  Completes ADLs independently?: Yes  Ambulates independently?: Yes  Does patient use assisted devices?: No  Does patient currently own DME?: Yes  What DME does the patient currently own?: [employer] Olvera  Does patient have a history of Outpatient Therapy (PT/OT)?: No  Does the patient have a history of Short-Term Rehab?: No  Does patient have a history of HHC?: No  Does patient currently have [address]?: No  Patient Information Continued  Income Source: SSI/SSD  Does patient have prescription coverage?: No  Within the past 12 months, you worried that your food would run out before you got the money to buy more.: Never true  Within the past 12 months, the food you bought just didn't last and you didn't have money to get more.: Never true  Food insecurity resource given?: N/A  Does patient receive dialysis treatments?: No    Means of Transportation  Means of Transport to Appts[de-identified] Family transport  In the past 12 months, has lack of transportation kept you from medical appointments or from getting medications?: No  In the past 12 months, has lack of transportation kept you from meetings, work, or from getting things needed for daily living?: No  Was application for public transport provided?: N/A        DISCHARGE DETAILS:    Discharge planning discussed with[de-identified] Pt, Pt is recommending outpt PT. Discussed same with pt    Contacts  Patient Contacts:  Mau Dc  Relationship to Patient[de-identified] Family  Contact Method: Phone  Phone Number: [phone]  Reason/Outcome: Emergency [address]         Is the patient interested in [facility] at discharge?: No    DME Referral Provided  Referral made for DME?: No  Would you like to participate in our [address] service program?  : No - Declined    Treatment Team Recommendation: Home  Discharge Destination Plan[de-identified] Home  Transport at Discharge : Family

## 2023-07-12 NOTE — PHYSICAL THERAPY NOTE
Physical Therapy Evaluation     Patient's Name: Marylou Morris    Admitting Diagnosis  Aphasia [R47.01]  Stroke-like symptoms [R29.90]    Problem List  Patient Active Problem List   Diagnosis   • History of noncompliance with medical treatment   • Insomnia   • Abdominal pain   • Gastroesophageal reflux disease   • Gastrojejunal anastomotic stricture   • Asthma   • Atopic dermatitis   • Depression with anxiety   • Herpes zoster   • Nicotine dependence   • Vitamin D deficiency   • History of gastric bypass   • History of methamphetamine abuse ([address])   • Perimenopausal   • Gait abnormality   • Delirium   • Non-traumatic rhabdomyolysis   • Hypokalemia   • Hypomagnesemia   • Elevated beta-hydroxybutyrate   • Urine ketones   • Metabolic acidosis   • Contact dermatitis   • Drug therapy continued   • Post-menopausal   • Tobacco use   • AGUILLON (dyspnea on exertion)   • Decreased diffusion capacity   • Left leg pain   • Intermittent chest pain   • Chest pressure   • SOB (shortness of breath)   • Family history of colon cancer in father   • Constipation   • Stroke-like symptoms       Past Medical History  Past Medical History:   Diagnosis Date   • Asthma    • GERD (gastroesophageal reflux disease)    • H/O gastric bypass    • Hypokalemia    • Methamphetamine abuse (HCC)     Positive UDS; Feb 2020   • Pyelonephritis        Past Surgical History  Past Surgical History:   Procedure Laterality Date   • CHOLECYSTECTOMY     • DENTAL SURGERY     • ESOPHAGEAL DILATION     • GASTRIC BYPASS     • EILEEN-EN-Y PROCEDURE     • SUBTOTAL COLECTOMY     • TOOTH EXTRACTION     • TUBAL LIGATION          07/12/23 0818   PT Last Visit   PT Visit Date 07/12/23   Note Type   Note type Evaluation   Pain Assessment   Pain Assessment Tool 0-10   Pain Score No Pain  (denies at rest and with activity)   Restrictions/Precautions   Weight Bearing Precautions Per Order No   Other Precautions Chair Alarm; Bed Alarm; Fall Risk;Telemetry   Home Living   Type of Home Mobile home  (stays at [address], awaiting a home through [address] One level;Performs ADLs on one level; Able to live on main level with bedroom/bathroom; Access; Ramped entrance   [employer] Grab bars in shower   [address] [place]  (did not utilize an AD prior to arrival)   Prior Function   Level of [place] Independent with ADLs; Independent with functional mobility; Independent with IADLS   Lives With [facility] Help From Family  (prn)   IADLs Independent with driving; Independent with meal prep; Independent with medication management   Falls in the last 6 months 0  (denies)   Vocational On disability   General   Family/Caregiver Present No   Cognition   Overall Cognitive Status WFL   Arousal/Participation Alert   Orientation Level Oriented X4   Memory Within functional limits   Following Commands Follows one step commands without difficulty   Comments Marylou was agreeable to PT assessment, pleasant. RLE Assessment   RLE Assessment X  (3+/5 gross musculature)   LLE Assessment   LLE Assessment X  (3+/5 gross musculature)   Vision-Basic Assessment   Current Vision Wears glasses only for reading   Vestibular   Spontaneous Nystagmus (-) no evidence of nystagmus at rest in room light   Gaze Holding Nystagmus (-) no evidence of nystagmus   Coordination   Movements are Fluid and Coordinated 1   Sharp/Dull   RLE Sharp/Dull Impaired  (R hand,specifically fingertips)   Bed Mobility   Supine to Sit 5  Supervision   Additional items Assist x 1;HOB elevated; Bedrails; Increased time required;Verbal cues   Sit to Supine   (DNT as Marylou was sitting out of bed on the recliner upon conclusion.)   Additional Comments Verbal cues for bedrail utilization. Nausea reported upon sitting edge of bed. She reports this tends to happen after eating.    Transfers   Sit to Stand 5  Supervision  (close) Additional items Assist x 1;Bedrails; Increased time required;Verbal cues   Stand to Sit 5  Supervision  (close)   Additional items Assist x 1;Bedrails; Increased time required;Verbal cues   Stand pivot 5  Supervision  (close)   Additional items Assist x 1; Increased time required;Verbal cues   Additional Comments Verbal cues for bedrail utilization and proper body mechanics. Ambulation/Elevation   Gait pattern Improper Weight shift;Narrow POOL; Short stride; Inconsistent andi  (mediolateral occasional instability)   Gait Assistance 5  Supervision  (close)   Additional items Assist x 1;Verbal cues   Assistive Device None   Distance 40 feet   Stair Management Assistance Not tested   Ambulation/Elevation Additional Comments Verbal cues for base of support widening and safety with directional changes. Balance   Static Sitting Good   Dynamic Sitting Fair   Static Standing Fair   Dynamic Standing Fair -   Ambulatory Fair -   Endurance Deficit   Endurance Deficit Yes   Activity Tolerance   Activity Tolerance Patient limited by fatigue; Other (Comment)  (nausea)   Medical Staff Made Aware Yes, CM was informed of d/c disposition recommendation. Nurse Made Aware yes, Lela PCA and Martha PCA   Assessment   Prognosis Fair   Problem List Decreased strength;Decreased endurance; Impaired balance;Decreased mobility; Decreased coordination; Impaired judgement;Decreased safety awareness   Assessment Pt is 47 y.o. female seen for PT evaluation s/p admit to [address] [place] [address] “” [place] on 7/11/2023 w/ Stroke-like symptoms. PT consulted to assess pt's functional mobility and d/c needs. Order placed for PT eval and tx, w/ up and OOB as tolerated order. Comorbidities affecting pt's physical performance at time of assessment include: stroke-like symptoms,depression with anxiety,asthma, history of noncompliance, insomnia . PTA, pt was independent w/ all functional mobility w/ o AD utilization.  Personal factors affecting pt at time of IE include: stairs to enter home, inability to navigate community distances, unable to perform dynamic tasks in community, inability to perform IADLs and noncompliance. Please find objective findings from PT assessment regarding body systems outlined above with impairments and limitations including weakness, impaired balance, decreased endurance, impaired coordination, gait deviations, decreased activity tolerance, decreased functional mobility tolerance, fall risk and nausea. From PT/mobility standpoint, recommendation at time of d/c would be home with outpatient rehabilitation pending progress in order to facilitate return to PLOF. Goals   Patient Goals to go home soon   LTG Expiration Date 07/22/23   Long Term Goal #1 Patient will complete transfers modified I  to decrease risk of falls, facilitate upright standing posture. BLE strength to greater than/equal to 4/5 gross musculature to increase ability to safely transfer, control descent to chair. Patient will exhibit increase dynamic standing balance to Good distant supervision 4-5 minutes to improve endurance. Patient will exhibit increase dynamic ambulatory balance to Good for 350-500 feet w/o AD distant supervision to improve ability to mobilize to toilet, chair and decrease risk for additional medical complications. PT Treatment Day 1  (please see below SOAP note and flow sheet)   Plan   Treatment/Interventions Functional transfer training;LE strengthening/ROM; Elevations; Therapeutic exercise; Endurance training;Patient/family training;Equipment eval/education; Bed mobility;Gait training;Spoke to nursing;Spoke to case management   PT Frequency 3-5x/wk   Recommendation   PT Discharge Recommendation Home with outpatient rehabilitation   Additional Comments Upon conclusion, Marylou was resting out of bed with all needs within reach.    AM-PAC Basic Mobility Inpatient   Turning in Flat Bed Without Bedrails 4   Lying on Back to Sitting on Edge of Flat Bed Without Bedrails 4   Moving Bed to Chair 3   Standing Up From Chair Using Arms 3   Walk in Room 3   Climb 3-5 Stairs With Railing 3   Basic Mobility Inpatient Raw Score 20   Basic Mobility Standardized Score 43.99   Highest Level Of Mobility   JH-HLM Goal 6: Walk 10 steps or more   JH-HLM Achieved 7: Walk 25 feet or more   Additional Treatment Session   Start Time 0828   End Time 9212   Treatment Assessment S: PT assessment had fully concluded and Marylou was agreeable to additional PT interventions. O: BLE therapeutic exercises x 10 minutes seated out of bed on the recliner. All tasks were AROM with specific tasks outlined in flow sheet. A: Marylou did well with all tasks. Occasional verbal cues provided for proper form. P: PT interventions will be advanced as patient can safely tolerate. Exercises   Quad Sets Sitting;20 reps;AROM; Bilateral  (2 sets)   Glute Sets Sitting;20 reps;AROM; Bilateral  (2 sets)   Hip Abduction Sitting;20 reps;AROM; Bilateral  (2 sets)   Hip Adduction Sitting;20 reps;AROM; Bilateral  (2 sets)   Ankle Pumps Sitting;20 reps;AROM; Bilateral  (2 sets)     History/Personal Factors/Comorbidities: stroke-like symptoms,depression with anxiety,asthma, history of noncompliance, insomnia    # of body structures/limitations: muscle weakness, activity intolerance,decreased endurance, impaired balance, gait deviations,impaired coordination    Clinical presentation: unstable as seen in nausea, progressive symptoms prior to hospitalization,fall risk, noncompliance    Initial Assessment Time: 2807-1125    Susi Mcdonnell, PT
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
## 2023-07-12 NOTE — ASSESSMENT & PLAN NOTE
· 7/11 patient presented to ED with aphasia over the course of the past few days  · She reports today symptoms completely resolved, feeling much better, no neurological deficits on exam  · 7/11 head CT: No acute intracranial abnormality  · 7/11 CTA head and neck: No evidence of intracranial hemorrhage, no evidence of significant stenosis, aneurysm or dissection.   · 7/11 ethanol level less than 10  · 7/11 UDA negative  · 7/11 chest x-ray no acute cardiopulmonary disease  · Patient admitted to stroke pathway  · 7/11 initiated aspirin and statin therapy  · MRI brain pending  · 7/12 2D echocardiogram: LVEF 60% with normal systolic function  · Monitored on telemetry overnight, no events we will discontinue  · Continue neurological checks as ordered  · PT/OT consulted, recommending outpatient rehab  · Speech therapy consult pending  · Neurology consult pending

## 2023-07-12 NOTE — PROGRESS NOTES
[address]  Progress Note  Name: Hany Garcia  MRN: [id number]  Unit/Bed#: -01 I Date of Admission: 7/11/2023   Date of Service: 7/12/2023 I Hospital Day: 0    Assessment/Plan   * Stroke-like symptoms  Assessment & Plan  · 7/11 patient presented to ED with aphasia over the course of the past few days  · She reports today symptoms completely resolved, feeling much better, no neurological deficits on exam  · 7/11 head CT: No acute intracranial abnormality  · 7/11 CTA head and neck: No evidence of intracranial hemorrhage, no evidence of significant stenosis, aneurysm or dissection. · 7/11 ethanol level less than 10  · 7/11 UDA negative  · 7/11 chest x-ray no acute cardiopulmonary disease  · Patient admitted to stroke pathway  · 7/11 initiated aspirin and statin therapy  · MRI brain pending  · 7/12 2D echocardiogram: LVEF 60% with normal systolic function  · Monitored on telemetry overnight, no events we will discontinue  · Continue neurological checks as ordered  · PT/OT consulted, recommending outpatient rehab  · Speech therapy consult pending  · Neurology consult pending    Depression with anxiety  Assessment & Plan  · Continue home sertraline and trazodone    Gastroesophageal reflux disease  Assessment & Plan  · Continue home PPI           VTE Pharmacologic Prophylaxis:   Pharmacologic: Enoxaparin (Lovenox)  Mechanical VTE Prophylaxis in Place: Yes    Patient Centered Rounds: I have performed bedside rounds with nursing staff today. Discussions with Specialists or Other Care Team Provider: PT/OT, case management, nursing    Education and Discussions with Family / Patient: Treatment plan discussed with patient and family at bedside all of which understand the plan as it is been explained and are agreeable to the plan as stated. All questions answered to satisfaction. Time Spent for Care: 40 minutes.   More than 50% of total time spent on counseling and coordination of care as described above. Current Length of Stay: 0 day(s)    Current Patient Status: Observation   Certification Statement: The patient will continue to require additional inpatient hospital stay due to Pending MRI, pending neuro consult    Discharge Plan: Patient was made observation status yesterday on stroke pathway. Head CT negative, CTA negative, MRI pending. Neurological consult pending. Patient's symptoms have resolved today. PT/OT recommend outpatient rehab on discharge. Plan will be to discharge patient home pending results of MRI/neuro consult. Code Status: Level 1 - Full Code      Subjective:   Patient states she is feeling much better today. Aphasia has resolved. Patient follows commands, no neurological deficits on exam.  Denied pain or discomfort. Objective:     Vitals:   Temp (24hrs), Av.9 °F (36.6 °C), Min:97.3 °F (36.3 °C), Max:98.8 °F (37.1 °C)    Temp:  [97.3 °F (36.3 °C)-98.8 °F (37.1 °C)] 98.4 °F (36.9 °C)  HR:  [50-64] 53  Resp:  [18] 18  BP: ()/(55-79) 116/70  SpO2:  [91 %-99 %] 97 %  Body mass index is 35.15 kg/m². Input and Output Summary (last 24 hours): Intake/Output Summary (Last 24 hours) at 2023 1322  Last data filed at 2023 1225  Gross per 24 hour   Intake 720 ml   Output --   Net 720 ml       Physical Exam:     Physical Exam  Vitals and nursing note reviewed. Constitutional:       General: She is not in acute distress. Appearance: She is obese. She is not ill-appearing. HENT:      Head: Normocephalic and atraumatic. Nose: Nose normal.      Mouth/Throat:      Mouth: Mucous membranes are moist.   Eyes:      Extraocular Movements: Extraocular movements intact. Conjunctiva/sclera: Conjunctivae normal.      Pupils: Pupils are equal, round, and reactive to light. Cardiovascular:      Rate and Rhythm: Normal rate and regular rhythm. Pulses: Normal pulses. Heart sounds: Normal heart sounds.    Pulmonary:      Effort: Pulmonary effort is normal. No respiratory distress. Breath sounds: Normal breath sounds. No wheezing or rales. Abdominal:      General: Bowel sounds are normal. There is no distension. Palpations: Abdomen is soft. Tenderness: There is no abdominal tenderness. There is no guarding. Musculoskeletal:         General: Normal range of motion. Cervical back: Normal range of motion and neck supple. Skin:     General: Skin is warm and dry. Capillary Refill: Capillary refill takes less than 2 seconds. Neurological:      General: No focal deficit present. Mental Status: She is alert and oriented to person, place, and time. Mental status is at baseline. Cranial Nerves: No cranial nerve deficit. Sensory: No sensory deficit. Motor: No weakness. Coordination: Coordination normal.      Gait: Gait normal.   Psychiatric:         Mood and Affect: Mood normal.         Behavior: Behavior normal.         Thought Content: Thought content normal.         Judgment: Judgment normal.         Additional Data:     Labs:    Results from last 7 days   Lab Units 07/12/23  0517   WBC Thousand/uL 5.12   HEMOGLOBIN g/dL 11.6   HEMATOCRIT % 37.6   PLATELETS Thousands/uL 223   NEUTROS PCT % 46   LYMPHS PCT % 46*   MONOS PCT % 6   EOS PCT % 1     Results from last 7 days   Lab Units 07/12/23  0517 07/11/23  1438   POTASSIUM mmol/L 3.8 3.9   CHLORIDE mmol/L 107 105   CO2 mmol/L 26 28   BUN mg/dL 10 11   CREATININE mg/dL 0.57* 0.58*   CALCIUM mg/dL 8.5 9.5   ALK PHOS U/L  --  74   ALT U/L  --  13   AST U/L  --  21     Results from last 7 days   Lab Units 07/11/23  1438   INR  0.92       * I Have Reviewed All Lab Data Listed Above. * Additional Pertinent Lab Tests Reviewed:  All [facility] Admission Reviewed    Imaging:    Imaging Reports Reviewed Today Include: Chest x-ray, CT head, CTA head and neck  Imaging Personally Reviewed by Myself Includes: Chest x-ray, CT head, CTA head and neck    Recent Cultures (last 7 days):           Last 24 Hours Medication List:   Current Facility-Administered Medications   Medication Dose Route Frequency Provider Last Rate   • acetaminophen  650 mg Oral Q4H PRN Darci Phoenix DO     • albuterol  2 puff Inhalation Q6H PRN Darci Phoenix DO     • aspirin  81 mg Oral Daily Darci Phoenix DO     • atorvastatin  40 mg Oral QPM Darci Phoenix DO     • vitamin B-12  500 mcg Oral Daily Darci Phoenix DO     • enoxaparin  40 mg Subcutaneous Q24H [address] Darci Phoenix DO     • fluticasone  1 spray Nasal Daily Darci Phoenix DO     • LORazepam  2 mg Intravenous Once PRN Kasey Elias PA-C     • LORazepam  0.5 mg Oral Once ABDOULAYE Dodson     • nicotine  14 mg Transdermal Daily Kasey Elias PA-C     • nicotine  1 patch Transdermal Daily Darci Phoenix DO     • ondansetron  4 mg Intravenous Q6H PRN Darci Phoenix DO     • pantoprazole  40 mg Oral Daily Darci Phoenix DO     • pneumococcal 20-jazz conj vacc  0.5 mL Intramuscular Prior to discharge Darci Phoenix DO     • sertraline  100 mg Oral Daily Darci Phoenix DO          Today, Patient Was Seen By: ABDOULAYE Johnson    ** Please Note: Dictation voice to text software may have been used in the creation of this document.  **

## 2023-07-13 ENCOUNTER — TELEPHONE (OUTPATIENT)
Dept: FAMILY MEDICINE CLINIC | Facility: CLINIC | Age: 53
End: 2023-07-13

## 2023-07-13 VITALS
HEART RATE: 59 BPM | WEIGHT: 180 LBS | BODY MASS INDEX: 35.34 KG/M2 | HEIGHT: 60 IN | RESPIRATION RATE: 18 BRPM | TEMPERATURE: 97.5 F | OXYGEN SATURATION: 97 % | SYSTOLIC BLOOD PRESSURE: 127 MMHG | DIASTOLIC BLOOD PRESSURE: 74 MMHG

## 2023-07-13 LAB
GLUCOSE SERPL-MCNC: 81 MG/DL (ref 65–140)
TSH SERPL DL<=0.05 MIU/L-ACNC: 1.23 UIU/ML (ref 0.45–4.5)
VIT B12 SERPL-MCNC: 1249 PG/ML (ref 180–914)

## 2023-07-13 PROCEDURE — 99244 OFF/OP CNSLTJ NEW/EST MOD 40: CPT | Performed by: PSYCHIATRY & NEUROLOGY

## 2023-07-13 PROCEDURE — 82948 REAGENT STRIP/BLOOD GLUCOSE: CPT

## 2023-07-13 PROCEDURE — 82607 VITAMIN B-12: CPT

## 2023-07-13 PROCEDURE — 84443 ASSAY THYROID STIM HORMONE: CPT

## 2023-07-13 PROCEDURE — 99239 HOSP IP/OBS DSCHRG MGMT >30: CPT

## 2023-07-13 RX ADMIN — NICOTINE 1 PATCH: 21 PATCH, EXTENDED RELEASE TRANSDERMAL at 08:57

## 2023-07-13 RX ADMIN — DICYCLOMINE HYDROCHLORIDE 20 MG: 20 TABLET ORAL at 08:58

## 2023-07-13 RX ADMIN — PANTOPRAZOLE SODIUM 40 MG: 40 TABLET, DELAYED RELEASE ORAL at 07:33

## 2023-07-13 RX ADMIN — ACETAMINOPHEN 650 MG: 325 TABLET ORAL at 08:58

## 2023-07-13 RX ADMIN — ASPIRIN 81 MG 81 MG: 81 TABLET ORAL at 08:58

## 2023-07-13 RX ADMIN — FLUTICASONE PROPIONATE 1 SPRAY: 50 SPRAY, METERED NASAL at 08:59

## 2023-07-13 RX ADMIN — CYANOCOBALAMIN TAB 500 MCG 500 MCG: 500 TAB at 08:58

## 2023-07-13 RX ADMIN — ENOXAPARIN SODIUM 40 MG: 40 INJECTION SUBCUTANEOUS at 08:57

## 2023-07-13 RX ADMIN — SERTRALINE 100 MG: 100 TABLET, FILM COATED ORAL at 08:58

## 2023-07-13 RX ADMIN — ONDANSETRON 4 MG: 2 INJECTION INTRAMUSCULAR; INTRAVENOUS at 10:54
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
## 2023-07-13 NOTE — PLAN OF CARE
Problem: PAIN - ADULT  Goal: Verbalizes/displays adequate comfort level or baseline comfort level  Description: Interventions:  - Encourage patient to monitor pain and request assistance  - Assess pain using appropriate pain scale  - Administer analgesics based on type and severity of pain and evaluate response  - Implement non-pharmacological measures as appropriate and evaluate response  - Consider cultural and social influences on pain and pain management  - Notify physician/advanced practitioner if interventions unsuccessful or patient reports new pain  Outcome: Progressing     Problem: INFECTION - ADULT  Goal: Absence or prevention of progression during hospitalization  Description: INTERVENTIONS:  - Assess and monitor for signs and symptoms of infection  - Monitor lab/diagnostic results  - Monitor all insertion sites, i.e. indwelling lines, tubes, and drains  - Monitor endotracheal if appropriate and nasal secretions for changes in amount and color  - Holden appropriate cooling/warming therapies per order  - Administer medications as ordered  - Instruct and encourage patient and family to use good hand hygiene technique  - Identify and instruct in appropriate isolation precautions for identified infection/condition  Outcome: Progressing  Goal: Absence of fever/infection during neutropenic period  Description: INTERVENTIONS:  - Monitor WBC    Outcome: Progressing     Problem: SAFETY ADULT  Goal: Patient will remain free of falls  Description: INTERVENTIONS:  - Educate patient/family on patient safety including physical limitations  - Instruct patient to call for assistance with activity   - Consult OT/PT to assist with strengthening/mobility   - Keep Call bell within reach  - Keep bed low and locked with side rails adjusted as appropriate  - Keep care items and personal belongings within reach  - Initiate and maintain comfort rounds  - Make Fall Risk Sign visible to staff  - Offer Toileting every  Hours, in advance of need  - Initiate/Maintain alarm  - Obtain necessary fall risk management equipment:   - Apply yellow socks and bracelet for high fall risk patients  - Consider moving patient to room near nurses station  Outcome: Progressing  Goal: Maintain or return to baseline ADL function  Description: INTERVENTIONS:  -  Assess patient's ability to carry out ADLs; assess patient's baseline for ADL function and identify physical deficits which impact ability to perform ADLs (bathing, care of mouth/teeth, toileting, grooming, dressing, etc.)  - Assess/evaluate cause of self-care deficits   - Assess range of motion  - Assess patient's mobility; develop plan if impaired  - Assess patient's need for assistive devices and provide as appropriate  - Encourage maximum independence but intervene and supervise when necessary  - Involve family in performance of ADLs  - Assess for home care needs following discharge   - Consider OT consult to assist with ADL evaluation and planning for discharge  - Provide patient education as appropriate  Outcome: Progressing  Goal: Maintains/Returns to pre admission functional level  Description: INTERVENTIONS:  - Perform BMAT or MOVE assessment daily.   - Set and communicate daily mobility goal to care team and patient/family/caregiver. - Collaborate with rehabilitation services on mobility goals if consulted  - Perform Range of Motion  times a day. - Reposition patient every  hours.   - Dangle patient  times a day  - Stand patient  times a day  - Ambulate patient  times a day  - Out of bed to chair  times a day   - Out of bed for meals  times a day  - Out of bed for toileting  - Record patient progress and toleration of activity level   Outcome: Progressing     Problem: DISCHARGE PLANNING  Goal: Discharge to home or other facility with appropriate resources  Description: INTERVENTIONS:  - Identify barriers to discharge w/patient and caregiver  - Arrange for needed discharge resources and transportation as appropriate  - Identify discharge learning needs (meds, wound care, etc.)  - Arrange for interpretive services to assist at discharge as needed  - Refer to Case Management Department for coordinating discharge planning if the patient needs post-hospital services based on physician/advanced practitioner order or complex needs related to functional status, cognitive ability, or social support system  Outcome: Progressing     Problem: Knowledge Deficit  Goal: Patient/family/caregiver demonstrates understanding of disease process, treatment plan, medications, and discharge instructions  Description: Complete learning assessment and assess knowledge base. Interventions:  - Provide teaching at level of understanding  - Provide teaching via preferred learning methods  Outcome: Progressing     Problem: Neurological Deficit  Goal: Neurological status is stable or improving  Description: Interventions:  - Monitor and assess patient's level of consciousness, motor function, sensory function, and level of assistance needed for ADLs. - Monitor and report changes from baseline. Collaborate with interdisciplinary team to initiate plan and implement interventions as ordered. - Provide and maintain a safe environment. - Consider seizure precautions. - Consider fall precautions. - Consider aspiration precautions. - Consider bleeding precautions. Outcome: Progressing     Problem: Activity Intolerance/Impaired Mobility  Goal: Mobility/activity is maintained at optimum level for patient  Description: Interventions:  - Assess and monitor patient  barriers to mobility and need for assistive/adaptive devices. - Assess patient's emotional response to limitations. - Collaborate with interdisciplinary team and initiate plans and interventions as ordered. - Encourage independent activity per ability.  - Maintain proper body alignment. - Perform active/passive rom as tolerated/ordered.   - Plan activities to conserve energy.  - Turn patient as appropriate  Outcome: Progressing     Problem: Communication Impairment  Goal: Ability to express needs and understand communication  Description: Assess patient's communication skills and ability to understand information. Patient will demonstrate use of effective communication techniques, alternative methods of communication and understanding even if not able to speak. - Encourage communication and provide alternate methods of communication as needed. - Collaborate with case management/ for discharge needs. - Include patient/family/caregiver in decisions related to communication. Outcome: Progressing     Problem: Potential for Aspiration  Goal: Non-ventilated patient's risk of aspiration is minimized  Description: Assess and monitor vital signs, respiratory status, and labs (WBC). Monitor for signs of aspiration (tachypnea, cough, rales, wheezing, cyanosis, fever). - Assess and monitor patient's ability to swallow. - Place patient up in chair to eat if possible. - HOB up at 90 degrees to eat if unable to get patient up into chair.  - Supervise patient during oral intake. - Instruct patient/ family to take small bites. - Instruct patient/ family to take small single sips when taking liquids. - Follow patient-specific strategies generated by speech pathologist.  Outcome: Progressing  Goal: Ventilated patient's risk of aspiration is minimized  Description: Assess and monitor vital signs, respiratory status, airway cuff pressure, and labs (WBC). Monitor for signs of aspiration (tachypnea, cough, rales, wheezing, cyanosis, fever). - Elevate head of bed 30 degrees if patient has tube feeding.  - Monitor tube feeding.   Outcome: Progressing     Problem: Nutrition  Goal: Nutrition/Hydration status is improving  Description: Monitor and assess patient's nutrition/hydration status for malnutrition (ex- brittle hair, bruises, dry skin, pale skin and conjunctiva, muscle wasting, smooth red tongue, and disorientation). Collaborate with interdisciplinary team and initiate plan and interventions as ordered. Monitor patient's weight and dietary intake as ordered or per policy. Utilize nutrition screening tool and intervene per policy. Determine patient's food preferences and provide high-protein, high-caloric foods as appropriate. - Assist patient with eating.  - Allow adequate time for meals.  - Encourage patient to take dietary supplement as ordered. - Collaborate with clinical nutritionist.  - Include patient/family/caregiver in decisions related to nutrition. Outcome: Progressing     Problem: MOBILITY - ADULT  Goal: Maintain or return to baseline ADL function  Description: INTERVENTIONS:  -  Assess patient's ability to carry out ADLs; assess patient's baseline for ADL function and identify physical deficits which impact ability to perform ADLs (bathing, care of mouth/teeth, toileting, grooming, dressing, etc.)  - Assess/evaluate cause of self-care deficits   - Assess range of motion  - Assess patient's mobility; develop plan if impaired  - Assess patient's need for assistive devices and provide as appropriate  - Encourage maximum independence but intervene and supervise when necessary  - Involve family in performance of ADLs  - Assess for home care needs following discharge   - Consider OT consult to assist with ADL evaluation and planning for discharge  - Provide patient education as appropriate  Outcome: Progressing  Goal: Maintains/Returns to pre admission functional level  Description: INTERVENTIONS:  - Perform BMAT or MOVE assessment daily.   - Set and communicate daily mobility goal to care team and patient/family/caregiver. - Collaborate with rehabilitation services on mobility goals if consulted  - Perform Range of Motion  times a day. - Reposition patient every  hours.   - Dangle patient  times a day  - Stand patient  times a day  - Ambulate patient  times a day  - Out of bed to chair  times a day   - Out of bed for meals times a day  - Out of bed for toileting  - Record patient progress and toleration of activity level   Outcome: Progressing     Problem: Nutrition/Hydration-ADULT  Goal: Nutrient/Hydration intake appropriate for improving, restoring or maintaining nutritional needs  Description: Monitor and assess patient's nutrition/hydration status for malnutrition. Collaborate with interdisciplinary team and initiate plan and interventions as ordered. Monitor patient's weight and dietary intake as ordered or per policy. Utilize nutrition screening tool and intervene as necessary. Determine patient's food preferences and provide high-protein, high-caloric foods as appropriate.      INTERVENTIONS:  - Monitor oral intake, urinary output, labs, and treatment plans  - Assess nutrition and hydration status and recommend course of action  - Evaluate amount of meals eaten  - Assist patient with eating if necessary   - Allow adequate time for meals  - Recommend/ encourage appropriate diets, oral nutritional supplements, and vitamin/mineral supplements  - Order, calculate, and assess calorie counts as needed  - Recommend, monitor, and adjust tube feedings and TPN/PPN based on assessed needs  - Assess need for intravenous fluids  - Provide specific nutrition/hydration education as appropriate  - Include patient/family/caregiver in decisions related to nutrition  Outcome: Progressing

## 2023-07-13 NOTE — DISCHARGE INSTR - AVS FIRST PAGE
There have been no changes to your prehospital medications  Please make a follow-up appointment with your primary care physician for within a week of discharge. Neurology is recommending an EEG test as an outpatient. This is to rule out seizure activity. Please discuss this with your primary care physician at your follow-up appointment so this can be scheduled. Please follow-up with neurology on discharge  I have also placed ambulatory referrals for psychiatry and psychology at the request of the neurologist who evaluated you today. Someone should be reaching out to you for follow-up. If you do not hear from anyone, you can contact the [facility] in [facility] for follow-up appointment.   The number is [phone]

## 2023-07-13 NOTE — DISCHARGE SUMMARY
[address]  Discharge- Kris Franz 1970, 47 y.o. female MRN: [id number]  Unit/Bed#: -01 Encounter: [id number]  Primary Care Provider: Brenda Cabrera DO   Date and time admitted to hospital: 7/11/2023  1:37 PM    * Stroke-like symptoms  Assessment & Plan  · 7/11 patient presented to ED with aphasia over the course of the past few days  · Aphasia has resolved, no neurodeficits on today's exam  · 7/11 head CT: No acute intracranial abnormality  · 7/11 CTA head and neck: No evidence of intracranial hemorrhage, no evidence of significant stenosis, aneurysm or dissection. · 7/11 ethanol level less than 10  · 7/11 UDA negative. Patient did inform me that she has a history of substance abuse. · 7/11 chest x-ray no acute cardiopulmonary disease  · Patient was admitted to stroke pathway  · 7/11 initiated aspirin and statin therapy  · 7/12 MRI brain: No acute intracranial abnormality. 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 Differential includes chronic migraines, demyelinating disease, Lyme disease, collagen vascular disorder, among other differentials. · 7/12 2D echocardiogram: LVEF 60% with normal systolic function  · Monitored on telemetry, no events as of 712 was discontinued  · PT/OT consulted, recommending outpatient rehab  · Speech therapy consulted, recommended regular diet with thin liquids  · Neurology consulted, appreciate recommendations.   Patient will have outpatient EEG and follow-up with neurology  · We will discharge patient home today with referral to outpatient PT/OT    Depression with anxiety  Assessment & Plan  · Continue home sertraline and trazodone on discharge  · Ambulatory referral placed to psychiatry and psychology at request of neurology    Gastroesophageal reflux disease  Assessment & Plan  · Continue home PPI on discharge      Discharging Physician / Practitioner: ABDOULAYE Rodriguez  PCP: Brenda Cabrera DO  Admission Date:   Admission Orders (From admission, onward)     Ordered        07/11/23 1740  Place in Observation  Once            07/11/23 1740  Place in Observation  Once                      Discharge Date: 07/13/23    Medical Problems     Resolved Problems  Date Reviewed: 7/13/2023   None         Consultations During Hospital Stay:  · Neurology    Procedures Performed:   · None    Significant Findings / Test Results:   · 711 chest x-ray: No acute cardiopulmonary disease  · 7/11 CT head: No acute intracranial abnormality, sinus disease noted  · 7/11 CTA head and neck: No evidence of acute intracranial hemorrhage, no evidence of significant stenosis, aneurysm or dissection. Did note 7 mm left tonsillar lesion which could be a cyst and recommended nonemergent outpatient follow-up  · 7/12 obstructive series: Nonobstructive bowel gas pattern  · 7/12 MRI brain: No acute intracranial abnormalities. Mild white matter disease chronic microangiopathic. Differentials include chronic migraines, demyelinating disease, possible Lyme disease, collagen vascular disorder among other differentials    Incidental Findings:   · See CTA head and neck noted tonsillar lesion    Test Results Pending at Discharge (will require follow up): · None     Outpatient Tests Requested:  · EEG requested by neurology    Complications: None    Reason for Admission: Strokelike symptoms    Hospital Course:     Mark Lucio is a 47 y.o. female patient who originally presented to the hospital on 7/11/2023 due to complaints of aphasia over the course of a few days prior to arrival to the ED. There were no focal neurological deficits on the initial exam.  CT head was within normal limits and CTA noted no large vessel occlusion. Patient was admitted to stroke pathway. Aspirin and statin therapy were initiated. Patient was monitored on telemetry, remained normal sinus rhythm throughout with no events.   MRI of the brain noted no acute intracranial abnormality, chronic microangiopathic he with differential diagnoses including chronic migraines, demyelinating disease, Lyme disease, collagen vascular disorder, among other differentials. Neurology was consulted. Okay to discharge patient today. She will have an outpatient EEG, outpatient referrals to psychiatry and psychology, outpatient neurocognitive assessment and will follow-up with neurology in outpatient setting. She will also follow-up with her PCP within a week of discharge. PT/OT evaluated patient and recommended outpatient rehab for which ambulatory referrals have been sent. Speech therapy saw patient and felt she could tolerate regular diet with thin liquids. Patient will be discharged home today. Please see above list of diagnoses and related plan for additional information. Condition at Discharge: good     Discharge Day Visit / Exam:     Subjective: Patient is pleasant. Anxious to go home. Neurologically intact. Aphasia has resolved. Vitals: Blood Pressure: 127/74 (07/13/23 0736)  Pulse: 59 (07/12/23 2248)  Temperature: 97.5 °F (36.4 °C) (07/13/23 0736)  Temp Source: Oral (07/12/23 1500)  Respirations: 18 (07/12/23 2248)  Height: 5' (152.4 cm) (07/12/23 0804)  Weight - Scale: 81.6 kg (180 lb) (07/12/23 0804)  SpO2: 97 % (07/12/23 2248)  Exam:   Physical Exam  Vitals and nursing note reviewed. Constitutional:       General: She is not in acute distress. Appearance: She is well-developed. She is obese. She is not ill-appearing. HENT:      Head: Normocephalic and atraumatic. Nose: Nose normal.      Mouth/Throat:      Mouth: Mucous membranes are moist.   Eyes:      Extraocular Movements: Extraocular movements intact. Conjunctiva/sclera: Conjunctivae normal.      Pupils: Pupils are equal, round, and reactive to light. Cardiovascular:      Rate and Rhythm: Normal rate and regular rhythm. Pulses: Normal pulses. Heart sounds: Normal heart sounds.  No murmur heard.  Pulmonary:      Effort: Pulmonary effort is normal. No respiratory distress. Breath sounds: Normal breath sounds. No wheezing or rales. Abdominal:      General: Bowel sounds are normal. There is no distension. Palpations: Abdomen is soft. Tenderness: There is no abdominal tenderness. There is no guarding. Musculoskeletal:         General: No swelling. Cervical back: Normal range of motion and neck supple. Skin:     General: Skin is warm and dry. Capillary Refill: Capillary refill takes less than 2 seconds. Neurological:      General: No focal deficit present. Mental Status: She is alert and oriented to person, place, and time. Mental status is at baseline. Cranial Nerves: No cranial nerve deficit. Sensory: No sensory deficit. Motor: No weakness. Coordination: Coordination normal.      Gait: Gait normal.   Psychiatric:         Mood and Affect: Mood normal.         Behavior: Behavior normal.         Thought Content: Thought content normal.         Judgment: Judgment normal.         Discussion with Family: Course of hospitalization including testing and treatments discussed with patient at length. All questions answered to her satisfaction. She is aware she is to follow-up with her primary care physician within a week of discharge, and that she should discuss an EEG with her PCP to be obtained in the outpatient setting as recommended by neurology. She is also aware she will follow-up with neurology on discharge. She also had ambulatory referrals to psychiatry and psychology per recommendations of neurology, and will follow up with PT/OT in the outpatient setting. Discharge instructions/Information to patient and family:   See after visit summary for information provided to patient and family. Provisions for Follow-Up Care:  See after visit summary for information related to follow-up care and any pertinent home health orders. Disposition:     Home    For Discharges to [facility] SNF:   · Not Applicable to this Patient - Not Applicable to this Patient    Planned Readmission: None     Discharge Statement:  I spent 50 minutes discharging the patient. This time was spent on the day of discharge. I had direct contact with the patient on the day of discharge. Greater than 50% of the total time was spent examining patient, answering all patient questions, arranging and discussing plan of care with patient as well as directly providing post-discharge instructions. Additional time then spent on discharge activities. Discharge Medications:  See after visit summary for reconciled discharge medications provided to patient and family.       ** Please Note: This note has been constructed using a voice recognition system **

## 2023-07-13 NOTE — ASSESSMENT & PLAN NOTE
· 7/11 patient presented to ED with aphasia over the course of the past few days  · Aphasia has resolved, no neurodeficits on today's exam  · 7/11 head CT: No acute intracranial abnormality  · 7/11 CTA head and neck: No evidence of intracranial hemorrhage, no evidence of significant stenosis, aneurysm or dissection. · 7/11 ethanol level less than 10  · 7/11 UDA negative. Patient did inform me that she has a history of substance abuse. · 7/11 chest x-ray no acute cardiopulmonary disease  · Patient was admitted to stroke pathway  · 7/11 initiated aspirin and statin therapy  · 7/12 MRI brain: No acute intracranial abnormality. 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 Differential includes chronic migraines, demyelinating disease, Lyme disease, collagen vascular disorder, among other differentials. · 7/12 2D echocardiogram: LVEF 60% with normal systolic function  · Monitored on telemetry, no events as of 712 was discontinued  · PT/OT consulted, recommending outpatient rehab  · Speech therapy consulted, recommended regular diet with thin liquids  · Neurology consulted, appreciate recommendations.   Patient will have outpatient EEG and follow-up with neurology  · We will discharge patient home today with referral to outpatient PT/OT

## 2023-07-13 NOTE — TELEPHONE ENCOUNTER
Patient called asking once she is discharged if it is possible to have ativan  prescribed as she is taking it in the hospital and it is working well,     She stated she feels perfectly fine, and was even able to do an MRI with out issues or anxiety    Lakshmi Rasheed,     Patient has a follow up apt on 7/17/23 and believes she may be discharged today

## 2023-07-13 NOTE — ASSESSMENT & PLAN NOTE
· Continue home sertraline and trazodone on discharge  · Ambulatory referral placed to psychiatry and psychology at request of neurology

## 2023-07-14 ENCOUNTER — TELEPHONE (OUTPATIENT)
Dept: PSYCHIATRY | Facility: CLINIC | Age: 53
End: 2023-07-14

## 2023-07-17 ENCOUNTER — TRANSITIONAL CARE MANAGEMENT (OUTPATIENT)
Dept: FAMILY MEDICINE CLINIC | Facility: CLINIC | Age: 53
End: 2023-07-17

## 2023-07-19 NOTE — TELEPHONE ENCOUNTER
Was calling pt in regards to routine referral and adding to proper wait list. Pt is not accepting calls at this time. Unable to make contact with pt or LVM.

## 2023-07-22 DIAGNOSIS — R10.9 ABDOMINAL PAIN: ICD-10-CM

## 2023-07-22 RX ORDER — DICYCLOMINE HCL 20 MG
TABLET ORAL
Qty: 20 TABLET | Refills: 1 | Status: SHIPPED | OUTPATIENT
Start: 2023-07-22

## 2023-07-26 ENCOUNTER — TELEPHONE (OUTPATIENT)
Dept: NEUROLOGY | Facility: CLINIC | Age: 53
End: 2023-07-26

## 2023-07-26 DIAGNOSIS — J45.40 MODERATE PERSISTENT ASTHMA WITHOUT COMPLICATION: ICD-10-CM

## 2023-07-26 RX ORDER — ALBUTEROL SULFATE 90 UG/1
AEROSOL, METERED RESPIRATORY (INHALATION)
Qty: 18 G | Refills: 0 | Status: SHIPPED | OUTPATIENT
Start: 2023-07-26

## 2023-07-26 NOTE — TELEPHONE ENCOUNTER
Contacted pt in regards to routine referral and placed on proper wait list. LVM for pt to contact the intake dept.

## 2023-07-26 NOTE — TELEPHONE ENCOUNTER
Hello,     Can you please advise which team and if patient can be seen by an Resident, AP and or Attending  only?     Thank you for your time,     June Fernandez        [facility]/ [address] [facility] 7/13/2023    Follow up with outpt [facility] neurology team

## 2023-07-31 ENCOUNTER — ANNUAL EXAM (OUTPATIENT)
Dept: OBGYN CLINIC | Facility: CLINIC | Age: 53
End: 2023-07-31
Payer: COMMERCIAL

## 2023-07-31 VITALS
DIASTOLIC BLOOD PRESSURE: 66 MMHG | BODY MASS INDEX: 35.26 KG/M2 | HEIGHT: 60 IN | SYSTOLIC BLOOD PRESSURE: 118 MMHG | WEIGHT: 179.6 LBS

## 2023-07-31 DIAGNOSIS — Z12.31 ENCOUNTER FOR SCREENING MAMMOGRAM FOR BREAST CANCER: ICD-10-CM

## 2023-07-31 DIAGNOSIS — B37.49 CANDIDIASIS OF PERINEUM: ICD-10-CM

## 2023-07-31 DIAGNOSIS — Z01.419 ROUTINE GYNECOLOGICAL EXAMINATION: Primary | ICD-10-CM

## 2023-07-31 PROCEDURE — 99396 PREV VISIT EST AGE 40-64: CPT | Performed by: ADVANCED PRACTICE MIDWIFE

## 2023-07-31 RX ORDER — NYSTATIN 100000 [USP'U]/G
POWDER TOPICAL 2 TIMES DAILY
Qty: 60 G | Refills: 1 | Status: SHIPPED | OUTPATIENT
Start: 2023-07-31

## 2023-07-31 NOTE — PROGRESS NOTES
[facility] [address] [place] [address]    ASSESSMENT/PLAN: Kristina Johnson is a 47 y.o. [id number] who presents for annual gynecologic exam.    Encounter for routine gynecologic examination  - Routine well woman exam completed today. - Cervical Cancer Screening: Current ASCCP Guidelines reviewed. Last Pap: 07/22/2022 normal. Next Pap Due: 1-2 yrs  - HPV Vaccination status: Not immunized  - STI screening offered including HIV: not indicated based on hx or requested at time of visit  - Breast Cancer Screening: Last Mammogram 09/02/2022, script given- to call and schedule  - Colorectal cancer screening was not ordered. States that she is being followed- for a problem with swallowing, was unable to do prep  - The following were reviewed in today's visit: breast self exam, mammography screening ordered, menopause, adequate intake of calcium and vitamin D and exercise  - RTO 1 yr    Additional problems addressed at this visit:  1. Routine gynecological examination  -     Mammo screening bilateral w 3d & cad; Future; Expected date: 09/02/2023    2. Encounter for screening mammogram for breast cancer  -     Mammo screening bilateral w 3d & cad; Future; Expected date: 09/02/2023    3. Candidiasis of perineum  -     nystatin powder; Apply topically 2 (two) times a day          CC:  Annual Gynecologic Examination    HPI: Kristina Johnson is a 47 y.o. [id number] who presents for annual gynecologic examination. Marylou presents today for gyn exam. No vaginal bleeding since onset of menopause. 2022 last pap smear- normal HPV and cytology, Hx of abnormal pap smear - no. Sexually active- no. Does not desire STI testing.  9/2022 mammogram- normal, and needs to schedule colonoscopy. Reports interrupted hrs of sleep daily, 1-2 servings of calcium rich foods daily, takes a multivitamin. Exercises : not since recent change in environment changes. 2 servings of caffeine daily. Performs SBE. Safe at home- yes. Wears seatbelt - yes. Concerns: none. Would like renewal of nystatin powder, working well. The following portions of the patient's history were reviewed and updated as appropriate: allergies, current medications, past family history, past medical history, obstetric history, gynecologic history, past social history, past surgical history and problem list.    Review of Systems   Constitutional: Negative for chills, fatigue and fever. Respiratory: Positive for shortness of breath. Negative for cough. Uses inhaler, environmental changes have increased use   Cardiovascular: Negative for chest pain, palpitations and leg swelling. Gastrointestinal: Positive for constipation. Negative for diarrhea. Genitourinary: Negative for difficulty urinating, dysuria, frequency, pelvic pain, urgency, vaginal bleeding, vaginal discharge and vaginal pain. Neurological: Negative for light-headedness and headaches. Psychiatric/Behavioral: The patient is nervous/anxious. Objective:  /66   Ht 5' (1.524 m)   Wt 81.5 kg (179 lb 9.6 oz)   LMP  (LMP Unknown)   BMI 35.08 kg/m²    Physical Exam  Vitals reviewed. Constitutional:       Appearance: Normal appearance. HENT:      Head: Normocephalic. Neck:      Thyroid: No thyroid mass or thyroid tenderness. Cardiovascular:      Rate and Rhythm: Normal rate and regular rhythm. Heart sounds: Normal heart sounds. Pulmonary:      Effort: Pulmonary effort is normal.      Breath sounds: Normal breath sounds. Chest:   Breasts:     Right: No mass, nipple discharge, skin change or tenderness. Left: No mass, nipple discharge, skin change or tenderness. Abdominal:      General: There is no distension. Palpations: There is no mass. Tenderness: There is no abdominal tenderness. There is no guarding. Genitourinary:     General: Normal vulva. Exam position: Lithotomy position. Labia:         Right: No tenderness or lesion. Left: No tenderness or lesion. Vagina: No vaginal discharge, tenderness, bleeding or lesions. Cervix: No discharge, lesion, erythema or cervical bleeding. Uterus: Normal. Not enlarged and not tender. Adnexa:         Right: No mass, tenderness or fullness. Left: No mass, tenderness or fullness. Musculoskeletal:      Cervical back: Normal range of motion. Lymphadenopathy:      Upper Body:      Right upper body: No axillary adenopathy. Left upper body: No axillary adenopathy. Skin:     General: Skin is warm and dry. Neurological:      Mental Status: She is alert.    Psychiatric:         Mood and Affect: Mood normal.         Behavior: Behavior normal.         Judgment: Judgment normal.             Micheal Ramírez CNM  [facility]  07/31/23 9:41 AM

## 2023-08-03 ENCOUNTER — VBI (OUTPATIENT)
Dept: ADMINISTRATIVE | Facility: OTHER | Age: 53
End: 2023-08-03

## 2023-08-09 DIAGNOSIS — R10.9 ABDOMINAL PAIN: ICD-10-CM

## 2023-08-09 RX ORDER — DICYCLOMINE HCL 20 MG
TABLET ORAL
Qty: 20 TABLET | Refills: 1 | Status: SHIPPED | OUTPATIENT
Start: 2023-08-09

## 2023-08-26 DIAGNOSIS — K21.9 GASTROESOPHAGEAL REFLUX DISEASE, UNSPECIFIED WHETHER ESOPHAGITIS PRESENT: ICD-10-CM

## 2023-08-26 DIAGNOSIS — R10.9 ABDOMINAL PAIN: ICD-10-CM

## 2023-08-26 RX ORDER — PANTOPRAZOLE SODIUM 40 MG/1
40 TABLET, DELAYED RELEASE ORAL DAILY
Qty: 30 TABLET | Refills: 1 | Status: SHIPPED | OUTPATIENT
Start: 2023-08-26

## 2023-08-26 RX ORDER — DICYCLOMINE HCL 20 MG
TABLET ORAL
Qty: 20 TABLET | Refills: 1 | Status: SHIPPED | OUTPATIENT
Start: 2023-08-26

## 2023-09-06 DIAGNOSIS — Z98.84 HISTORY OF GASTRIC BYPASS: ICD-10-CM

## 2023-09-06 DIAGNOSIS — J45.40 MODERATE PERSISTENT ASTHMA WITHOUT COMPLICATION: ICD-10-CM

## 2023-09-06 NOTE — TELEPHONE ENCOUNTER
Patient requesting refill(s) of: albuterol inhaler     Last filled: 7/26/2023 #18g x 0      Patient requesting refill(s) of: B12 500 mcg daily    Last filled: 5/12/2023 #30 x 2  Last appt: 7/11/2023  Next appt: 9/13/2023  Pharmacy: [facility]

## 2023-09-07 RX ORDER — CHOLECALCIFEROL (VITAMIN D3) 125 MCG
500 CAPSULE ORAL DAILY
Qty: 30 TABLET | Refills: 2 | Status: SHIPPED | OUTPATIENT
Start: 2023-09-07

## 2023-09-07 RX ORDER — ALBUTEROL SULFATE 90 UG/1
2 AEROSOL, METERED RESPIRATORY (INHALATION) EVERY 6 HOURS PRN
Qty: 18 G | Refills: 2 | Status: SHIPPED | OUTPATIENT
Start: 2023-09-07

## 2023-09-18 ENCOUNTER — TELEPHONE (OUTPATIENT)
Dept: FAMILY MEDICINE CLINIC | Facility: CLINIC | Age: 53
End: 2023-09-18

## 2023-09-18 ENCOUNTER — APPOINTMENT (OUTPATIENT)
Dept: LAB | Facility: CLINIC | Age: 53
End: 2023-09-18
Payer: COMMERCIAL

## 2023-09-18 ENCOUNTER — OFFICE VISIT (OUTPATIENT)
Dept: FAMILY MEDICINE CLINIC | Facility: CLINIC | Age: 53
End: 2023-09-18
Payer: COMMERCIAL

## 2023-09-18 VITALS
BODY MASS INDEX: 33.92 KG/M2 | SYSTOLIC BLOOD PRESSURE: 114 MMHG | TEMPERATURE: 97.4 F | HEIGHT: 60 IN | WEIGHT: 172.8 LBS | HEART RATE: 58 BPM | OXYGEN SATURATION: 99 % | DIASTOLIC BLOOD PRESSURE: 76 MMHG | RESPIRATION RATE: 18 BRPM

## 2023-09-18 DIAGNOSIS — R22.1 NECK SWELLING: Primary | ICD-10-CM

## 2023-09-18 DIAGNOSIS — F41.9 ANXIETY: ICD-10-CM

## 2023-09-18 DIAGNOSIS — R13.10 DYSPHAGIA, UNSPECIFIED TYPE: ICD-10-CM

## 2023-09-18 DIAGNOSIS — R22.1 NECK SWELLING: ICD-10-CM

## 2023-09-18 DIAGNOSIS — M54.2 NECK PAIN: ICD-10-CM

## 2023-09-18 LAB
ALBUMIN SERPL BCP-MCNC: 4.1 G/DL (ref 3.5–5)
ALP SERPL-CCNC: 58 U/L (ref 34–104)
ALT SERPL W P-5'-P-CCNC: 11 U/L (ref 7–52)
ANION GAP SERPL CALCULATED.3IONS-SCNC: 9 MMOL/L
AST SERPL W P-5'-P-CCNC: 21 U/L (ref 13–39)
BASOPHILS # BLD AUTO: 0.02 THOUSANDS/ÂΜL (ref 0–0.1)
BASOPHILS NFR BLD AUTO: 0 % (ref 0–1)
BILIRUB SERPL-MCNC: 0.85 MG/DL (ref 0.2–1)
BUN SERPL-MCNC: 10 MG/DL (ref 5–25)
CALCIUM SERPL-MCNC: 10 MG/DL (ref 8.4–10.2)
CHLORIDE SERPL-SCNC: 106 MMOL/L (ref 96–108)
CO2 SERPL-SCNC: 26 MMOL/L (ref 21–32)
CREAT SERPL-MCNC: 0.67 MG/DL (ref 0.6–1.3)
CRP SERPL QL: <1 MG/L
EOSINOPHIL # BLD AUTO: 0.06 THOUSAND/ÂΜL (ref 0–0.61)
EOSINOPHIL NFR BLD AUTO: 1 % (ref 0–6)
ERYTHROCYTE [DISTWIDTH] IN BLOOD BY AUTOMATED COUNT: 16.4 % (ref 11.6–15.1)
GFR SERPL CREATININE-BSD FRML MDRD: 101 ML/MIN/1.73SQ M
GLUCOSE P FAST SERPL-MCNC: 83 MG/DL (ref 65–99)
HCT VFR BLD AUTO: 41.9 % (ref 34.8–46.1)
HGB BLD-MCNC: 13.5 G/DL (ref 11.5–15.4)
IMM GRANULOCYTES # BLD AUTO: 0.03 THOUSAND/UL (ref 0–0.2)
IMM GRANULOCYTES NFR BLD AUTO: 0 % (ref 0–2)
LYMPHOCYTES # BLD AUTO: 2.15 THOUSANDS/ÂΜL (ref 0.6–4.47)
LYMPHOCYTES NFR BLD AUTO: 29 % (ref 14–44)
MCH RBC QN AUTO: 29 PG (ref 26.8–34.3)
MCHC RBC AUTO-ENTMCNC: 32.2 G/DL (ref 31.4–37.4)
MCV RBC AUTO: 90 FL (ref 82–98)
MONOCYTES # BLD AUTO: 0.29 THOUSAND/ÂΜL (ref 0.17–1.22)
MONOCYTES NFR BLD AUTO: 4 % (ref 4–12)
NEUTROPHILS # BLD AUTO: 4.85 THOUSANDS/ÂΜL (ref 1.85–7.62)
NEUTS SEG NFR BLD AUTO: 66 % (ref 43–75)
NRBC BLD AUTO-RTO: 0 /100 WBCS
PLATELET # BLD AUTO: 306 THOUSANDS/UL (ref 149–390)
PMV BLD AUTO: 9.7 FL (ref 8.9–12.7)
POTASSIUM SERPL-SCNC: 5 MMOL/L (ref 3.5–5.3)
PROT SERPL-MCNC: 6.9 G/DL (ref 6.4–8.4)
RBC # BLD AUTO: 4.65 MILLION/UL (ref 3.81–5.12)
SODIUM SERPL-SCNC: 141 MMOL/L (ref 135–147)
WBC # BLD AUTO: 7.4 THOUSAND/UL (ref 4.31–10.16)

## 2023-09-18 PROCEDURE — 85025 COMPLETE CBC W/AUTO DIFF WBC: CPT

## 2023-09-18 PROCEDURE — 36415 COLL VENOUS BLD VENIPUNCTURE: CPT

## 2023-09-18 PROCEDURE — 80053 COMPREHEN METABOLIC PANEL: CPT

## 2023-09-18 PROCEDURE — 86140 C-REACTIVE PROTEIN: CPT

## 2023-09-18 PROCEDURE — 99214 OFFICE O/P EST MOD 30 MIN: CPT | Performed by: FAMILY MEDICINE

## 2023-09-18 RX ORDER — PREDNISONE 20 MG/1
20 TABLET ORAL 2 TIMES DAILY WITH MEALS
Qty: 10 TABLET | Refills: 0 | Status: SHIPPED | OUTPATIENT
Start: 2023-09-18 | End: 2023-09-23

## 2023-09-18 RX ORDER — BUSPIRONE HYDROCHLORIDE 7.5 MG/1
7.5 TABLET ORAL 2 TIMES DAILY
Qty: 60 TABLET | Refills: 5 | Status: SHIPPED | OUTPATIENT
Start: 2023-09-18

## 2023-09-18 NOTE — TELEPHONE ENCOUNTER
Scheduled MRI for pt for 9/19 at 3:30 at [facility]. Called ENT in [facility], they do not accept [facility]. Patient will have to call insurance to see what ENT she can go to. Tried to call patient to inform of this, number not available. Patients mother will have her call the office back.

## 2023-09-18 NOTE — PROGRESS NOTES
Assessment/Plan:       Problem List Items Addressed This Visit    None  Visit Diagnoses     Neck swelling    -  Primary    Relevant Medications    predniSONE 20 mg tablet    Other Relevant Orders    CBC and differential    Comprehensive metabolic panel    C-reactive protein    Ambulatory Referral to Otolaryngology    MRI soft tissue neck wo and w contrast    Dysphagia, unspecified type        Relevant Medications    predniSONE 20 mg tablet    Other Relevant Orders    CBC and differential    Comprehensive metabolic panel    C-reactive protein    Ambulatory Referral to Otolaryngology    MRI soft tissue neck wo and w contrast    Neck pain        Relevant Medications    predniSONE 20 mg tablet    Other Relevant Orders    CBC and differential    Comprehensive metabolic panel    C-reactive protein    Ambulatory Referral to Otolaryngology    MRI soft tissue neck wo and w contrast    Anxiety        Relevant Medications    busPIRone (BUSPAR) 7.5 mg tablet            Blood work ordered, urgent MRI soft tissue neck, ENT referral. Will assist her with scheduling. Trial prednisone burst to decrease inflammation/swelling. ED precautions. Trial Buspar 7.5 mg BID for anxiety. Close follow-up 4 weeks. Of note, needs neurology follow-up and EEG. Can not make it down to [facility], will plan on [facility] neurology in [facility] but she would like to accomplish this testing first.     Subjective:      Patient ID: Deo Enrique is a 47 y.o. female. HPI     Throat swelling for awhile now, feels throat closing, with eating choking, choking with liquids, with bending over feels swelling. Happens every day, episodes/comes in waves, soreness in neck and swelling. Couple months at least. Hoarseness, hurts to talk. Fluid coming up at night from stomach.  CT in hospital in July showed "Nonspecific rim-enhancing 7 mm left tonsillar lesion could relate to a cyst, correlate with either direct visualization or contrast-enhanced neck MRI on a nonemergent outpatient basis." Feels like throat is going to close completely, trouble breathing sometimes. Feels soreness in throat and shoulder. No fevers. Hard time swallowing. Trying to stick to soft food, feels like food getting stuck. Tried Tylenol, doesn't help. Ice packs don't help. She mentions she feels like her blood sugar is dropping overdue night to not eating, wakes up to eat. Anxiety has been very severe recently, would like to try something else for it. PHQ-2/9 Depression Screening    Little interest or pleasure in doing things: 0 - not at all  Feeling down, depressed, or hopeless: 0 - not at all  Trouble falling or staying asleep, or sleeping too much: 0 - not at all  Feeling tired or having little energy: 0 - not at all  Poor appetite or overeatin - not at all  Feeling bad about yourself - or that you are a failure or have let yourself or your family down: 0 - not at all  Trouble concentrating on things, such as reading the newspaper or watching television: 0 - not at all  Moving or speaking so slowly that other people could have noticed. Or the opposite - being so fidgety or restless that you have been moving around a lot more than usual: 0 - not at all  Thoughts that you would be better off dead, or of hurting yourself in some way: 0 - not at all  PHQ-9 Score: 0   PHQ-9 Interpretation: No or Minimal depression          The following portions of the patient's history were reviewed and updated as appropriate: allergies, current medications, past family history, past medical history, past social history, past surgical history, and problem list.    Review of Systems   All other systems reviewed and are negative. Objective:      /76   Pulse 58   Temp (!) 97.4 °F (36.3 °C) (Tympanic)   Resp 18   Ht 5' (1.524 m)   Wt 78.4 kg (172 lb 12.8 oz)   LMP  (LMP Unknown)   SpO2 99%   BMI 33.75 kg/m²          Physical Exam  Vitals reviewed.    Constitutional:       General: She is not in acute distress. Appearance: Normal appearance. She is not ill-appearing, toxic-appearing or diaphoretic. HENT:      Head: Normocephalic and atraumatic. Mouth/Throat:      Mouth: Mucous membranes are moist.      Pharynx: No oropharyngeal exudate or posterior oropharyngeal erythema. Comments: No visible abnormality or mass  Eyes:      General:         Right eye: No discharge. Left eye: No discharge. Extraocular Movements: Extraocular movements intact. Conjunctiva/sclera: Conjunctivae normal.   Neck:      Comments: Limited ROM due to pain, soft tissue swelling left clavicle   Cardiovascular:      Rate and Rhythm: Normal rate and regular rhythm. Heart sounds: Normal heart sounds. No murmur heard. No friction rub. No gallop. Pulmonary:      Effort: Pulmonary effort is normal. No respiratory distress. Breath sounds: Normal breath sounds. No stridor. No wheezing or rhonchi. Musculoskeletal:         General: No swelling or signs of injury. Cervical back: Tenderness present. No rigidity. Right lower leg: No edema. Left lower leg: No edema. Lymphadenopathy:      Cervical: No cervical adenopathy. Skin:     General: Skin is warm. Coloration: Skin is not pale. Findings: No erythema or rash. Neurological:      Mental Status: She is alert and oriented to person, place, and time. Motor: No weakness.    Psychiatric:         Mood and Affect: Mood normal.         Behavior: Behavior normal.             DO Peyton Matthews [facility]

## 2023-09-19 ENCOUNTER — TELEPHONE (OUTPATIENT)
Dept: OTOLARYNGOLOGY | Facility: CLINIC | Age: 53
End: 2023-09-19

## 2023-09-21 ENCOUNTER — TELEPHONE (OUTPATIENT)
Dept: OTOLARYNGOLOGY | Facility: CLINIC | Age: 53
End: 2023-09-21

## 2023-09-21 ENCOUNTER — HOSPITAL ENCOUNTER (OUTPATIENT)
Dept: MRI IMAGING | Facility: HOSPITAL | Age: 53
End: 2023-09-21
Payer: COMMERCIAL

## 2023-09-21 DIAGNOSIS — M54.2 NECK PAIN: ICD-10-CM

## 2023-09-21 DIAGNOSIS — R22.1 NECK SWELLING: ICD-10-CM

## 2023-09-21 DIAGNOSIS — R13.10 DYSPHAGIA, UNSPECIFIED TYPE: ICD-10-CM

## 2023-09-21 PROCEDURE — G1004 CDSM NDSC: HCPCS

## 2023-09-21 PROCEDURE — 70543 MRI ORBT/FAC/NCK W/O &W/DYE: CPT

## 2023-09-21 PROCEDURE — A9585 GADOBUTROL INJECTION: HCPCS | Performed by: FAMILY MEDICINE

## 2023-09-21 RX ORDER — GADOBUTROL 604.72 MG/ML
7 INJECTION INTRAVENOUS
Status: COMPLETED | OUTPATIENT
Start: 2023-09-21 | End: 2023-09-21

## 2023-09-21 RX ADMIN — GADOBUTROL 7 ML: 604.72 INJECTION INTRAVENOUS at 12:30

## 2023-09-21 NOTE — TELEPHONE ENCOUNTER
Patient called back , stated she already has another appt Oct 12 for ent on [facility]. Does not need to scheduled.

## 2023-09-21 NOTE — TELEPHONE ENCOUNTER
----- Message from Mason Sun MD sent at 9/19/2023  8:48 AM EDT -----  Regarding: RE: referral  She can see brit sometime next month    ----- Message -----  From: Beba Masterson MA  Sent: 9/19/2023   8:44 AM EDT  To: Mason Sun MD  Subject: referral                                         Can you please let me know when to schedule this patient.  Thanks

## 2023-09-25 ENCOUNTER — TELEPHONE (OUTPATIENT)
Dept: OTOLARYNGOLOGY | Facility: CLINIC | Age: 53
End: 2023-09-25

## 2023-09-25 DIAGNOSIS — F41.9 ANXIETY: Primary | ICD-10-CM

## 2023-09-25 NOTE — TELEPHONE ENCOUNTER
Spoke to [facility] about scheduling a ENT appt. , she feels she does not need it because her MRI came back ok. She would like to cancel the consult.

## 2023-09-25 NOTE — TELEPHONE ENCOUNTER
----- Message from Jeffery Sawyer MD sent at 9/19/2023  8:48 AM EDT -----  Regarding: RE: referral  She can see brit sometime next month    ----- Message -----  From: Gage Morales MA  Sent: 9/19/2023   8:44 AM EDT  To: Jeffery Sawyer MD  Subject: referral                                         Can you please let me know when to schedule this patient.  Thanks

## 2023-09-25 NOTE — TELEPHONE ENCOUNTER
Spoke to Marylou about scheduling her ENT consult, she states that she doesn't think that she needs it because her MRI is ok. Also that she cannot get a ride to this location.

## 2023-09-28 ENCOUNTER — APPOINTMENT (EMERGENCY)
Dept: CT IMAGING | Facility: HOSPITAL | Age: 53
End: 2023-09-28
Payer: COMMERCIAL

## 2023-09-28 ENCOUNTER — HOSPITAL ENCOUNTER (EMERGENCY)
Facility: HOSPITAL | Age: 53
Discharge: HOME | End: 2023-09-28
Attending: EMERGENCY MEDICINE
Payer: COMMERCIAL

## 2023-09-28 VITALS
BODY MASS INDEX: 33.77 KG/M2 | OXYGEN SATURATION: 97 % | HEART RATE: 62 BPM | RESPIRATION RATE: 18 BRPM | TEMPERATURE: 97.3 F | SYSTOLIC BLOOD PRESSURE: 119 MMHG | WEIGHT: 172 LBS | HEIGHT: 60 IN | DIASTOLIC BLOOD PRESSURE: 85 MMHG

## 2023-09-28 DIAGNOSIS — R51.9 CHRONIC HEADACHE: ICD-10-CM

## 2023-09-28 DIAGNOSIS — G89.29 CHRONIC ABDOMINAL PAIN: Primary | ICD-10-CM

## 2023-09-28 DIAGNOSIS — R10.9 CHRONIC ABDOMINAL PAIN: Primary | ICD-10-CM

## 2023-09-28 DIAGNOSIS — G89.29 CHRONIC HEADACHE: ICD-10-CM

## 2023-09-28 LAB
ALBUMIN SERPL BCP-MCNC: 4.1 G/DL (ref 3.5–5)
ALP SERPL-CCNC: 60 U/L (ref 34–104)
ALT SERPL W P-5'-P-CCNC: 13 U/L (ref 7–52)
ANION GAP SERPL CALCULATED.3IONS-SCNC: 9 MMOL/L
AST SERPL W P-5'-P-CCNC: 18 U/L (ref 13–39)
BACTERIA UR QL AUTO: NORMAL /HPF
BASOPHILS # BLD AUTO: 0.03 THOUSANDS/ÂΜL (ref 0–0.1)
BASOPHILS NFR BLD AUTO: 0 % (ref 0–1)
BILIRUB SERPL-MCNC: 0.76 MG/DL (ref 0.2–1)
BILIRUB UR QL STRIP: NEGATIVE
BUN SERPL-MCNC: 14 MG/DL (ref 5–25)
CALCIUM SERPL-MCNC: 9.2 MG/DL (ref 8.4–10.2)
CHLORIDE SERPL-SCNC: 104 MMOL/L (ref 96–108)
CLARITY UR: CLEAR
CO2 SERPL-SCNC: 22 MMOL/L (ref 21–32)
COLOR UR: YELLOW
CREAT SERPL-MCNC: 0.52 MG/DL (ref 0.6–1.3)
EOSINOPHIL # BLD AUTO: 0.07 THOUSAND/ÂΜL (ref 0–0.61)
EOSINOPHIL NFR BLD AUTO: 1 % (ref 0–6)
ERYTHROCYTE [DISTWIDTH] IN BLOOD BY AUTOMATED COUNT: 16 % (ref 11.6–15.1)
FLUAV RNA RESP QL NAA+PROBE: NEGATIVE
FLUBV RNA RESP QL NAA+PROBE: NEGATIVE
GFR SERPL CREATININE-BSD FRML MDRD: 110 ML/MIN/1.73SQ M
GLUCOSE SERPL-MCNC: 87 MG/DL (ref 65–140)
GLUCOSE UR STRIP-MCNC: NEGATIVE MG/DL
HCT VFR BLD AUTO: 41.4 % (ref 34.8–46.1)
HGB BLD-MCNC: 12.9 G/DL (ref 11.5–15.4)
HGB UR QL STRIP.AUTO: ABNORMAL
IMM GRANULOCYTES # BLD AUTO: 0.03 THOUSAND/UL (ref 0–0.2)
IMM GRANULOCYTES NFR BLD AUTO: 0 % (ref 0–2)
KETONES UR STRIP-MCNC: NEGATIVE MG/DL
LEUKOCYTE ESTERASE UR QL STRIP: NEGATIVE
LIPASE SERPL-CCNC: 22 U/L (ref 11–82)
LYMPHOCYTES # BLD AUTO: 2.55 THOUSANDS/ÂΜL (ref 0.6–4.47)
LYMPHOCYTES NFR BLD AUTO: 36 % (ref 14–44)
MCH RBC QN AUTO: 28.5 PG (ref 26.8–34.3)
MCHC RBC AUTO-ENTMCNC: 31.2 G/DL (ref 31.4–37.4)
MCV RBC AUTO: 91 FL (ref 82–98)
MONOCYTES # BLD AUTO: 0.4 THOUSAND/ÂΜL (ref 0.17–1.22)
MONOCYTES NFR BLD AUTO: 6 % (ref 4–12)
NEUTROPHILS # BLD AUTO: 4 THOUSANDS/ÂΜL (ref 1.85–7.62)
NEUTS SEG NFR BLD AUTO: 57 % (ref 43–75)
NITRITE UR QL STRIP: NEGATIVE
NON-SQ EPI CELLS URNS QL MICRO: NORMAL /HPF
NRBC BLD AUTO-RTO: 0 /100 WBCS
PH UR STRIP.AUTO: 6 [PH]
PLATELET # BLD AUTO: 293 THOUSANDS/UL (ref 149–390)
PMV BLD AUTO: 9.2 FL (ref 8.9–12.7)
POTASSIUM SERPL-SCNC: 4.1 MMOL/L (ref 3.5–5.3)
PROT SERPL-MCNC: 6.9 G/DL (ref 6.4–8.4)
PROT UR STRIP-MCNC: NEGATIVE MG/DL
RBC # BLD AUTO: 4.53 MILLION/UL (ref 3.81–5.12)
RBC #/AREA URNS AUTO: NORMAL /HPF
RSV RNA RESP QL NAA+PROBE: NEGATIVE
SARS-COV-2 RNA RESP QL NAA+PROBE: NEGATIVE
SODIUM SERPL-SCNC: 135 MMOL/L (ref 135–147)
SP GR UR STRIP.AUTO: 1.01
UROBILINOGEN UR QL STRIP.AUTO: 0.2 E.U./DL
WBC # BLD AUTO: 7.08 THOUSAND/UL (ref 4.31–10.16)
WBC #/AREA URNS AUTO: NORMAL /HPF

## 2023-09-28 PROCEDURE — 99284 EMERGENCY DEPT VISIT MOD MDM: CPT

## 2023-09-28 PROCEDURE — 99284 EMERGENCY DEPT VISIT MOD MDM: CPT | Performed by: EMERGENCY MEDICINE

## 2023-09-28 PROCEDURE — 0241U HB NFCT DS VIR RESP RNA 4 TRGT: CPT | Performed by: EMERGENCY MEDICINE

## 2023-09-28 PROCEDURE — 74176 CT ABD & PELVIS W/O CONTRAST: CPT

## 2023-09-28 PROCEDURE — 81001 URINALYSIS AUTO W/SCOPE: CPT | Performed by: EMERGENCY MEDICINE

## 2023-09-28 PROCEDURE — G1004 CDSM NDSC: HCPCS

## 2023-09-28 PROCEDURE — 36415 COLL VENOUS BLD VENIPUNCTURE: CPT | Performed by: EMERGENCY MEDICINE

## 2023-09-28 PROCEDURE — 83690 ASSAY OF LIPASE: CPT | Performed by: EMERGENCY MEDICINE

## 2023-09-28 PROCEDURE — 80053 COMPREHEN METABOLIC PANEL: CPT | Performed by: EMERGENCY MEDICINE

## 2023-09-28 PROCEDURE — 85025 COMPLETE CBC W/AUTO DIFF WBC: CPT | Performed by: EMERGENCY MEDICINE

## 2023-09-28 RX ORDER — MAGNESIUM SULFATE HEPTAHYDRATE 40 MG/ML
2 INJECTION, SOLUTION INTRAVENOUS ONCE
Status: COMPLETED | OUTPATIENT
Start: 2023-09-28 | End: 2023-09-28

## 2023-09-28 RX ORDER — KETOROLAC TROMETHAMINE 30 MG/ML
30 INJECTION, SOLUTION INTRAMUSCULAR; INTRAVENOUS ONCE
Status: DISCONTINUED | OUTPATIENT
Start: 2023-09-28 | End: 2023-09-28 | Stop reason: HOSPADM

## 2023-09-28 RX ORDER — DIPHENHYDRAMINE HYDROCHLORIDE 50 MG/ML
25 INJECTION INTRAMUSCULAR; INTRAVENOUS ONCE
Status: COMPLETED | OUTPATIENT
Start: 2023-09-28 | End: 2023-09-28

## 2023-09-28 RX ORDER — METOCLOPRAMIDE HYDROCHLORIDE 5 MG/ML
10 INJECTION INTRAMUSCULAR; INTRAVENOUS ONCE
Status: COMPLETED | OUTPATIENT
Start: 2023-09-28 | End: 2023-09-28

## 2023-09-28 RX ADMIN — METOCLOPRAMIDE 10 MG: 5 INJECTION, SOLUTION INTRAMUSCULAR; INTRAVENOUS at 11:12

## 2023-09-28 RX ADMIN — SODIUM CHLORIDE 1000 ML: 0.9 INJECTION, SOLUTION INTRAVENOUS at 11:16

## 2023-09-28 RX ADMIN — SODIUM CHLORIDE 1000 ML: 0.9 INJECTION, SOLUTION INTRAVENOUS at 11:15

## 2023-09-28 RX ADMIN — DIPHENHYDRAMINE HYDROCHLORIDE 25 MG: 50 INJECTION, SOLUTION INTRAMUSCULAR; INTRAVENOUS at 11:09

## 2023-09-28 RX ADMIN — MAGNESIUM SULFATE HEPTAHYDRATE 2 G: 40 INJECTION, SOLUTION INTRAVENOUS at 11:15

## 2023-09-28 NOTE — ED PROVIDER NOTES
History  Chief Complaint   Patient presents with   • Abdominal Pain     Began last night     Patient is a 64-year-old female with a history of GERD that presents for evaluation of chronic abdominal pain. Patient says that she been having left-sided abdominal pain for months. It is unchanged as far as the location and type of pain but is more severe than normal.  She denies nausea vomiting or diaphoresis. She denies chest pain or dyspnea. She does also have a chronic headache. This is intermittent. There is no associated head strike, altered mental status or focal neurologic deficit. She says that she has been having this headache on and off for over a year. She does endorse a cough rhinorrhea congestion which also seems to be chronic in nature. No known fevers. She denies urinary complaints, hematuria or dysuria. Denies diarrhea melena or hematochezia. Prior to Admission Medications   Prescriptions Last Dose Informant Patient Reported? Taking? Blood Glucose Monitoring Suppl (OneTouch Verio Reflect) w/Device KIT   No No   Sig: Check blood sugars once daily. Please substitute with appropriate alternative as covered by patient's insurance. Dx: S16.93   OneTouch Delica Lancets 16F MISC   No No   Sig: Check blood sugars once daily. Please substitute with appropriate alternative as covered by patient's insurance.  Dx: E11.65   albuterol (PROVENTIL HFA,VENTOLIN HFA) 90 mcg/act inhaler   No No   Sig: Inhale 2 puffs every 6 (six) hours as needed for wheezing   busPIRone (BUSPAR) 7.5 mg tablet   No No   Sig: Take 1 tablet (7.5 mg total) by mouth 2 (two) times a day   dicyclomine (BENTYL) 20 mg tablet   No No   Sig: take 1 tablet by mouth twice a day if needed for ABDOMINAL PAIN   ergocalciferol (VITAMIN D2) 50,000 units   No No   Sig: Take 1 capsule (50,000 Units total) by mouth once a week   Patient taking differently: Take 50,000 Units by mouth once a week   fluticasone (FLONASE) 50 mcg/act nasal spray No No   Si spray into each nostril daily   glucose blood (OneTouch Verio) test strip   No No   Sig: Use 1 each 4 (four) times a day (before meals and at bedtime) Check blood sugars once daily. Please substitute with appropriate alternative as covered by patient's insurance. Dx: E11.65   nystatin powder   No No   Sig: Apply topically 2 (two) times a day   pantoprazole (PROTONIX) 40 mg tablet   No No   Sig: take 1 tablet by mouth once daily   polyethylene glycol (GOLYTELY) 4000 mL solution   No No   Sig: Take 4,000 mL by mouth once for 1 dose   therapeutic multivitamin-minerals (THERAGRAN-M) tablet  Self Yes No   Sig: Take 1 tablet by mouth daily   vitamin B-12 (VITAMIN B-12) 500 mcg tablet   No No   Sig: Take 1 tablet (500 mcg total) by mouth daily      Facility-Administered Medications: None       Past Medical History:   Diagnosis Date   • Asthma    • GERD (gastroesophageal reflux disease)    • H/O gastric bypass    • Hypokalemia    • Methamphetamine abuse (HCC)     Positive UDS;  2020   • Pyelonephritis        Past Surgical History:   Procedure Laterality Date   • CHOLECYSTECTOMY     • DENTAL SURGERY     • ESOPHAGEAL DILATION     • GASTRIC BYPASS     • EILEEN-EN-Y PROCEDURE     • SUBTOTAL COLECTOMY     • TOOTH EXTRACTION     • TUBAL LIGATION         Family History   Problem Relation Age of Onset   • Kidney disease Mother         Pt also reports "bone disease"   • Hypertension Mother    • Diabetes Mother    • Heart disease Mother    • Stroke Mother    • Arthritis Mother    • Hyperlipidemia Mother    • Colon cancer Father          motor cycle accident   • Heart disease Sister    • Coronary artery disease Sister    • No Known Problems Sister    • No Known Problems Sister    • No Known Problems Daughter    • No Known Problems Daughter    • Breast cancer Maternal Grandmother    • Lung cancer Maternal Grandmother    • Lung cancer Maternal Grandfather    • Lung cancer Paternal Grandmother    • Lung cancer Paternal Grandfather    • Cancer Brother    • Heart attack Brother    • Heart disease Brother    • Coronary artery disease Brother    • Lung cancer Paternal Uncle    • Lung cancer Cousin    • Uterine cancer Maternal Aunt    • No Known Problems Maternal Aunt    • No Known Problems Maternal Aunt    • Lung cancer Maternal Aunt    • Lung cancer Maternal Aunt    • No Known Problems Paternal Aunt    • No Known Problems Paternal Aunt      I have reviewed and agree with the history as documented. E-Cigarette/Vaping   • E-Cigarette Use Never User    • Cartridges/Day 1      E-Cigarette/Vaping Substances   • Nicotine No    • THC No    • CBD No    • Flavoring No    • Other No    • Unknown No      Social History     Tobacco Use   • Smoking status: Every Day     Packs/day: 0.50     Years: 38.00     Total pack years: 19.00     Types: Cigarettes   • Smokeless tobacco: Never   Vaping Use   • Vaping Use: Never used   Substance Use Topics   • Alcohol use: Never   • Drug use: Not Currently     Types: Methamphetamines     Comment: reports use this week (12/2020)       Review of Systems   Constitutional: Negative for fever. HENT: Negative for sore throat. Respiratory: Negative for shortness of breath. Cardiovascular: Negative for chest pain. Gastrointestinal: Positive for abdominal pain. Genitourinary: Negative for dysuria. Musculoskeletal: Negative for back pain. Skin: Negative for rash. Neurological: Positive for headaches. Negative for light-headedness. Psychiatric/Behavioral: Negative for agitation. All other systems reviewed and are negative. Physical Exam  Physical Exam  Vitals reviewed. Constitutional:       General: She is not in acute distress. Appearance: She is well-developed. HENT:      Head: Normocephalic. Eyes:      Pupils: Pupils are equal, round, and reactive to light. Cardiovascular:      Rate and Rhythm: Normal rate and regular rhythm. Heart sounds: Normal heart sounds. Pulmonary:      Effort: Pulmonary effort is normal.      Breath sounds: Normal breath sounds. Abdominal:      General: Bowel sounds are normal. There is no distension. Palpations: Abdomen is soft. Tenderness: There is no abdominal tenderness. There is no guarding. Comments: No reproducible abdominal tenderness on exam   Musculoskeletal:         General: No tenderness or deformity. Normal range of motion. Cervical back: Normal range of motion and neck supple. Skin:     General: Skin is warm and dry. Capillary Refill: Capillary refill takes less than 2 seconds. Neurological:      Mental Status: She is alert and oriented to person, place, and time. Cranial Nerves: No cranial nerve deficit. Sensory: No sensory deficit. Comments: Alert oriented x3. GCS 15. Cranial nerves 2-12 intact. Strength 5/5 in all 4 extremities. Sensation intact throughout. Psychiatric:         Behavior: Behavior normal.         Thought Content:  Thought content normal.         Judgment: Judgment normal.         Vital Signs  ED Triage Vitals   Temperature Pulse Respirations Blood Pressure SpO2   09/28/23 1007 09/28/23 1007 09/28/23 1007 09/28/23 1023 09/28/23 1007   (!) 97.3 °F (36.3 °C) 71 20 110/76 98 %      Temp Source Heart Rate Source Patient Position - Orthostatic VS BP Location FiO2 (%)   09/28/23 1007 09/28/23 1007 09/28/23 1023 09/28/23 1023 --   Tympanic Monitor Sitting Left arm       Pain Score       09/28/23 1007       5           Vitals:    09/28/23 1007 09/28/23 1023 09/28/23 1214   BP:  110/76 119/85   Pulse: 71  62   Patient Position - Orthostatic VS:  Sitting Sitting         Visual Acuity      ED Medications  Medications   ketorolac (TORADOL) injection 30 mg (30 mg Intravenous Not Given 9/28/23 1119)   sodium chloride 0.9 % bolus 1,000 mL (1,000 mL Intravenous New Bag 9/28/23 1115)   sodium chloride 0.9 % bolus 1,000 mL (1,000 mL Intravenous New Bag 9/28/23 1116) metoclopramide (REGLAN) injection 10 mg (10 mg Intravenous Given 9/28/23 1112)   diphenhydrAMINE (BENADRYL) injection 25 mg (25 mg Intravenous Given 9/28/23 1109)   magnesium sulfate 2 g/50 mL IVPB (premix) 2 g (2 g Intravenous New Bag 9/28/23 1115)       Diagnostic Studies  Results Reviewed     Procedure Component Value Units Date/Time    CMP [[id number]]  (Abnormal) Collected: 09/28/23 1101    Lab Status: Final result Specimen: Blood from Arm, Right Updated: 09/28/23 1135     Sodium 135 mmol/L      Potassium 4.1 mmol/L      Chloride 104 mmol/L      CO2 22 mmol/L      ANION GAP 9 mmol/L      BUN 14 mg/dL      Creatinine 0.52 mg/dL      Glucose 87 mg/dL      Calcium 9.2 mg/dL      AST 18 U/L      ALT 13 U/L      Alkaline Phosphatase 60 U/L      Total Protein 6.9 g/dL      Albumin 4.1 g/dL      Total Bilirubin 0.76 mg/dL      eGFR 110 ml/min/1.73sq m     Narrative:      [place] guidelines for Chronic Kidney Disease (CKD):   •  Stage 1 with normal or high GFR (GFR > 90 mL/min/1.73 square meters)  •  Stage 2 Mild CKD (GFR = 60-89 mL/min/1.73 square meters)  •  Stage 3A Moderate CKD (GFR = 45-59 mL/min/1.73 square meters)  •  Stage 3B Moderate CKD (GFR = 30-44 mL/min/1.73 square meters)  •  Stage 4 Severe CKD (GFR = 15-29 mL/min/1.73 square meters)  •  Stage 5 End Stage CKD (GFR <15 mL/min/1.73 square meters)  Note: GFR calculation is accurate only with a steady state creatinine    Lipase [[id number]]  (Normal) Collected: 09/28/23 1101    Lab Status: Final result Specimen: Blood from Arm, Right Updated: 09/28/23 1135     Lipase 22 u/L     CBC and differential [[id number]]  (Abnormal) Collected: 09/28/23 1101    Lab Status: Final result Specimen: Blood from Arm, Right Updated: 09/28/23 1120     WBC 7.08 Thousand/uL      RBC 4.53 Million/uL      Hemoglobin 12.9 g/dL      Hematocrit 41.4 %      MCV 91 fL      MCH 28.5 pg      MCHC 31.2 g/dL      RDW 16.0 %      MPV 9.2 fL      Platelets 023 Thousands/uL      nRBC 0 /100 WBCs      Neutrophils Relative 57 %      Immat GRANS % 0 %      Lymphocytes Relative 36 %      Monocytes Relative 6 %      Eosinophils Relative 1 %      Basophils Relative 0 %      Neutrophils Absolute 4.00 Thousands/µL      Immature Grans Absolute 0.03 Thousand/uL      Lymphocytes Absolute 2.55 Thousands/µL      Monocytes Absolute 0.40 Thousand/µL      Eosinophils Absolute 0.07 Thousand/µL      Basophils Absolute 0.03 Thousands/µL     FLU/RSV/COVID - if FLU/RSV clinically relevant [[id number]]  (Normal) Collected: 09/28/23 1035    Lab Status: Final result Specimen: Nares from Nose Updated: 09/28/23 1115     SARS-CoV-2 Negative     INFLUENZA A PCR Negative     INFLUENZA B PCR Negative     RSV PCR Negative    Narrative:      FOR PEDIATRIC PATIENTS - copy/paste COVID Guidelines URL to browser: [email or web address]    SARS-CoV-2 assay is a Nucleic Acid Amplification assay intended for the  qualitative detection of nucleic acid from SARS-CoV-2 in nasopharyngeal  swabs. Results are for the presumptive identification of SARS-CoV-2 RNA. Positive results are indicative of infection with SARS-CoV-2, the virus  causing COVID-19, but do not rule out bacterial infection or co-infection  with other viruses. Laboratories within the [facility] and its  territories are required to report all positive results to the appropriate  public health authorities. Negative results do not preclude SARS-CoV-2  infection and should not be used as the sole basis for treatment or other  patient management decisions. Negative results must be combined with  clinical observations, patient history, and epidemiological information. This test has not been FDA cleared or approved. This test has been authorized by FDA under an Emergency Use Authorization  (EUA).  This test is only authorized for the duration of time the  declaration that circumstances exist justifying the authorization of the  emergency use of an in vitro diagnostic tests for detection of SARS-CoV-2  virus and/or diagnosis of COVID-19 infection under section 564(b)(1) of  the Act, 21 U. S.C. 809HFB-1(E)(2), unless the authorization is terminated  or revoked sooner. The test has been validated but independent review by FDA  and CLIA is pending. Test performed using [employer] GeneXpert: This RT-PCR assay targets N2,  a region unique to SARS-CoV-2. A conserved region in the E-gene was chosen  for pan-Sarbecovirus detection which includes SARS-CoV-2. According to CMS-2020-01-R, this platform meets the definition of high-throughput technology. Urine Microscopic [[id number]]  (Normal) Collected: 09/28/23 1036    Lab Status: Final result Specimen: Urine, Clean Catch Updated: 09/28/23 1103     RBC, UA 1-2 /hpf      WBC, UA 0-1 /hpf      Epithelial Cells Occasional /hpf      Bacteria, UA None Seen /hpf     UA w Reflex to Microscopic w Reflex to Culture [[id number]]  (Abnormal) Collected: 09/28/23 1036    Lab Status: Final result Specimen: Urine, Clean Catch Updated: 09/28/23 1058     Color, UA Yellow     Clarity, UA Clear     Specific Gravity, UA 1.015     pH, UA 6.0     Leukocytes, UA Negative     Nitrite, UA Negative     Protein, UA Negative mg/dl      Glucose, UA Negative mg/dl      Ketones, UA Negative mg/dl      Urobilinogen, UA 0.2 E.U./dl      Bilirubin, UA Negative     Occult Blood, UA Trace-Intact                 CT renal stone study abdomen pelvis wo contrast   Final Result by Aida King MD (09/28 1210)      Stable 2 mm nonobstructing right renal calculus. Otherwise, unremarkable examination. No hydronephrosis or findings of obstructive uropathy.             Workstation performed: [id number]                    Procedures  Procedures         ED Course  ED Course as of 09/28/23 1232   Thu Sep 28, 2023   1135 Respirations(!): 2  typo                               SBIRT 20yo+    Flowsheet Row Most Recent Value   Initial Alcohol Screen: US AUDIT-C     1. How often do you have a drink containing alcohol? 1 Filed at: 09/28/2023 1120   2. How many drinks containing alcohol do you have on a typical day you are drinking? 0 Filed at: 09/28/2023 1120   3a. Male UNDER 65: How often do you have five or more drinks on one occasion? 0 Filed at: 09/28/2023 1120   3b. FEMALE Any Age, or MALE 65+: How often do you have 4 or more drinks on one occassion? 0 Filed at: 09/28/2023 1120   Audit-C Score 1 Filed at: 09/28/2023 1120   PATRICK: How many times in the past year have you. .. Used an illegal drug or used a prescription medication for non-medical reasons? Never Filed at: 09/28/2023 1120                    Medical Decision Making  Patient is a 52-year-old female who presents for evaluation of chronic abdominal pain and headache. Things are unchanged from baseline she has benign exam.  Vital signs reviewed unremarkable. Blood work reviewed unremarkable. CT scan reviewed unremarkable. Patient with no evidence of acute pathology. She had complete resolution of her symptoms after migraine cocktail. Possible viral URI worsening her chronic symptoms. Advise supportive measures and PCP follow-up. Amount and/or Complexity of Data Reviewed  Labs: ordered. Radiology: ordered. Risk  Prescription drug management. Disposition  Final diagnoses:   Chronic abdominal pain   Chronic headache     Time reflects when diagnosis was documented in both MDM as applicable and the Disposition within this note     Time User Action Codes Description Comment    9/28/2023 12:18 PM Sudheer Hardy Add [R10.9,  G89.29] Chronic abdominal pain     9/28/2023 12:18 PM Sudheer Hardy Add [R51.9,  G89.29] Chronic headache       ED Disposition     ED Disposition   Discharge    Condition   Stable    Date/Time   Thu Sep 28, 2023 1218    Comment   Marylou Morris discharge to home/self care.                Follow-up Information Follow up With Specialties Details Why Contact Info Additional [address] [facility] Emergency Department Emergency Medicine  If symptoms worsen [address] [place] [id number]  [address] Emergency Department, [address] [facility], [address]    Darci Ryan DO Family Medicine Schedule an appointment as soon as possible for a visit   [address]  [address]  [phone]             Patient's Medications   Discharge Prescriptions    No medications on file       No discharge procedures on file.     PDMP Review       Value Time User    PDMP Reviewed  Yes 3/14/2022 12:20 PM Inocencia Sheehan DO          ED Provider  Electronically Signed by           Tayler Mary MD  09/28/23 [id number]

## 2023-10-12 ENCOUNTER — TELEPHONE (OUTPATIENT)
Dept: FAMILY MEDICINE CLINIC | Facility: CLINIC | Age: 53
End: 2023-10-12

## 2023-10-16 NOTE — TELEPHONE ENCOUNTER
Spoke with patient. Informed patient lab results were normal. Stated she did not have any concerns at this time.

## 2023-10-20 ENCOUNTER — TELEPHONE (OUTPATIENT)
Dept: FAMILY MEDICINE CLINIC | Facility: CLINIC | Age: 53
End: 2023-10-20

## 2023-10-27 ENCOUNTER — OFFICE VISIT (OUTPATIENT)
Dept: FAMILY MEDICINE CLINIC | Facility: CLINIC | Age: 53
End: 2023-10-27
Payer: COMMERCIAL

## 2023-10-27 ENCOUNTER — TELEPHONE (OUTPATIENT)
Dept: FAMILY MEDICINE CLINIC | Facility: CLINIC | Age: 53
End: 2023-10-27

## 2023-10-27 VITALS
TEMPERATURE: 96.4 F | SYSTOLIC BLOOD PRESSURE: 116 MMHG | WEIGHT: 176 LBS | BODY MASS INDEX: 34.55 KG/M2 | OXYGEN SATURATION: 100 % | HEART RATE: 57 BPM | RESPIRATION RATE: 18 BRPM | DIASTOLIC BLOOD PRESSURE: 78 MMHG | HEIGHT: 60 IN

## 2023-10-27 DIAGNOSIS — R13.10 DYSPHAGIA, UNSPECIFIED TYPE: ICD-10-CM

## 2023-10-27 DIAGNOSIS — F41.9 ANXIETY: Primary | ICD-10-CM

## 2023-10-27 PROCEDURE — 99214 OFFICE O/P EST MOD 30 MIN: CPT | Performed by: FAMILY MEDICINE

## 2023-10-27 NOTE — PROGRESS NOTES
Assessment/Plan:       Problem List Items Addressed This Visit    None  Visit Diagnoses       Anxiety    -  Primary    Dysphagia, unspecified type        Relevant Orders    Ambulatory Referral to Gastroenterology              Continue current regimen. Recommended consultation with GI for chronic dysphagia. Subjective:      Patient ID: Zaid Monroe is a 47 y.o. female. HPI    Anxiety- She is feeling much better, Buspar working well. Feeling great. GERD- Sometimes difficulty with swallowing, occasional neck pain as we discussed before but better. History of esophageal stricture/need for dilation. Pieces of food gets stuck. The following portions of the patient's history were reviewed and updated as appropriate: allergies, current medications, past family history, past medical history, past social history, past surgical history, and problem list.    Review of Systems   All other systems reviewed and are negative. Objective:      /78   Pulse 57   Temp (!) 96.4 °F (35.8 °C) (Tympanic)   Resp 18   Ht 5' (1.524 m)   Wt 79.8 kg (176 lb)   LMP  (LMP Unknown)   SpO2 100%   BMI 34.37 kg/m²          Physical Exam  Vitals reviewed. Constitutional:       General: She is not in acute distress. Appearance: Normal appearance. She is not ill-appearing, toxic-appearing or diaphoretic. Pulmonary:      Effort: Pulmonary effort is normal. No respiratory distress. Neurological:      Mental Status: She is alert and oriented to person, place, and time.    Psychiatric:         Mood and Affect: Mood normal.         Behavior: Behavior normal.             DO Xiang Nieves [facility]

## 2023-10-27 NOTE — TELEPHONE ENCOUNTER
Patient was seen in office today for appointment. Stated she dropped off SSI paperwork a couple weeks ago. Nothing scanned into chart. Form found in provider's folder at [facility] station. Form scanned into chart and placed in provider's folder at . Patient is aware we will contact her upon completion.

## 2023-10-31 DIAGNOSIS — K21.9 GASTROESOPHAGEAL REFLUX DISEASE, UNSPECIFIED WHETHER ESOPHAGITIS PRESENT: ICD-10-CM

## 2023-10-31 RX ORDER — PANTOPRAZOLE SODIUM 40 MG/1
40 TABLET, DELAYED RELEASE ORAL DAILY
Qty: 30 TABLET | Refills: 1 | Status: SHIPPED | OUTPATIENT
Start: 2023-10-31

## 2023-11-02 ENCOUNTER — HOSPITAL ENCOUNTER (OUTPATIENT)
Dept: MAMMOGRAPHY | Facility: HOSPITAL | Age: 53
End: 2023-11-02
Payer: COMMERCIAL

## 2023-11-02 VITALS — WEIGHT: 176 LBS | BODY MASS INDEX: 34.55 KG/M2 | HEIGHT: 60 IN

## 2023-11-02 DIAGNOSIS — Z12.31 ENCOUNTER FOR SCREENING MAMMOGRAM FOR BREAST CANCER: ICD-10-CM

## 2023-11-02 DIAGNOSIS — Z01.419 ROUTINE GYNECOLOGICAL EXAMINATION: ICD-10-CM

## 2023-11-02 PROCEDURE — 77063 BREAST TOMOSYNTHESIS BI: CPT

## 2023-11-02 PROCEDURE — 77067 SCR MAMMO BI INCL CAD: CPT

## 2023-11-26 DIAGNOSIS — Z98.84 HISTORY OF GASTRIC BYPASS: ICD-10-CM

## 2023-11-27 DIAGNOSIS — E16.2 HYPOGLYCEMIA: ICD-10-CM

## 2023-11-27 DIAGNOSIS — Z98.84 HISTORY OF GASTRIC BYPASS: ICD-10-CM

## 2023-11-27 DIAGNOSIS — J45.40 MODERATE PERSISTENT ASTHMA WITHOUT COMPLICATION: ICD-10-CM

## 2023-11-27 DIAGNOSIS — E55.9 VITAMIN D DEFICIENCY: ICD-10-CM

## 2023-11-27 RX ORDER — CHOLECALCIFEROL (VITAMIN D3) 125 MCG
500 CAPSULE ORAL DAILY
Qty: 30 TABLET | Refills: 2 | Status: SHIPPED | OUTPATIENT
Start: 2023-11-27

## 2023-11-27 NOTE — TELEPHONE ENCOUNTER
Patient requesting a new script for Prednisone 40mg, take once daily. Said that it was previously prescribed by Pulmonary but that Dr Valencia Abdi has taken over the script. Please advise. Patient requesting refill(s) of: Vitamin D     Last filled: 5/12/23  Last appt: 10/27/23  Next appt: 1/29/24  Pharmacy: [facility]       Patient requesting refill(s) of:  Albuterol     Last filled: 9/7/23      Patient requesting refill(s) of: Test Strips     Last filled: 7/11/23      Patient requesting refill(s) of: lancets     Last filled: 6/1/23

## 2023-11-28 RX ORDER — ERGOCALCIFEROL 1.25 MG/1
50000 CAPSULE ORAL WEEKLY
Qty: 12 CAPSULE | Refills: 0 | Status: SHIPPED | OUTPATIENT
Start: 2023-11-28

## 2023-11-28 RX ORDER — ALBUTEROL SULFATE 90 UG/1
2 AEROSOL, METERED RESPIRATORY (INHALATION) EVERY 6 HOURS PRN
Qty: 18 G | Refills: 2 | Status: SHIPPED | OUTPATIENT
Start: 2023-11-28

## 2023-11-28 RX ORDER — BLOOD SUGAR DIAGNOSTIC
1 STRIP MISCELLANEOUS
Qty: 100 EACH | Refills: 3 | Status: SHIPPED | OUTPATIENT
Start: 2023-11-28

## 2023-11-28 RX ORDER — LANCETS 33 GAUGE
EACH MISCELLANEOUS
Qty: 100 EACH | Refills: 3 | Status: SHIPPED | OUTPATIENT
Start: 2023-11-28

## 2023-11-28 NOTE — TELEPHONE ENCOUNTER
I am not aware of a prednisone prescription, this is usually just as needed for asthma exacerbation.  I would recommend reaching out to pulmonary

## 2023-12-04 ENCOUNTER — OFFICE VISIT (OUTPATIENT)
Dept: GASTROENTEROLOGY | Facility: CLINIC | Age: 53
End: 2023-12-04
Payer: COMMERCIAL

## 2023-12-04 VITALS
WEIGHT: 180 LBS | DIASTOLIC BLOOD PRESSURE: 68 MMHG | OXYGEN SATURATION: 99 % | BODY MASS INDEX: 35.34 KG/M2 | HEART RATE: 70 BPM | SYSTOLIC BLOOD PRESSURE: 92 MMHG | HEIGHT: 60 IN | RESPIRATION RATE: 18 BRPM

## 2023-12-04 DIAGNOSIS — Z80.0 FAMILY HISTORY OF COLON CANCER IN FATHER: ICD-10-CM

## 2023-12-04 DIAGNOSIS — Z98.84 HISTORY OF GASTRIC BYPASS: ICD-10-CM

## 2023-12-04 DIAGNOSIS — R13.10 DYSPHAGIA, UNSPECIFIED TYPE: Primary | ICD-10-CM

## 2023-12-04 DIAGNOSIS — Z12.11 COLON CANCER SCREENING: ICD-10-CM

## 2023-12-04 DIAGNOSIS — K59.00 CONSTIPATION, UNSPECIFIED CONSTIPATION TYPE: ICD-10-CM

## 2023-12-04 PROCEDURE — 99244 OFF/OP CNSLTJ NEW/EST MOD 40: CPT | Performed by: STUDENT IN AN ORGANIZED HEALTH CARE EDUCATION/TRAINING PROGRAM

## 2023-12-04 RX ORDER — OMEPRAZOLE 40 MG/1
40 CAPSULE, DELAYED RELEASE ORAL DAILY
Qty: 30 CAPSULE | Refills: 11 | Status: SHIPPED | OUTPATIENT
Start: 2023-12-04

## 2023-12-04 RX ORDER — LACTULOSE 20 G/30ML
20 SOLUTION ORAL 2 TIMES DAILY PRN
Qty: 946 ML | Refills: 2 | Status: SHIPPED | OUTPATIENT
Start: 2023-12-04

## 2023-12-04 NOTE — PATIENT INSTRUCTIONS
Scheduled date of EGD(as of today):12/07/2023  Physician performing EGD: Dari  Location of EGD: [place]  Instructions reviewed with patient by: Blossom  Clearances: none

## 2023-12-04 NOTE — PROGRESS NOTES
[facility] - Outpatient Consultation  Yanet Lu 47 y.o. female MRN: [id number]  Encounter: [id number]          ASSESSMENT AND PLAN:    53F with  RYGB 2015, depression, prior substance abuse here for 2 months of progressive dysphagia to liquids and solids. Suspect this is oropharyngeal or motility given it happens with both liquids and solids but will pursue EGD to assess for stricture, ring, esophagitis, etc given her history of GJ stricture and her reflux symptoms. Will check barium swallow for motility issue. Will change pantoprazole to omeprazole for better acid suppression. 1. Dysphagia, unspecified type  2. History of gastric bypass  - Ambulatory Referral to Gastroenterology  - FL barium swallow; Future  - EGD; Future  - omeprazole (PriLOSEC) 40 MG capsule; Take 1 capsule (40 mg total) by mouth daily  Dispense: 30 capsule; Refill: 11    3. Constipation, unspecified constipation type  - lactulose 20 g/30 mL; Take 30 mL (20 g total) by mouth 2 (two) times a day as needed (constipation)  Dispense: 946 mL; Refill: 2    4. Colon cancer screening  5. Family history of colon cancer in father  Discussed taht given her family history, the colonoscopy is the appropriate colon cancer screening test.  However, she reports she cannot undergo colonoscopy due to prep. We agreed to do a Cologuard in order to do something for colon cancer screening though she is aware that should this come back positive, completing a colonoscopy will be even more critical  - Cologuard    ______________________________________________________________________    HPI:    Feels like throat closes with no warning. Chokes on small amounts of food and liquid. Feels like pills gets stuck. Has been going on for a couple weeks. Also wakes up gasping for breath. Last EGD 2020. Takes pantoprazole 40 mg, not sure if this helps. Gets intermittent abdominal pain, usually related to dietary triggers. Gets constipated. Responds well to prunes but can't afford them. Has seen some blood in stool. No NSAID use. Dad had colon cancer in his late 45s. No prior colonoscopy, feels can't do bowel prep. EGD 10/28/20  Normal Phoenix-en-Y gastric bypass. Biopsies taken in efferent jejunal limb to r/o celiac. A. Small Intestine, Jejunum, Biopsy:  - Small intestinal mucosa with no specific pathologic change. REVIEW OF SYSTEMS:    CONSTITUTIONAL: Denies any fever, chills, rigors, and weight loss. HEENT: No earache or tinnitus. Denies hearing loss or visual disturbances. CARDIOVASCULAR: No chest pain or palpitations. RESPIRATORY: Denies any cough, hemoptysis, shortness of breath or dyspnea on exertion. GASTROINTESTINAL: As noted in the History of Present Illness. GENITOURINARY: No problems with urination. Denies any hematuria or dysuria. NEUROLOGIC: No dizziness or vertigo, denies headaches. MUSCULOSKELETAL: Denies any muscle or joint pain. SKIN: Denies skin rashes or itching. ENDOCRINE: Denies excessive thirst. Denies intolerance to heat or cold. PSYCHOSOCIAL: Denies depression or anxiety. Denies any recent memory loss. Historical Information   Past Medical History:   Diagnosis Date    Asthma     GERD (gastroesophageal reflux disease)     H/O gastric bypass     Hypokalemia     Methamphetamine abuse (HCC)     Positive UDS;  Feb 2020    Pyelonephritis      Past Surgical History:   Procedure Laterality Date    CHOLECYSTECTOMY      DENTAL SURGERY      ESOPHAGEAL DILATION      GASTRIC BYPASS      [place]-EN-Y PROCEDURE      SUBTOTAL COLECTOMY      TOOTH EXTRACTION      TUBAL LIGATION       Social History   Social History     Substance and Sexual Activity   Alcohol Use Never     Social History     Substance and Sexual Activity   Drug Use Not Currently    Types: Methamphetamines    Comment: reports use this week (12/2020)     Social History     Tobacco Use   Smoking Status Every Day    Packs/day: 0.50    Years: 38.00 Total pack years: 19.00    Types: Cigarettes   Smokeless Tobacco Never     Family History   Problem Relation Age of Onset    Kidney disease Mother         Pt also reports "bone disease"    Hypertension Mother     Diabetes Mother     Heart disease Mother     Stroke Mother     Arthritis Mother     Hyperlipidemia Mother     Colon cancer Father          motor cycle accident    Heart disease Sister     Coronary artery disease Sister     No Known Problems Sister     No Known Problems Sister     No Known Problems Daughter     No Known Problems Daughter     Breast cancer Maternal Grandmother     Lung cancer Maternal Grandmother     Lung cancer Maternal Grandfather     Lung cancer Paternal Grandmother     Lung cancer Paternal Grandfather     Cancer Brother     Heart attack Brother     Heart disease Brother     Coronary artery disease Brother     Uterine cancer Maternal Aunt     No Known Problems Maternal Aunt     No Known Problems Maternal Aunt     Lung cancer Maternal Aunt     Lung cancer Maternal Aunt     Thyroid cancer Maternal Uncle     No Known Problems Paternal Aunt     No Known Problems Paternal Aunt     Lung cancer Paternal Uncle     Lung cancer Cousin        Meds/Allergies       Current Outpatient Medications:     albuterol (PROVENTIL HFA,VENTOLIN HFA) 90 mcg/act inhaler    Blood Glucose Monitoring Suppl (OneTouch Verio Reflect) w/Device KIT    busPIRone (BUSPAR) 7.5 mg tablet    dicyclomine (BENTYL) 20 mg tablet    ergocalciferol (VITAMIN D2) 50,000 units    glucose blood (OneTouch Verio) test strip    nystatin powder    OneTouch Delica Lancets 40X MISC    pantoprazole (PROTONIX) 40 mg tablet    therapeutic multivitamin-minerals (THERAGRAN-M) tablet    vitamin B-12 (VITAMIN B-12) 500 mcg tablet    fluticasone (FLONASE) 50 mcg/act nasal spray    polyethylene glycol (GOLYTELY) 4000 mL solution    Allergies   Allergen Reactions    Morphine      anaphylactic    Morphine Vomiting    Suboxone [Buprenorphine-Naloxone] Abdominal Pain    Sulfamethoxazole-Trimethoprim Hives    Sulfamethoxazole-Trimethoprim Nausea Only           Objective     Blood pressure 92/68, pulse 70, resp. rate 18, height 5' (1.524 m), weight 81.6 kg (180 lb), SpO2 99 %. Body mass index is 35.15 kg/m². PHYSICAL EXAM:      General Appearance:   Alert, cooperative, no distress   HEENT:   Normocephalic, atraumatic, anicteric. Neck:  Supple, symmetrical, trachea midline   Lungs:   Clear to auscultation bilaterally; no rales, rhonchi or wheezing; respirations unlabored    Heart[de-identified]   Regular rate and rhythm; no murmur, rub, or gallop. Abdomen:   Soft, non-tender, non-distended; normal bowel sounds; no masses, no organomegaly    Genitalia:   Deferred    Rectal:   Deferred    Extremities:  No cyanosis, clubbing or edema    Pulses:  2+ and symmetric    Skin:  No jaundice, rashes, or lesions    Lymph nodes:  No palpable cervical lymphadenopathy        Lab Results:   No visits with results within 1 Day(s) from this visit.    Latest known visit with results is:   Admission on 09/28/2023, Discharged on 09/28/2023   Component Date Value    WBC 09/28/2023 7.08     RBC 09/28/2023 4.53     Hemoglobin 09/28/2023 12.9     Hematocrit 09/28/2023 41.4     MCV 09/28/2023 91     MCH 09/28/2023 28.5     MCHC 09/28/2023 31.2 (L)     RDW 09/28/2023 16.0 (H)     MPV 09/28/2023 9.2     Platelets 59/58/6330 293     nRBC 09/28/2023 0     Neutrophils Relative 09/28/2023 57     Immat GRANS % 09/28/2023 0     Lymphocytes Relative 09/28/2023 36     Monocytes Relative 09/28/2023 6     Eosinophils Relative 09/28/2023 1     Basophils Relative 09/28/2023 0     Neutrophils Absolute 09/28/2023 4.00     Immature Grans Absolute 09/28/2023 0.03     Lymphocytes Absolute 09/28/2023 2.55     Monocytes Absolute 09/28/2023 0.40     Eosinophils Absolute 09/28/2023 0.07     Basophils Absolute 09/28/2023 0.03     Sodium 09/28/2023 135     Potassium 09/28/2023 4.1     Chloride 09/28/2023 104     CO2 09/28/2023 22     ANION GAP 09/28/2023 9     BUN 09/28/2023 14     Creatinine 09/28/2023 0.52 (L)     Glucose 09/28/2023 87     Calcium 09/28/2023 9.2     AST 09/28/2023 18     ALT 09/28/2023 13     Alkaline Phosphatase 09/28/2023 60     Total Protein 09/28/2023 6.9     Albumin 09/28/2023 4.1     Total Bilirubin 09/28/2023 0.76     eGFR 09/28/2023 110     Lipase 09/28/2023 22     SARS-CoV-2 09/28/2023 Negative     INFLUENZA A PCR 09/28/2023 Negative     INFLUENZA B PCR 09/28/2023 Negative     RSV PCR 09/28/2023 Negative     Color, UA 09/28/2023 Yellow     Clarity, UA 09/28/2023 Clear     Specific Gravity, UA 09/28/2023 1.015     pH, UA 09/28/2023 6.0     Leukocytes, UA 09/28/2023 Negative     Nitrite, UA 09/28/2023 Negative     Protein, UA 09/28/2023 Negative     Glucose, UA 09/28/2023 Negative     Ketones, UA 09/28/2023 Negative     Urobilinogen, UA 09/28/2023 0.2     Bilirubin, UA 09/28/2023 Negative     Occult Blood, UA 09/28/2023 Trace-Intact (A)     RBC, UA 09/28/2023 1-2     WBC, UA 09/28/2023 0-1     Epithelial Cells 09/28/2023 Occasional     Bacteria, UA 09/28/2023 None Seen          Radiology Results:   No results found.

## 2023-12-06 RX ORDER — SODIUM CHLORIDE, SODIUM LACTATE, POTASSIUM CHLORIDE, CALCIUM CHLORIDE 600; 310; 30; 20 MG/100ML; MG/100ML; MG/100ML; MG/100ML
125 INJECTION, SOLUTION INTRAVENOUS CONTINUOUS
OUTPATIENT
Start: 2023-12-06

## 2023-12-07 ENCOUNTER — TELEPHONE (OUTPATIENT)
Age: 53
End: 2023-12-07

## 2023-12-08 ENCOUNTER — HOSPITAL ENCOUNTER (OUTPATIENT)
Dept: RADIOLOGY | Facility: HOSPITAL | Age: 53
End: 2023-12-08
Attending: STUDENT IN AN ORGANIZED HEALTH CARE EDUCATION/TRAINING PROGRAM
Payer: COMMERCIAL

## 2023-12-08 DIAGNOSIS — R13.10 DYSPHAGIA, UNSPECIFIED TYPE: ICD-10-CM

## 2023-12-08 PROCEDURE — 74220 X-RAY XM ESOPHAGUS 1CNTRST: CPT

## 2023-12-16 LAB — COLOGUARD RESULT REPORTABLE: NEGATIVE

## 2023-12-18 ENCOUNTER — TELEPHONE (OUTPATIENT)
Age: 53
End: 2023-12-18

## 2023-12-20 ENCOUNTER — OFFICE VISIT (OUTPATIENT)
Dept: CARDIOLOGY CLINIC | Facility: CLINIC | Age: 53
End: 2023-12-20
Payer: COMMERCIAL

## 2023-12-20 VITALS
BODY MASS INDEX: 35.94 KG/M2 | HEART RATE: 60 BPM | DIASTOLIC BLOOD PRESSURE: 70 MMHG | SYSTOLIC BLOOD PRESSURE: 100 MMHG | WEIGHT: 184 LBS

## 2023-12-20 DIAGNOSIS — Z98.84 S/P GASTRIC BYPASS: ICD-10-CM

## 2023-12-20 DIAGNOSIS — R07.9 CHEST PAIN, UNSPECIFIED TYPE: Primary | ICD-10-CM

## 2023-12-20 DIAGNOSIS — E66.09 CLASS 2 OBESITY DUE TO EXCESS CALORIES WITHOUT SERIOUS COMORBIDITY WITH BODY MASS INDEX (BMI) OF 35.0 TO 35.9 IN ADULT: ICD-10-CM

## 2023-12-20 DIAGNOSIS — R06.02 SHORTNESS OF BREATH: ICD-10-CM

## 2023-12-20 DIAGNOSIS — F17.200 CURRENT SMOKER: ICD-10-CM

## 2023-12-20 DIAGNOSIS — R00.2 PALPITATIONS: ICD-10-CM

## 2023-12-20 DIAGNOSIS — K21.9 GASTROESOPHAGEAL REFLUX DISEASE, UNSPECIFIED WHETHER ESOPHAGITIS PRESENT: ICD-10-CM

## 2023-12-20 DIAGNOSIS — K44.9 HIATAL HERNIA: ICD-10-CM

## 2023-12-20 PROCEDURE — 99204 OFFICE O/P NEW MOD 45 MIN: CPT | Performed by: INTERNAL MEDICINE

## 2023-12-20 PROCEDURE — 93000 ELECTROCARDIOGRAM COMPLETE: CPT | Performed by: INTERNAL MEDICINE

## 2023-12-20 NOTE — PROGRESS NOTES
[facility] [place]   [address] [facility] [address]                                            Cardiology Office Consult  Marylou Morris, 53 y.o. female  YOB: 1970  MRN: [id number] Encounter: [id number]      PCP - Roberta Britton DO  Referring Provider - Self, Referral    Chief Complaint   Patient presents with   • Establish Care   • Hypertension       Assessment  Chest discomfort  Palpitations  GERD  Hiatal hernia  Left ringing in the air, neck swelling  Stroke-like symptoms  7/2023 IP eval for stroke, MRI Randy negative  Current smoker  Was down to 0.5 PPD  S/p gastric bypass (10+ yrs ago, Dr.Selena Flash)  Pre-surgical wt 302 lbs --> 105 lbs (2020) --> now up to 184 lbs  Obesity, Body mass index is 35.94 kg/m².     Plan  Chest discomfort, Shortness of breath, GERD / hiatal hernia  Atypical symptoms, ongoing intermittently for months, no clear exertional triggers / occurs even at rest, but reports worsening shortness of breath  9/2022 Exercise stress test - 4:01 min, 5.8 METS, got shortness of breath, stress ECG negative for ischemia --> overall low sensitivity due to low level of exercise performed  ECG no acute ST T changes  She has severe back pain and with progressive shortness of breath, is unable to exercise  Recommend proceeding with nuclear Rx stress to further evaluate for CAD  If no ischemia and normal EF, then follow up with GI regarding GERD/hiatal hernia    Palpitations  Mainly at night, at times with dizziness, occurs almost daily  ?anxiety v SVT/AT  Check Holter monitor x 48 hours  Minimize caffeine, recommend quitting smoking  Avoid alcohol      Results for orders placed or performed in visit on 12/20/23   POCT ECG    Impression    Normal sinus rhythm  Normal ECG           Orders Placed This Encounter   Procedures   • Holter monitor   • POCT ECG     Return in about 3 months (around 3/20/2024), or if symptoms worsen or  "fail to improve.      History of Present Illness   53 y.o. female comes in as a new patient for consultation regarding ongoing symptoms of chest discomfort and palpitations.  She describes ongoing symptoms of chest discomfort for several months, extending from the midsternal region to her neck, which occurs at rest and is constantly present at times.  There is no clear worsening with exertion.  She has been evaluated in the ED for this and ruled out for ACS and was not found to have very problems.  She is awaiting further endoscopic evaluation regarding this.      She additionally has a lot of other complaints including swelling in the left side of the neck left leg pain, ringing in the ears, and has been evaluated for a stroke as well in the ED in 2023.  She was supposed to follow-up with neurology after this, but was not able to go to [place] for follow-up and as a result has not seen anyone thereafter.  She does have issues with GERD and hiatal hernia and is awaiting follow-up/endoscopy with GI      Historical Information   Past Medical History:   Diagnosis Date   • Asthma    • GERD (gastroesophageal reflux disease)    • H/O gastric bypass    • Hypokalemia    • Methamphetamine abuse (HCC)     Positive UDS; 2020   • Pyelonephritis      Past Surgical History:   Procedure Laterality Date   • CHOLECYSTECTOMY     • DENTAL SURGERY     • ESOPHAGEAL DILATION     • GASTRIC BYPASS     • EILEEN-EN-Y PROCEDURE     • SUBTOTAL COLECTOMY     • TOOTH EXTRACTION     • TUBAL LIGATION       Family History   Problem Relation Age of Onset   • Kidney disease Mother         Pt also reports \"bone disease\"   • Hypertension Mother    • Diabetes Mother    • Heart disease Mother    • Stroke Mother    • Arthritis Mother    • Hyperlipidemia Mother    • Colon cancer Father          motor cycle accident   • Heart disease Sister    • Coronary artery disease Sister    • No Known Problems Sister    • No Known Problems Sister    • " No Known Problems Daughter    • No Known Problems Daughter    • Breast cancer Maternal Grandmother    • Lung cancer Maternal Grandmother    • Lung cancer Maternal Grandfather    • Lung cancer Paternal Grandmother    • Lung cancer Paternal Grandfather    • Cancer Brother    • Heart attack Brother    • Heart disease Brother    • Coronary artery disease Brother    • Uterine cancer Maternal Aunt    • No Known Problems Maternal Aunt    • No Known Problems Maternal Aunt    • Lung cancer Maternal Aunt    • Lung cancer Maternal Aunt    • Thyroid cancer Maternal Uncle    • No Known Problems Paternal Aunt    • No Known Problems Paternal Aunt    • Lung cancer Paternal Uncle    • Lung cancer Cousin      Current Outpatient Medications on File Prior to Visit   Medication Sig Dispense Refill   • albuterol (PROVENTIL HFA,VENTOLIN HFA) 90 mcg/act inhaler Inhale 2 puffs every 6 (six) hours as needed for wheezing 18 g 2   • Blood Glucose Monitoring Suppl (OneTouch Verio Reflect) w/Device KIT Check blood sugars once daily. Please substitute with appropriate alternative as covered by patient's insurance. Dx: E11.65 1 kit 0   • busPIRone (BUSPAR) 7.5 mg tablet Take 1 tablet (7.5 mg total) by mouth 2 (two) times a day 60 tablet 5   • dicyclomine (BENTYL) 20 mg tablet take 1 tablet by mouth twice a day if needed for ABDOMINAL PAIN 20 tablet 1   • ergocalciferol (VITAMIN D2) 50,000 units Take 1 capsule (50,000 Units total) by mouth once a week 12 capsule 0   • glucose blood (OneTouch Verio) test strip Use 1 each 4 (four) times a day (before meals and at bedtime) Check blood sugars once daily. Please substitute with appropriate alternative as covered by patient's insurance. Dx: E11.65 100 each 3   • lactulose 20 g/30 mL Take 30 mL (20 g total) by mouth 2 (two) times a day as needed (constipation) 946 mL 2   • nystatin powder Apply topically 2 (two) times a day 60 g 1   • omeprazole (PriLOSEC) 40 MG capsule Take 1 capsule (40 mg total) by  mouth daily 30 capsule 11   • OneTouch Delica Lancets 33G MISC Check blood sugars once daily. Please substitute with appropriate alternative as covered by patient's insurance. Dx: E11.65 100 each 3   • vitamin B-12 (VITAMIN B-12) 500 mcg tablet take 1 tablet by mouth once daily 30 tablet 2   • fluticasone (FLONASE) 50 mcg/act nasal spray 1 spray into each nostril daily (Patient not taking: Reported on 10/27/2023) 16 g 1   • therapeutic multivitamin-minerals (THERAGRAN-M) tablet Take 1 tablet by mouth daily (Patient not taking: Reported on 12/20/2023)       No current facility-administered medications on file prior to visit.     Allergies   Allergen Reactions   • Morphine      anaphylactic   • Morphine Vomiting   • Suboxone [Buprenorphine-Naloxone] Abdominal Pain   • Sulfamethoxazole-Trimethoprim Hives   • Sulfamethoxazole-Trimethoprim Nausea Only     Social History     Socioeconomic History   • Marital status: Single     Spouse name: None   • Number of children: 2   • Years of education: None   • Highest education level: None   Occupational History   • None   Tobacco Use   • Smoking status: Every Day     Current packs/day: 0.50     Average packs/day: 0.5 packs/day for 38.0 years (19.0 ttl pk-yrs)     Types: Cigarettes   • Smokeless tobacco: Never   Vaping Use   • Vaping status: Never Used   Substance and Sexual Activity   • Alcohol use: Never   • Drug use: Not Currently     Types: Methamphetamines     Comment: reports use this week (12/2020)   • Sexual activity: Not Currently     Partners: Male     Birth control/protection: Post-menopausal   Other Topics Concern   • None   Social History Narrative    ** Merged History Encounter **         Caffeine use    Ready to quit smoking    Never - 28 yrs male , 2 children    Lives with her mother    Unemployed and on SSI     Social Determinants of Health     Financial Resource Strain: Not on file   Food Insecurity: No Food Insecurity (7/12/2023)    Hunger Vital  Sign    • Worried About Running Out of Food in the Last Year: Never true    • Ran Out of Food in the Last Year: Never true   Transportation Needs: No Transportation Needs (7/12/2023)    PRAPARE - Transportation    • Lack of Transportation (Medical): No    • Lack of Transportation (Non-Medical): No   Physical Activity: Not on file   Stress: Not on file   Social Connections: Not on file   Intimate Partner Violence: Not on file   Housing Stability: Low Risk  (7/12/2023)    Housing Stability Vital Sign    • Unable to Pay for Housing in the Last Year: No    • Number of Places Lived in the Last Year: 1    • Unstable Housing in the Last Year: No        Review of Systems   All other systems reviewed and are negative.        Vitals:  Vitals:    12/20/23 0821   BP: 100/70   Pulse: 60   Weight: 83.5 kg (184 lb)     BMI - Body mass index is 35.94 kg/m².  Wt Readings from Last 7 Encounters:   12/20/23 83.5 kg (184 lb)   12/04/23 81.6 kg (180 lb)   11/02/23 79.8 kg (176 lb)   10/27/23 79.8 kg (176 lb)   09/28/23 78 kg (172 lb)   09/18/23 78.4 kg (172 lb 12.8 oz)   07/31/23 81.5 kg (179 lb 9.6 oz)       Physical Exam  Vitals and nursing note reviewed.   Constitutional:       General: She is not in acute distress.     Appearance: Normal appearance. She is well-developed. She is obese. She is not ill-appearing.   HENT:      Head: Normocephalic and atraumatic.      Nose: No congestion.   Eyes:      General: No scleral icterus.     Conjunctiva/sclera: Conjunctivae normal.   Neck:      Vascular: No carotid bruit or JVD.   Cardiovascular:      Rate and Rhythm: Normal rate and regular rhythm.      Pulses: Normal pulses.      Heart sounds: Normal heart sounds. No murmur heard.     No friction rub. No gallop.   Pulmonary:      Effort: Pulmonary effort is normal. No respiratory distress.      Breath sounds: Normal breath sounds. No rales.   Abdominal:      General: There is no distension.      Palpations: Abdomen is soft.      Tenderness:  "There is abdominal tenderness in the epigastric area and left upper quadrant.      Comments: Mild tenderness   Musculoskeletal:         General: No swelling or tenderness.      Cervical back: Neck supple.      Right lower leg: No edema.      Left lower leg: No edema.   Skin:     General: Skin is warm.   Neurological:      General: No focal deficit present.      Mental Status: She is alert and oriented to person, place, and time. Mental status is at baseline.   Psychiatric:         Mood and Affect: Mood normal.         Behavior: Behavior normal.         Thought Content: Thought content normal.           Labs:  CBC:   Lab Results   Component Value Date    WBC 7.08 09/28/2023    RBC 4.53 09/28/2023    HGB 12.9 09/28/2023    HCT 41.4 09/28/2023    MCV 91 09/28/2023     09/28/2023    RDW 16.0 (H) 09/28/2023       CMP:   Lab Results   Component Value Date    K 4.1 09/28/2023     09/28/2023    CO2 22 09/28/2023    BUN 14 09/28/2023    CREATININE 0.52 (L) 09/28/2023    EGFR 110 09/28/2023    CALCIUM 9.2 09/28/2023    AST 18 09/28/2023    ALT 13 09/28/2023    ALKPHOS 60 09/28/2023       Magnesium:  Lab Results   Component Value Date    MG 1.9 07/12/2023       Lipid Profile:   Lab Results   Component Value Date    HDL 49 (L) 07/12/2023    TRIG 106 07/12/2023    LDLCALC 66 07/12/2023       Thyroid Studies:   Lab Results   Component Value Date    [id number] 1.225 07/13/2023       A1c:  No components found for: \"HGA1C\"    INR:  Lab Results   Component Value Date    INR 0.92 07/11/2023    INR 0.98 09/07/2020    INR 1.03 02/24/2020   5    Imaging: FL barium swallow    Result Date: 12/8/2023  Narrative: BARIUM SWALLOW-ESOPHAGRAM INDICATION:   R13.10: Dysphagia, unspecified. COMPARISON: 4/15/2016 IMAGES: FLUOROSCOPY TIME:   2.0 minutes TECHNIQUE: The patient was given barium by mouth and images of the esophagus were obtained. FINDINGS: The esophagus is normal in caliber.  Esophageal motility is normal and emptying of " contrast from the esophagus is prompt.  No mucosal lesion, ulceration or evidence of fold thickening is seen. Small reducible hiatal hernia without gastroesophageal reflux. Bariatric Phoenix-en-Y gastric bypass again noted with small posterior gastric diverticulum     Impression: Small reducible hiatal hernia without gastroesophageal reflux. Unremarkable esophagram otherwise. Workstation performed: [id number]       Cardiac testing:   No results found for this or any previous visit.    No results found for this or any previous visit.    No results found for this or any previous visit.    No results found for this or any previous visit.      FL barium swallow  Narrative: BARIUM SWALLOW-ESOPHAGRAM    INDICATION:   R13.10: Dysphagia, unspecified.    COMPARISON: 4/15/2016    IMAGES:    FLUOROSCOPY TIME:   2.0 minutes    TECHNIQUE:  The patient was given barium by mouth and images of the esophagus were obtained.    FINDINGS:    The esophagus is normal in caliber.  Esophageal motility is normal and emptying of contrast from the esophagus is prompt.  No mucosal lesion, ulceration or evidence of fold thickening is seen.    Small reducible hiatal hernia without gastroesophageal reflux.    Bariatric Phoenix-en-Y gastric bypass again noted with small posterior gastric diverticulum  Impression: Small reducible hiatal hernia without gastroesophageal reflux.    Unremarkable esophagram otherwise.    Workstation performed: [id number]

## 2023-12-26 LAB — GLUCOSE SERPL-MCNC: 86 MG/DL (ref 65–140)

## 2024-01-02 RX ORDER — SODIUM CHLORIDE, SODIUM LACTATE, POTASSIUM CHLORIDE, CALCIUM CHLORIDE 600; 310; 30; 20 MG/100ML; MG/100ML; MG/100ML; MG/100ML
125 INJECTION, SOLUTION INTRAVENOUS CONTINUOUS
OUTPATIENT
Start: 2024-01-02

## 2024-01-08 ENCOUNTER — TELEPHONE (OUTPATIENT)
Dept: GASTROENTEROLOGY | Facility: CLINIC | Age: 54
End: 2024-01-08

## 2024-01-09 ENCOUNTER — TELEPHONE (OUTPATIENT)
Dept: FAMILY MEDICINE CLINIC | Facility: CLINIC | Age: 54
End: 2024-01-09

## 2024-01-09 DIAGNOSIS — Z98.84 HISTORY OF GASTRIC BYPASS: ICD-10-CM

## 2024-01-09 DIAGNOSIS — E16.2 HYPOGLYCEMIA: ICD-10-CM

## 2024-01-09 DIAGNOSIS — E55.9 VITAMIN D DEFICIENCY: ICD-10-CM

## 2024-01-09 RX ORDER — ERGOCALCIFEROL 1.25 MG/1
50000 CAPSULE ORAL WEEKLY
Qty: 12 CAPSULE | Refills: 0 | Status: SHIPPED | OUTPATIENT
Start: 2024-01-09

## 2024-01-09 RX ORDER — LANCETS 33 GAUGE
EACH MISCELLANEOUS
Qty: 100 EACH | Refills: 3 | Status: SHIPPED | OUTPATIENT
Start: 2024-01-09

## 2024-01-09 RX ORDER — BLOOD SUGAR DIAGNOSTIC
1 STRIP MISCELLANEOUS
Qty: 100 EACH | Refills: 3 | Status: SHIPPED | OUTPATIENT
Start: 2024-01-09

## 2024-01-09 NOTE — TELEPHONE ENCOUNTER
Patient needs new script for test strips, lancets and vitamin d.     Per chart review, sent over to pharmacy on 11/28, but they are telling patient we cancelled the orders which we did not.     New scripts sent to pharmacy

## 2024-01-11 ENCOUNTER — APPOINTMENT (EMERGENCY)
Dept: RADIOLOGY | Facility: HOSPITAL | Age: 54
DRG: 229 | End: 2024-01-11
Payer: COMMERCIAL

## 2024-01-11 ENCOUNTER — ANESTHESIA (OUTPATIENT)
Dept: PERIOP | Facility: HOSPITAL | Age: 54
DRG: 229 | End: 2024-01-11
Payer: COMMERCIAL

## 2024-01-11 ENCOUNTER — ANESTHESIA EVENT (OUTPATIENT)
Dept: PERIOP | Facility: HOSPITAL | Age: 54
DRG: 229 | End: 2024-01-11
Payer: COMMERCIAL

## 2024-01-11 ENCOUNTER — HOSPITAL ENCOUNTER (EMERGENCY)
Facility: HOSPITAL | Age: 54
DRG: 229 | End: 2024-01-11
Attending: EMERGENCY MEDICINE
Payer: COMMERCIAL

## 2024-01-11 ENCOUNTER — HOSPITAL ENCOUNTER (INPATIENT)
Facility: HOSPITAL | Age: 54
LOS: 1 days | Discharge: HOME | DRG: 229 | End: 2024-01-13
Attending: SURGERY | Admitting: SURGERY
Payer: COMMERCIAL

## 2024-01-11 VITALS
HEART RATE: 60 BPM | TEMPERATURE: 97.7 F | OXYGEN SATURATION: 96 % | DIASTOLIC BLOOD PRESSURE: 53 MMHG | RESPIRATION RATE: 20 BRPM | SYSTOLIC BLOOD PRESSURE: 139 MMHG

## 2024-01-11 DIAGNOSIS — R10.9 ABDOMINAL PAIN: Primary | ICD-10-CM

## 2024-01-11 DIAGNOSIS — K45.8 INTERNAL HERNIA: ICD-10-CM

## 2024-01-11 DIAGNOSIS — R11.2 NAUSEA AND VOMITING: ICD-10-CM

## 2024-01-11 DIAGNOSIS — Z98.84 HISTORY OF GASTRIC BYPASS: ICD-10-CM

## 2024-01-11 DIAGNOSIS — K45.8 INTERNAL HERNIA: Primary | ICD-10-CM

## 2024-01-11 DIAGNOSIS — I95.1 ORTHOSTATIC HYPOTENSION: ICD-10-CM

## 2024-01-11 DIAGNOSIS — R10.12 LEFT UPPER QUADRANT ABDOMINAL PAIN: ICD-10-CM

## 2024-01-11 DIAGNOSIS — R13.10 DYSPHAGIA, UNSPECIFIED TYPE: ICD-10-CM

## 2024-01-11 DIAGNOSIS — J45.40 MODERATE PERSISTENT ASTHMA WITHOUT COMPLICATION: ICD-10-CM

## 2024-01-11 LAB
ALBUMIN SERPL BCP-MCNC: 4.1 G/DL (ref 3.5–5)
ALP SERPL-CCNC: 67 U/L (ref 34–104)
ALT SERPL W P-5'-P-CCNC: 22 U/L (ref 7–52)
ANION GAP SERPL CALCULATED.3IONS-SCNC: 7 MMOL/L
AST SERPL W P-5'-P-CCNC: 26 U/L (ref 13–39)
BASOPHILS # BLD AUTO: 0.04 THOUSANDS/ÂΜL (ref 0–0.1)
BASOPHILS NFR BLD AUTO: 1 % (ref 0–1)
BILIRUB SERPL-MCNC: 0.68 MG/DL (ref 0.2–1)
BILIRUB UR QL STRIP: NEGATIVE
BUN SERPL-MCNC: 13 MG/DL (ref 5–25)
CALCIUM SERPL-MCNC: 9.4 MG/DL (ref 8.4–10.2)
CARDIAC TROPONIN I PNL SERPL HS: <2 NG/L
CHLORIDE SERPL-SCNC: 108 MMOL/L (ref 96–108)
CLARITY UR: CLEAR
CO2 SERPL-SCNC: 25 MMOL/L (ref 21–32)
COLOR UR: NORMAL
CREAT SERPL-MCNC: 0.74 MG/DL (ref 0.6–1.3)
EOSINOPHIL # BLD AUTO: 0.04 THOUSAND/ÂΜL (ref 0–0.61)
EOSINOPHIL NFR BLD AUTO: 1 % (ref 0–6)
ERYTHROCYTE [DISTWIDTH] IN BLOOD BY AUTOMATED COUNT: 14.8 % (ref 11.6–15.1)
GFR SERPL CREATININE-BSD FRML MDRD: 93 ML/MIN/1.73SQ M
GLUCOSE SERPL-MCNC: 74 MG/DL (ref 65–140)
GLUCOSE SERPL-MCNC: 80 MG/DL (ref 65–140)
GLUCOSE SERPL-MCNC: 88 MG/DL (ref 65–140)
GLUCOSE UR STRIP-MCNC: NEGATIVE MG/DL
HCT VFR BLD AUTO: 39.4 % (ref 34.8–46.1)
HGB BLD-MCNC: 12.2 G/DL (ref 11.5–15.4)
HGB UR QL STRIP.AUTO: NEGATIVE
IMM GRANULOCYTES # BLD AUTO: 0.01 THOUSAND/UL (ref 0–0.2)
IMM GRANULOCYTES NFR BLD AUTO: 0 % (ref 0–2)
KETONES UR STRIP-MCNC: NEGATIVE MG/DL
LACTATE SERPL-SCNC: 0.7 MMOL/L (ref 0.5–2)
LEUKOCYTE ESTERASE UR QL STRIP: NEGATIVE
LIPASE SERPL-CCNC: 18 U/L (ref 11–82)
LYMPHOCYTES # BLD AUTO: 2.16 THOUSANDS/ÂΜL (ref 0.6–4.47)
LYMPHOCYTES NFR BLD AUTO: 35 % (ref 14–44)
MCH RBC QN AUTO: 27.4 PG (ref 26.8–34.3)
MCHC RBC AUTO-ENTMCNC: 31 G/DL (ref 31.4–37.4)
MCV RBC AUTO: 89 FL (ref 82–98)
MONOCYTES # BLD AUTO: 0.22 THOUSAND/ÂΜL (ref 0.17–1.22)
MONOCYTES NFR BLD AUTO: 4 % (ref 4–12)
NEUTROPHILS # BLD AUTO: 3.71 THOUSANDS/ÂΜL (ref 1.85–7.62)
NEUTS SEG NFR BLD AUTO: 59 % (ref 43–75)
NITRITE UR QL STRIP: NEGATIVE
NRBC BLD AUTO-RTO: 0 /100 WBCS
PH UR STRIP.AUTO: 6.5 [PH]
PLATELET # BLD AUTO: 274 THOUSANDS/UL (ref 149–390)
PMV BLD AUTO: 9.3 FL (ref 8.9–12.7)
POTASSIUM SERPL-SCNC: 3.9 MMOL/L (ref 3.5–5.3)
PROT SERPL-MCNC: 6.8 G/DL (ref 6.4–8.4)
PROT UR STRIP-MCNC: NEGATIVE MG/DL
RBC # BLD AUTO: 4.45 MILLION/UL (ref 3.81–5.12)
SODIUM SERPL-SCNC: 140 MMOL/L (ref 135–147)
SP GR UR STRIP.AUTO: 1.01 (ref 1–1.03)
UROBILINOGEN UR STRIP-ACNC: <2 MG/DL
WBC # BLD AUTO: 6.18 THOUSAND/UL (ref 4.31–10.16)

## 2024-01-11 PROCEDURE — 96361 HYDRATE IV INFUSION ADD-ON: CPT

## 2024-01-11 PROCEDURE — 80053 COMPREHEN METABOLIC PANEL: CPT | Performed by: EMERGENCY MEDICINE

## 2024-01-11 PROCEDURE — 99291 CRITICAL CARE FIRST HOUR: CPT | Performed by: EMERGENCY MEDICINE

## 2024-01-11 PROCEDURE — 82948 REAGENT STRIP/BLOOD GLUCOSE: CPT

## 2024-01-11 PROCEDURE — 81003 URINALYSIS AUTO W/O SCOPE: CPT | Performed by: EMERGENCY MEDICINE

## 2024-01-11 PROCEDURE — 99285 EMERGENCY DEPT VISIT HI MDM: CPT

## 2024-01-11 PROCEDURE — 71046 X-RAY EXAM CHEST 2 VIEWS: CPT

## 2024-01-11 PROCEDURE — 74177 CT ABD & PELVIS W/CONTRAST: CPT

## 2024-01-11 PROCEDURE — 83605 ASSAY OF LACTIC ACID: CPT | Performed by: STUDENT IN AN ORGANIZED HEALTH CARE EDUCATION/TRAINING PROGRAM

## 2024-01-11 PROCEDURE — 96376 TX/PRO/DX INJ SAME DRUG ADON: CPT

## 2024-01-11 PROCEDURE — 0DQV4ZZ REPAIR MESENTERY, PERCUTANEOUS ENDOSCOPIC APPROACH: ICD-10-PCS | Performed by: SURGERY

## 2024-01-11 PROCEDURE — 86850 RBC ANTIBODY SCREEN: CPT | Performed by: STUDENT IN AN ORGANIZED HEALTH CARE EDUCATION/TRAINING PROGRAM

## 2024-01-11 PROCEDURE — 86901 BLOOD TYPING SEROLOGIC RH(D): CPT | Performed by: STUDENT IN AN ORGANIZED HEALTH CARE EDUCATION/TRAINING PROGRAM

## 2024-01-11 PROCEDURE — 99205 OFFICE O/P NEW HI 60 MIN: CPT | Performed by: STUDENT IN AN ORGANIZED HEALTH CARE EDUCATION/TRAINING PROGRAM

## 2024-01-11 PROCEDURE — 36415 COLL VENOUS BLD VENIPUNCTURE: CPT | Performed by: EMERGENCY MEDICINE

## 2024-01-11 PROCEDURE — 85025 COMPLETE CBC W/AUTO DIFF WBC: CPT | Performed by: EMERGENCY MEDICINE

## 2024-01-11 PROCEDURE — 96374 THER/PROPH/DIAG INJ IV PUSH: CPT

## 2024-01-11 PROCEDURE — 83690 ASSAY OF LIPASE: CPT | Performed by: EMERGENCY MEDICINE

## 2024-01-11 PROCEDURE — 86900 BLOOD TYPING SEROLOGIC ABO: CPT | Performed by: STUDENT IN AN ORGANIZED HEALTH CARE EDUCATION/TRAINING PROGRAM

## 2024-01-11 PROCEDURE — G1004 CDSM NDSC: HCPCS

## 2024-01-11 PROCEDURE — 93005 ELECTROCARDIOGRAM TRACING: CPT

## 2024-01-11 PROCEDURE — 84484 ASSAY OF TROPONIN QUANT: CPT | Performed by: EMERGENCY MEDICINE

## 2024-01-11 PROCEDURE — 96375 TX/PRO/DX INJ NEW DRUG ADDON: CPT

## 2024-01-11 RX ORDER — ONDANSETRON 2 MG/ML
4 INJECTION INTRAMUSCULAR; INTRAVENOUS ONCE
Status: COMPLETED | OUTPATIENT
Start: 2024-01-11 | End: 2024-01-11

## 2024-01-11 RX ORDER — ONDANSETRON 2 MG/ML
4 INJECTION INTRAMUSCULAR; INTRAVENOUS EVERY 6 HOURS PRN
Status: DISCONTINUED | OUTPATIENT
Start: 2024-01-11 | End: 2024-01-12 | Stop reason: HOSPADM

## 2024-01-11 RX ORDER — SODIUM CHLORIDE, SODIUM LACTATE, POTASSIUM CHLORIDE, CALCIUM CHLORIDE 600; 310; 30; 20 MG/100ML; MG/100ML; MG/100ML; MG/100ML
1000 INJECTION, SOLUTION INTRAVENOUS CONTINUOUS
Status: DISCONTINUED | OUTPATIENT
Start: 2024-01-11 | End: 2024-01-12

## 2024-01-11 RX ORDER — SODIUM CHLORIDE, SODIUM LACTATE, POTASSIUM CHLORIDE, CALCIUM CHLORIDE 600; 310; 30; 20 MG/100ML; MG/100ML; MG/100ML; MG/100ML
125 INJECTION, SOLUTION INTRAVENOUS CONTINUOUS
Status: DISCONTINUED | OUTPATIENT
Start: 2024-01-11 | End: 2024-01-13 | Stop reason: ALTCHOICE

## 2024-01-11 RX ORDER — KETOROLAC TROMETHAMINE 30 MG/ML
15 INJECTION, SOLUTION INTRAMUSCULAR; INTRAVENOUS ONCE
Status: COMPLETED | OUTPATIENT
Start: 2024-01-11 | End: 2024-01-11

## 2024-01-11 RX ORDER — CEFAZOLIN SODIUM 2 G/50ML
2000 SOLUTION INTRAVENOUS ONCE
Status: COMPLETED | OUTPATIENT
Start: 2024-01-11 | End: 2024-01-11

## 2024-01-11 RX ORDER — CELECOXIB 200 MG/1
200 CAPSULE ORAL ONCE
Status: CANCELLED | OUTPATIENT
Start: 2024-01-11 | End: 2024-01-11

## 2024-01-11 RX ORDER — ENOXAPARIN SODIUM 100 MG/ML
40 INJECTION SUBCUTANEOUS ONCE
Status: CANCELLED | OUTPATIENT
Start: 2024-01-11 | End: 2024-01-11

## 2024-01-11 RX ORDER — DIPHENHYDRAMINE HYDROCHLORIDE 50 MG/ML
25 INJECTION INTRAMUSCULAR; INTRAVENOUS ONCE
Status: COMPLETED | OUTPATIENT
Start: 2024-01-11 | End: 2024-01-11

## 2024-01-11 RX ORDER — FAMOTIDINE 10 MG/ML
20 INJECTION, SOLUTION INTRAVENOUS EVERY 12 HOURS SCHEDULED
Status: DISCONTINUED | OUTPATIENT
Start: 2024-01-11 | End: 2024-01-12

## 2024-01-11 RX ORDER — CYANOCOBALAMIN 1000 UG/ML
1000 INJECTION, SOLUTION INTRAMUSCULAR; SUBCUTANEOUS
Status: DISCONTINUED | OUTPATIENT
Start: 2024-01-11 | End: 2024-01-11

## 2024-01-11 RX ORDER — ACETAMINOPHEN 10 MG/ML
1000 INJECTION, SOLUTION INTRAVENOUS ONCE
Status: COMPLETED | OUTPATIENT
Start: 2024-01-11 | End: 2024-01-12

## 2024-01-11 RX ORDER — HEPARIN SODIUM 5000 [USP'U]/ML
5000 INJECTION, SOLUTION INTRAVENOUS; SUBCUTANEOUS ONCE
Status: COMPLETED | OUTPATIENT
Start: 2024-01-12 | End: 2024-01-12

## 2024-01-11 RX ORDER — CYANOCOBALAMIN 1000 UG/ML
1000 INJECTION, SOLUTION INTRAMUSCULAR; SUBCUTANEOUS
Status: DISCONTINUED | OUTPATIENT
Start: 2024-01-12 | End: 2024-01-12

## 2024-01-11 RX ORDER — KETOROLAC TROMETHAMINE 30 MG/ML
15 INJECTION, SOLUTION INTRAMUSCULAR; INTRAVENOUS EVERY 6 HOURS
Qty: 8 ML | Refills: 0 | Status: DISCONTINUED | OUTPATIENT
Start: 2024-01-11 | End: 2024-01-12

## 2024-01-11 RX ORDER — HYDROMORPHONE HCL/PF 1 MG/ML
0.5 SYRINGE (ML) INJECTION ONCE
Status: COMPLETED | OUTPATIENT
Start: 2024-01-11 | End: 2024-01-11

## 2024-01-11 RX ORDER — HYDROMORPHONE HCL/PF 1 MG/ML
1 SYRINGE (ML) INJECTION EVERY 4 HOURS PRN
Status: DISCONTINUED | OUTPATIENT
Start: 2024-01-11 | End: 2024-01-12

## 2024-01-11 RX ADMIN — ONDANSETRON 4 MG: 2 INJECTION INTRAMUSCULAR; INTRAVENOUS at 13:09

## 2024-01-11 RX ADMIN — SODIUM CHLORIDE, SODIUM LACTATE, POTASSIUM CHLORIDE, AND CALCIUM CHLORIDE 1000 ML: .6; .31; .03; .02 INJECTION, SOLUTION INTRAVENOUS at 22:16

## 2024-01-11 RX ADMIN — HYDROMORPHONE HYDROCHLORIDE 0.5 MG: 1 INJECTION, SOLUTION INTRAMUSCULAR; INTRAVENOUS; SUBCUTANEOUS at 20:24

## 2024-01-11 RX ADMIN — KETOROLAC TROMETHAMINE 15 MG: 30 INJECTION, SOLUTION INTRAMUSCULAR; INTRAVENOUS at 13:09

## 2024-01-11 RX ADMIN — CEFAZOLIN SODIUM 2000 MG: 2 SOLUTION INTRAVENOUS at 23:51

## 2024-01-11 RX ADMIN — IOHEXOL 100 ML: 350 INJECTION, SOLUTION INTRAVENOUS at 16:57

## 2024-01-11 RX ADMIN — FAMOTIDINE 20 MG: 10 INJECTION INTRAVENOUS at 23:23

## 2024-01-11 RX ADMIN — DIPHENHYDRAMINE HYDROCHLORIDE 25 MG: 50 INJECTION, SOLUTION INTRAMUSCULAR; INTRAVENOUS at 14:47

## 2024-01-11 RX ADMIN — ONDANSETRON 4 MG: 2 INJECTION INTRAMUSCULAR; INTRAVENOUS at 20:24

## 2024-01-11 RX ADMIN — MIDAZOLAM 2 MG: 1 INJECTION INTRAMUSCULAR; INTRAVENOUS at 23:51

## 2024-01-11 RX ADMIN — IOHEXOL 50 ML: 240 INJECTION, SOLUTION INTRATHECAL; INTRAVASCULAR; INTRAVENOUS; ORAL at 15:09

## 2024-01-11 RX ADMIN — SODIUM CHLORIDE 1000 ML: 0.9 INJECTION, SOLUTION INTRAVENOUS at 13:10

## 2024-01-11 NOTE — ED PROVIDER NOTES
"History  Chief Complaint   Patient presents with    Abdominal Pain     RUQ pain that radiates to mid abdomen at times. States it is right on top of her gastric bypass scar that she had 13 years ago. N/v. Denying narcotics due to being in recovery     52 yo morbidly obese female with a history of asthma, GERD, polysubstance abuse, and s/p gastric bypass surgery presents to the ED complaining of abdominal pain. The patient reports a poorly localized left sided abdominal pain intermittently x \"months\". The pain occasionally radiates into the mid-abdomen. She says the pain acutely worsened today. (+) Intermittent nausea and vomiting x \"forever\", also worse today. (+) Chronic constipation. No fevers or chills. She denies dysuria, hematuria, vaginal bleeding, and vaginal discharge. No dark or bloody stools. (+) Secondary complaint of vague anterior chest discomfort x \"a few weeks\". No associated shortness of breath, wheezing, or diaphoresis. She denies LE swelling/pain. No cough or hemoptysis. No other specific complaints.        Prior to Admission Medications   Prescriptions Last Dose Informant Patient Reported? Taking?   Blood Glucose Monitoring Suppl (OneTouch Verio Reflect) w/Device KIT   No No   Sig: Check blood sugars once daily. Please substitute with appropriate alternative as covered by patient's insurance. Dx: E11.65   OneTouch Delica Lancets 33G MISC   No No   Sig: Check blood sugars once daily. Please substitute with appropriate alternative as covered by patient's insurance. Dx: E11.65   albuterol (PROVENTIL HFA,VENTOLIN HFA) 90 mcg/act inhaler   No No   Sig: Inhale 2 puffs every 6 (six) hours as needed for wheezing   busPIRone (BUSPAR) 7.5 mg tablet   No No   Sig: Take 1 tablet (7.5 mg total) by mouth 2 (two) times a day   dicyclomine (BENTYL) 20 mg tablet   No No   Sig: take 1 tablet by mouth twice a day if needed for ABDOMINAL PAIN   ergocalciferol (VITAMIN D2) 50,000 units   No No   Sig: Take 1 capsule " "(50,000 Units total) by mouth once a week   fluticasone (FLONASE) 50 mcg/act nasal spray   No No   Si spray into each nostril daily   Patient not taking: Reported on 10/27/2023   glucose blood (OneTouch Verio) test strip   No No   Sig: Use 1 each 4 (four) times a day (before meals and at bedtime) Check blood sugars once daily. Please substitute with appropriate alternative as covered by patient's insurance. Dx: E11.65   lactulose 20 g/30 mL   No No   Sig: Take 30 mL (20 g total) by mouth 2 (two) times a day as needed (constipation)   nystatin powder   No No   Sig: Apply topically 2 (two) times a day   omeprazole (PriLOSEC) 40 MG capsule   No No   Sig: Take 1 capsule (40 mg total) by mouth daily   therapeutic multivitamin-minerals (THERAGRAN-M) tablet  Self Yes No   Sig: Take 1 tablet by mouth daily   Patient not taking: Reported on 2023   vitamin B-12 (VITAMIN B-12) 500 mcg tablet   No No   Sig: take 1 tablet by mouth once daily      Facility-Administered Medications: None       Past Medical History:   Diagnosis Date    Asthma     GERD (gastroesophageal reflux disease)     H/O gastric bypass     Hypokalemia     Methamphetamine abuse (HCC)     Positive UDS; 2020    Pyelonephritis        Past Surgical History:   Procedure Laterality Date    CHOLECYSTECTOMY      DENTAL SURGERY      ESOPHAGEAL DILATION      GASTRIC BYPASS      EILEEN-EN-Y PROCEDURE      SUBTOTAL COLECTOMY      TOOTH EXTRACTION      TUBAL LIGATION         Family History   Problem Relation Age of Onset    Kidney disease Mother         Pt also reports \"bone disease\"    Hypertension Mother     Diabetes Mother     Heart disease Mother     Stroke Mother     Arthritis Mother     Hyperlipidemia Mother     Colon cancer Father          motor cycle accident    Heart disease Sister     Coronary artery disease Sister     No Known Problems Sister     No Known Problems Sister     No Known Problems Daughter     No Known Problems Daughter     Breast " cancer Maternal Grandmother     Lung cancer Maternal Grandmother     Lung cancer Maternal Grandfather     Lung cancer Paternal Grandmother     Lung cancer Paternal Grandfather     Cancer Brother     Heart attack Brother     Heart disease Brother     Coronary artery disease Brother     Uterine cancer Maternal Aunt     No Known Problems Maternal Aunt     No Known Problems Maternal Aunt     Lung cancer Maternal Aunt     Lung cancer Maternal Aunt     Thyroid cancer Maternal Uncle     No Known Problems Paternal Aunt     No Known Problems Paternal Aunt     Lung cancer Paternal Uncle     Lung cancer Cousin      I have reviewed and agree with the history as documented.    E-Cigarette/Vaping    E-Cigarette Use Never User     Cartridges/Day 1      E-Cigarette/Vaping Substances    Nicotine No     THC No     CBD No     Flavoring No     Other No     Unknown No      Social History     Tobacco Use    Smoking status: Every Day     Current packs/day: 0.50     Average packs/day: 0.5 packs/day for 38.0 years (19.0 ttl pk-yrs)     Types: Cigarettes    Smokeless tobacco: Never   Vaping Use    Vaping status: Never Used   Substance Use Topics    Alcohol use: Never    Drug use: Not Currently     Types: Methamphetamines     Comment: reports use this week (12/2020)       Review of Systems   Constitutional:  Negative for chills and fever.   HENT:  Negative for ear pain and sore throat.    Eyes:  Negative for pain and visual disturbance.   Respiratory:  Negative for cough, shortness of breath and wheezing.    Cardiovascular:  Positive for chest pain. Negative for palpitations.   Gastrointestinal:  Positive for abdominal pain, constipation, nausea and vomiting. Negative for blood in stool and diarrhea.   Genitourinary:  Negative for dysuria and hematuria.   Musculoskeletal:  Negative for arthralgias and back pain.   Skin:  Negative for color change and rash.   Neurological:  Negative for dizziness, seizures, syncope and light-headedness.   All  other systems reviewed and are negative.      Physical Exam  Physical Exam  Vitals and nursing note reviewed.   Constitutional:       General: She is not in acute distress.     Appearance: She is well-developed.   HENT:      Head: Normocephalic and atraumatic.   Eyes:      Conjunctiva/sclera: Conjunctivae normal.      Pupils: Pupils are equal, round, and reactive to light.   Cardiovascular:      Rate and Rhythm: Normal rate and regular rhythm.      Heart sounds: No murmur heard.  Pulmonary:      Effort: Pulmonary effort is normal. No respiratory distress.      Breath sounds: Normal breath sounds.   Abdominal:      General: There is no distension.      Palpations: Abdomen is soft.      Tenderness: There is abdominal tenderness in the left upper quadrant and left lower quadrant. There is guarding. There is no rebound.   Musculoskeletal:         General: No swelling. Normal range of motion.      Cervical back: Normal range of motion and neck supple.   Skin:     General: Skin is warm and dry.      Capillary Refill: Capillary refill takes less than 2 seconds.   Neurological:      Mental Status: She is alert and oriented to person, place, and time.   Psychiatric:         Mood and Affect: Mood normal.         Vital Signs  ED Triage Vitals   Temperature Pulse Respirations Blood Pressure SpO2   01/11/24 1156 01/11/24 1156 01/11/24 1156 01/11/24 1156 01/11/24 1156   97.7 °F (36.5 °C) 70 20 131/67 97 %      Temp Source Heart Rate Source Patient Position - Orthostatic VS BP Location FiO2 (%)   01/11/24 1156 01/11/24 1426 01/11/24 1641 01/11/24 1641 --   Oral Monitor Lying Left arm       Pain Score       01/11/24 1156       10 - Worst Possible Pain           Vitals:    01/11/24 1156 01/11/24 1426 01/11/24 1641   BP: 131/67 118/64 120/61   Pulse: 70 63 70   Patient Position - Orthostatic VS:   Lying         Visual Acuity      ED Medications  Medications   sodium chloride 0.9 % bolus 1,000 mL (1,000 mL Intravenous New Bag 1/11/24  1310)   iohexol (OMNIPAQUE) 240 MG/ML solution 50 mL (50 mL Oral Given 1/11/24 1509)   ketorolac (TORADOL) injection 15 mg (15 mg Intravenous Given 1/11/24 1309)   ondansetron (ZOFRAN) injection 4 mg (4 mg Intravenous Given 1/11/24 1309)   diphenhydrAMINE (BENADRYL) injection 25 mg (25 mg Intravenous Given 1/11/24 1447)   iohexol (OMNIPAQUE) 350 MG/ML injection (SINGLE-DOSE) 100 mL (100 mL Intravenous Given 1/11/24 1657)       Diagnostic Studies  Results Reviewed       Procedure Component Value Units Date/Time    Lactic acid, plasma (w/reflex if result > 2.0) [[id number]] Collected: 01/11/24 1933    Lab Status: In process Specimen: Blood from Arm, Right Updated: 01/11/24 1936    UA w Reflex to Microscopic w Reflex to Culture [[id number]] Collected: 01/11/24 1257    Lab Status: Final result Specimen: Urine, Clean Catch Updated: 01/11/24 1502     Color, UA Light Yellow     Clarity, UA Clear     Specific Gravity, UA 1.015     pH, UA 6.5     Leukocytes, UA Negative     Nitrite, UA Negative     Protein, UA Negative mg/dl      Glucose, UA Negative mg/dl      Ketones, UA Negative mg/dl      Urobilinogen, UA <2.0 mg/dl      Bilirubin, UA Negative     Occult Blood, UA Negative    Fingerstick Glucose (POCT) [[id number]]  (Normal) Collected: 01/11/24 1458    Lab Status: Final result Updated: 01/11/24 1459     POC Glucose 74 mg/dl     HS Troponin 0hr (reflex protocol) [[id number]]  (Normal) Collected: 01/11/24 1307    Lab Status: Final result Specimen: Blood from Arm, Right Updated: 01/11/24 1355     hs TnI 0hr <2 ng/L     Comprehensive metabolic panel [[id number]] Collected: 01/11/24 1307    Lab Status: Final result Specimen: Blood from Arm, Right Updated: 01/11/24 1345     Sodium 140 mmol/L      Potassium 3.9 mmol/L      Chloride 108 mmol/L      CO2 25 mmol/L      ANION GAP 7 mmol/L      BUN 13 mg/dL      Creatinine 0.74 mg/dL      Glucose 80 mg/dL      Calcium 9.4 mg/dL      AST 26 U/L      ALT 22 U/L      Alkaline Phosphatase 67  U/L      Total Protein 6.8 g/dL      Albumin 4.1 g/dL      Total Bilirubin 0.68 mg/dL      eGFR 93 ml/min/1.73sq m     Narrative:      National Kid[employer] guidelines for Chronic Kidney Disease (CKD):     Stage 1 with normal or high GFR (GFR > 90 mL/min/1.73 square meters)    Stage 2 Mild CKD (GFR = 60-89 mL/min/1.73 square meters)    Stage 3A Moderate CKD (GFR = 45-59 mL/min/1.73 square meters)    Stage 3B Moderate CKD (GFR = 30-44 mL/min/1.73 square meters)    Stage 4 Severe CKD (GFR = 15-29 mL/min/1.73 square meters)    Stage 5 End Stage CKD (GFR <15 mL/min/1.73 square meters)  Note: GFR calculation is accurate only with a steady state creatinine    Lipase [[id number]]  (Normal) Collected: 01/11/24 1307    Lab Status: Final result Specimen: Blood from Arm, Right Updated: 01/11/24 1345     Lipase 18 u/L     CBC and differential [[id number]]  (Abnormal) Collected: 01/11/24 1307    Lab Status: Final result Specimen: Blood from Arm, Right Updated: 01/11/24 1328     WBC 6.18 Thousand/uL      RBC 4.45 Million/uL      Hemoglobin 12.2 g/dL      Hematocrit 39.4 %      MCV 89 fL      MCH 27.4 pg      MCHC 31.0 g/dL      RDW 14.8 %      MPV 9.3 fL      Platelets 274 Thousands/uL      nRBC 0 /100 WBCs      Neutrophils Relative 59 %      Immat GRANS % 0 %      Lymphocytes Relative 35 %      Monocytes Relative 4 %      Eosinophils Relative 1 %      Basophils Relative 1 %      Neutrophils Absolute 3.71 Thousands/µL      Immature Grans Absolute 0.01 Thousand/uL      Lymphocytes Absolute 2.16 Thousands/µL      Monocytes Absolute 0.22 Thousand/µL      Eosinophils Absolute 0.04 Thousand/µL      Basophils Absolute 0.04 Thousands/µL                    CT abdomen pelvis with contrast   Final Result by Leida Hopson MD (01/11 1842)      Findings compatible with an internal hernia, with imaging findings highly suggestive of a Petersons type internal hernia as described above but there is no evidence of obstruction and  no evidence of bowel ischemia. The study was marked in EPIC for    immediate notification.            Workstation performed: [id number]         XR chest 2 views   ED Interpretation by Dani Tamez MD (01/11 1445)   No acute cardiopulmonary disease.      Final Result by Antoine Sy MD (01/11 6655)      No acute cardiopulmonary disease.                  Workstation performed: [id number]                    Procedures  ECG 12 Lead Documentation Only    Date/Time: 1/11/2024 2:12 PM    Performed by: Dani Tamez MD  Authorized by: Dani Tamez MD    Indications / Diagnosis:  Chest pain  ECG reviewed by me, the ED Provider: yes    Patient location:  ED  Interpretation:     Interpretation: abnormal    Quality:     Tracing quality:  Limited by artifact  Rate:     ECG rate:  48 bpm    ECG rate assessment: bradycardic    Rhythm:     Rhythm: sinus bradycardia    Ectopy:     Ectopy: none    QRS:     QRS axis:  Normal    QRS intervals:  Normal  Conduction:     Conduction: normal    ST segments:     ST segments:  Non-specific  T waves:     T waves: non-specific    Comments:      Low voltage QRS.  CriticalCare Time    Date/Time: 1/11/2024 7:50 PM    Performed by: Dani Tamez MD  Authorized by: Dani Tamez MD    Critical care provider statement:     Critical care time (minutes):  45    Critical care start time:  1/11/2024 7:00 PM    Critical care end time:  1/11/2024 7:45 PM    Critical care time was exclusive of:  Separately billable procedures and treating other patients and teaching time    Critical care was necessary to treat or prevent imminent or life-threatening deterioration of the following conditions:  Shock and dehydration    Critical care was time spent personally by me on the following activities:  Obtaining history from patient or surrogate, development of treatment plan with patient or surrogate, discussions with consultants, discussions with primary provider, evaluation of  patient's response to treatment, examination of patient, interpretation of cardiac output measurements, ordering and performing treatments and interventions, ordering and review of laboratory studies, ordering and review of radiographic studies, re-evaluation of patient's condition and review of old charts    I assumed direction of critical care for this patient from another provider in my specialty: no    Comments:      Patient requires emergent transfer to [facility] for surgical intervention.           ED Course  ED Course as of 01/11/24 2000   Thu Jan 11, 2024   1413 hs TnI 0hr: <2   1413 Lipase: 18   1413 Creatinine: 0.74                               SBIRT 22yo+      Flowsheet Row Most Recent Value   Initial Alcohol Screen: US AUDIT-C     1. How often do you have a drink containing alcohol? 0 Filed at: 01/11/2024 1158   2. How many drinks containing alcohol do you have on a typical day you are drinking?  0 Filed at: 01/11/2024 1158   3b. FEMALE Any Age, or MALE 65+: How often do you have 4 or more drinks on one occassion? 0 Filed at: 01/11/2024 1158   Audit-C Score 0 Filed at: 01/11/2024 1158   PATRICK: How many times in the past year have you...    Used an illegal drug or used a prescription medication for non-medical reasons? Never Filed at: 01/11/2024 1158                      Medical Decision Making  The patient is comfortable appearing with stable vital signs despite her complaints. (+) Moderate left-sided abdominal tenderness on exam. (+) Voluntary guarding. Unclear etiology of pain. Post-op complication vs gastritis vs pancreatitis vs diverticulitis vs obstruction? Lower clinical suspicion for ACS, pneumonia, and PTX. Will check EKG, CXR, basic labs, lipase, troponin, UA, and CT A/P. IVFs, Toradol, and Zofran administered. Will continue to monitor in the ED. Disposition per workup and reassessment.    1900 CT findings highly suggestive of a Petersons type internal hernia. Case discussed with  Surgery --> they will evaluate the patient at bedside.    1950 General Surgery deferred to Bariatrics. Case discussed with Dr. Preciado, he would like the patient transferred to [facility] under Dr. Milner's service. Likely plan for surgical intervention tonight. Plan discussed with the patient. She was consented to transport.    Amount and/or Complexity of Data Reviewed  Labs: ordered. Decision-making details documented in ED Course.  Radiology: ordered and independent interpretation performed.     Details: No acute cardiopulmonary disease.  ECG/medicine tests: ordered and independent interpretation performed.    Risk  Prescription drug management.  Decision regarding hospitalization.  Emergency major surgery.             Disposition  Final diagnoses:   Abdominal pain   Internal hernia   Nausea and vomiting     Time reflects when diagnosis was documented in both MDM as applicable and the Disposition within this note       Time User Action Codes Description Comment    1/11/2024  6:59 PM Dani Tamez [R10.9] Abdominal pain     1/11/2024  7:46 PM Dani Tamez [K45.8] Internal hernia     1/11/2024  7:46 PM Dani Tamez [R11.2] Nausea and vomiting           ED Disposition       ED Disposition   Transfer to Another Facility-In Network    Condition   --    Date/Time   Thu Jan 11, 2024 1945    Comment   Marylou Morris should be transferred out to [facility].               Follow-up Information    None         Patient's Medications   Discharge Prescriptions    No medications on file       No discharge procedures on file.    PDMP Review         Value Time User    PDMP Reviewed  Yes 3/14/2022 12:20 PM Chintan Roper DO            ED Provider  Electronically Signed by             Dani Tamez MD  01/11/24 2000

## 2024-01-11 NOTE — ED PROCEDURE NOTE
Procedure  POC AAA US    Date/Time: 1/11/2024 1:32 PM    Performed by: Svitlana Agudelo MD  Authorized by: Svitlana Agudelo MD    Patient location:  ED  Performing Provider:  Resident  Procedure performed by consultant: Richard Ag.    Procedure details:     Exam Type:  Educational    Indications: abdominal pain      Views Obtained:  Transverse proximal, transverse mid view, transverse distal view and sagittal (longitudinal) view    Image quality: diagnostic      Image availability:  Images available in PACS  Findings:     Abdominal Aorta Findings: normal      Aneurysm (if present): no abdominal aortic aneurysm      Intra-abdominal fluid: not identified    Interpretation:     Aortic ultrasound impression: aorta normal                     Svitlana Agudelo MD  01/11/24 2437

## 2024-01-11 NOTE — ED PROCEDURE NOTE
Procedure  POC Renal US    Date/Time: 1/11/2024 1:31 PM    Performed by: Svitlana Agudelo MD  Authorized by: Svitlana Agudelo MD    Patient location:  ED  Performed by:  Resident  Procedure performed by consultant: Richard Ag.    Procedure details:     Exam Type:  Educational    Indications: abdominal pain      Views obtained: bladder (transverse and sagittal), left kidney and right kidney      Image quality: diagnostic      Image availability:  Images available in PACS  Findings:     LEFT kidney findings: unremarkable      LEFT hydronephrosis: none      RIGHT kidney findings: unremarkable      RIGHT hydronephrosis: none      Bladder:  Visualized  Interpretation:     Renal ultrasound impressions: normal exam                     Svitlana Agudelo MD  01/11/24 1330

## 2024-01-12 PROBLEM — K45.8 INTERNAL HERNIA: Status: ACTIVE | Noted: 2024-01-12

## 2024-01-12 LAB
ABO GROUP BLD: NORMAL
ANION GAP SERPL CALCULATED.3IONS-SCNC: 5 MMOL/L
ATRIAL RATE: 46 BPM
BLD GP AB SCN SERPL QL: POSITIVE
BUN SERPL-MCNC: 13 MG/DL (ref 5–25)
CALCIUM SERPL-MCNC: 8.3 MG/DL (ref 8.4–10.2)
CHLORIDE SERPL-SCNC: 109 MMOL/L (ref 96–108)
CO2 SERPL-SCNC: 24 MMOL/L (ref 21–32)
CREAT SERPL-MCNC: 0.59 MG/DL (ref 0.6–1.3)
ERYTHROCYTE [DISTWIDTH] IN BLOOD BY AUTOMATED COUNT: 14.7 % (ref 11.6–15.1)
GFR SERPL CREATININE-BSD FRML MDRD: 105 ML/MIN/1.73SQ M
GLUCOSE SERPL-MCNC: 80 MG/DL (ref 65–140)
GLUCOSE SERPL-MCNC: 86 MG/DL (ref 65–140)
HCT VFR BLD AUTO: 33.4 % (ref 34.8–46.1)
HGB BLD-MCNC: 10.5 G/DL (ref 11.5–15.4)
MCH RBC QN AUTO: 28.2 PG (ref 26.8–34.3)
MCHC RBC AUTO-ENTMCNC: 31.4 G/DL (ref 31.4–37.4)
MCV RBC AUTO: 90 FL (ref 82–98)
P AXIS: 46 DEGREES
PLATELET # BLD AUTO: 217 THOUSANDS/UL (ref 149–390)
PMV BLD AUTO: 9.3 FL (ref 8.9–12.7)
POTASSIUM SERPL-SCNC: 4 MMOL/L (ref 3.5–5.3)
QRS AXIS: 66 DEGREES
QRSD INTERVAL: 70 MS
QT INTERVAL: 464 MS
QTC INTERVAL: 414 MS
RBC # BLD AUTO: 3.72 MILLION/UL (ref 3.81–5.12)
RH BLD: POSITIVE
SODIUM SERPL-SCNC: 138 MMOL/L (ref 135–147)
SPECIMEN EXPIRATION DATE: NORMAL
T WAVE AXIS: 25 DEGREES
VENTRICULAR RATE: 48 BPM
WBC # BLD AUTO: 7.85 THOUSAND/UL (ref 4.31–10.16)

## 2024-01-12 PROCEDURE — C9113 INJ PANTOPRAZOLE SODIUM, VIA: HCPCS | Performed by: STUDENT IN AN ORGANIZED HEALTH CARE EDUCATION/TRAINING PROGRAM

## 2024-01-12 PROCEDURE — C9290 INJ, BUPIVACAINE LIPOSOME: HCPCS | Performed by: STUDENT IN AN ORGANIZED HEALTH CARE EDUCATION/TRAINING PROGRAM

## 2024-01-12 PROCEDURE — 44050 REDUCE BOWEL OBSTRUCTION: CPT | Performed by: STUDENT IN AN ORGANIZED HEALTH CARE EDUCATION/TRAINING PROGRAM

## 2024-01-12 PROCEDURE — 86870 RBC ANTIBODY IDENTIFICATION: CPT | Performed by: SURGERY

## 2024-01-12 PROCEDURE — 99024 POSTOP FOLLOW-UP VISIT: CPT | Performed by: SURGERY

## 2024-01-12 PROCEDURE — 85027 COMPLETE CBC AUTOMATED: CPT | Performed by: STUDENT IN AN ORGANIZED HEALTH CARE EDUCATION/TRAINING PROGRAM

## 2024-01-12 PROCEDURE — 44050 REDUCE BOWEL OBSTRUCTION: CPT | Performed by: SURGERY

## 2024-01-12 PROCEDURE — 82948 REAGENT STRIP/BLOOD GLUCOSE: CPT

## 2024-01-12 PROCEDURE — 80048 BASIC METABOLIC PNL TOTAL CA: CPT | Performed by: STUDENT IN AN ORGANIZED HEALTH CARE EDUCATION/TRAINING PROGRAM

## 2024-01-12 RX ORDER — DIPHENHYDRAMINE HCL 25 MG
25 TABLET ORAL
Status: DISCONTINUED | OUTPATIENT
Start: 2024-01-12 | End: 2024-01-12

## 2024-01-12 RX ORDER — LIDOCAINE HCL/PF 100 MG/5ML
SYRINGE (ML) INJECTION AS NEEDED
Status: DISCONTINUED | OUTPATIENT
Start: 2024-01-12 | End: 2024-01-12

## 2024-01-12 RX ORDER — ONDANSETRON 2 MG/ML
4 INJECTION INTRAMUSCULAR; INTRAVENOUS ONCE AS NEEDED
Status: DISCONTINUED | OUTPATIENT
Start: 2024-01-12 | End: 2024-01-12

## 2024-01-12 RX ORDER — MIDAZOLAM HYDROCHLORIDE 2 MG/2ML
INJECTION, SOLUTION INTRAMUSCULAR; INTRAVENOUS AS NEEDED
Status: DISCONTINUED | OUTPATIENT
Start: 2024-01-11 | End: 2024-01-12

## 2024-01-12 RX ORDER — PROMETHAZINE HYDROCHLORIDE 25 MG/ML
25 INJECTION, SOLUTION INTRAMUSCULAR; INTRAVENOUS EVERY 6 HOURS PRN
Status: DISCONTINUED | OUTPATIENT
Start: 2024-01-12 | End: 2024-01-12

## 2024-01-12 RX ORDER — OXYCODONE HYDROCHLORIDE AND ACETAMINOPHEN 5; 325 MG/1; MG/1
1 TABLET ORAL EVERY 4 HOURS PRN
Status: DISCONTINUED | OUTPATIENT
Start: 2024-01-12 | End: 2024-01-13

## 2024-01-12 RX ORDER — ACETAMINOPHEN 10 MG/ML
1000 INJECTION, SOLUTION INTRAVENOUS EVERY 6 HOURS SCHEDULED
Status: DISCONTINUED | OUTPATIENT
Start: 2024-01-12 | End: 2024-01-12 | Stop reason: ALTCHOICE

## 2024-01-12 RX ORDER — NICOTINE 21 MG/24HR
14 PATCH, TRANSDERMAL 24 HOURS TRANSDERMAL DAILY
Status: DISCONTINUED | OUTPATIENT
Start: 2024-01-12 | End: 2024-01-13 | Stop reason: HOSPADM

## 2024-01-12 RX ORDER — CYANOCOBALAMIN 1000 UG/ML
1000 INJECTION, SOLUTION INTRAMUSCULAR; SUBCUTANEOUS
Status: DISCONTINUED | OUTPATIENT
Start: 2024-01-12 | End: 2024-01-13 | Stop reason: HOSPADM

## 2024-01-12 RX ORDER — BUSPIRONE HYDROCHLORIDE 5 MG/1
7.5 TABLET ORAL 2 TIMES DAILY
Status: DISCONTINUED | OUTPATIENT
Start: 2024-01-12 | End: 2024-01-13 | Stop reason: HOSPADM

## 2024-01-12 RX ORDER — FENTANYL CITRATE/PF 50 MCG/ML
25 SYRINGE (ML) INJECTION
Status: DISCONTINUED | OUTPATIENT
Start: 2024-01-12 | End: 2024-01-12

## 2024-01-12 RX ORDER — HYDROMORPHONE HCL/PF 1 MG/ML
0.5 SYRINGE (ML) INJECTION
Status: DISCONTINUED | OUTPATIENT
Start: 2024-01-12 | End: 2024-01-12

## 2024-01-12 RX ORDER — DIPHENHYDRAMINE HYDROCHLORIDE 50 MG/ML
25 INJECTION INTRAMUSCULAR; INTRAVENOUS EVERY 6 HOURS PRN
Status: DISCONTINUED | OUTPATIENT
Start: 2024-01-12 | End: 2024-01-13 | Stop reason: HOSPADM

## 2024-01-12 RX ORDER — OXYCODONE HYDROCHLORIDE 5 MG/1
5 TABLET ORAL EVERY 6 HOURS PRN
Qty: 10 TABLET | Refills: 0 | Status: SHIPPED | OUTPATIENT
Start: 2024-01-12 | End: 2024-01-13

## 2024-01-12 RX ORDER — OXYCODONE HCL 5 MG/5 ML
10 SOLUTION, ORAL ORAL EVERY 4 HOURS PRN
Status: DISCONTINUED | OUTPATIENT
Start: 2024-01-12 | End: 2024-01-12 | Stop reason: SDUPTHER

## 2024-01-12 RX ORDER — LANOLIN ALCOHOL/MO/W.PET/CERES
3 CREAM (GRAM) TOPICAL
Status: DISCONTINUED | OUTPATIENT
Start: 2024-01-13 | End: 2024-01-13 | Stop reason: HOSPADM

## 2024-01-12 RX ORDER — HYDROMORPHONE HCL/PF 1 MG/ML
1 SYRINGE (ML) INJECTION EVERY 4 HOURS PRN
Status: DISCONTINUED | OUTPATIENT
Start: 2024-01-12 | End: 2024-01-12 | Stop reason: ALTCHOICE

## 2024-01-12 RX ORDER — EPHEDRINE SULFATE 50 MG/ML
INJECTION INTRAVENOUS AS NEEDED
Status: DISCONTINUED | OUTPATIENT
Start: 2024-01-12 | End: 2024-01-12

## 2024-01-12 RX ORDER — METOCLOPRAMIDE HYDROCHLORIDE 5 MG/ML
10 INJECTION INTRAMUSCULAR; INTRAVENOUS EVERY 6 HOURS PRN
Status: DISCONTINUED | OUTPATIENT
Start: 2024-01-12 | End: 2024-01-13 | Stop reason: HOSPADM

## 2024-01-12 RX ORDER — PANTOPRAZOLE SODIUM 40 MG/10ML
40 INJECTION, POWDER, LYOPHILIZED, FOR SOLUTION INTRAVENOUS EVERY 12 HOURS SCHEDULED
Status: DISCONTINUED | OUTPATIENT
Start: 2024-01-12 | End: 2024-01-13 | Stop reason: HOSPADM

## 2024-01-12 RX ORDER — OXYCODONE HCL 5 MG/5 ML
5 SOLUTION, ORAL ORAL EVERY 4 HOURS PRN
Status: DISCONTINUED | OUTPATIENT
Start: 2024-01-12 | End: 2024-01-12 | Stop reason: SDUPTHER

## 2024-01-12 RX ORDER — SIMETHICONE 80 MG
80 TABLET,CHEWABLE ORAL 4 TIMES DAILY PRN
Status: DISCONTINUED | OUTPATIENT
Start: 2024-01-12 | End: 2024-01-13 | Stop reason: HOSPADM

## 2024-01-12 RX ORDER — OXYCODONE HCL 5 MG/5 ML
10 SOLUTION, ORAL ORAL EVERY 4 HOURS PRN
Status: DISCONTINUED | OUTPATIENT
Start: 2024-01-12 | End: 2024-01-12 | Stop reason: ALTCHOICE

## 2024-01-12 RX ORDER — LOPERAMIDE HCL 1 MG/7.5ML
2 SUSPENSION ORAL 3 TIMES DAILY PRN
Status: DISCONTINUED | OUTPATIENT
Start: 2024-01-12 | End: 2024-01-13 | Stop reason: HOSPADM

## 2024-01-12 RX ORDER — ENOXAPARIN SODIUM 100 MG/ML
40 INJECTION SUBCUTANEOUS DAILY
Status: DISCONTINUED | OUTPATIENT
Start: 2024-01-12 | End: 2024-01-12

## 2024-01-12 RX ORDER — ROCURONIUM BROMIDE 10 MG/ML
INJECTION, SOLUTION INTRAVENOUS AS NEEDED
Status: DISCONTINUED | OUTPATIENT
Start: 2024-01-12 | End: 2024-01-12

## 2024-01-12 RX ORDER — DIAZEPAM 5 MG/ML
5 INJECTION, SOLUTION INTRAMUSCULAR; INTRAVENOUS EVERY 6 HOURS PRN
Status: DISCONTINUED | OUTPATIENT
Start: 2024-01-12 | End: 2024-01-13 | Stop reason: HOSPADM

## 2024-01-12 RX ORDER — PROPOFOL 10 MG/ML
INJECTION, EMULSION INTRAVENOUS AS NEEDED
Status: DISCONTINUED | OUTPATIENT
Start: 2024-01-12 | End: 2024-01-12

## 2024-01-12 RX ORDER — OXYCODONE HCL 5 MG/5 ML
5 SOLUTION, ORAL ORAL EVERY 4 HOURS PRN
Status: DISCONTINUED | OUTPATIENT
Start: 2024-01-12 | End: 2024-01-12 | Stop reason: ALTCHOICE

## 2024-01-12 RX ORDER — FENTANYL CITRATE 50 UG/ML
INJECTION, SOLUTION INTRAMUSCULAR; INTRAVENOUS AS NEEDED
Status: DISCONTINUED | OUTPATIENT
Start: 2024-01-12 | End: 2024-01-12

## 2024-01-12 RX ORDER — BUPIVACAINE HYDROCHLORIDE 2.5 MG/ML
INJECTION, SOLUTION EPIDURAL; INFILTRATION; INTRACAUDAL AS NEEDED
Status: DISCONTINUED | OUTPATIENT
Start: 2024-01-12 | End: 2024-01-12 | Stop reason: HOSPADM

## 2024-01-12 RX ORDER — SUCCINYLCHOLINE/SOD CL,ISO/PF 100 MG/5ML
SYRINGE (ML) INTRAVENOUS AS NEEDED
Status: DISCONTINUED | OUTPATIENT
Start: 2024-01-12 | End: 2024-01-12

## 2024-01-12 RX ORDER — GLYCOPYRROLATE 0.2 MG/ML
INJECTION INTRAMUSCULAR; INTRAVENOUS AS NEEDED
Status: DISCONTINUED | OUTPATIENT
Start: 2024-01-12 | End: 2024-01-12

## 2024-01-12 RX ORDER — ENOXAPARIN SODIUM 100 MG/ML
40 INJECTION SUBCUTANEOUS
Status: DISCONTINUED | OUTPATIENT
Start: 2024-01-12 | End: 2024-01-13 | Stop reason: HOSPADM

## 2024-01-12 RX ORDER — FENTANYL CITRATE/PF 50 MCG/ML
25 SYRINGE (ML) INJECTION
Status: DISCONTINUED | OUTPATIENT
Start: 2024-01-12 | End: 2024-01-12 | Stop reason: HOSPADM

## 2024-01-12 RX ORDER — ALBUTEROL SULFATE 90 UG/1
2 AEROSOL, METERED RESPIRATORY (INHALATION) EVERY 6 HOURS PRN
Status: DISCONTINUED | OUTPATIENT
Start: 2024-01-12 | End: 2024-01-13 | Stop reason: HOSPADM

## 2024-01-12 RX ADMIN — THIAMINE HYDROCHLORIDE: 100 INJECTION, SOLUTION INTRAMUSCULAR; INTRAVENOUS at 10:07

## 2024-01-12 RX ADMIN — FENTANYL CITRATE 50 MCG: 50 INJECTION INTRAMUSCULAR; INTRAVENOUS at 00:17

## 2024-01-12 RX ADMIN — ENOXAPARIN SODIUM 40 MG: 40 INJECTION SUBCUTANEOUS at 10:06

## 2024-01-12 RX ADMIN — ACETAMINOPHEN 1000 MG: 10 INJECTION INTRAVENOUS at 03:11

## 2024-01-12 RX ADMIN — FENTANYL CITRATE 25 MCG: 0.05 INJECTION, SOLUTION INTRAMUSCULAR; INTRAVENOUS at 01:44

## 2024-01-12 RX ADMIN — CYANOCOBALAMIN 1000 MCG: 1000 INJECTION, SOLUTION INTRAMUSCULAR at 10:06

## 2024-01-12 RX ADMIN — DIAZEPAM 5 MG: 10 INJECTION, SOLUTION INTRAMUSCULAR; INTRAVENOUS at 15:50

## 2024-01-12 RX ADMIN — OXYCODONE HYDROCHLORIDE AND ACETAMINOPHEN 1 TABLET: 5; 325 TABLET ORAL at 20:19

## 2024-01-12 RX ADMIN — SODIUM CHLORIDE, SODIUM LACTATE, POTASSIUM CHLORIDE, AND CALCIUM CHLORIDE 125 ML/HR: .6; .31; .03; .02 INJECTION, SOLUTION INTRAVENOUS at 20:20

## 2024-01-12 RX ADMIN — BUSPIRONE HYDROCHLORIDE 7.5 MG: 5 TABLET ORAL at 10:06

## 2024-01-12 RX ADMIN — PANTOPRAZOLE SODIUM 40 MG: 40 INJECTION, POWDER, FOR SOLUTION INTRAVENOUS at 10:07

## 2024-01-12 RX ADMIN — ACETAMINOPHEN 1000 MG: 10 INJECTION INTRAVENOUS at 15:58

## 2024-01-12 RX ADMIN — SODIUM CHLORIDE, SODIUM LACTATE, POTASSIUM CHLORIDE, AND CALCIUM CHLORIDE: .6; .31; .03; .02 INJECTION, SOLUTION INTRAVENOUS at 00:33

## 2024-01-12 RX ADMIN — PANTOPRAZOLE SODIUM 40 MG: 40 INJECTION, POWDER, FOR SOLUTION INTRAVENOUS at 20:20

## 2024-01-12 RX ADMIN — Medication 14 MG: at 15:51

## 2024-01-12 RX ADMIN — ACETAMINOPHEN 1000 MG: 10 INJECTION INTRAVENOUS at 10:24

## 2024-01-12 RX ADMIN — GLYCOPYRROLATE 0.2 MG: 0.2 INJECTION INTRAMUSCULAR; INTRAVENOUS at 00:12

## 2024-01-12 RX ADMIN — ONDANSETRON 4 MG: 2 INJECTION INTRAMUSCULAR; INTRAVENOUS at 01:47

## 2024-01-12 RX ADMIN — ACETAMINOPHEN 1000 MG: 10 INJECTION INTRAVENOUS at 00:02

## 2024-01-12 RX ADMIN — PROPOFOL 200 MG: 10 INJECTION, EMULSION INTRAVENOUS at 00:00

## 2024-01-12 RX ADMIN — SODIUM CHLORIDE, SODIUM LACTATE, POTASSIUM CHLORIDE, AND CALCIUM CHLORIDE 125 ML/HR: .6; .31; .03; .02 INJECTION, SOLUTION INTRAVENOUS at 12:42

## 2024-01-12 RX ADMIN — SUGAMMADEX 200 MG: 100 INJECTION, SOLUTION INTRAVENOUS at 01:12

## 2024-01-12 RX ADMIN — BUSPIRONE HYDROCHLORIDE 7.5 MG: 5 TABLET ORAL at 18:00

## 2024-01-12 RX ADMIN — ROCURONIUM BROMIDE 45 MG: 10 INJECTION, SOLUTION INTRAVENOUS at 00:12

## 2024-01-12 RX ADMIN — Medication 100 MG: at 00:00

## 2024-01-12 RX ADMIN — HEPARIN SODIUM 5000 UNITS: 5000 INJECTION INTRAVENOUS; SUBCUTANEOUS at 00:02

## 2024-01-12 RX ADMIN — OXYCODONE HYDROCHLORIDE 10 MG: 5 SOLUTION ORAL at 03:08

## 2024-01-12 RX ADMIN — SODIUM CHLORIDE, SODIUM LACTATE, POTASSIUM CHLORIDE, AND CALCIUM CHLORIDE 500 ML: .6; .31; .03; .02 INJECTION, SOLUTION INTRAVENOUS at 07:36

## 2024-01-12 RX ADMIN — DIPHENHYDRAMINE HYDROCHLORIDE 25 MG: 50 INJECTION, SOLUTION INTRAMUSCULAR; INTRAVENOUS at 03:53

## 2024-01-12 RX ADMIN — LOPERAMIDE HCL 2 MG: 1 SOLUTION ORAL at 20:19

## 2024-01-12 RX ADMIN — ROCURONIUM BROMIDE 5 MG: 10 INJECTION, SOLUTION INTRAVENOUS at 00:00

## 2024-01-12 RX ADMIN — LIDOCAINE HYDROCHLORIDE 80 MG: 20 INJECTION INTRAVENOUS at 00:00

## 2024-01-12 RX ADMIN — EPHEDRINE SULFATE 10 MG: 50 INJECTION, SOLUTION INTRAVENOUS at 00:05

## 2024-01-12 RX ADMIN — PANTOPRAZOLE SODIUM 40 MG: 40 INJECTION, POWDER, FOR SOLUTION INTRAVENOUS at 03:54

## 2024-01-12 RX ADMIN — FENTANYL CITRATE 50 MCG: 50 INJECTION INTRAMUSCULAR; INTRAVENOUS at 00:00

## 2024-01-12 NOTE — PLAN OF CARE
Problem: PAIN - ADULT  Goal: Verbalizes/displays adequate comfort level or baseline comfort level  Description: Interventions:  - Encourage patient to monitor pain and request assistance  - Assess pain using appropriate pain scale  - Administer analgesics based on type and severity of pain and evaluate response  - Implement non-pharmacological measures as appropriate and evaluate response  - Consider cultural and social influences on pain and pain management  - Notify physician/advanced practitioner if interventions unsuccessful or patient reports new pain  Outcome: Progressing     Problem: INFECTION - ADULT  Goal: Absence or prevention of progression during hospitalization  Description: INTERVENTIONS:  - Assess and monitor for signs and symptoms of infection  - Monitor lab/diagnostic results  - Monitor all insertion sites, i.e. indwelling lines, tubes, and drains  - Monitor endotracheal if appropriate and nasal secretions for changes in amount and color  - [place] appropriate cooling/warming therapies per order  - Administer medications as ordered  - Instruct and encourage patient and family to use good hand hygiene technique  - Identify and instruct in appropriate isolation precautions for identified infection/condition  Outcome: Progressing  Goal: Absence of fever/infection during neutropenic period  Description: INTERVENTIONS:  - Monitor WBC    Outcome: Progressing     Problem: SAFETY ADULT  Goal: Patient will remain free of falls  Description: INTERVENTIONS:  - Educate patient/family on patient safety including physical limitations  - Instruct patient to call for assistance with activity   - Consult OT/PT to assist with strengthening/mobility   - Keep Call bell within reach  - Keep bed low and locked with side rails adjusted as appropriate  - Keep care items and personal belongings within reach  - Initiate and maintain comfort rounds  - Make Fall Risk Sign visible to staff  - Offer Toileting every 3 Hours,  in advance of need  - Obtain necessary fall risk management equipment: call bell in   - Apply yellow socks and bracelet for high fall risk patients  - Consider moving patient to room near nurses station  Outcome: Progressing  Goal: Maintain or return to baseline ADL function  Description: INTERVENTIONS:  -  Assess patient's ability to carry out ADLs; assess patient's baseline for ADL function and identify physical deficits which impact ability to perform ADLs (bathing, care of mouth/teeth, toileting, grooming, dressing, etc.)  - Assess/evaluate cause of self-care deficits   - Assess range of motion  - Assess patient's mobility; develop plan if impaired  - Assess patient's need for assistive devices and provide as appropriate  - Encourage maximum independence but intervene and supervise when necessary  - Involve family in performance of ADLs  - Assess for home care needs following discharge   - Consider OT consult to assist with ADL evaluation and planning for discharge  - Provide patient education as appropriate  Outcome: Progressing  Goal: Maintains/Returns to pre admission functional level  Description: INTERVENTIONS:  - Perform AM-PAC 6 Click Basic Mobility/ Daily Activity assessment daily.  - Set and communicate daily mobility goal to care team and patient/family/caregiver.   - Collaborate with rehabilitation services on mobility goals if consulted  - Perform Range of Motion 3 times a day.  - Reposition patient every 2 hours.  - Dangle patient 3 times a day  - Stand patient 3 times a day  - Ambulate patient 3 times a day  - Out of bed to chair 3 times a day   - Out of bed for meals 3 times a day  - Out of bed for toileting  - Record patient progress and toleration of activity level   Outcome: Progressing     Problem: DISCHARGE PLANNING  Goal: Discharge to home or other facility with appropriate resources  Description: INTERVENTIONS:  - Identify barriers to discharge w/patient and caregiver  - Arrange for needed  discharge resources and transportation as appropriate  - Identify discharge learning needs (meds, wound care, etc.)  - Arrange for interpretive services to assist at discharge as needed  - Refer to Case Management Department for coordinating discharge planning if the patient needs post-hospital services based on physician/advanced practitioner order or complex needs related to functional status, cognitive ability, or social support system  Outcome: Progressing     Problem: Knowledge Deficit  Goal: Patient/family/caregiver demonstrates understanding of disease process, treatment plan, medications, and discharge instructions  Description: Complete learning assessment and assess knowledge base.  Interventions:  - Provide teaching at level of understanding  - Provide teaching via preferred learning methods  Outcome: Progressing

## 2024-01-12 NOTE — ANESTHESIA PREPROCEDURE EVALUATION
Procedure:  LAPAROSCOPY DIAGNOSTIC (Abdomen)    Relevant Problems   CARDIO   (+) AGUILLON (dyspnea on exertion)   (+) Intermittent chest pain      GI/HEPATIC   (+) Gastroesophageal reflux disease      MUSCULOSKELETAL   (+) Non-traumatic rhabdomyolysis      NEURO/PSYCH   (+) Depression with anxiety      PULMONARY   (+) Asthma   (+) AGUILLON (dyspnea on exertion)   (+) SOB (shortness of breath)      Other   (+) Chest pressure   (+) Tobacco use        Physical Exam    Airway    Mallampati score: II         Dental       Cardiovascular  Rhythm: regular    Pulmonary   Breath sounds clear to auscultation    Other Findings  No teethpost-pubertal.      Anesthesia Plan  ASA Score- 3 Emergent    Anesthesia Type- general with ASA Monitors.         Additional Monitors:     Airway Plan:            Plan Factors-Exercise tolerance (METS): <4 METS.    Chart reviewed.   Existing labs reviewed. Patient summary reviewed.    Patient is a current smoker. Patient not instructed to abstain from smoking on day of procedure. Patient did not smoke on day of surgery.    Obstructive sleep apnea risk education given perioperatively.        Induction- intravenous.    Postoperative Plan-     Informed Consent- Anesthetic plan and risks discussed with patient.

## 2024-01-12 NOTE — ANESTHESIA POSTPROCEDURE EVALUATION
Post-Op Assessment Note    CV Status:  Stable  Pain Score: 2    Pain management: adequate       Mental Status:  Alert and awake   Hydration Status:  Euvolemic   PONV Controlled:  Controlled   Airway Patency:  Patent     Post Op Vitals Reviewed: Yes      Staff: Anesthesiologist               BP      Temp      Pulse     Resp      SpO2      BP 93/60   Pulse 55   Temp 97.6 °F (36.4 °C)   Resp 16   LMP  (LMP Unknown)   SpO2 94%

## 2024-01-12 NOTE — PROGRESS NOTES
Progress Note - Bariatric Surgery   Marylou Morris 53 y.o. female MRN: [id number]  Unit/Bed#: E5 -01 Encounter: [id number]    Assessment:  Marylou Morris is a 53 y.o. female with history of RnYGB s/p emergent diagnostic laparoscopy for internal hernia, doing well postop.       Plan:  Advance to clear liquid diet with Ensure supplements  Plan for further diet advancement to full liquids in the PM  Pain control p.o. and IV meds  Encourage ambulation being out of bed  Incentive spirometer  Labs and vitals were reviewed and are stable  Okay for Lovenox for VTE prophylaxis  Replace electrolytes as needed  Thiamine/Folate IV and B12 injections for presumed vitamin deficiency, will formally screen as outpatient    #Transient hypotension, possibly chronic?  Internal medicine consult to evaluate, also assist with management of co-morbidities    #History of Marginal Ulcers  Protonix BID, will convert to PO once tolerating diet  Outpatient follow-up with GI (Dr. Chapin) for EGD      Dispo: tentative plan for discharge sat 1/13 once tolerating diet    Subjective/Objective   Chief Complaint: No complaints    Subjective: s/p diagnostic lap with reduction of internal hernia and mesentery repair at 1:00am, downgrade from ICU recovery hold to floors. Patient reports incisional pain, but overall abdominal pain is much improved. Denies nausea or vomiting. Patient is ambulating. Patient is voiding. Patient reports phlegm in throat, pulling 1500cc on IS.      Objective:     Vitals: Blood pressure 94/59, pulse 58, temperature 98.3 °F (36.8 °C), temperature source Axillary, resp. rate 17, weight 85.7 kg (188 lb 15 oz), SpO2 92%.,Body mass index is 36.9 kg/m².      Intake/Output Summary (Last 24 hours) at 1/12/2024 1041  Last data filed at 1/12/2024 0701  Gross per 24 hour   Intake 1550 ml   Output 420 ml   Net 1130 ml       Invasive Devices       Peripheral Intravenous Line  Duration             Peripheral IV 01/11/24 Right  Antecubital <1 day    Peripheral IV 01/12/24 Left Hand <1 day                    Physical Exam: BP 94/59 (BP Location: Left arm)   Pulse 58   Temp 98.3 °F (36.8 °C) (Axillary)   Resp 17   Wt 85.7 kg (188 lb 15 oz)   LMP  (LMP Unknown)   SpO2 92%   BMI 36.90 kg/m²   General appearance: alert and oriented, in no acute distress  Head: Normocephalic, without obvious abnormality, atraumatic  Eyes: negative  Throat:  mucous membranes moist  Lungs:  no apparent respiratory distress on room air  Abdomen:  soft, mildly tender, nondistended, laparoscopic port sites clean dry and intact  Extremities:  mild extremity edema  Skin: Skin color, texture, turgor normal. No rashes or lesions    Lab, Imaging and other studies: I have personally reviewed pertinent labs.  CBC:   Lab Results   Component Value Date    WBC 7.85 01/12/2024    HGB 10.5 (L) 01/12/2024    HCT 33.4 (L) 01/12/2024    MCV 90 01/12/2024     01/12/2024    RBC 3.72 (L) 01/12/2024    MCH 28.2 01/12/2024    MCHC 31.4 01/12/2024    RDW 14.7 01/12/2024    MPV 9.3 01/12/2024    NRBC 0 01/11/2024     CMP:   Lab Results   Component Value Date    SODIUM 138 01/12/2024     (H) 01/12/2024    CO2 24 01/12/2024    BUN 13 01/12/2024    CREATININE 0.59 (L) 01/12/2024    CALCIUM 8.3 (L) 01/12/2024    AST 26 01/11/2024    ALT 22 01/11/2024    ALKPHOS 67 01/11/2024    EGFR 105 01/12/2024     VTE Pharmacologic Prophylaxis: Enoxaparin (Lovenox)  VTE Mechanical Prophylaxis: sequential compression device    Franky Preciado MD  Bariatric Surgery

## 2024-01-12 NOTE — OP NOTE
OPERATIVE REPORT  PATIENT NAME: Marylou Morris    :  1970  MRN: [id number]  Pt Location: [facility] OR ROOM 06    SURGERY DATE: 2024    Surgeons and Role:     * Ruben Milner MD - Primary     * Franky Preciado MD - Fellow    Preop Diagnosis:  History of gastric bypass [Z98.84]  Internal hernia [K45.8]  Left upper quadrant abdominal pain [R10.12]    Post-Op Diagnosis Codes:     * History of gastric bypass [Z98.84]     * Internal hernia [K45.8]     * Left upper quadrant abdominal pain [R10.12]    Procedure(s):  LAPAROSCOPY DIAGNOSTIC. REDUCTION AND REPAIR OF INTERNAL HERNIA    Specimen(s):  * No specimens in log *    Estimated Blood Loss:   20 mL    Drains:  * No LDAs found *    Anesthesia Type:   General    Operative Indications:  History of gastric bypass [Z98.84]  Internal hernia [K45.8]  Left upper quadrant abdominal pain [R10.12]      Operative Findings:  Internal hernia at Crook's space     Complications:   None    Procedure and Technique:  The patient was identified by name, armband, and conversation.  The patient was then brought to the operative theater.  After successful induction of general anesthesia the patient was prepped and draped in the usual sterile fashion.  A timeout was performed and all were in agreement.    Veress needle was inserted at Hurtado's point and the abdomen was pre-insufflated.  Optiview technique was then used to gain entrance into the abdomen with a 5 mm 0 degree laparoscope at Hurtado's point.  3 more 5 mm ports were placed below the umbilicus, in the left and right lower quadrants.  A four-quadrant Exparel Marcaine transversus abdominis plane block was then performed.    Immediately the entire bowel was noted to be in an internal hernia.  Starting at the terminal ileum the entire small bowel was examined and released from the internal hernia Petersons space.  The intermesenteric jejunojejunostomy space was noted to be closed.  Petersons space was then closed with  a running 3-0 Ethibond suture.    All port sites were examined prior to exiting the abdomen to ensure hemostasis.  The skin was then closed with Monocryl and Exofin.  All instrument, needle, and sponge counts were correct.   I was present for the entire procedure., A qualified resident physician was not available., and A bariatric surgery fellow was required during the procedure for retraction, tissue handling, dissection and suturing.    Patient Disposition:  PACU         SIGNATURE: Ruben Milner MD  DATE: January 12, 2024  TIME: 1:10 AM

## 2024-01-12 NOTE — UTILIZATION REVIEW
EMERGENT OUTPATIENT PROCEDURE AUTHORIZATION REQUEST   REQUESTING/SERVICING FACILITY:   [place] [address]  Tax ID: [id number]  NPI: [id number]  Phone: [phone] ([phone]) ATTENDING PROVIDER:  Attending Name and NPI#: Ruben Milner Md [[id number]]  Address: [address] [place] [address]  Phone: [phone] ([phone])     STAY INFORMATION:  Place of Service: [facility]  Place of Service Code: 22  Patient presented to the ER, had an outpatient procedure, was in observation status and discharge.        OUTPATIENT PROCEDURE INFORMATION    Procedure Date: 1/11/2024  Discharge Date/Time: No discharge date for patient encounter.  Patient Preop Diagnosis: History of gastric bypass [Z98.84]  Internal hernia [K45.8]  Left upper quadrant abdominal pain [R10.12] Post-Op Diagnosis Codes:     * History of gastric bypass [Z98.84]     * Internal hernia [K45.8]     * Left upper quadrant abdominal pain [R10.12]  Outpatient Procedure CPT Codes:    Hospital observation service, per hour  81771 - [place] UNLISTED LAPAROSCOPY PX INTESTINE XCP RECTUM    **OPERATIVE REPORT ATTACHED**     UTILIZATION REVIEW CONTACT:  Cornelio Witt, Utilization   Network Utilization Review Department  Phone: [phone]  Fax: [phone]  Email: [email or web address]  Contact for approvals/pending authorizations, clinical reviews, and discharge.

## 2024-01-12 NOTE — EMTALA/ACUTE CARE TRANSFER
[facility] EMERGENCY DEPARTMENT  [address] [facility] [address]  Dept: [phone]      EMTALA TRANSFER CONSENT    NAME Marylou REA 1970                              MRN [id number]    I have been informed of my rights regarding examination, treatment, and transfer   by Dr. Dani Tamez MD    Benefits: Specialized equipment and/or services available at the receiving facility (Include comment)________________________    Risks: Potential for delay in receiving treatment      Consent for Transfer:  I acknowledge that my medical condition has been evaluated and explained to me by the emergency department physician or other qualified medical person and/or my attending physician, who has recommended that I be transferred to the service of  Accepting Physician: Alf at Accepting Facility Name, City & State : [facility]. The above potential benefits of such transfer, the potential risks associated with such transfer, and the probable risks of not being transferred have been explained to me, and I fully understand them.  The doctor has explained that, in my case, the benefits of transfer outweigh the risks.  I agree to be transferred.    I authorize the performance of emergency medical procedures and treatments upon me in both transit and upon arrival at the receiving facility.  Additionally, I authorize the release of any and all medical records to the receiving facility and request they be transported with me, if possible.  I understand that the safest mode of transportation during a medical emergency is an ambulance and that the Hospital advocates the use of this mode of transport. Risks of traveling to the receiving facility by car, including absence of medical control, life sustaining equipment, such as oxygen, and medical personnel has been explained to me and I fully understand them.    (ZHANE CORRECT BOX BELOW)  [  ]  I  consent to the stated transfer and to be transported by ambulance/helicopter.  [  ]  I consent to the stated transfer, but refuse transportation by ambulance and accept full responsibility for my transportation by car.  I understand the risks of non-ambulance transfers and I exonerate the Hospital and its staff from any deterioration in my condition that results from this refusal.    X___________________________________________    DATE  24  TIME________  Signature of patient or legally responsible individual signing on patient behalf           RELATIONSHIP TO PATIENT_________________________          Provider Certification    NAME Marylou Morris                                         1970                              MRN [id number]    A medical screening exam was performed on the above named patient.  Based on the examination:    Condition Necessitating Transfer The primary encounter diagnosis was Abdominal pain. Diagnoses of Internal hernia and Nausea and vomiting were also pertinent to this visit.    Patient Condition: The patient has been stabilized such that within reasonable medical probability, no material deterioration of the patient condition or the condition of the unborn child(dwayne) is likely to result from the transfer    Reason for Transfer: Level of Care needed not available at this facility    Transfer Requirements: Facility [facility]   Space available and qualified personnel available for treatment as acknowledged by    Agreed to accept transfer and to provide appropriate medical treatment as acknowledged by       Alf  Appropriate medical records of the examination and treatment of the patient are provided at the time of transfer   STAFF INITIAL WHEN COMPLETED _______  Transfer will be performed by qualified personnel from    and appropriate transfer equipment as required, including the use of necessary and appropriate life support measures.    Provider Certification: I  have examined the patient and explained the following risks and benefits of being transferred/refusing transfer to the patient/family:         Based on these reasonable risks and benefits to the patient and/or the unborn child(dwayne), and based upon the information available at the time of the patient’s examination, I certify that the medical benefits reasonably to be expected from the provision of appropriate medical treatments at another medical facility outweigh the increasing risks, if any, to the individual’s medical condition, and in the case of labor to the unborn child, from effecting the transfer.    X____________________________________________ DATE 01/11/24        TIME_______      ORIGINAL - SEND TO MEDICAL RECORDS   COPY - SEND WITH PATIENT DURING TRANSFER

## 2024-01-12 NOTE — H&P
H&P Exam -Bariatric Surgery   Marylou Morris 53 y.o. female MRN: [id number]  Unit/Bed#: ED-08 Encounter: [id number]        Assessment/Plan     Assessment:  Marylou Morris is a 53 y.o. female with history of laparoscopic delia-en-Y gastric bypass in  with Dr. Jefferson in [place], with acute of chronic abdominal pain and CT-scan findings of internal hernia through Hicks's space and arrived with persistent abdominal pain, tenderness and hypotension.    Plan:  Admit to bariatric surgery service  Plan for emergent exploration in the OR with diagnostic laparoscopy, consented for this with possible open and all indicated procedures.  NPO with IVF  Give LR bolus now due to hypotension, responded to fluids  GI ppx with pepcid  Will also order MVI bag and B12 injection    History of Present Illness   HPI:  Marylou Morris is a 53 y.o. female who presents with history of laparoscopic delia-en-Y gastric bypass in  by Dr. Jefferson in [place], and she was lost to follow-up after her surgeon . She reports having multiple episodes of abdominal pain. This episode was severe, started 2 days also, maybe associated with food, she is unsure, but is having episodes of nausea and vomiting. This prompted her to come to ED. She underwent a CT-scan with possible internal hernia through Hicks's space.    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 She was supposed to have EGD last week but missed appointment due to transportation issues.    Review of Systems   Constitutional:  Positive for appetite change, diaphoresis and fatigue.   HENT: Negative.     Eyes: Negative.    Respiratory:  Positive for choking.    Cardiovascular: Negative.    Gastrointestinal:  Positive for abdominal pain, nausea and vomiting.   Endocrine: Negative.    Genitourinary: Negative.    Musculoskeletal: Negative.    Skin: Negative.    Allergic/Immunologic:  Negative.    Neurological: Negative.    Hematological: Negative.    Psychiatric/Behavioral: Negative.         Historical Information   Past Medical History:   Diagnosis Date    Asthma     GERD (gastroesophageal reflux disease)     H/O gastric bypass     Hypokalemia     Methamphetamine abuse (HCC)     Positive UDS; Feb 2020    Pyelonephritis      Past Surgical History:   Procedure Laterality Date    CHOLECYSTECTOMY      DENTAL SURGERY      ESOPHAGEAL DILATION      GASTRIC BYPASS      EILEEN-EN-Y PROCEDURE      SUBTOTAL COLECTOMY      TOOTH EXTRACTION      TUBAL LIGATION       Social History   Social History     Substance and Sexual Activity   Alcohol Use Never     Social History     Substance and Sexual Activity   Drug Use Not Currently    Types: Methamphetamines    Comment: reports use this week (12/2020)     Social History     Tobacco Use   Smoking Status Every Day    Current packs/day: 0.50    Average packs/day: 0.5 packs/day for 38.0 years (19.0 ttl pk-yrs)    Types: Cigarettes   Smokeless Tobacco Never     E-Cigarette/Vaping    E-Cigarette Use Never User     Cartridges/Day 1       E-Cigarette/Vaping Substances    Nicotine No     THC No     CBD No     Flavoring No     Other No     Unknown No      Family History: non-contributory    Meds/Allergies   all medications and allergies reviewed  Allergies   Allergen Reactions    Morphine      anaphylactic    Morphine Vomiting    Suboxone [Buprenorphine-Naloxone] Abdominal Pain    Sulfamethoxazole-Trimethoprim Hives    Sulfamethoxazole-Trimethoprim Nausea Only       Objective   Vitals: Blood pressure 103/66, pulse 59, temperature 97.9 °F (36.6 °C), temperature source Oral, resp. rate 18, SpO2 98%.,There is no height or weight on file to calculate BMI.    No intake or output data in the 24 hours ending 01/11/24 2066    Invasive Devices       Peripheral Intravenous Line  Duration             Peripheral IV 01/11/24 Right Antecubital <1 day                    Physical  Exam  Constitutional:       General: She is not in acute distress.     Appearance: She is ill-appearing.   HENT:      Head: Normocephalic and atraumatic.      Nose: Nose normal.      Mouth/Throat:      Mouth: Mucous membranes are dry.      Pharynx: Oropharynx is clear.   Eyes:      General:         Right eye: No discharge.         Left eye: No discharge.   Cardiovascular:      Rate and Rhythm: Bradycardia present.   Pulmonary:      Effort: Pulmonary effort is normal. No respiratory distress.   Abdominal:      General: There is no distension.      Palpations: Abdomen is soft.      Tenderness: There is abdominal tenderness.      Comments: Laparoscopic incisions noted.   Musculoskeletal:         General: Normal range of motion.      Cervical back: Normal range of motion.   Skin:     General: Skin is warm and dry.      Capillary Refill: Capillary refill takes 2 to 3 seconds.      Coloration: Skin is pale.   Neurological:      General: No focal deficit present.   Psychiatric:         Mood and Affect: Mood normal.         Lab Results: I have personally reviewed pertinent labs.  CBC:   Lab Results   Component Value Date    WBC 6.18 01/11/2024    HGB 12.2 01/11/2024    HCT 39.4 01/11/2024    MCV 89 01/11/2024     01/11/2024    RBC 4.45 01/11/2024    MCH 27.4 01/11/2024    MCHC 31.0 (L) 01/11/2024    RDW 14.8 01/11/2024    MPV 9.3 01/11/2024    NRBC 0 01/11/2024     CMP:   Lab Results   Component Value Date    SODIUM 140 01/11/2024     01/11/2024    CO2 25 01/11/2024    BUN 13 01/11/2024    CREATININE 0.74 01/11/2024    CALCIUM 9.4 01/11/2024    AST 26 01/11/2024    ALT 22 01/11/2024    ALKPHOS 67 01/11/2024    EGFR 93 01/11/2024     Imaging: I have personally reviewed pertinent reports.   and I have personally reviewed pertinent films in PACS  EKG, Pathology, and Other Studies: I have personally reviewed pertinent reports.      Code Status: Prior  Advance Directive and Living Will:      Power of :     POLST:      Counseling / Coordination of Care  Total floor / unit time spent today 43 minutes.  Greater than 50% of total time was spent with the patient and / or family counseling and / or coordination of care.  A description of the counseling / coordination of care: transfer of patient to hospital for emergency surgery.    Franky Preciado MD  Bariatric Surgery

## 2024-01-12 NOTE — PLAN OF CARE
Problem: PAIN - ADULT  Goal: Verbalizes/displays adequate comfort level or baseline comfort level  Description: Interventions:  - Encourage patient to monitor pain and request assistance  - Assess pain using appropriate pain scale  - Administer analgesics based on type and severity of pain and evaluate response  - Implement non-pharmacological measures as appropriate and evaluate response  - Consider cultural and social influences on pain and pain management  - Notify physician/advanced practitioner if interventions unsuccessful or patient reports new pain  1/12/2024 1429 by Chacha Jaeger RN  Outcome: Progressing  1/12/2024 1427 by Chacha Jaeger RN  Outcome: Progressing     Problem: INFECTION - ADULT  Goal: Absence or prevention of progression during hospitalization  Description: INTERVENTIONS:  - Assess and monitor for signs and symptoms of infection  - Monitor lab/diagnostic results  - Monitor all insertion sites, i.e. indwelling lines, tubes, and drains  - Monitor endotracheal if appropriate and nasal secretions for changes in amount and color  - [place] appropriate cooling/warming therapies per order  - Administer medications as ordered  - Instruct and encourage patient and family to use good hand hygiene technique  - Identify and instruct in appropriate isolation precautions for identified infection/condition  1/12/2024 1429 by Chacha Jaeger RN  Outcome: Progressing  1/12/2024 1427 by Chacha Jaeger RN  Outcome: Progressing  Goal: Absence of fever/infection during neutropenic period  Description: INTERVENTIONS:  - Monitor WBC    1/12/2024 1429 by Chacha Jaeger RN  Outcome: Progressing  1/12/2024 1427 by Chacha Jaeger RN  Outcome: Progressing     Problem: SAFETY ADULT  Goal: Patient will remain free of falls  Description: INTERVENTIONS:  - Educate patient/family on patient safety including physical limitations  - Instruct patient to call for assistance with activity   - Consult OT/PT to assist with  strengthening/mobility   - Keep Call bell within reach  - Keep bed low and locked with side rails adjusted as appropriate  - Keep care items and personal belongings within reach  - Initiate and maintain comfort rounds  - Make Fall Risk Sign visible to staff  - Apply yellow socks and bracelet for high fall risk patients  - Consider moving patient to room near nurses station  1/12/2024 1429 by Chacha Jaeger RN  Outcome: Progressing  1/12/2024 1427 by Chacha Jaeger RN  Outcome: Progressing  Goal: Maintain or return to baseline ADL function  Description: INTERVENTIONS:  -  Assess patient's ability to carry out ADLs; assess patient's baseline for ADL function and identify physical deficits which impact ability to perform ADLs (bathing, care of mouth/teeth, toileting, grooming, dressing, etc.)  - Assess/evaluate cause of self-care deficits   - Assess range of motion  - Assess patient's mobility; develop plan if impaired  - Assess patient's need for assistive devices and provide as appropriate  - Encourage maximum independence but intervene and supervise when necessary  - Involve family in performance of ADLs  - Assess for home care needs following discharge   - Consider OT consult to assist with ADL evaluation and planning for discharge  - Provide patient education as appropriate  1/12/2024 1429 by Chacha Jaeger RN  Outcome: Progressing  1/12/2024 1427 by Chacha Jaeger RN  Outcome: Progressing  Goal: Maintains/Returns to pre admission functional level  Description: INTERVENTIONS:  - Perform AM-PAC 6 Click Basic Mobility/ Daily Activity assessment daily.  - Set and communicate daily mobility goal to care team and patient/family/caregiver.   - Collaborate with rehabilitation services on mobility goals if consulted  - Out of bed for toileting  - Record patient progress and toleration of activity level   1/12/2024 1429 by Chacha Jaeger RN  Outcome: Progressing  1/12/2024 1427 by Chacha Jaeger RN  Outcome: Progressing      Problem: DISCHARGE PLANNING  Goal: Discharge to home or other facility with appropriate resources  Description: INTERVENTIONS:  - Identify barriers to discharge w/patient and caregiver  - Arrange for needed discharge resources and transportation as appropriate  - Identify discharge learning needs (meds, wound care, etc.)  - Arrange for interpretive services to assist at discharge as needed  - Refer to Case Management Department for coordinating discharge planning if the patient needs post-hospital services based on physician/advanced practitioner order or complex needs related to functional status, cognitive ability, or social support system  1/12/2024 1429 by Chacha Jaeger RN  Outcome: Progressing  1/12/2024 1427 by Chacha Jaeger RN  Outcome: Progressing     Problem: Knowledge Deficit  Goal: Patient/family/caregiver demonstrates understanding of disease process, treatment plan, medications, and discharge instructions  Description: Complete learning assessment and assess knowledge base.  Interventions:  - Provide teaching at level of understanding  - Provide teaching via preferred learning methods  1/12/2024 1429 by Chacha Jaeger RN  Outcome: Progressing  1/12/2024 1427 by Chacha Jaeger RN  Outcome: Progressing     Problem: SKIN/TISSUE INTEGRITY - ADULT  Goal: Incision(s), wounds(s) or drain site(s) healing without S/S of infection  Description: INTERVENTIONS  - Assess and document dressing, incision, wound bed, drain sites and surrounding tissue  - Provide patient and family education  - Perform skin care/dressing changes every shift    Outcome: Progressing

## 2024-01-12 NOTE — UTILIZATION REVIEW
Initial Clinical Review      OBS order 2244 converted to IP on 1346 for continued monitoring of abd and adv diet as tolerated.    Admission: Date/Time/Statement:   Admission Orders (From admission, onward)       Ordered        24 1346  Inpatient Admission  Once            24  Place in Observation  Once                           Inpatient Admission     Standing Status:   Standing     Number of Occurrences:   1     Order Specific Question:   Level of Care     Answer:   Med Surg [16]     Order Specific Question:   Bed Type     Answer:   Bariatric [1]     Order Specific Question:   Estimated length of stay     Answer:   Inpatient Only Surgery     ED Arrival Information       Expected   2024     Arrival   2024 21:45    Acuity   Urgent              Means of arrival   Ambulance    Escorted by   [facility] ([place])    Service   Bariatrics    Admission type   Emergency              Arrival complaint   Abdominal Pain             Chief Complaint   Patient presents with    Abdominal Pain     Transfer from [facility] for bariatrics, reports hernia and hx of gastric bypass.        Initial Presentation: 53 y.o. female to ED from home via EMS w/  history of laparoscopic delia-en-Y gastric bypass in  by Dr. Jefferson in [place], and she was lost to follow-up after her surgeon . She reports having multiple episodes of abdominal pain. This episode was severe, started 2 days also, maybe associated with food, she is unsure, but is having episodes of nausea and vomiting. CT-scan with possible internal hernia through Hicks's space . Admitted OBS status to bariatric service . Plan for emergent exploration in the OR with diagnostic laparoscopy, consented for this with possible open and all indicated procedures. NPO w/ IVF , PPX GI , MVI and B12 .      OP Note   LAPAROSCOPY DIAGNOSTIC. REDUCTION AND REPAIR OF INTERNAL HERNIA   Operative Findings:  Internal hernia at Crook's space      Bariatric Note    s/p emergent diagnostic laparoscopy for internal hernia, doing well postop . Plan adv to clear liq diet , adv to full liq in pm . Pain control , monitor labs and vitals , replace lytes . Thiamine , folate , B12 . Protonix BID .       ED Triage Vitals [01/11/24 2149]   Temperature Pulse Respirations Blood Pressure SpO2   97.9 °F (36.6 °C) 59 18 103/66 98 %      Temp Source Heart Rate Source Patient Position - Orthostatic VS BP Location FiO2 (%)   Oral Monitor Sitting Right arm --      Pain Score       10 - Worst Possible Pain          Wt Readings from Last 1 Encounters:   01/12/24 85.7 kg (188 lb 15 oz)     Additional Vital Signs:   1/12/24 07:56:39 98.3 °F (36.8 °C) 58 17 94/59 71 92 % None (Room air) -- Lying   01/12/24 07:28:08 -- -- -- 89/55 Abnormal  66 -- -- --      01/12/24 06:43:44 -- -- -- 89/61 Abnormal  70 -- -- -- --   01/12/24 06:22:25 -- -- -- 88/61 Abnormal  70 -- -- -- --   01/12/24 04:06:27 -- -- 17 107/70 82 -- -- -- --   01/12/24 03:08:03 -- -- 16 93/60 71 -- -- -- --   01/12/24 02:26:52 97.6 °F (36.4 °C) 55 16 99/68 78 94 % -- -- --   01/12/24 0200 -- 66 20 98/60 82 92 % None (Room air) -- Lying   01/12/24 0145 -- 64 14 105/59 75 92 % None (Room air) -- --   01/12/24 0125 97.6 °F (36.4 °C) 81 18 116/83 -- 95 % None (Room air) WDL      Pertinent Labs/Diagnostic Test Results:   No orders to display   1/11 CXR No acute cardiopulmonary disease.   1/11 CT abd    Findings compatible with an internal hernia, with imaging findings highly suggestive of a Petersons type internal hernia as described above but there is no evidence of obstruction and no evidence of bowel ischemia. The study was marked in EPIC for   immediate notification.   1/11 EKG Marked sinus bradycardia  Low voltage QRS        Results from last 7 days   Lab Units 01/12/24  0533 01/11/24  1307   WBC Thousand/uL 7.85 6.18   HEMOGLOBIN g/dL 10.5* 12.2   HEMATOCRIT % 33.4* 39.4   PLATELETS Thousands/uL 217 274   NEUTROS ABS  Thousands/µL  --  3.71         Results from last 7 days   Lab Units 01/12/24  0533 01/11/24  1307   SODIUM mmol/L 138 140   POTASSIUM mmol/L 4.0 3.9   CHLORIDE mmol/L 109* 108   CO2 mmol/L 24 25   ANION GAP mmol/L 5 7   BUN mg/dL 13 13   CREATININE mg/dL 0.59* 0.74   EGFR ml/min/1.73sq m 105 93   CALCIUM mg/dL 8.3* 9.4     Results from last 7 days   Lab Units 01/11/24  1307   AST U/L 26   ALT U/L 22   ALK PHOS U/L 67   TOTAL PROTEIN g/dL 6.8   ALBUMIN g/dL 4.1   TOTAL BILIRUBIN mg/dL 0.68     Results from last 7 days   Lab Units 01/12/24  0244 01/11/24  2211 01/11/24  1458   POC GLUCOSE mg/dl 86 88 74     Results from last 7 days   Lab Units 01/12/24  0533 01/11/24  1307   GLUCOSE RANDOM mg/dL 80 80         BETA-HYDROXYBUTYRATE   Date Value Ref Range Status   06/02/2021 4.4 (H) <0.6 mmol/L Final          Results from last 7 days   Lab Units 01/11/24  1307   HS TNI 0HR ng/L <2     Results from last 7 days   Lab Units 01/11/24  1933   LACTIC ACID mmol/L 0.7         Results from last 7 days   Lab Units 01/11/24  1307   LIPASE u/L 18         Results from last 7 days   Lab Units 01/11/24  1257   CLARITY UA  Clear   COLOR UA  Light Yellow   SPEC GRAV UA  1.015   PH UA  6.5   GLUCOSE UA mg/dl Negative   KETONES UA mg/dl Negative   BLOOD UA  Negative   PROTEIN UA mg/dl Negative   NITRITE UA  Negative   BILIRUBIN UA  Negative   UROBILINOGEN UA (BE) mg/dl <2.0   LEUKOCYTES UA  Negative     ED Treatment:   Medication Administration from 01/11/2024 2030 to 01/11/2024 2349         Date/Time Order Dose Route Action     01/11/2024 2216 EST lactated ringers infusion 1,000 mL 1,000 mL Intravenous New Bag     01/11/2024 2323 EST Famotidine (PF) (PEPCID) injection 20 mg 20 mg Intravenous Given          Past Medical History:   Diagnosis Date    Asthma     GERD (gastroesophageal reflux disease)     H/O gastric bypass     Hypokalemia     Methamphetamine abuse (HCC)     Positive UDS; Feb 2020    Pyelonephritis      Present on Admission:    Internal hernia      Admitting Diagnosis: Internal hernia [K45.8]  History of gastric bypass [Z98.84]  Left upper quadrant abdominal pain [R10.12]  Age/Sex: 53 y.o. female  Admission Orders:  Scheduled Medications:  acetaminophen, 1,000 mg, Intravenous, Q6H KEIRA  busPIRone, 7.5 mg, Oral, BID  cyanocobalamin, 1,000 mcg, Intramuscular, Q30 Days  enoxaparin, 40 mg, Subcutaneous, Daily  enoxaparin, 40 mg, Subcutaneous, Q24H KEIRA  folic acid 1 mg, thiamine (VITAMIN B1) 100 mg in sodium chloride 0.9 % 100 mL IV piggyback, , Intravenous, Daily  pantoprazole, 40 mg, Intravenous, Q12H KEIRA      Continuous IV Infusions:  lactated ringers, 1,000 mL, Intravenous, Continuous  lactated ringers, 125 mL/hr, Intravenous, Continuous      PRN Meds:  albuterol, 2 puff, Inhalation, Q6H PRN  diphenhydrAMINE, 25 mg, Intravenous, Q6H PRN  HYDROmorphone, 1 mg, Intravenous, Q4H PRN  metoclopramide, 10 mg, Intravenous, Q6H PRN  oxyCODONE, 10 mg, Oral, Q4H PRN  oxyCODONE, 5 mg, Oral, Q4H PRN   Or  oxyCODONE, 10 mg, Oral, Q4H PRN  oxyCODONE, 5 mg, Oral, Q4H PRN  phenol, 2 spray, Mouth/Throat, Q2H PRN  simethicone, 80 mg, Oral, 4x Daily PRN    Weights  I&O   Up and OOB   Cont pulse ox   NPO     IP CONSULT TO INTERNAL MEDICINE    Network Utilization Review Department  ATTENTION: Please call with any questions or concerns to [phone] and carefully listen to the prompts so that you are directed to the right person. All voicemails are confidential.   For Discharge needs, contact Care Management DC Support Team at [phone] opt. 2  Send all requests for admission clinical reviews, approved or denied determinations and any other requests to dedicated fax number below belonging to the campus where the patient is receiving treatment. List of dedicated fax numbers for the Facilities:  FACILITY NAME UR FAX NUMBER   ADMISSION DENIALS (Administrative/Medical Necessity) [phone]   DISCHARGE SUPPORT TEAM (NETWORK) [phone]   PARENT CHILD HEALTH  (Maternity/NICU/Pediatrics) [phone]   [facility] [phone]   [facility] [phone]   [facility] [phone]   [facility] [phone]   [facility] [phone]   [facility] [phone]   [facility] [phone]   [facility] [phone]   [facility] [phone]   [facility] [phone]   [facility] [phone]

## 2024-01-12 NOTE — ED NOTES
Informed provider of hypotension, per Dr. Preciado bolus of lactated ringers.     Cayla Russell RN  01/11/24 6028

## 2024-01-13 VITALS
TEMPERATURE: 98.5 F | SYSTOLIC BLOOD PRESSURE: 98 MMHG | OXYGEN SATURATION: 90 % | DIASTOLIC BLOOD PRESSURE: 62 MMHG | BODY MASS INDEX: 37.89 KG/M2 | HEART RATE: 67 BPM | RESPIRATION RATE: 17 BRPM | WEIGHT: 194 LBS

## 2024-01-13 PROBLEM — I95.9 HYPOTENSION (ARTERIAL): Status: ACTIVE | Noted: 2024-01-13

## 2024-01-13 LAB
ATRIAL RATE: 75 BPM
GLUCOSE SERPL-MCNC: 85 MG/DL (ref 65–140)
MAGNESIUM SERPL-MCNC: 1.9 MG/DL (ref 1.9–2.7)
P AXIS: 7 DEGREES
PHOSPHATE SERPL-MCNC: 3.6 MG/DL (ref 2.7–4.5)
PR INTERVAL: 140 MS
QRS AXIS: 15 DEGREES
QRSD INTERVAL: 84 MS
QT INTERVAL: 390 MS
QTC INTERVAL: 435 MS
T WAVE AXIS: 24 DEGREES
VENTRICULAR RATE: 75 BPM

## 2024-01-13 PROCEDURE — 99024 POSTOP FOLLOW-UP VISIT: CPT | Performed by: SURGERY

## 2024-01-13 PROCEDURE — 93005 ELECTROCARDIOGRAM TRACING: CPT

## 2024-01-13 PROCEDURE — C9113 INJ PANTOPRAZOLE SODIUM, VIA: HCPCS | Performed by: STUDENT IN AN ORGANIZED HEALTH CARE EDUCATION/TRAINING PROGRAM

## 2024-01-13 PROCEDURE — 82948 REAGENT STRIP/BLOOD GLUCOSE: CPT

## 2024-01-13 PROCEDURE — 99243 OFF/OP CNSLTJ NEW/EST LOW 30: CPT | Performed by: INTERNAL MEDICINE

## 2024-01-13 PROCEDURE — NC001 PR NO CHARGE: Performed by: SURGERY

## 2024-01-13 PROCEDURE — 84100 ASSAY OF PHOSPHORUS: CPT | Performed by: STUDENT IN AN ORGANIZED HEALTH CARE EDUCATION/TRAINING PROGRAM

## 2024-01-13 PROCEDURE — 83735 ASSAY OF MAGNESIUM: CPT | Performed by: STUDENT IN AN ORGANIZED HEALTH CARE EDUCATION/TRAINING PROGRAM

## 2024-01-13 RX ORDER — LANOLIN ALCOHOL/MO/W.PET/CERES
3 CREAM (GRAM) TOPICAL
Qty: 20 TABLET | Refills: 0 | Status: SHIPPED | OUTPATIENT
Start: 2024-01-13 | End: 2024-01-13

## 2024-01-13 RX ORDER — LANOLIN ALCOHOL/MO/W.PET/CERES
3 CREAM (GRAM) TOPICAL
Qty: 20 TABLET | Refills: 0 | Status: SHIPPED | OUTPATIENT
Start: 2024-01-13

## 2024-01-13 RX ORDER — NICOTINE 21 MG/24HR
1 PATCH, TRANSDERMAL 24 HOURS TRANSDERMAL DAILY
Qty: 28 PATCH | Refills: 0 | Status: SHIPPED | OUTPATIENT
Start: 2024-01-14 | End: 2024-01-16 | Stop reason: SDUPTHER

## 2024-01-13 RX ORDER — OXYCODONE HYDROCHLORIDE AND ACETAMINOPHEN 5; 325 MG/1; MG/1
1 TABLET ORAL EVERY 6 HOURS PRN
Qty: 10 TABLET | Refills: 0 | Status: SHIPPED | OUTPATIENT
Start: 2024-01-13 | End: 2024-01-16 | Stop reason: SDUPTHER

## 2024-01-13 RX ORDER — NICOTINE 21 MG/24HR
1 PATCH, TRANSDERMAL 24 HOURS TRANSDERMAL DAILY
Qty: 28 PATCH | Refills: 0 | Status: SHIPPED | OUTPATIENT
Start: 2024-01-14 | End: 2024-01-13

## 2024-01-13 RX ORDER — ONDANSETRON 4 MG/1
4 TABLET, FILM COATED ORAL EVERY 8 HOURS PRN
Qty: 20 TABLET | Refills: 0 | Status: SHIPPED | OUTPATIENT
Start: 2024-01-13 | End: 2024-01-13

## 2024-01-13 RX ORDER — MAGNESIUM SULFATE HEPTAHYDRATE 40 MG/ML
2 INJECTION, SOLUTION INTRAVENOUS
Status: DISCONTINUED | OUTPATIENT
Start: 2024-01-13 | End: 2024-01-13 | Stop reason: HOSPADM

## 2024-01-13 RX ORDER — DEXAMETHASONE SODIUM PHOSPHATE 10 MG/ML
10 INJECTION, SOLUTION INTRAMUSCULAR; INTRAVENOUS ONCE
Status: COMPLETED | OUTPATIENT
Start: 2024-01-13 | End: 2024-01-13

## 2024-01-13 RX ORDER — OXYCODONE HYDROCHLORIDE AND ACETAMINOPHEN 5; 325 MG/1; MG/1
1 TABLET ORAL EVERY 6 HOURS PRN
Qty: 10 TABLET | Refills: 0 | Status: SHIPPED | OUTPATIENT
Start: 2024-01-13 | End: 2024-01-13

## 2024-01-13 RX ORDER — ONDANSETRON 4 MG/1
4 TABLET, FILM COATED ORAL EVERY 8 HOURS PRN
Qty: 20 TABLET | Refills: 0 | Status: SHIPPED | OUTPATIENT
Start: 2024-01-13

## 2024-01-13 RX ORDER — OMEPRAZOLE 40 MG/1
40 CAPSULE, DELAYED RELEASE ORAL 2 TIMES DAILY
Qty: 90 CAPSULE | Refills: 0 | Status: SHIPPED | OUTPATIENT
Start: 2024-01-13

## 2024-01-13 RX ORDER — OXYCODONE HYDROCHLORIDE AND ACETAMINOPHEN 5; 325 MG/1; MG/1
1 TABLET ORAL EVERY 4 HOURS PRN
Status: DISCONTINUED | OUTPATIENT
Start: 2024-01-13 | End: 2024-01-13 | Stop reason: HOSPADM

## 2024-01-13 RX ORDER — SODIUM CHLORIDE 9 MG/ML
100 INJECTION, SOLUTION INTRAVENOUS ONCE
Status: COMPLETED | OUTPATIENT
Start: 2024-01-13 | End: 2024-01-13

## 2024-01-13 RX ORDER — OXYCODONE HYDROCHLORIDE AND ACETAMINOPHEN 5; 325 MG/1; MG/1
2 TABLET ORAL EVERY 4 HOURS PRN
Status: DISCONTINUED | OUTPATIENT
Start: 2024-01-13 | End: 2024-01-13 | Stop reason: HOSPADM

## 2024-01-13 RX ORDER — SUMATRIPTAN 6 MG/.5ML
6 INJECTION, SOLUTION SUBCUTANEOUS
Status: DISCONTINUED | OUTPATIENT
Start: 2024-01-13 | End: 2024-01-13 | Stop reason: HOSPADM

## 2024-01-13 RX ORDER — OMEPRAZOLE 40 MG/1
40 CAPSULE, DELAYED RELEASE ORAL 2 TIMES DAILY
Qty: 90 CAPSULE | Refills: 0 | Status: SHIPPED | OUTPATIENT
Start: 2024-01-13 | End: 2024-01-13

## 2024-01-13 RX ADMIN — OXYCODONE AND ACETAMINOPHEN 2 TABLET: 325; 5 TABLET ORAL at 13:23

## 2024-01-13 RX ADMIN — SODIUM CHLORIDE 100 ML/HR: 0.9 INJECTION, SOLUTION INTRAVENOUS at 09:13

## 2024-01-13 RX ADMIN — Medication 14 MG: at 09:25

## 2024-01-13 RX ADMIN — OXYCODONE HYDROCHLORIDE AND ACETAMINOPHEN 1 TABLET: 5; 325 TABLET ORAL at 04:41

## 2024-01-13 RX ADMIN — BUSPIRONE HYDROCHLORIDE 7.5 MG: 5 TABLET ORAL at 09:20

## 2024-01-13 RX ADMIN — LOPERAMIDE HCL 2 MG: 1 SOLUTION ORAL at 13:22

## 2024-01-13 RX ADMIN — ENOXAPARIN SODIUM 40 MG: 40 INJECTION SUBCUTANEOUS at 09:19

## 2024-01-13 RX ADMIN — MAGNESIUM SULFATE HEPTAHYDRATE 2 G: 40 INJECTION, SOLUTION INTRAVENOUS at 12:18

## 2024-01-13 RX ADMIN — Medication 3 MG: at 00:18

## 2024-01-13 RX ADMIN — OXYCODONE AND ACETAMINOPHEN 2 TABLET: 325; 5 TABLET ORAL at 09:36

## 2024-01-13 RX ADMIN — PANTOPRAZOLE SODIUM 40 MG: 40 INJECTION, POWDER, FOR SOLUTION INTRAVENOUS at 09:20

## 2024-01-13 RX ADMIN — SODIUM CHLORIDE, SODIUM LACTATE, POTASSIUM CHLORIDE, AND CALCIUM CHLORIDE 125 ML/HR: .6; .31; .03; .02 INJECTION, SOLUTION INTRAVENOUS at 04:41

## 2024-01-13 RX ADMIN — METOCLOPRAMIDE 10 MG: 5 INJECTION, SOLUTION INTRAMUSCULAR; INTRAVENOUS at 04:47

## 2024-01-13 RX ADMIN — DEXAMETHASONE SODIUM PHOSPHATE 10 MG: 10 INJECTION INTRAMUSCULAR; INTRAVENOUS at 09:17

## 2024-01-13 RX ADMIN — THIAMINE HYDROCHLORIDE: 100 INJECTION, SOLUTION INTRAMUSCULAR; INTRAVENOUS at 09:20

## 2024-01-13 NOTE — PLAN OF CARE
Problem: PAIN - ADULT  Goal: Verbalizes/displays adequate comfort level or baseline comfort level  Description: Interventions:  - Encourage patient to monitor pain and request assistance  - Assess pain using appropriate pain scale  - Administer analgesics based on type and severity of pain and evaluate response  - Implement non-pharmacological measures as appropriate and evaluate response  - Consider cultural and social influences on pain and pain management  - Notify physician/advanced practitioner if interventions unsuccessful or patient reports new pain  Outcome: Progressing     Problem: INFECTION - ADULT  Goal: Absence or prevention of progression during hospitalization  Description: INTERVENTIONS:  - Assess and monitor for signs and symptoms of infection  - Monitor lab/diagnostic results  - Monitor all insertion sites, i.e. indwelling lines, tubes, and drains  - Monitor endotracheal if appropriate and nasal secretions for changes in amount and color  - [place] appropriate cooling/warming therapies per order  - Administer medications as ordered  - Instruct and encourage patient and family to use good hand hygiene technique  - Identify and instruct in appropriate isolation precautions for identified infection/condition  Outcome: Progressing  Goal: Absence of fever/infection during neutropenic period  Description: INTERVENTIONS:  - Monitor WBC    Outcome: Progressing     Problem: SAFETY ADULT  Goal: Patient will remain free of falls  Description: INTERVENTIONS:  - Educate patient/family on patient safety including physical limitations  - Instruct patient to call for assistance with activity   - Consult OT/PT to assist with strengthening/mobility   - Keep Call bell within reach  - Keep bed low and locked with side rails adjusted as appropriate  - Keep care items and personal belongings within reach  - Initiate and maintain comfort rounds  - Make Fall Risk Sign visible to staff  - Apply yellow socks and bracelet  for high fall risk patients  - Consider moving patient to room near nurses station  Outcome: Progressing  Goal: Maintain or return to baseline ADL function  Description: INTERVENTIONS:  -  Assess patient's ability to carry out ADLs; assess patient's baseline for ADL function and identify physical deficits which impact ability to perform ADLs (bathing, care of mouth/teeth, toileting, grooming, dressing, etc.)  - Assess/evaluate cause of self-care deficits   - Assess range of motion  - Assess patient's mobility; develop plan if impaired  - Assess patient's need for assistive devices and provide as appropriate  - Encourage maximum independence but intervene and supervise when necessary  - Involve family in performance of ADLs  - Assess for home care needs following discharge   - Consider OT consult to assist with ADL evaluation and planning for discharge  - Provide patient education as appropriate  Outcome: Progressing  Goal: Maintains/Returns to pre admission functional level  Description: INTERVENTIONS:  - Perform AM-PAC 6 Click Basic Mobility/ Daily Activity assessment daily.  - Set and communicate daily mobility goal to care team and patient/family/caregiver.   - Collaborate with rehabilitation services on mobility goals if consulted  - Out of bed for toileting  - Record patient progress and toleration of activity level   Outcome: Progressing     Problem: DISCHARGE PLANNING  Goal: Discharge to home or other facility with appropriate resources  Description: INTERVENTIONS:  - Identify barriers to discharge w/patient and caregiver  - Arrange for needed discharge resources and transportation as appropriate  - Identify discharge learning needs (meds, wound care, etc.)  - Arrange for interpretive services to assist at discharge as needed  - Refer to Case Management Department for coordinating discharge planning if the patient needs post-hospital services based on physician/advanced practitioner order or complex needs  related to functional status, cognitive ability, or social support system  Outcome: Progressing     Problem: Knowledge Deficit  Goal: Patient/family/caregiver demonstrates understanding of disease process, treatment plan, medications, and discharge instructions  Description: Complete learning assessment and assess knowledge base.  Interventions:  - Provide teaching at level of understanding  - Provide teaching via preferred learning methods  Outcome: Progressing     Problem: SKIN/TISSUE INTEGRITY - ADULT  Goal: Incision(s), wounds(s) or drain site(s) healing without S/S of infection  Description: INTERVENTIONS  - Assess and document dressing, incision, wound bed, drain sites and surrounding tissue  - Provide patient and family education  - Perform skin care/dressing changes every shift    Outcome: Progressing     Problem: GASTROINTESTINAL - ADULT  Goal: Minimal or absence of nausea and/or vomiting  Description: INTERVENTIONS:  - Administer IV fluids if ordered to ensure adequate hydration  - Maintain NPO status until nausea and vomiting are resolved  - Nasogastric tube if ordered  - Administer ordered antiemetic medications as needed  - Provide nonpharmacologic comfort measures as appropriate  - Advance diet as tolerated, if ordered  - Consider nutrition services referral to assist patient with adequate nutrition and appropriate food choices  Outcome: Progressing  Goal: Maintains or returns to baseline bowel function  Description: INTERVENTIONS:  - Assess bowel function  - Encourage oral fluids to ensure adequate hydration  - Administer IV fluids if ordered to ensure adequate hydration  - Administer ordered medications as needed  - Encourage mobilization and activity  - Consider nutritional services referral to assist patient with adequate nutrition and appropriate food choices  Outcome: Progressing  Goal: Maintains adequate nutritional intake  Description: INTERVENTIONS:  - Monitor percentage of each meal  consumed  - Identify factors contributing to decreased intake, treat as appropriate  - Assist with meals as needed  - Monitor I&O, weight, and lab values if indicated  - Obtain nutrition services referral as needed  Outcome: Progressing  Goal: Establish and maintain optimal ostomy function  Description: INTERVENTIONS:  - Assess bowel function  - Encourage oral fluids to ensure adequate hydration  - Administer IV fluids if ordered to ensure adequate hydration   - Administer ordered medications as needed  - Encourage mobilization and activity  - Nutrition services referral to assist patient with appropriate food choices  - Assess stoma site  - Consider wound care consult   Outcome: Progressing  Goal: Oral mucous membranes remain intact  Description: INTERVENTIONS  - Assess oral mucosa and hygiene practices  - Implement preventative oral hygiene regimen  - Implement oral medicated treatments as ordered  - Initiate Nutrition services referral as needed  Outcome: Progressing

## 2024-01-13 NOTE — DISCHARGE SUMMARY
Discharge Summary - Marylou Morris 53 y.o. female MRN: [id number]    Unit/Bed#: E5 -01 Encounter: [id number]      Pre-Operative Diagnosis: Pre-Op Diagnosis Codes:     * History of gastric bypass [Z98.84]     * Internal hernia [K45.8]     * Left upper quadrant abdominal pain [R10.12]    Post-Operative Diagnosis: Post-Op Diagnosis Codes:     * History of gastric bypass [Z98.84]     * Internal hernia [K45.8]     * Left upper quadrant abdominal pain [R10.12]    Procedures Performed:  Procedure(s):  LAPAROSCOPY DIAGNOSTIC, REDUCTION AND REPAIR OF INTERNAL HERNIA    Surgeon: Ruben Milner MD    See H & P for full details of admission and Operative Note for full details of operations performed.     In brief, the patient had hx of laparoscopic delia-en-Y gastric bypass approximately 10 years ago at [place] complicated by marginal ulcers, strictures and cigarette smoking.     She presented to [facility] ED on 1/11/24 with abdominal pain and vomiting. Underwent CT-scan with findings concerning for internal hernia.    She was emergently transferred to [facility] under Bariatric surgery service and underwent diagnostic laparoscopy, reduction of internal hernia and suture repair of Hicks's defect with Dr. Milner overnight 1/11/24.     Patient tolerated surgery well without complications. In the morning postoperative Day 1, the patient had mild nausea and abdominal pain. Tolerated a clear liquid diet without vomiting. Able to ambulate and voiding independently.  Patient was deemed ready for discharge home on postoperative day 2 after tolerating full liquids and soft foods.    She was given IV multivitamins and nutrition supplements during her hospitalization.     She was counseled on smoking cessation, urged to obtain transportation to make her missed appointments.     Patient was seen and examined prior to discharge.      Provisions for Follow-Up Care:  See After Visit Summary/Discharge  Instructions for information related to follow-up care and home orders.      Disposition: Home, in stable condition.     Planned Readmission: No    Discharge Medications:  See After Visit Summary/Discharge Instructions for reconciled discharge medications provided to patient and family.      Post Operative instructions: Reviewed with patient and/or family.    Signature:   Franky Preciado MD  Date: 1/13/2024 Time: 3:22 PM

## 2024-01-13 NOTE — DISCHARGE INSTR - AVS FIRST PAGE
Bariatric/Weight Loss Surgery  Emergency surgery  Hospital Discharge Instructions  ACTIVITY:  Progress as feels comfortable - a good rule is:  if you are doing something and it begins to hurt, stop doing the activity. Walk every hour while at home.  You may walk stairs if you do so slowly  You may shower 48 hours after surgery.  Do not scrub incision sites.  Blot gently with clean towel to dry incisions. (see #4 below)   Use your incentive spirometer 10 times per hour while awake for 1 week after surgery.  Do NOT drive for 48 hours after surgery. No driving 24 hours after taking certain prescription pain medications. Examples of such medication are Percocet, Darvocet, Oxycodone, Tylenol #3, and Tylenol with Codeine.     DIET  Stay on a soft diet for 7 days after your surgery date, sipping slowly. Refer to your manual for examples of choices. Remember to keep your liquids sugar free or low calorie. You may have protein drinks. Make sure to drink 48 to 64 ounces per day of fluids.   You may advance your diet once use see the surgeon in clinic.    MEDICATIONS:  The abdominal nerve block will wear off during the first 1-2 days that you are home, and you may become sore (especially over incision site/sites where abdominal wall is sutured). This may create a pulling sensation, especially while moving around, and will fade over time.  Continue to take your Tylenol and your pain medication as instructed.   Start vitamins and minerals now  Anti-acid Medication as per prescription.  Other medications as indicated on the Physician Patient Discharge Instructions form given to you at the time of discharge.  Make sure that you are splitting your pill or tablet medications in halves or fourths or even crushing them before you take them. Capsules should be opened and mixed with water or jello. You need to do this for at least 4 weeks after surgery. Eventually you will be able to take your medications the regular way as they were  prescribed.   You will need to consult with your Family Doctor in regards to all your prescribed medication, particularly those for blood pressure and diabetes.  As you lose weight, medical conditions may change, requiring an alteration or elimination of the drug dose. Monitor blood pressure closely and call PCP with any concerns.   Sleeve Gastrectomy patients ONLY:  Complete full course of lovenox injections!  DO NOT TAKE BIRTH CONTROL(BC) MEDICATIONS, INSERT BC VAGINAL RINGS, OR PLACE IUD OR ANY OTHER BC METHODS UNTIL 31 DAYS FROM DAY OF DISCHARGE FROM HOSPITAL. THIS PLACES YOU AT HIGH RISK FOR A POTENTIALLY LIFE THREATENING BLOOD CLOT. Remember to always use barrier methods for birth control and speak to your GYN about using two forms of birth control to start 31 days after surgery. It is very important to avoid pregnancy until at least 18-24 months after surgery.     INCISION CARE  You may shower and get incisions wet 2 days after surgery. No soaking tub baths or swimming for 30 days after surgery. Keep abdominal area and incisions clean. Use soap and water to create a good lather and rinse off.  Do not scrub incisions.   If you have a drain, empty the drain as the nurses instructed.    FOLLOW-UP APPOINTMENT should be made for one week after discharge. Call surgeon’s office at [phone] to schedule an appointment.    CALL YOUR DOCTOR FOR:  pain not controlled by pain medications, a temperature greater than 101.5° F, any increase or change in drainage or redness from any incision, any vomiting or inability to keep liquids down, shortness of breath, shoulder pain, or bleeding

## 2024-01-13 NOTE — DISCHARGE INSTRUCTIONS
your medications from [facility] in Hospital Lobby   Crush or cut your pills and open capsules, mix with liquid to drink.  Take Percocet every 6 hours as needed for pain.  If you are not taking Percocet then you can take Tylenol every 8 hours as needed for pain.  Do not combine Percocet and Tylenol as this can lead to Tylenol overdose.  Take your omeprazole daily  It is important to stay hydrated and follow your discharge diet progression   Mild nausea is ok as long as you can drink fluids, sip very slowly and get up and walk during any periods of nausea  You may shower normally after 48 hours, but do not scrub incision sites, blot gently with clean towel to dry incisions  Take home medications as usual unless instructed otherwise while in hospital  Follow up with Dr. Milner and your PCP within the next week  Sleeve gastrectomy patients ONLY: Complete full course of lovenox injections!

## 2024-01-13 NOTE — ASSESSMENT & PLAN NOTE
Hypotension asymptomatic.  Patient reports as an outpatient this is being worked up and she is to see cardiology.  No evidence of blood loss anemia during hospitalization  Recent TSH within limits.  May benefit from cortisol testing.

## 2024-01-13 NOTE — UTILIZATION REVIEW
Continued Stay Review    Date: 01/13 Day 2                          Current Patient Class: IP  Current Level of Care: MS    HPI:53 y.o. female initially admitted on 01/12     Assessment/Plan:  No acute overnight events, patient reports headaches and abdominal discomfort this morning.  She was advance from clears to full liquid diet yesterday, she tolerated fulls without nausea or vomiting.  Cont bariatric soft diet with Ensure supplements. Pain control p.o. and IV meds. Encourage ambulation being out of bed. Incentive spirometer. Cont IV thiamine, folic acid. Pain and nausea control prn. Replete electrolytes as needed. Cont IVF    Vital Signs: /59 (BP Location: Right arm)   Pulse 67   Temp 98.6 °F (37 °C) (Oral)   Resp 17   Wt 88 kg (194 lb 0.1 oz)   LMP  (LMP Unknown)   SpO2 90%   BMI 37.89 kg/m²       Pertinent Labs/Diagnostic Results:       Results from last 7 days   Lab Units 01/12/24  0533 01/11/24  1307   WBC Thousand/uL 7.85 6.18   HEMOGLOBIN g/dL 10.5* 12.2   HEMATOCRIT % 33.4* 39.4   PLATELETS Thousands/uL 217 274   NEUTROS ABS Thousands/µL  --  3.71         Results from last 7 days   Lab Units 01/13/24  0442 01/12/24  0533 01/11/24  1307   SODIUM mmol/L  --  138 140   POTASSIUM mmol/L  --  4.0 3.9   CHLORIDE mmol/L  --  109* 108   CO2 mmol/L  --  24 25   ANION GAP mmol/L  --  5 7   BUN mg/dL  --  13 13   CREATININE mg/dL  --  0.59* 0.74   EGFR ml/min/1.73sq m  --  105 93   CALCIUM mg/dL  --  8.3* 9.4   MAGNESIUM mg/dL 1.9  --   --    PHOSPHORUS mg/dL 3.6  --   --      Results from last 7 days   Lab Units 01/11/24  1307   AST U/L 26   ALT U/L 22   ALK PHOS U/L 67   TOTAL PROTEIN g/dL 6.8   ALBUMIN g/dL 4.1   TOTAL BILIRUBIN mg/dL 0.68     Results from last 7 days   Lab Units 01/13/24  0212 01/12/24  0244 01/11/24  2211 01/11/24  1458   POC GLUCOSE mg/dl 85 86 88 74     Results from last 7 days   Lab Units 01/12/24  0533 01/11/24  1307   GLUCOSE RANDOM mg/dL 80 80              BETA-HYDROXYBUTYRATE   Date Value Ref Range Status   06/02/2021 4.4 (H) <0.6 mmol/L Final                      Results from last 7 days   Lab Units 01/11/24  1307   HS TNI 0HR ng/L <2                     Results from last 7 days   Lab Units 01/11/24  1933   LACTIC ACID mmol/L 0.7                                 Results from last 7 days   Lab Units 01/11/24  1307   LIPASE u/L 18                 Results from last 7 days   Lab Units 01/11/24  1257   CLARITY UA  Clear   COLOR UA  Light Yellow   SPEC GRAV UA  1.015   PH UA  6.5   GLUCOSE UA mg/dl Negative   KETONES UA mg/dl Negative   BLOOD UA  Negative   PROTEIN UA mg/dl Negative   NITRITE UA  Negative   BILIRUBIN UA  Negative   UROBILINOGEN UA (BE) mg/dl <2.0   LEUKOCYTES UA  Negative                                                   Medications:   Scheduled Medications:  busPIRone, 7.5 mg, Oral, BID  cyanocobalamin, 1,000 mcg, Intramuscular, Q30 Days  enoxaparin, 40 mg, Subcutaneous, Q24H KEIRA  folic acid 1 mg, thiamine (VITAMIN B1) 100 mg in sodium chloride 0.9 % 100 mL IV piggyback, , Intravenous, Daily  magnesium sulfate, 2 g, Intravenous, Q24H KEIRA  melatonin, 3 mg, Oral, HS  nicotine, 14 mg, Transdermal, Daily  pantoprazole, 40 mg, Intravenous, Q12H KEIRA      Continuous IV Infusions:  lactated ringers, 125 mL/hr, Intravenous, Continuous      PRN Meds:  albuterol, 2 puff, Inhalation, Q6H PRN  diazepam, 5 mg, Intravenous, Q6H PRN  diphenhydrAMINE, 25 mg, Intravenous, Q6H PRN  loperamide, 2 mg, Oral, TID PRN  metoclopramide, 10 mg, Intravenous, Q6H PRN 01/13 x 1  oxyCODONE-acetaminophen, 1 tablet, Oral, Q4H PRN   Or  oxyCODONE-acetaminophen, 2 tablet, Oral, Q4H PRN 01/13 x 1  phenol, 2 spray, Mouth/Throat, Q2H PRN  simethicone, 80 mg, Oral, 4x Daily PRN  SUMAtriptan, 6 mg, Subcutaneous, Q1H PRN        Discharge Plan: D    Network Utilization Review Department  ATTENTION: Please call with any questions or concerns to [phone] and carefully listen to the prompts  so that you are directed to the right person. All voicemails are confidential.   For Discharge needs, contact Care Management DC Support Team at [phone] opt. 2  Send all requests for admission clinical reviews, approved or denied determinations and any other requests to dedicated fax number below belonging to the campus where the patient is receiving treatment. List of dedicated fax numbers for the Facilities:  FACILITY NAME UR FAX NUMBER   ADMISSION DENIALS (Administrative/Medical Necessity) [phone]   DISCHARGE SUPPORT TEAM (NETWORK) [phone]   PARENT CHILD HEALTH (Maternity/NICU/Pediatrics) [phone]   [facility] [phone]   [facility] [phone]   [facility] [phone]   [facility] [phone]   [facility] [phone]   [facility] [phone]   [facility] [phone]   [facility] [phone]   [facility] [phone]   [facility] [phone]   [facility] [phone]

## 2024-01-13 NOTE — CONSULTS
[facility]  Consult  Name: Marylou Morris 53 y.o. female I MRN: [id number]  Unit/Bed#: E5 -01 I Date of Admission: 1/11/2024   Date of Service: 1/13/2024 I Hospital Day: 1    Inpatient consult to Internal Medicine  Consult performed by: Jim Ross DO  Consult ordered by: Franky Preciado MD        Assessment/Plan   * Internal hernia  Assessment & Plan  Transferred from [facility] for internal hernia underwent laparoscopy with reduction of hernia  No evidence of blood loss anemia.  Hypotension asymptomatic.  Advised smoking cessation.  Medically stable for discharge.    Hypotension (arterial)  Assessment & Plan  Hypotension asymptomatic.  Patient reports as an outpatient this is being worked up and she is to see cardiology.  No evidence of blood loss anemia during hospitalization  Recent TSH within limits.  May benefit from cortisol testing.    Depression with anxiety  Assessment & Plan  Mood stable continue buspirone    Asthma  Assessment & Plan  No exacerbation on albuterol as needed      HPI: Marylou Morris is a 53 y.o. year old female with a history of asthma depression/anxiety and [place]-en-Y gastric bypass who presented to [facility] for nausea vomiting found to have internal hernia.  She was transferred to [place] under bariatric surgery service for laparoscopy with reduction of internal hernia.  She did well.  She did have hypotension but was not symptomatic.  On exam she denies any chest pain shortness of breath.  Her pain is controlled.  She is being discharged home.    ALLERGIES  Allergies   Allergen Reactions    Morphine      anaphylactic    Morphine Vomiting    Suboxone [Buprenorphine-Naloxone] Abdominal Pain    Sulfamethoxazole-Trimethoprim Hives    Sulfamethoxazole-Trimethoprim Nausea Only     HOME MEDICATIONS  Prior to Admission Medications   Prescriptions Last Dose Informant Patient Reported? Taking?   Blood Glucose Monitoring Suppl (OneTouch  Verio Reflect) w/Device KIT   No No   Sig: Check blood sugars once daily. Please substitute with appropriate alternative as covered by patient's insurance. Dx: E11.65   OneTouch Delica Lancets 33G MISC   No No   Sig: Check blood sugars once daily. Please substitute with appropriate alternative as covered by patient's insurance. Dx: E11.65   albuterol (PROVENTIL HFA,VENTOLIN HFA) 90 mcg/act inhaler   No No   Sig: Inhale 2 puffs every 6 (six) hours as needed for wheezing   busPIRone (BUSPAR) 7.5 mg tablet   No No   Sig: Take 1 tablet (7.5 mg total) by mouth 2 (two) times a day   dicyclomine (BENTYL) 20 mg tablet   No No   Sig: take 1 tablet by mouth twice a day if needed for ABDOMINAL PAIN   ergocalciferol (VITAMIN D2) 50,000 units   No No   Sig: Take 1 capsule (50,000 Units total) by mouth once a week   fluticasone (FLONASE) 50 mcg/act nasal spray   No No   Si spray into each nostril daily   Patient not taking: Reported on 10/27/2023   glucose blood (OneTouch Verio) test strip   No No   Sig: Use 1 each 4 (four) times a day (before meals and at bedtime) Check blood sugars once daily. Please substitute with appropriate alternative as covered by patient's insurance. Dx: E11.65   lactulose 20 g/30 mL   No No   Sig: Take 30 mL (20 g total) by mouth 2 (two) times a day as needed (constipation)   nystatin powder   No No   Sig: Apply topically 2 (two) times a day   omeprazole (PriLOSEC) 40 MG capsule   No No   Sig: Take 1 capsule (40 mg total) by mouth daily   therapeutic multivitamin-minerals (THERAGRAN-M) tablet  Self Yes No   Sig: Take 1 tablet by mouth daily   vitamin B-12 (VITAMIN B-12) 500 mcg tablet   No No   Sig: take 1 tablet by mouth once daily      Facility-Administered Medications: None     PAST HISTORY  Past Medical History:   Diagnosis Date    Asthma     GERD (gastroesophageal reflux disease)     H/O gastric bypass     History of methamphetamine abuse (HCC)     Positive UDS; 2020    Hypokalemia      Pyelonephritis      Past Surgical History:   Procedure Laterality Date    CHOLECYSTECTOMY      DENTAL SURGERY      ESOPHAGEAL DILATION      GASTRIC BYPASS      IN LAPS ABD PRTM&OMENTUM DX W/WO SPEC BR/WA SPX N/A 1/11/2024    Procedure: LAPAROSCOPY DIAGNOSTIC, REDUCTION AND REPAIR OF INTERNAL HERNIA;  Surgeon: Ruben Milner MD;  Location: [facility];  Service: Bariatrics    EILEEN-EN-Y PROCEDURE      SUBTOTAL COLECTOMY      TOOTH EXTRACTION      TUBAL LIGATION       SOCIAL HISTORY  Social History     Socioeconomic History    Marital status: Single     Spouse name: Not on file    Number of children: 2    Years of education: Not on file    Highest education level: Not on file   Occupational History    Not on file   Tobacco Use    Smoking status: Every Day     Current packs/day: 0.50     Average packs/day: 0.5 packs/day for 38.0 years (19.0 ttl pk-yrs)     Types: Cigarettes    Smokeless tobacco: Never   Vaping Use    Vaping status: Never Used   Substance and Sexual Activity    Alcohol use: Never    Drug use: Not Currently     Types: Methamphetamines     Comment: reports use this week (12/2020)    Sexual activity: Not Currently     Partners: Male     Birth control/protection: Post-menopausal   Other Topics Concern    Not on file   Social History Narrative    ** Merged History Encounter **         Caffeine use    Ready to quit smoking    Never - 28 yrs male , 2 children    Lives with her mother    Unemployed and on SSI     Social Determinants of Health     Financial Resource Strain: Not on file   Food Insecurity: No Food Insecurity (1/12/2024)    Hunger Vital Sign     Worried About Running Out of Food in the Last Year: Never true     Ran Out of Food in the Last Year: Never true   Transportation Needs: Unmet Transportation Needs (1/12/2024)    PRAPARE - Transportation     Lack of Transportation (Medical): Yes     Lack of Transportation (Non-Medical): Yes   Physical Activity: Not on file   Stress: Not on  "file   Social Connections: Not on file   Intimate Partner Violence: Not on file   Housing Stability: Low Risk  (2024)    Housing Stability Vital Sign     Unable to Pay for Housing in the Last Year: No     Number of Places Lived in the Last Year: 1     Unstable Housing in the Last Year: No     FAMILY HISTORY  Family History   Problem Relation Age of Onset    Kidney disease Mother         Pt also reports \"bone disease\"    Hypertension Mother     Diabetes Mother     Heart disease Mother     Stroke Mother     Arthritis Mother     Hyperlipidemia Mother     Colon cancer Father          motor cycle accident    Heart disease Sister     Coronary artery disease Sister     No Known Problems Sister     No Known Problems Sister     No Known Problems Daughter     No Known Problems Daughter     Breast cancer Maternal Grandmother     Lung cancer Maternal Grandmother     Lung cancer Maternal Grandfather     Lung cancer Paternal Grandmother     Lung cancer Paternal Grandfather     Cancer Brother     Heart attack Brother     Heart disease Brother     Coronary artery disease Brother     Uterine cancer Maternal Aunt     No Known Problems Maternal Aunt     No Known Problems Maternal Aunt     Lung cancer Maternal Aunt     Lung cancer Maternal Aunt     Thyroid cancer Maternal Uncle     No Known Problems Paternal Aunt     No Known Problems Paternal Aunt     Lung cancer Paternal Uncle     Lung cancer Cousin        REVIEW OF SYSTEMS  Review of Systems   Constitutional:  Negative for chills and fever.   HENT:  Negative for facial swelling and trouble swallowing.    Eyes:  Negative for visual disturbance.   Respiratory:  Negative for shortness of breath.    Cardiovascular:  Negative for chest pain.   Gastrointestinal:  Positive for abdominal pain. Negative for abdominal distention, diarrhea, nausea and vomiting.   Genitourinary:  Negative for hematuria.   Musculoskeletal:  Negative for back pain and myalgias.   Skin:  Negative for " rash.   Neurological:  Negative for speech difficulty and numbness.   Psychiatric/Behavioral:  The patient is not nervous/anxious.    All other systems reviewed and are negative.       OBJECTIVE  Temp:  [98.3 °F (36.8 °C)-98.6 °F (37 °C)] 98.5 °F (36.9 °C)  HR:  [67] 67  Resp:  [17-18] 17  BP: ()/(49-62) 98/62    PHYSICAL EXAM  Physical Exam  Vitals reviewed.   Constitutional:       General: She is not in acute distress.     Appearance: Normal appearance. She is obese.   HENT:      Head: Atraumatic.   Eyes:      Extraocular Movements: Extraocular movements intact.   Cardiovascular:      Rate and Rhythm: Regular rhythm.      Heart sounds: Normal heart sounds.   Pulmonary:      Breath sounds: Normal breath sounds. No wheezing.   Abdominal:      Palpations: Abdomen is soft.   Musculoskeletal:         General: No swelling.   Skin:     General: Skin is warm.   Neurological:      General: No focal deficit present.      Mental Status: She is alert.   Psychiatric:         Mood and Affect: Mood normal.         Lab Results: I have personally reviewed pertinent reports.    Results from last 7 days   Lab Units 01/12/24  0533 01/11/24  1307   WBC Thousand/uL 7.85 6.18   HEMOGLOBIN g/dL 10.5* 12.2   HEMATOCRIT % 33.4* 39.4   MCV fL 90 89   PLATELETS Thousands/uL 217 274           Results from last 7 days   Lab Units 01/12/24  0533 01/11/24  1307   SODIUM mmol/L 138 140   POTASSIUM mmol/L 4.0 3.9   CHLORIDE mmol/L 109* 108   CO2 mmol/L 24 25   ANION GAP mmol/L 5 7   BUN mg/dL 13 13   CREATININE mg/dL 0.59* 0.74   CALCIUM mg/dL 8.3* 9.4   ALBUMIN g/dL  --  4.1   TOTAL BILIRUBIN mg/dL  --  0.68   ALK PHOS U/L  --  67   ALT U/L  --  22   AST U/L  --  26   EGFR ml/min/1.73sq m 105 93   GLUCOSE RANDOM mg/dL 80 80              Results from last 7 days   Lab Units 01/13/24  0212 01/12/24  0244 01/11/24  2211 01/11/24  1458   POC GLUCOSE mg/dl 85 86 88 74             Results from last 7 days   Lab Units 01/11/24  1933   LACTIC ACID  mmol/L 0.7               Cultures:   Results from last 7 days   Lab Units 01/11/24  1257   COLOR UA  Light Yellow   CLARITY UA  Clear   SPEC GRAV UA  1.015   PH UA  6.5   LEUKOCYTES UA  Negative   NITRITE UA  Negative   GLUCOSE UA mg/dl Negative   KETONES UA mg/dl Negative   BILIRUBIN UA  Negative   BLOOD UA  Negative                Imaging: I have personally reviewed pertinent films in PACS  CT abdomen pelvis with contrast    Result Date: 1/11/2024  Impression: Findings compatible with an internal hernia, with imaging findings highly suggestive of a Petersons type internal hernia as described above but there is no evidence of obstruction and no evidence of bowel ischemia. The study was marked in EPIC for immediate notification. Workstation performed: [id number]     XR chest 2 views    Result Date: 1/11/2024  Impression: No acute cardiopulmonary disease. Workstation performed: [id number]       Total Time for Visit, including Counseling / Coordination of Care: xx mins. Greater than 50% of this total time spent on direct patient counseling and coordination of care.    ** Please Note: This note has been constructed using a voice recognition system. **

## 2024-01-13 NOTE — CASE MANAGEMENT
Case Management Discharge Planning Note    Patient name Marylou Morris  Location East 5 /E5 -* MRN [id number]  : 1970 Date 2024       Current Admission Date: 2024  Current Admission Diagnosis:Internal hernia   Patient Active Problem List    Diagnosis Date Noted    Hypotension (arterial) 2024    Internal hernia 2024    Stroke-like symptoms 2023    Family history of colon cancer in father 2023    Constipation 2023    Chest pressure 2022    SOB (shortness of breath) 2022    Decreased diffusion capacity 2022    Left leg pain 2022    Intermittent chest pain 2022    Tobacco use     AGUILLON (dyspnea on exertion)     Drug therapy continued 2022    Post-menopausal 2022    Metabolic acidosis 2021    Contact dermatitis 2021    Hypomagnesemia 2021    Elevated beta-hydroxybutyrate 2021    Urine ketones 2021    Delirium 2021    Non-traumatic rhabdomyolysis 2021    Hypokalemia 2021    History of methamphetamine abuse (HCC) 10/15/2020    Perimenopausal 10/15/2020    Gait abnormality 10/15/2020    Gastrojejunal anastomotic stricture 2019    History of noncompliance with medical treatment 2019    Insomnia 2019    Abdominal pain 2019    Gastroesophageal reflux disease 2019    History of gastric bypass 2019    Depression with anxiety 2017    Asthma 2016    Herpes zoster 2016    Atopic dermatitis 2016    Vitamin D deficiency 12/15/2015    Nicotine dependence 2015      LOS (days): 1  Geometric Mean LOS (GMLOS) (days):   Days to GMLOS:     OBJECTIVE:  Risk of Unplanned Readmission Score: 19.64         Current admission status: Inpatient   Preferred Pharmacy:   [id number] - [place], [place] - [address]  [address] [address]  Phone: [phone] Fax: [phone]    [id number]  [place] - [address]  [address] [place] [address]  Phone: [phone] Fax: [phone]    [facility] [place] [address],  [address],  [address]ound [address] [place]  [place] [address]  Phone: [phone] Fax: [phone]    Primary Care Provider: Roberta Britton DO    Primary Insurance: [employer]  Secondary Insurance:     DISCHARGE DETAILS:    Discharge planning discussed with:: Patient  Freedom of Choice: Yes  Comments - Freedom of Choice: Pt discharging home    Were Treatment Team discharge recommendations reviewed with patient/caregiver?: Yes  Did patient/caregiver verbalize understanding of patient care needs?: Yes  Were patient/caregiver advised of the risks associated with not following Treatment Team discharge recommendations?: Yes    Discharge Destination Plan:: Home  Transport at Discharge : Auto with designated   Dispatcher Contacted: Yes  Number/Name of Dispatcher: Roundtrip  Transported by (Company and Unit #): [employer]  ETA of Transport (Date): 01/13/24  ETA of Transport (Time): 1630  Transport Service Arrived: Yes  Transfer Mode: Ambulate  Accompanied by:  ()    Additional Comments: CM Consult for transportation- CM assisted w/ care coordination forthe purpose of Pt picking up her medications from [facility] in [place]. [facility] [facility] and agreed to wait for Pt to  her medications then subsequently transport Pt to her home. Pt to follow up w/ providers regarding additional [employer] rides to and from Dr appointments. CM remains available as needed.

## 2024-01-13 NOTE — PLAN OF CARE
Problem: PAIN - ADULT  Goal: Verbalizes/displays adequate comfort level or baseline comfort level  Description: Interventions:  - Encourage patient to monitor pain and request assistance  - Assess pain using appropriate pain scale  - Administer analgesics based on type and severity of pain and evaluate response  - Implement non-pharmacological measures as appropriate and evaluate response  - Consider cultural and social influences on pain and pain management  - Notify physician/advanced practitioner if interventions unsuccessful or patient reports new pain  Outcome: Progressing     Problem: INFECTION - ADULT  Goal: Absence or prevention of progression during hospitalization  Description: INTERVENTIONS:  - Assess and monitor for signs and symptoms of infection  - Monitor lab/diagnostic results  - Monitor all insertion sites, i.e. indwelling lines, tubes, and drains  - Monitor endotracheal if appropriate and nasal secretions for changes in amount and color  - [place] appropriate cooling/warming therapies per order  - Administer medications as ordered  - Instruct and encourage patient and family to use good hand hygiene technique  - Identify and instruct in appropriate isolation precautions for identified infection/condition  Outcome: Progressing  Goal: Absence of fever/infection during neutropenic period  Description: INTERVENTIONS:  - Monitor WBC    Outcome: Progressing     Problem: SAFETY ADULT  Goal: Patient will remain free of falls  Description: INTERVENTIONS:  - Educate patient/family on patient safety including physical limitations  - Instruct patient to call for assistance with activity   - Consult OT/PT to assist with strengthening/mobility   - Keep Call bell within reach  - Keep bed low and locked with side rails adjusted as appropriate  - Keep care items and personal belongings within reach  - Initiate and maintain comfort rounds  - Make Fall Risk Sign visible to staff  - Apply yellow socks and bracelet  for high fall risk patients  - Consider moving patient to room near nurses station  Outcome: Progressing  Goal: Maintain or return to baseline ADL function  Description: INTERVENTIONS:  -  Assess patient's ability to carry out ADLs; assess patient's baseline for ADL function and identify physical deficits which impact ability to perform ADLs (bathing, care of mouth/teeth, toileting, grooming, dressing, etc.)  - Assess/evaluate cause of self-care deficits   - Assess range of motion  - Assess patient's mobility; develop plan if impaired  - Assess patient's need for assistive devices and provide as appropriate  - Encourage maximum independence but intervene and supervise when necessary  - Involve family in performance of ADLs  - Assess for home care needs following discharge   - Consider OT consult to assist with ADL evaluation and planning for discharge  - Provide patient education as appropriate  Outcome: Progressing  Goal: Maintains/Returns to pre admission functional level  Description: INTERVENTIONS:  - Perform AM-PAC 6 Click Basic Mobility/ Daily Activity assessment daily.  - Set and communicate daily mobility goal to care team and patient/family/caregiver.   - Collaborate with rehabilitation services on mobility goals if consulted  - Out of bed for toileting  - Record patient progress and toleration of activity level   Outcome: Progressing     Problem: DISCHARGE PLANNING  Goal: Discharge to home or other facility with appropriate resources  Description: INTERVENTIONS:  - Identify barriers to discharge w/patient and caregiver  - Arrange for needed discharge resources and transportation as appropriate  - Identify discharge learning needs (meds, wound care, etc.)  - Arrange for interpretive services to assist at discharge as needed  - Refer to Case Management Department for coordinating discharge planning if the patient needs post-hospital services based on physician/advanced practitioner order or complex needs  related to functional status, cognitive ability, or social support system  Outcome: Progressing     Problem: Knowledge Deficit  Goal: Patient/family/caregiver demonstrates understanding of disease process, treatment plan, medications, and discharge instructions  Description: Complete learning assessment and assess knowledge base.  Interventions:  - Provide teaching at level of understanding  - Provide teaching via preferred learning methods  Outcome: Progressing     Problem: SKIN/TISSUE INTEGRITY - ADULT  Goal: Incision(s), wounds(s) or drain site(s) healing without S/S of infection  Description: INTERVENTIONS  - Assess and document dressing, incision, wound bed, drain sites and surrounding tissue  - Provide patient and family education  - Perform skin care/dressing changes every shift    Outcome: Progressing     Problem: GASTROINTESTINAL - ADULT  Goal: Minimal or absence of nausea and/or vomiting  Description: INTERVENTIONS:  - Administer IV fluids if ordered to ensure adequate hydration  - Maintain NPO status until nausea and vomiting are resolved  - Nasogastric tube if ordered  - Administer ordered antiemetic medications as needed  - Provide nonpharmacologic comfort measures as appropriate  - Advance diet as tolerated, if ordered  - Consider nutrition services referral to assist patient with adequate nutrition and appropriate food choices  Outcome: Progressing  Goal: Maintains or returns to baseline bowel function  Description: INTERVENTIONS:  - Assess bowel function  - Encourage oral fluids to ensure adequate hydration  - Administer IV fluids if ordered to ensure adequate hydration  - Administer ordered medications as needed  - Encourage mobilization and activity  - Consider nutritional services referral to assist patient with adequate nutrition and appropriate food choices  Outcome: Progressing  Goal: Maintains adequate nutritional intake  Description: INTERVENTIONS:  - Monitor percentage of each meal  consumed  - Identify factors contributing to decreased intake, treat as appropriate  - Assist with meals as needed  - Monitor I&O, weight, and lab values if indicated  - Obtain nutrition services referral as needed  Outcome: Progressing  Goal: Establish and maintain optimal ostomy function  Description: INTERVENTIONS:  - Assess bowel function  - Encourage oral fluids to ensure adequate hydration  - Administer IV fluids if ordered to ensure adequate hydration   - Administer ordered medications as needed  - Encourage mobilization and activity  - Nutrition services referral to assist patient with appropriate food choices  - Assess stoma site  - Consider wound care consult   Outcome: Progressing  Goal: Oral mucous membranes remain intact  Description: INTERVENTIONS  - Assess oral mucosa and hygiene practices  - Implement preventative oral hygiene regimen  - Implement oral medicated treatments as ordered  - Initiate Nutrition services referral as needed  Outcome: Progressing

## 2024-01-13 NOTE — NURSING NOTE
AVS reviewed with pt, medications reviewed multiple times, iv removed, pt escorted to [facility] with case management ambulating.

## 2024-01-13 NOTE — PROGRESS NOTES
Progress Note - Bariatric Surgery   Marylou Morris 53 y.o. female MRN: [id number]  Unit/Bed#: E5 -01 Encounter: [id number]    Assessment:  Marylou Morris is a 53 y.o. female with history of RnYGB s/p emergent diagnostic laparoscopy for internal hernia, doing well postop.       Plan:  Advance to bariatric soft diet with Ensure supplements  Pain control p.o. and IV meds  Encourage ambulation being out of bed  Incentive spirometer  Vitals were reviewed and are stable  Okay for Lovenox for VTE prophylaxis  Replace electrolytes as needed  Thiamine/Folate IV and B12 injections for presumed vitamin deficiency, will formally screen as outpatient    #Transient hypotension, possibly chronic?  Internal medicine consult to evaluate, also assist with management of co-morbidities    #History of Marginal Ulcers  Protonix BID, will convert to PO once tolerating diet  Outpatient follow-up with GI (Dr. Chapin) for EGD    #Headaches  Could be from reactive hypoglycemia, she should reach out to endocrine outpatient.  Ordered Decadron and sumatriptan hand for headaches    # Outpatient concerns  Social work consult to assist with transportation for postop evaluation and other appointments.    Dispo: tentative plan for discharge Sunday 1/14 once her pain and headaches are controlled    Subjective/Objective   Chief Complaint: Headaches    Subjective: No acute overnight events, patient reports headaches and abdominal discomfort this morning.  She was advance from clears to full liquid diet yesterday, she tolerated fulls without nausea or vomiting.  She reports headaches, onset of which began when she stopped eating.      Objective:     Vitals: Blood pressure 101/59, pulse 67, temperature 98.6 °F (37 °C), temperature source Oral, resp. rate 17, weight 88 kg (194 lb 0.1 oz), SpO2 90%.,Body mass index is 37.89 kg/m².      Intake/Output Summary (Last 24 hours) at 1/13/2024 0829  Last data filed at 1/12/2024 1730  Gross per 24 hour  "  Intake --   Output 1025 ml   Net -1025 ml       Invasive Devices       Peripheral Intravenous Line  Duration             Peripheral IV 01/11/24 Right Antecubital 1 day    Peripheral IV 01/12/24 Left Hand 1 day                    Physical Exam: /59 (BP Location: Right arm)   Pulse 67   Temp 98.6 °F (37 °C) (Oral)   Resp 17   Wt 88 kg (194 lb 0.1 oz)   LMP  (LMP Unknown)   SpO2 90%   BMI 37.89 kg/m²   General appearance: alert and oriented, in no acute distress  Head: Normocephalic, without obvious abnormality, atraumatic  Eyes: negative  Throat:  mucous membranes moist  Lungs:  no apparent respiratory distress on room air  Abdomen:  soft, mildly tender, nondistended, laparoscopic port sites clean dry and intact  Extremities:  mild extremity edema  Skin: Skin color, texture, turgor normal. No rashes or lesions    Lab, Imaging and other studies: I have personally reviewed pertinent labs.  CBC:   No results found for: \"WBC\", \"HGB\", \"HCT\", \"MCV\", \"PLT\", \"ADJUSTEDWBC\", \"RBC\", \"MCH\", \"MCHC\", \"RDW\", \"MPV\", \"NRBC\"    CMP:   No results found for: \"SODIUM\", \"CL\", \"CO2\", \"ANIONGAP\", \"BUN\", \"CREATININE\", \"GLUCOSE\", \"CALCIUM\", \"AST\", \"ALT\", \"ALKPHOS\", \"PROT\", \"BILITOT\", \"EGFR\"    VTE Pharmacologic Prophylaxis: Enoxaparin (Lovenox)  VTE Mechanical Prophylaxis: sequential compression device    Franky Preciado MD  Bariatric Surgery    "

## 2024-01-13 NOTE — ASSESSMENT & PLAN NOTE
Transferred from [facility] for internal hernia underwent laparoscopy with reduction of hernia  No evidence of blood loss anemia.  Hypotension asymptomatic.  Advised smoking cessation.  Medically stable for discharge.

## 2024-01-15 ENCOUNTER — TELEPHONE (OUTPATIENT)
Dept: MEDSURG UNIT | Facility: HOSPITAL | Age: 54
End: 2024-01-15

## 2024-01-15 ENCOUNTER — TRANSITIONAL CARE MANAGEMENT (OUTPATIENT)
Dept: FAMILY MEDICINE CLINIC | Facility: CLINIC | Age: 54
End: 2024-01-15

## 2024-01-15 DIAGNOSIS — E16.2 HYPOGLYCEMIA: ICD-10-CM

## 2024-01-15 DIAGNOSIS — Z78.9 NEED FOR FOLLOW-UP BY SOCIAL WORKER: Primary | ICD-10-CM

## 2024-01-15 DIAGNOSIS — Z98.84 HISTORY OF GASTRIC BYPASS: ICD-10-CM

## 2024-01-15 RX ORDER — BLOOD-GLUCOSE METER
KIT MISCELLANEOUS
Qty: 1 KIT | Refills: 0 | Status: SHIPPED | OUTPATIENT
Start: 2024-01-15

## 2024-01-15 NOTE — TELEPHONE ENCOUNTER
Needs new Rx for onetouch glucose monitor. Pharmacy has script for test strips and lancets.     Meter pended for approval

## 2024-01-15 NOTE — UTILIZATION REVIEW
Notification of Unplanned, Urgent, or   Emergency Inpatient Admission   AUTHORIZATION REQUEST   Admitting Facility [place] [address]  Tax ID: [id number]  NPI: [id number]  Place of Service: Acute [facility]  Admission Level of Care: Inpatient  Place of Service Code: 21     Attending Physician Information  Attending Name and NPI#: Ruben Milner Md [[id number]]  Phone: [phone]     Admission Information  Inpatient Admission Date/Time: 1/12/24  1:46 PM  Discharge Date/Time: 1/13/2024  5:23 PM  Admitting Diagnosis Code/Description:  Internal hernia [K45.8]  History of gastric bypass [Z98.84]  Left upper quadrant abdominal pain [R10.12]     Utilization Review Contact  Lela Patino Utilization   Phone: [phone]  Fax: [phone]  Email: [email or web address]  Contact for approvals/pending authorizations, clinical reviews, and discharge.     Physician Advisory Services Contact  Medical Necessity Denial & Tntz-xn-Aglf Discussion  Phone: [phone]  Fax: [phone]  Email: [email or web address]     DISCHARGE SUPPORT TEAM:  For Patients Discharge Needs & Updates  Phone: [phone] opt. 2 Fax: [phone]  Email: [email or web address]

## 2024-01-15 NOTE — TELEPHONE ENCOUNTER
Post op follow up phone call attempted.  No answer obtained on listed phone number and no option to leave message as recording stated the mail box was full.

## 2024-01-15 NOTE — UTILIZATION REVIEW
NOTIFICATION OF ADMISSION DISCHARGE   This is a Notification of Discharge from [facility]. Please be advised that this patient has been discharge from our facility. Below you will find the admission and discharge date and time including the patient’s disposition.   UTILIZATION REVIEW CONTACT:  Lela Patino  Utilization   Network Utilization Review Department  Phone: [phone] x carefully listen to the prompts. All voicemails are confidential.  Email: [email or web address]     ADMISSION INFORMATION  PRESENTATION DATE: 1/11/2024  9:46 PM  OBERVATION ADMISSION DATE:   INPATIENT ADMISSION DATE: 1/12/24  1:46 PM   DISCHARGE DATE: 1/13/2024  5:23 PM   DISPOSITION:Home/Self Care    Network Utilization Review Department  ATTENTION: Please call with any questions or concerns to [phone] and carefully listen to the prompts so that you are directed to the right person. All voicemails are confidential.   For Discharge needs, contact Care Management DC Support Team at [phone] opt. 2  Send all requests for admission clinical reviews, approved or denied determinations and any other requests to dedicated fax number below belonging to the campus where the patient is receiving treatment. List of dedicated fax numbers for the Facilities:  FACILITY NAME UR FAX NUMBER   ADMISSION DENIALS (Administrative/Medical Necessity) [phone]   DISCHARGE SUPPORT TEAM ([facility]) [phone]   PARENT CHILD HEALTH (Maternity/NICU/Pediatrics) [phone]   [facility] [phone]   [facility] [phone]   [facility] [phone]   [facility] [phone]   [facility] [phone]   [facility] [phone]   [facility] [phone]   [facility] [phone]   [facility]  [facility] [phone]   [facility] [phone]   [facility] [phone]              syncope

## 2024-01-16 ENCOUNTER — TELEMEDICINE (OUTPATIENT)
Dept: FAMILY MEDICINE CLINIC | Facility: CLINIC | Age: 54
End: 2024-01-16
Payer: COMMERCIAL

## 2024-01-16 VITALS — HEART RATE: 88 BPM | OXYGEN SATURATION: 96 %

## 2024-01-16 DIAGNOSIS — L50.9 HIVES: ICD-10-CM

## 2024-01-16 DIAGNOSIS — K45.8 INTERNAL HERNIA: Primary | ICD-10-CM

## 2024-01-16 LAB — BLOOD GROUP ANTIBODIES SERPL: NORMAL

## 2024-01-16 PROCEDURE — 99495 TRANSJ CARE MGMT MOD F2F 14D: CPT | Performed by: FAMILY MEDICINE

## 2024-01-16 RX ORDER — NICOTINE 21 MG/24HR
1 PATCH, TRANSDERMAL 24 HOURS TRANSDERMAL DAILY
Qty: 28 PATCH | Refills: 2 | Status: SHIPPED | OUTPATIENT
Start: 2024-01-16

## 2024-01-16 RX ORDER — OXYCODONE HYDROCHLORIDE AND ACETAMINOPHEN 5; 325 MG/1; MG/1
1 TABLET ORAL EVERY 6 HOURS PRN
Qty: 10 TABLET | Refills: 0 | Status: SHIPPED | OUTPATIENT
Start: 2024-01-16

## 2024-01-16 RX ORDER — PREDNISONE 10 MG/1
TABLET ORAL DAILY
Qty: 32 TABLET | Refills: 0 | Status: SHIPPED | OUTPATIENT
Start: 2024-01-16 | End: 2024-01-31

## 2024-01-16 NOTE — PROGRESS NOTES
"Virtual TCM Visit:    Verification of patient location:    Patient is located at Home in the following state in which I hold an active license [place]    Assessment/Plan:          Problem List Items Addressed This Visit          Other    Internal hernia - Primary    Relevant Medications    oxyCODONE-acetaminophen (PERCOCET) 5-325 mg per tablet    nicotine (NICODERM CQ) 14 mg/24hr TD 24 hr patch     Other Visit Diagnoses       Hives        Relevant Medications    predniSONE 10 mg tablet             Reason for visit is TCM    Encounter provider Roberta Britton DO     Provider located at [address]  [facility] [address]  [phone]    Recent Visits  Date Type Provider Dept   01/16/24 Telemedicine Roberta Britton DO [facility]   Showing recent visits within past 7 days and meeting all other requirements  Future Appointments  No visits were found meeting these conditions.  Showing future appointments within next 150 days and meeting all other requirements       After connecting through televideo, the patient was identified by name and date of birth. Marylou Morris was informed that this is a telemedicine visit and that the visit is being conducted through Telephone.  My office door was closed. No one else was in the room.  She acknowledged consent and understanding of privacy and security of the video platform. The patient has agreed to participate and understands they can discontinue the visit at any time.    Patient is aware this is a billable service.    \"Transitional Care Management Review:  Marylou Morris is a 53 y.o. female here for TCM follow up. She was admitted 1/11-1/13/24 for internal hernia. Hospital course as per discharge summary below:    \" Pre-Operative Diagnosis: Pre-Op Diagnosis Codes:     * History of gastric bypass [Z98.84]     * Internal hernia [K45.8]     * Left upper quadrant abdominal pain [R10.12]   " "  Post-Operative Diagnosis: Post-Op Diagnosis Codes:     * History of gastric bypass [Z98.84]     * Internal hernia [K45.8]     * Left upper quadrant abdominal pain [R10.12]     Procedures Performed:  Procedure(s):  LAPAROSCOPY DIAGNOSTIC, REDUCTION AND REPAIR OF INTERNAL HERNIA     Surgeon: Ruben Milner MD     See H & P for full details of admission and Operative Note for full details of operations performed.      In brief, the patient had hx of laparoscopic delia-en-Y gastric bypass approximately 10 years ago at [place] complicated by marginal ulcers, strictures and cigarette smoking.      She presented to [facility] ED on 1/11/24 with abdominal pain and vomiting. Underwent CT-scan with findings concerning for internal hernia.     She was emergently transferred to [facility] under Bariatric surgery service and underwent diagnostic laparoscopy, reduction of internal hernia and suture repair of Hicks's defect with Dr. Milner overnight 1/11/24.      Patient tolerated surgery well without complications. In the morning postoperative Day 1, the patient had mild nausea and abdominal pain. Tolerated a clear liquid diet without vomiting. Able to ambulate and voiding independently.  Patient was deemed ready for discharge home on postoperative day 2 after tolerating full liquids and soft foods.     She was given IV multivitamins and nutrition supplements during her hospitalization.      She was counseled on smoking cessation, urged to obtain transportation to make her missed appointments. \"    During the TCM phone call patient stated:    TCM Call       Date and time call was made  1/15/2024 10:26 AM    Hospital care reviewed  Records reviewed    Patient was hospitialized at  [facility]    Date of Admission  01/11/24    Date of discharge  01/13/24    Diagnosis  INternal Hernia    Disposition  Home    Were the patients medications reviewed and updated  No    Current Symptoms  None      " "    TCM Call       Post hospital issues  None    Should patient be enrolled in anticoag monitoring?  No    Scheduled for follow up?  Yes    Patient refusal reason  \"the number you are dialing is not a working number- please try again\"    Did you obtain your prescribed medications  Yes    Why were you unable to obtain your medications  no changes    Do you need help managing your prescriptions or medications  No    Is transportation to your appointment needed  No    I have advised the patient to call PCP with any new or worsening symptoms  Pau Garcia MR    Living Arrangements  parents    Support System  Parent    The type of support provided  Emotional; Physical; Other (comment)    Do you have social support  Yes, as much as I need          Subjective:     Patient ID: Marylou Morris is a 53 y.o. female.    HPI    Internal hernia- Pain level the same, feels \"very raw\", was taking Percocet and Tylenol, would like refill of Percocet given pain level, she reports wound healing up well. Appetite not as good as it was but improving slowly, eating no sugar pudding and non-fat yogurt, Ensure protein drinks. Had solid normal bowel movement, no blood in stool. No fevers.     Rash- Started as she was leaving the hospital, all around stomach, up into chest, raised itchy hives. Looks like prickly rash, very itchy. Has had this before due to adhesive.     Review of Systems  - as in HPI    Objective:    Vitals:    01/16/24 1104   Pulse: 88   SpO2: 96%       Physical Exam  - no video.   Medications have been reviewed by provider in current encounter    I spent 10 minutes with the patient during this visit.    Roberta Britton, DO      VIRTUAL VISIT DISCLAIMER    Marylou Morris verbally agrees to participate in Virtual Care Services. Pt is aware that Virtual Care Services could be limited without vital signs or the ability to perform a full hands-on physical exam. Marylou Morris understands she or the provider may request " at any time to terminate the video visit and request the patient to seek care or treatment in person.

## 2024-01-16 NOTE — TELEPHONE ENCOUNTER
Patient does not wish to r/s EGD at this time. She stated she had surgery and does not need it. Pt would like to keep follow up appt with Dari.

## 2024-01-16 NOTE — PROGRESS NOTES
" Marylou Morris 1970 female MRN: [id number]    [facility] Transition of Care Visit      SUBJECTIVE    CC: SIMONE Visit     Transitional Care Management Review:  Marylou Morris is a 53 y.o. female here for TCM follow up. She was admitted 1/11-1/13/24 for internal hernia. Hospital course as per discharge summary below:    \" Pre-Operative Diagnosis: Pre-Op Diagnosis Codes:     * History of gastric bypass [Z98.84]     * Internal hernia [K45.8]     * Left upper quadrant abdominal pain [R10.12]     Post-Operative Diagnosis: Post-Op Diagnosis Codes:     * History of gastric bypass [Z98.84]     * Internal hernia [K45.8]     * Left upper quadrant abdominal pain [R10.12]     Procedures Performed:  Procedure(s):  LAPAROSCOPY DIAGNOSTIC, REDUCTION AND REPAIR OF INTERNAL HERNIA     Surgeon: Ruben Milner MD     See H & P for full details of admission and Operative Note for full details of operations performed.      In brief, the patient had hx of laparoscopic delia-en-Y gastric bypass approximately 10 years ago at [place] complicated by marginal ulcers, strictures and cigarette smoking.      She presented to [facility] ED on 1/11/24 with abdominal pain and vomiting. Underwent CT-scan with findings concerning for internal hernia.     She was emergently transferred to [facility] under Bariatric surgery service and underwent diagnostic laparoscopy, reduction of internal hernia and suture repair of Hicks's defect with Dr. Milner overnight 1/11/24.      Patient tolerated surgery well without complications. In the morning postoperative Day 1, the patient had mild nausea and abdominal pain. Tolerated a clear liquid diet without vomiting. Able to ambulate and voiding independently.  Patient was deemed ready for discharge home on postoperative day 2 after tolerating full liquids and soft foods.     She was given IV multivitamins and nutrition supplements during her hospitalization.      She was " "counseled on smoking cessation, urged to obtain transportation to make her missed appointments. \"    During the TCM phone call patient stated:    TCM Call       Date and time call was made  1/15/2024 10:26 AM    Hospital care reviewed  Records reviewed    Patient was hospitialized at  [facility]    Date of Admission  01/11/24    Date of discharge  01/13/24    Diagnosis  INternal Hernia    Disposition  Home    Were the patients medications reviewed and updated  No    Current Symptoms  None          TCM Call       Post hospital issues  None    Should patient be enrolled in anticoag monitoring?  No    Scheduled for follow up?  Yes    Patient refusal reason  \"the number you are dialing is not a working number- please try again\"    Did you obtain your prescribed medications  Yes    Why were you unable to obtain your medications  no changes    Do you need help managing your prescriptions or medications  No    Is transportation to your appointment needed  No    I have advised the patient to call PCP with any new or worsening symptoms  Pau GUERRA    Living Arrangements  parents    Support System  Parent    The type of support provided  Emotional; Physical; Other (comment)    Do you have social support  Yes, as much as I need            During today's visit:    ***    They are/are not*** complying with their medication changes and discharge instructions.     They have the following questions: ***    Review of Systems    Historical Information     The patient history was reviewed as follows:    Past Medical History:   Diagnosis Date    Asthma     GERD (gastroesophageal reflux disease)     H/O gastric bypass     History of methamphetamine abuse (HCC)     Positive UDS; Feb 2020    Hypokalemia     Pyelonephritis      Past Surgical History:   Procedure Laterality Date    CHOLECYSTECTOMY      DENTAL SURGERY      ESOPHAGEAL DILATION      GASTRIC BYPASS      IN LAPS ABD PRTM&OMENTUM DX W/WO SPEC BR/WA SPX N/A " "2024    Procedure: LAPAROSCOPY DIAGNOSTIC, REDUCTION AND REPAIR OF INTERNAL HERNIA;  Surgeon: Ruben Milner MD;  Location: [facility];  Service: Bariatrics    EILEEN-EN-Y PROCEDURE      SUBTOTAL COLECTOMY      TOOTH EXTRACTION      TUBAL LIGATION       Family History   Problem Relation Age of Onset    Kidney disease Mother         Pt also reports \"bone disease\"    Hypertension Mother     Diabetes Mother     Heart disease Mother     Stroke Mother     Arthritis Mother     Hyperlipidemia Mother     Colon cancer Father          motor cycle accident    Heart disease Sister     Coronary artery disease Sister     No Known Problems Sister     No Known Problems Sister     No Known Problems Daughter     No Known Problems Daughter     Breast cancer Maternal Grandmother     Lung cancer Maternal Grandmother     Lung cancer Maternal Grandfather     Lung cancer Paternal Grandmother     Lung cancer Paternal Grandfather     Cancer Brother     Heart attack Brother     Heart disease Brother     Coronary artery disease Brother     Uterine cancer Maternal Aunt     No Known Problems Maternal Aunt     No Known Problems Maternal Aunt     Lung cancer Maternal Aunt     Lung cancer Maternal Aunt     Thyroid cancer Maternal Uncle     No Known Problems Paternal Aunt     No Known Problems Paternal Aunt     Lung cancer Paternal Uncle     Lung cancer Cousin       Social History   Social History     Substance and Sexual Activity   Alcohol Use Never     Social History     Substance and Sexual Activity   Drug Use Not Currently    Types: Methamphetamines    Comment: reports use this week (2020)     Social History     Tobacco Use   Smoking Status Every Day    Current packs/day: 0.50    Average packs/day: 0.5 packs/day for 38.0 years (19.0 ttl pk-yrs)    Types: Cigarettes   Smokeless Tobacco Never       Medications:   Meds/Allergies     Current Outpatient Medications:     albuterol (PROVENTIL HFA,VENTOLIN HFA) 90 mcg/act inhaler, " Inhale 2 puffs every 6 (six) hours as needed for wheezing, Disp: 18 g, Rfl: 2    Blood Glucose Monitoring Suppl (OneTouch Verio Reflect) w/Device KIT, Check blood sugars once daily. Please substitute with appropriate alternative as covered by patient's insurance. Dx: E11.65, Disp: 1 kit, Rfl: 0    busPIRone (BUSPAR) 7.5 mg tablet, Take 1 tablet (7.5 mg total) by mouth 2 (two) times a day, Disp: 60 tablet, Rfl: 5    dicyclomine (BENTYL) 20 mg tablet, take 1 tablet by mouth twice a day if needed for ABDOMINAL PAIN, Disp: 20 tablet, Rfl: 1    ergocalciferol (VITAMIN D2) 50,000 units, Take 1 capsule (50,000 Units total) by mouth once a week, Disp: 12 capsule, Rfl: 0    fluticasone (FLONASE) 50 mcg/act nasal spray, 1 spray into each nostril daily (Patient not taking: Reported on 10/27/2023), Disp: 16 g, Rfl: 1    glucose blood (OneTouch Verio) test strip, Use 1 each 4 (four) times a day (before meals and at bedtime) Check blood sugars once daily. Please substitute with appropriate alternative as covered by patient's insurance. Dx: E11.65, Disp: 100 each, Rfl: 3    lactulose 20 g/30 mL, Take 30 mL (20 g total) by mouth 2 (two) times a day as needed (constipation), Disp: 946 mL, Rfl: 2    melatonin 3 mg, Take 1 tablet (3 mg total) by mouth daily at bedtime, Disp: 20 tablet, Rfl: 0    nicotine (NICODERM CQ) 14 mg/24hr TD 24 hr patch, Place 1 patch on the skin over 24 hours daily Do not start before January 14, 2024., Disp: 28 patch, Rfl: 0    nystatin powder, Apply topically 2 (two) times a day, Disp: 60 g, Rfl: 1    omeprazole (PriLOSEC) 40 MG capsule, Take 1 capsule (40 mg total) by mouth 2 (two) times a day, Disp: 90 capsule, Rfl: 0    ondansetron (ZOFRAN) 4 mg tablet, Take 1 tablet (4 mg total) by mouth every 8 (eight) hours as needed for nausea or vomiting, Disp: 20 tablet, Rfl: 0    OneTouch Delica Lancets 33G MISC, Check blood sugars once daily. Please substitute with appropriate alternative as covered by patient's  insurance. Dx: E11.65, Disp: 100 each, Rfl: 3    oxyCODONE-acetaminophen (PERCOCET) 5-325 mg per tablet, Take 1 tablet by mouth every 6 (six) hours as needed for moderate pain for up to 10 doses Max Daily Amount: 4 tablets, Disp: 10 tablet, Rfl: 0    therapeutic multivitamin-minerals (THERAGRAN-M) tablet, Take 1 tablet by mouth daily, Disp: , Rfl:     vitamin B-12 (VITAMIN B-12) 500 mcg tablet, take 1 tablet by mouth once daily, Disp: 30 tablet, Rfl: 2  Allergies   Allergen Reactions    Morphine      anaphylactic    Morphine Vomiting    Suboxone [Buprenorphine-Naloxone] Abdominal Pain    Sulfamethoxazole-Trimethoprim Hives    Sulfamethoxazole-Trimethoprim Nausea Only       OBJECTIVE    Vitals:   There were no vitals filed for this visit.  *** HELP TEXT ***    This SmartLink requires parameters. Parameters are variables that are added to the SmartLink name to request specific information. The parameter for .lastwt is the number of encounters to display readings from.    For example: .lastwt[4    In this example, the SmartLink displays readings from the last four encounters.    There is no height or weight on file to calculate BMI.    Physical Exam:    Physical Exam     Labs:  I have personally reviewed all pertinent results.     Admission on 01/11/2024, Discharged on 01/13/2024   Component Date Value Ref Range Status    POC Glucose 01/11/2024 88  65 - 140 mg/dl Final    ABO Grouping 01/11/2024 O   Final    Rh Factor 01/11/2024 Positive   Final    Antibody Screen 01/11/2024 Positive   Final    Specimen Expiration Date 01/11/2024 20240114   Final    WBC 01/12/2024 7.85  4.31 - 10.16 Thousand/uL Final    RBC 01/12/2024 3.72 (L)  3.81 - 5.12 Million/uL Final    Hemoglobin 01/12/2024 10.5 (L)  11.5 - 15.4 g/dL Final    Hematocrit 01/12/2024 33.4 (L)  34.8 - 46.1 % Final    MCV 01/12/2024 90  82 - 98 fL Final    MCH 01/12/2024 28.2  26.8 - 34.3 pg Final    MCHC 01/12/2024 31.4  31.4 - 37.4 g/dL Final    RDW 01/12/2024 14.7   11.6 - 15.1 % Final    Platelets 01/12/2024 217  149 - 390 Thousands/uL Final    MPV 01/12/2024 9.3  8.9 - 12.7 fL Final    Sodium 01/12/2024 138  135 - 147 mmol/L Final    Potassium 01/12/2024 4.0  3.5 - 5.3 mmol/L Final    Chloride 01/12/2024 109 (H)  96 - 108 mmol/L Final    CO2 01/12/2024 24  21 - 32 mmol/L Final    ANION GAP 01/12/2024 5  mmol/L Final    BUN 01/12/2024 13  5 - 25 mg/dL Final    Creatinine 01/12/2024 0.59 (L)  0.60 - 1.30 mg/dL Final    Standardized to IDMS reference method    Glucose 01/12/2024 80  65 - 140 mg/dL Final    If the patient is fasting, the ADA then defines impaired fasting glucose as > 100 mg/dL and diabetes as > or equal to 123 mg/dL.    Calcium 01/12/2024 8.3 (L)  8.4 - 10.2 mg/dL Final    eGFR 01/12/2024 105  ml/min/1.73sq m Final    POC Glucose 01/12/2024 86  65 - 140 mg/dl Final    POC Glucose 01/13/2024 85  65 - 140 mg/dl Final    Magnesium 01/13/2024 1.9  1.9 - 2.7 mg/dL Final    Phosphorus 01/13/2024 3.6  2.7 - 4.5 mg/dL Final    Ventricular Rate 01/13/2024 75  BPM Final    Atrial Rate 01/13/2024 75  BPM Final    NV Interval 01/13/2024 140  ms Final    QRSD Interval 01/13/2024 84  ms Final    QT Interval 01/13/2024 390  ms Final    QTC Interval 01/13/2024 435  ms Final    P Axis 01/13/2024 7  degrees Final    QRS Axis 01/13/2024 15  degrees Final    T Wave Axis 01/13/2024 24  degrees Final       Imaging:  I have personally reviewed all pertinent results.    Assessment/Plan    No problem-specific Assessment & Plan notes found for this encounter.    There are no diagnoses linked to this encounter.      Future Appointments   Date Time Provider Department Center   1/16/2024 11:00 AM DO PRABHAKAR Stiles [facility]   1/29/2024 10:20 AM Roberta Britton DO [facility]   1/31/2024  8:00 AM Demetria Chapin MD GASTRO [facility]   4/10/2024  9:00 AM Zoltan Horn MD [facility] Cardio [facility]   8/2/2024  8:30 AM Ana Saha CNM OBGYN [facility]           Roberta Britton DO  [facility]

## 2024-01-17 ENCOUNTER — TELEPHONE (OUTPATIENT)
Dept: MEDSURG UNIT | Facility: HOSPITAL | Age: 54
End: 2024-01-17

## 2024-01-24 ENCOUNTER — PATIENT OUTREACH (OUTPATIENT)
Dept: FAMILY MEDICINE CLINIC | Facility: CLINIC | Age: 54
End: 2024-01-24

## 2024-01-24 DIAGNOSIS — Z78.9 NEEDS ASSISTANCE WITH COMMUNITY RESOURCES: Primary | ICD-10-CM

## 2024-01-24 DIAGNOSIS — Z59.9 FINANCIAL DIFFICULTIES: ICD-10-CM

## 2024-01-24 SDOH — ECONOMIC STABILITY - INCOME SECURITY: PROBLEM RELATED TO HOUSING AND ECONOMIC CIRCUMSTANCES, UNSPECIFIED: Z59.9

## 2024-01-24 NOTE — PROGRESS NOTES
Referral received as patient was identified via high risk for readmission report.  Outpatient Social Work Care Manager (OP SWCM) to outreach patient and assist with transportation and utility assistance resources.    Call placed to patient to assess needs.  Patient lives with her mother and helps care for her.  Patient's income is $956/month through [employer].  Patient does not drive; her mother transports her to appts as needed and able.  States she has not been able to get to appts recently due to weather.  Patient has not scheduled appt with surgeon due to transport barriers (surgeon is located in [place]).  Informed patient of LYFT transport that may be available at offices.  Encouraged patient to call offices prior to appts to ask if LYFT can be arranged.    Also discussed [employer] as patient lives in [facility].  Patient has phone number for [employer] but is not yet set up.  Patient states she has difficulty completing applications on her own.  Referral placed to SAVAGE Montoya to assist with application; patient has MA.    Patient states all bills are paid and utilities are in her mother's name.  Patient states her food bill is hard to afford.  Patient had SNAP benefits but did not renew them.  States her MA was renewed over the phone with the [employer].  Will request SAVAGE Montoya's assistance to renew SNAP benefits.    Patient states she does not have phone; uses her mom's phone as needed.  Discussed [employer], which patient is interested in.  Will also ask for SAVAGE Montoya's help to apply for [employer].    Will follow.

## 2024-01-26 ENCOUNTER — TELEPHONE (OUTPATIENT)
Dept: FAMILY MEDICINE CLINIC | Facility: CLINIC | Age: 54
End: 2024-01-26

## 2024-01-26 NOTE — TELEPHONE ENCOUNTER
Patient called letting me know her sugars are dropping very low at night, with activity and patient is very tired as well, dropping 50-60 made appointment for Monday 10:00am but let patient know if she felt worse before Monday please go to the ED [facility].Patient let me know she is not taking medications for sugars

## 2024-01-30 ENCOUNTER — TELEPHONE (OUTPATIENT)
Age: 54
End: 2024-01-30

## 2024-01-30 NOTE — TELEPHONE ENCOUNTER
Patient was to have EGD on 1/2. Please call patient and see if she is interested in r/s procedure.

## 2024-01-31 ENCOUNTER — OFFICE VISIT (OUTPATIENT)
Dept: GASTROENTEROLOGY | Facility: CLINIC | Age: 54
End: 2024-01-31
Payer: COMMERCIAL

## 2024-01-31 VITALS
DIASTOLIC BLOOD PRESSURE: 70 MMHG | WEIGHT: 188.6 LBS | OXYGEN SATURATION: 100 % | HEART RATE: 66 BPM | HEIGHT: 62 IN | SYSTOLIC BLOOD PRESSURE: 110 MMHG | TEMPERATURE: 97.8 F | BODY MASS INDEX: 34.71 KG/M2 | RESPIRATION RATE: 17 BRPM

## 2024-01-31 DIAGNOSIS — K91.2 HYPOGLYCEMIA AFTER GI (GASTROINTESTINAL) SURGERY: ICD-10-CM

## 2024-01-31 DIAGNOSIS — K21.9 GASTROESOPHAGEAL REFLUX DISEASE WITHOUT ESOPHAGITIS: ICD-10-CM

## 2024-01-31 DIAGNOSIS — R13.10 DYSPHAGIA, UNSPECIFIED TYPE: ICD-10-CM

## 2024-01-31 DIAGNOSIS — K59.04 CHRONIC IDIOPATHIC CONSTIPATION: Primary | ICD-10-CM

## 2024-01-31 DIAGNOSIS — Z80.0 FAMILY HISTORY OF COLON CANCER IN FATHER: ICD-10-CM

## 2024-01-31 PROCEDURE — 99214 OFFICE O/P EST MOD 30 MIN: CPT | Performed by: STUDENT IN AN ORGANIZED HEALTH CARE EDUCATION/TRAINING PROGRAM

## 2024-01-31 RX ORDER — OMEPRAZOLE 40 MG/1
40 CAPSULE, DELAYED RELEASE ORAL DAILY
Qty: 90 CAPSULE | Refills: 3 | Status: SHIPPED | OUTPATIENT
Start: 2024-01-31

## 2024-01-31 RX ORDER — LUBIPROSTONE 24 UG/1
24 CAPSULE ORAL 2 TIMES DAILY WITH MEALS
Qty: 60 CAPSULE | Refills: 11 | Status: SHIPPED | OUTPATIENT
Start: 2024-01-31

## 2024-01-31 NOTE — PROGRESS NOTES
[facility] - Outpatient Follow-up Note  Marylou Morris 53 y.o. female MRN: [id number]  Encounter: [id number]          ASSESSMENT AND PLAN:    53F with  RYGB 2015 c/b internal hernia s/p repair January 2024, depression, prior substance abuse here for follow up of dysphagia and abdominal pain.  She underwent emergent internal hernia repair 1/11 and reports she has not been having any abdominal pain since.  1. Chronic idiopathic constipation  No improvement with lactulose twice daily.  - lubiprostone (AMITIZA) 24 mcg capsule; Take 1 capsule (24 mcg total) by mouth 2 (two) times a day with meals  Dispense: 60 capsule; Refill: 11    2. Gastroesophageal reflux disease without esophagitis  3. Dysphagia, unspecified type  Currently well controlled on omeprazole.  Will continue omeprazole.  Given her unremarkable barium swallow and her desire to avoid EGD, will defer this for now.  - omeprazole (PriLOSEC) 40 MG capsule; Take 1 capsule (40 mg total) by mouth daily  Dispense: 90 capsule; Refill: 3    4. Hypoglycemia after GI (gastrointestinal) surgery  Currently very concerned about blood sugars in the 60s associated with symptoms. Discussed this is likely related to her surgical anatomy.  She reports it is difficult for her to get to [place] to see bariatrics.  Encouraged her to try to make these appointments.    - Ambulatory Referral to Weight Management; Future  - Ambulatory Referral to Endocrinology; Future    5. Family history of colon cancer in father  Cologuard negative 12/2023.  Has been counseled that this is not the ideal CRC screening test given her elevated risk due to her family history but pursued stool testing as a better alternative to no screening.    ______________________________________________________________________    SUBJECTIVE:    Swallowing has improved.  Not sure what changed but dysphagia went away. Taking omeprazole once a day with excellent control.  No vomiting  since having surgery    Blood sugars have been variable, going down to 60s in early AM. Feels drunk when sugars are in 60s.  PCP told her she could go in a coma.    Has been taking lactulose twice a day.  Doesn't think it works.  Has been taking her daughter's dulcolax and finds that is helps. Has BM every 2-3 days and has to strain.    Presented to ED 1/11/2024 with abdominal pain was found to have an internal hernia.  Underwent emergent surgery for reduction of internal hernia and suture repair of Petersons defect    Barium swallow 12/8/2023 with small reducible hiatal hernia with no reflux  CT abdomen pelvis 1/11/24 with possible internal hernia but no obstruction    Cologuard - 12/8/2023    REVIEW OF SYSTEMS IS OTHERWISE NEGATIVE.      Historical Information   Past Medical History:   Diagnosis Date    Asthma     GERD (gastroesophageal reflux disease)     H/O gastric bypass     History of methamphetamine abuse (HCC)     Positive UDS; Feb 2020    Hypokalemia     Pyelonephritis      Past Surgical History:   Procedure Laterality Date    CHOLECYSTECTOMY      DENTAL SURGERY      ESOPHAGEAL DILATION      GASTRIC BYPASS      GA LAPS ABD PRTM&OMENTUM DX W/WO SPEC BR/WA SPX N/A 1/11/2024    Procedure: LAPAROSCOPY DIAGNOSTIC, REDUCTION AND REPAIR OF INTERNAL HERNIA;  Surgeon: Ruben Milner MD;  Location: [facility];  Service: Bariatrics    EILEEN-EN-Y PROCEDURE      SUBTOTAL COLECTOMY      TOOTH EXTRACTION      TUBAL LIGATION       Social History   Social History     Substance and Sexual Activity   Alcohol Use Never     Social History     Substance and Sexual Activity   Drug Use Not Currently    Types: Methamphetamines    Comment: reports use this week (12/2020)     Social History     Tobacco Use   Smoking Status Every Day    Current packs/day: 0.50    Average packs/day: 0.5 packs/day for 38.0 years (19.0 ttl pk-yrs)    Types: Cigarettes   Smokeless Tobacco Never     Family History   Problem Relation Age of Onset     "Kidney disease Mother         Pt also reports \"bone disease\"    Hypertension Mother     Diabetes Mother     Heart disease Mother     Stroke Mother     Arthritis Mother     Hyperlipidemia Mother     Colon cancer Father          motor cycle accident    Heart disease Sister     Coronary artery disease Sister     No Known Problems Sister     No Known Problems Sister     No Known Problems Daughter     No Known Problems Daughter     Breast cancer Maternal Grandmother     Lung cancer Maternal Grandmother     Lung cancer Maternal Grandfather     Lung cancer Paternal Grandmother     Lung cancer Paternal Grandfather     Cancer Brother     Heart attack Brother     Heart disease Brother     Coronary artery disease Brother     Uterine cancer Maternal Aunt     No Known Problems Maternal Aunt     No Known Problems Maternal Aunt     Lung cancer Maternal Aunt     Lung cancer Maternal Aunt     Thyroid cancer Maternal Uncle     No Known Problems Paternal Aunt     No Known Problems Paternal Aunt     Lung cancer Paternal Uncle     Lung cancer Cousin        Meds/Allergies       Current Outpatient Medications:     albuterol (PROVENTIL HFA,VENTOLIN HFA) 90 mcg/act inhaler    Blood Glucose Monitoring Suppl (OneTouch Verio Reflect) w/Device KIT    busPIRone (BUSPAR) 7.5 mg tablet    ergocalciferol (VITAMIN D2) 50,000 units    glucose blood (OneTouch Verio) test strip    lactulose 20 g/30 mL    nicotine (NICODERM CQ) 14 mg/24hr TD 24 hr patch    nystatin powder    omeprazole (PriLOSEC) 40 MG capsule    ondansetron (ZOFRAN) 4 mg tablet    OneTouch Delica Lancets 33G MISC    therapeutic multivitamin-minerals (THERAGRAN-M) tablet    vitamin B-12 (VITAMIN B-12) 500 mcg tablet    dicyclomine (BENTYL) 20 mg tablet    fluticasone (FLONASE) 50 mcg/act nasal spray    melatonin 3 mg    oxyCODONE-acetaminophen (PERCOCET) 5-325 mg per tablet    predniSONE 10 mg tablet    Allergies   Allergen Reactions    Morphine      anaphylactic    Morphine " "Vomiting    Suboxone [Buprenorphine-Naloxone] Abdominal Pain    Sulfamethoxazole-Trimethoprim Hives    Sulfamethoxazole-Trimethoprim Nausea Only           Objective     Blood pressure 110/70, pulse 66, temperature 97.8 °F (36.6 °C), resp. rate 17, height 5' 2\" (1.575 m), weight 85.5 kg (188 lb 9.6 oz), SpO2 100%. Body mass index is 34.5 kg/m².      PHYSICAL EXAM:      General Appearance:   Alert, cooperative, no distress   HEENT:   Normocephalic, atraumatic, anicteric.     Neck:  Supple, symmetrical, trachea midline   Lungs:   Clear to auscultation bilaterally; no rales, rhonchi or wheezing; respirations unlabored    Heart::   Regular rate and rhythm; no murmur, rub, or gallop.   Abdomen:   Soft, non-tender, non-distended; normal bowel sounds; no masses, no organomegaly.  Trochar sites are well healing with no tenderness    Genitalia:   Deferred    Rectal:   Deferred    Extremities:  No cyanosis, clubbing or edema    Pulses:  2+ and symmetric    Skin:  No jaundice, rashes, or lesions    Lymph nodes:  No palpable cervical lymphadenopathy        Lab Results:   No visits with results within 1 Day(s) from this visit.   Latest known visit with results is:   Admission on 01/11/2024, Discharged on 01/13/2024   Component Date Value    POC Glucose 01/11/2024 88     ABO Grouping 01/11/2024 O     Rh Factor 01/11/2024 Positive     Antibody Screen 01/11/2024 Positive     Specimen Expiration Date 01/11/2024 20240114     ANTIBODY ID. #1 01/12/2024 Anti-M     WBC 01/12/2024 7.85     RBC 01/12/2024 3.72 (L)     Hemoglobin 01/12/2024 10.5 (L)     Hematocrit 01/12/2024 33.4 (L)     MCV 01/12/2024 90     MCH 01/12/2024 28.2     MCHC 01/12/2024 31.4     RDW 01/12/2024 14.7     Platelets 01/12/2024 217     MPV 01/12/2024 9.3     Sodium 01/12/2024 138     Potassium 01/12/2024 4.0     Chloride 01/12/2024 109 (H)     CO2 01/12/2024 24     ANION GAP 01/12/2024 5     BUN 01/12/2024 13     Creatinine 01/12/2024 0.59 (L)     Glucose 01/12/2024 " 80     Calcium 01/12/2024 8.3 (L)     eGFR 01/12/2024 105     POC Glucose 01/12/2024 86     POC Glucose 01/13/2024 85     Magnesium 01/13/2024 1.9     Phosphorus 01/13/2024 3.6     Ventricular Rate 01/13/2024 75     Atrial Rate 01/13/2024 75     MS Interval 01/13/2024 140     QRSD Interval 01/13/2024 84     QT Interval 01/13/2024 390     QTC Interval 01/13/2024 435     P [place] 01/13/2024 7     QRS [place] 01/13/2024 15     T Wave [place] 01/13/2024 24          Radiology Results:   CT abdomen pelvis with contrast    Result Date: 1/11/2024  Narrative: CT ABDOMEN AND PELVIS WITH IV CONTRAST INDICATION: Abdominal pain, acute, nonlocalized abdominal pain. COMPARISON: Comparison is made to prior studies, most recent is dated September 28, 2023. TECHNIQUE: CT examination of the abdomen and pelvis was performed. Multiplanar 2D reformatted images were created from the source data. This examination, like all CT scans performed in the [facility], was performed utilizing techniques to minimize radiation dose exposure, including the use of iterative reconstruction and automated exposure control. Radiation dose length product (DLP) for this visit: 599.29 mGy-cm IV Contrast: 100 mL of iohexol (OMNIPAQUE) Enteric Contrast: Enteric contrast was administered. FINDINGS: ABDOMEN LOWER CHEST: No clinically significant abnormality identified in the visualized lower chest. LIVER/BILIARY TREE: Unremarkable. GALLBLADDER: Cholecystectomy. SPLEEN: Unremarkable. PANCREAS: Unremarkable. ADRENAL GLANDS: Unremarkable. KIDNEYS/URETERS: Nonobstructing calculus lower pole right kidney measuring approximately 2 mm. STOMACH AND BOWEL: Patient is status post Phoenix-en-Y gastric bypass surgery. No evidence of bowel obstruction. However, there are findings which most likely represent an internal hernia with swirling of mesenteric vessels, best shown on series 601 images 50 through 63 and series 2 images 77 through 92. A cluster of herniated but  nondilated small bowel loops are in the left upper quadrant. In addition, the Treitz angle is deviated anteriorly and toward the right, with the mid and distal ileum coursing from the left upper quadrant towards the cecum in the right lower quadrant. These findings have been associated with a Petersons type internal hernia. However, there is no evidence of obstruction or ischemia. APPENDIX: No findings to suggest appendicitis. ABDOMINOPELVIC CAVITY: No ascites. No pneumoperitoneum. No lymphadenopathy. VESSELS: Unremarkable for patient's age. PELVIS REPRODUCTIVE ORGANS: Unremarkable for patient's age. URINARY BLADDER: Unremarkable. ABDOMINAL WALL/INGUINAL REGIONS: Unremarkable. OSSEOUS STRUCTURES: No acute fracture or destructive osseous lesion.     Impression: Findings compatible with an internal hernia, with imaging findings highly suggestive of a Petersons type internal hernia as described above but there is no evidence of obstruction and no evidence of bowel ischemia. The study was marked in EPIC for immediate notification. Workstation performed: [id number]     XR chest 2 views    Result Date: 1/11/2024  Narrative: CHEST INDICATION:   chest pain. COMPARISON: Chest radiograph July 11, 2023 EXAM PERFORMED/VIEWS:  XR CHEST PA & LATERAL FINDINGS: Cardiomediastinal silhouette appears unremarkable. The lungs are clear.  No pneumothorax or pleural effusion. Osseous structures appear within normal limits for patient age.     Impression: No acute cardiopulmonary disease. Workstation performed: [id number]     US bedside procedure    Result Date: 1/11/2024  Narrative: [id number]

## 2024-02-01 ENCOUNTER — OFFICE VISIT (OUTPATIENT)
Dept: ENDOCRINOLOGY | Facility: CLINIC | Age: 54
End: 2024-02-01
Payer: COMMERCIAL

## 2024-02-01 VITALS
OXYGEN SATURATION: 97 % | DIASTOLIC BLOOD PRESSURE: 76 MMHG | SYSTOLIC BLOOD PRESSURE: 130 MMHG | TEMPERATURE: 97.9 F | WEIGHT: 189.4 LBS | HEIGHT: 62 IN | HEART RATE: 66 BPM | BODY MASS INDEX: 34.85 KG/M2

## 2024-02-01 DIAGNOSIS — K91.2 HYPOGLYCEMIA AFTER GI (GASTROINTESTINAL) SURGERY: ICD-10-CM

## 2024-02-01 DIAGNOSIS — E16.2 HYPOGLYCEMIA: Primary | ICD-10-CM

## 2024-02-01 PROCEDURE — 99244 OFF/OP CNSLTJ NEW/EST MOD 40: CPT | Performed by: STUDENT IN AN ORGANIZED HEALTH CARE EDUCATION/TRAINING PROGRAM

## 2024-02-01 RX ORDER — ACYCLOVIR 400 MG/1
1 TABLET ORAL
Qty: 9 EACH | Refills: 2 | Status: SHIPPED | OUTPATIENT
Start: 2024-02-01 | End: 2024-10-28

## 2024-02-01 RX ORDER — ACYCLOVIR 400 MG/1
1 TABLET ORAL CONTINUOUS
Qty: 1 EACH | Refills: 0 | Status: SHIPPED | OUTPATIENT
Start: 2024-02-01

## 2024-02-01 RX ORDER — ACARBOSE 25 MG/1
25 TABLET ORAL
Qty: 90 TABLET | Refills: 2 | Status: SHIPPED | OUTPATIENT
Start: 2024-02-01 | End: 2024-05-01

## 2024-02-01 NOTE — PROGRESS NOTES
Marylou Morris 53 y.o. female MRN: [id number]    Encounter: [id number]      Assessment/Plan     Problem List Items Addressed This Visit          Other    Hypoglycemia after GI (gastrointestinal) surgery     Marylou has history of Phoenix-en-Y surgery in 2012, she has been experiencing symptoms of hypoglycemia 1 to 2 hours after meals, especially after carb heavy meals, symptoms will improve with consuming glucose tablets, denies fasting hypoglycemia.  This pattern is more in favor of postprandial hypoglycemia in setting of gastric bypass surgery.  I reviewed with her pathophysiology of postprandial hypoglycemia, we discussed that there is no special treatment for hypoglycemia after GI surgery except lifestyle modification which we reviewed in detail as below.  Control portions of carbohydrate - 30 grams/meal, 15 grams/snack.  Choose low-glycemic carbohydrates.  Avoid high-glycemic carbohydrates.  Include (heart-healthy) fats in each meal or snack - 15 grams/meal, 5 grams/snack.  Emphasize optimal protein intake.  Space meals/snacks 3-4 hours apart.  Avoid consuming liquids with meals.  Avoid alcohol.  Avoid caffeine.  Maintain post-bariatric vitamin and mineral intake  In regards to medication, acarbose has been used for years, which is intolerable to some patients due to GI adverse reactions, she would like to try it which I ordered 25 mg before meals.  She is a good candidate for continuous glucose monitoring, I ordered Dexcom G7 sensor and reader to her pharmacy and she will call the office for training when she received it.            Other Visit Diagnoses       Hypoglycemia    -  Primary    Relevant Medications    Continuous Blood Gluc  (Dexcom G7 ) TOMEKA    Continuous Blood Gluc Sensor (Dexcom G7 Sensor)    acarbose (PRECOSE) 25 mg tablet    Other Relevant Orders    Vitamin D 25 hydroxy    Cortisol Level, AM Specimen    TSH, 3rd generation    Glucose, fasting    Insulin and C-Peptide, Serum             CC:   Hypoglycemia    History of Present Illness     HPI:  Marylou Morris is a 53-year-old female who is referred by Demetria Chapin MD. for hypoglycemia.  Marylou is a status post RYGB in 2012, her weight pre surgery was 350 Ib and currently 189 Ib  Started one year ago, she started having episodes of hot flashes, tired, losing balance, brain fog blurry vision.   Episodes happens mostly within one hour of her meal but occasionally at night as well.  She takes glucose tablets which improve her symptoms,  Symptoms have been worsened progressively.  She has glucometer at home and lowest blood sugar was 60.  She is post menopausal and her last mensuration was 2 years ago.  She is not following with weight management but she is compliant with bariatric vitamins and minerals.       Review of Systems   Constitutional:  Positive for fatigue and unexpected weight change. Negative for appetite change.   HENT:  Negative for trouble swallowing and voice change.    Eyes:  Negative for visual disturbance.   Respiratory:  Negative for cough, shortness of breath and wheezing.    Cardiovascular:  Negative for palpitations and leg swelling.   Gastrointestinal:  Negative for abdominal pain, constipation, diarrhea, nausea and vomiting.   Endocrine: Negative for cold intolerance, heat intolerance, polyphagia and polyuria.   Musculoskeletal:  Negative for arthralgias.   Skin:  Negative for color change, rash and wound.   Neurological:  Negative for dizziness, tremors, weakness, light-headedness, numbness and headaches.   Psychiatric/Behavioral:  Negative for agitation and sleep disturbance. The patient is not nervous/anxious.        Historical Information   Past Medical History:   Diagnosis Date    Asthma     GERD (gastroesophageal reflux disease)     H/O gastric bypass     History of methamphetamine abuse (HCC)     Positive UDS; Feb 2020    Hypokalemia     Pyelonephritis      Past Surgical History:   Procedure Laterality Date     "CHOLECYSTECTOMY      DENTAL SURGERY      ESOPHAGEAL DILATION      GASTRIC BYPASS      OK LAPS ABD PRTM&OMENTUM DX W/WO SPEC BR/WA SPX N/A 2024    Procedure: LAPAROSCOPY DIAGNOSTIC, REDUCTION AND REPAIR OF INTERNAL HERNIA;  Surgeon: Ruben Milner MD;  Location: [facility];  Service: Bariatrics    EILEEN-EN-Y PROCEDURE      SUBTOTAL COLECTOMY      TOOTH EXTRACTION      TUBAL LIGATION       Social History   Social History     Substance and Sexual Activity   Alcohol Use Never     Social History     Substance and Sexual Activity   Drug Use Not Currently    Types: Methamphetamines    Comment: reports use this week (2020)     Social History     Tobacco Use   Smoking Status Every Day    Current packs/day: 0.50    Average packs/day: 0.5 packs/day for 38.0 years (19.0 ttl pk-yrs)    Types: Cigarettes   Smokeless Tobacco Never     Family History:   Family History   Problem Relation Age of Onset    Kidney disease Mother         Pt also reports \"bone disease\"    Hypertension Mother     Diabetes Mother     Heart disease Mother     Stroke Mother     Arthritis Mother     Hyperlipidemia Mother     Colon cancer Father          motor cycle accident    Heart disease Sister     Coronary artery disease Sister     No Known Problems Sister     No Known Problems Sister     No Known Problems Daughter     No Known Problems Daughter     Breast cancer Maternal Grandmother     Lung cancer Maternal Grandmother     Lung cancer Maternal Grandfather     Lung cancer Paternal Grandmother     Lung cancer Paternal Grandfather     Cancer Brother     Heart attack Brother     Heart disease Brother     Coronary artery disease Brother     Uterine cancer Maternal Aunt     No Known Problems Maternal Aunt     No Known Problems Maternal Aunt     Lung cancer Maternal Aunt     Lung cancer Maternal Aunt     Thyroid cancer Maternal Uncle     No Known Problems Paternal Aunt     No Known Problems Paternal Aunt     Lung cancer Paternal Uncle     Lung " cancer Cousin        Meds/Allergies   Current Outpatient Medications   Medication Sig Dispense Refill    albuterol (PROVENTIL HFA,VENTOLIN HFA) 90 mcg/act inhaler Inhale 2 puffs every 6 (six) hours as needed for wheezing 18 g 2    Blood Glucose Monitoring Suppl (OneTouch Verio Reflect) w/Device KIT Check blood sugars once daily. Please substitute with appropriate alternative as covered by patient's insurance. Dx: E11.65 1 kit 0    busPIRone (BUSPAR) 7.5 mg tablet Take 1 tablet (7.5 mg total) by mouth 2 (two) times a day 60 tablet 5    dicyclomine (BENTYL) 20 mg tablet take 1 tablet by mouth twice a day if needed for ABDOMINAL PAIN 20 tablet 1    ergocalciferol (VITAMIN D2) 50,000 units Take 1 capsule (50,000 Units total) by mouth once a week 12 capsule 0    fluticasone (FLONASE) 50 mcg/act nasal spray 1 spray into each nostril daily (Patient not taking: Reported on 10/27/2023) 16 g 1    glucose blood (OneTouch Verio) test strip Use 1 each 4 (four) times a day (before meals and at bedtime) Check blood sugars once daily. Please substitute with appropriate alternative as covered by patient's insurance. Dx: E11.65 100 each 3    lactulose 20 g/30 mL Take 30 mL (20 g total) by mouth 2 (two) times a day as needed (constipation) 946 mL 2    lubiprostone (AMITIZA) 24 mcg capsule Take 1 capsule (24 mcg total) by mouth 2 (two) times a day with meals 60 capsule 11    melatonin 3 mg Take 1 tablet (3 mg total) by mouth daily at bedtime (Patient not taking: Reported on 1/31/2024) 20 tablet 0    nicotine (NICODERM CQ) 14 mg/24hr TD 24 hr patch Place 1 patch on the skin over 24 hours daily 28 patch 2    nystatin powder Apply topically 2 (two) times a day 60 g 1    omeprazole (PriLOSEC) 40 MG capsule Take 1 capsule (40 mg total) by mouth daily 90 capsule 3    ondansetron (ZOFRAN) 4 mg tablet Take 1 tablet (4 mg total) by mouth every 8 (eight) hours as needed for nausea or vomiting 20 tablet 0    OneTouch Delica Lancets 33G MISC Check  "blood sugars once daily. Please substitute with appropriate alternative as covered by patient's insurance. Dx: E11.65 100 each 3    oxyCODONE-acetaminophen (PERCOCET) 5-325 mg per tablet Take 1 tablet by mouth every 6 (six) hours as needed for moderate pain for up to 10 doses Max Daily Amount: 4 tablets (Patient not taking: Reported on 1/31/2024) 10 tablet 0    therapeutic multivitamin-minerals (THERAGRAN-M) tablet Take 1 tablet by mouth daily      vitamin B-12 (VITAMIN B-12) 500 mcg tablet take 1 tablet by mouth once daily 30 tablet 2     No current facility-administered medications for this visit.     Allergies   Allergen Reactions    Morphine      anaphylactic    Morphine Vomiting    Suboxone [Buprenorphine-Naloxone] Abdominal Pain    Sulfamethoxazole-Trimethoprim Hives    Sulfamethoxazole-Trimethoprim Nausea Only       Objective   Vitals: Pulse 66, temperature 97.9 °F (36.6 °C), height 5' 2\" (1.575 m), weight 85.9 kg (189 lb 6.4 oz), SpO2 97%.    Physical Exam  Constitutional:       Appearance: She is well-developed.   HENT:      Head: Normocephalic and atraumatic.      Nose: Nose normal.   Eyes:      Pupils: Pupils are equal, round, and reactive to light.   Neck:      Thyroid: No thyromegaly.      Vascular: No JVD.   Cardiovascular:      Rate and Rhythm: Normal rate and regular rhythm.      Heart sounds: Normal heart sounds. No murmur heard.     No friction rub. No gallop.   Pulmonary:      Effort: Pulmonary effort is normal. No respiratory distress.      Breath sounds: Normal breath sounds. No stridor. No wheezing or rales.   Chest:      Chest wall: No tenderness.   Abdominal:      General: Bowel sounds are normal. There is no distension.      Palpations: Abdomen is soft. There is no mass.      Tenderness: There is no abdominal tenderness. There is no guarding.   Musculoskeletal:         General: No deformity. Normal range of motion.      Cervical back: Normal range of motion.   Skin:     General: Skin is " "warm.   Neurological:      Mental Status: She is alert and oriented to person, place, and time.         The history was obtained from the review of the chart, patient.    Lab Results:   Lab Results   Component Value Date/Time    Potassium 4.0 01/12/2024 05:33 AM    Potassium 3.9 01/11/2024 01:07 PM    Potassium 4.1 09/28/2023 11:01 AM    Chloride 109 (H) 01/12/2024 05:33 AM    Chloride 108 01/11/2024 01:07 PM    Chloride 104 09/28/2023 11:01 AM    CO2 24 01/12/2024 05:33 AM    CO2 25 01/11/2024 01:07 PM    CO2 22 09/28/2023 11:01 AM    BUN 13 01/12/2024 05:33 AM    BUN 13 01/11/2024 01:07 PM    BUN 14 09/28/2023 11:01 AM    Creatinine 0.59 (L) 01/12/2024 05:33 AM    Creatinine 0.74 01/11/2024 01:07 PM    Creatinine 0.52 (L) 09/28/2023 11:01 AM    Glucose, Fasting 83 09/18/2023 12:34 PM    Glucose, Fasting 80 07/12/2023 05:17 AM    Glucose, Fasting 86 06/01/2023 10:00 AM    Calcium 8.3 (L) 01/12/2024 05:33 AM    Calcium 9.4 01/11/2024 01:07 PM    Calcium 9.2 09/28/2023 11:01 AM    eGFR 105 01/12/2024 05:33 AM    eGFR 93 01/11/2024 01:07 PM    eGFR 110 09/28/2023 11:01 AM    TSH 3RD GENERATON 1.225 07/13/2023 02:26 PM             Imaging Studies:         I have personally reviewed pertinent reports.      Portions of the record may have been created with voice recognition software. Occasional wrong word or \"sound a like\" substitutions may have occurred due to the inherent limitations of voice recognition software. Read the chart carefully and recognize, using context, where substitutions have occurred.    I have spent a total time of 40 minutes on 02/01/24 in caring for this patient including Diagnostic results, Prognosis, Risks and benefits of tx options, Instructions for management, Patient and family education, Importance of tx compliance, Risk factor reductions, Impressions, Counseling / Coordination of care, Documenting in the medical record, Reviewing / ordering tests, medicine, procedures  , and Obtaining or " reviewing history  .

## 2024-02-01 NOTE — ASSESSMENT & PLAN NOTE
Marylou has history of [place]-en-Y surgery in 2012, she has been experiencing symptoms of hypoglycemia 1 to 2 hours after meals, especially after carb heavy meals, symptoms will improve with consuming glucose tablets, denies fasting hypoglycemia.  This pattern is more in favor of postprandial hypoglycemia in setting of gastric bypass surgery.  I reviewed with her pathophysiology of postprandial hypoglycemia, we discussed that there is no special treatment for hypoglycemia after GI surgery except lifestyle modification which we reviewed in detail as below.  Control portions of carbohydrate - 30 grams/meal, 15 grams/snack.  Choose low-glycemic carbohydrates.  Avoid high-glycemic carbohydrates.  Include (heart-healthy) fats in each meal or snack - 15 grams/meal, 5 grams/snack.  Emphasize optimal protein intake.  Space meals/snacks 3-4 hours apart.  Avoid consuming liquids with meals.  Avoid alcohol.  Avoid caffeine.  Maintain post-bariatric vitamin and mineral intake  In regards to medication, acarbose has been used for years, which is intolerable to some patients due to GI adverse reactions, she would like to try it which I ordered 25 mg before meals.  She is a good candidate for continuous glucose monitoring, I ordered Dexcom G7 sensor and reader to her pharmacy and she will call the office for training when she received it.

## 2024-02-01 NOTE — PATIENT INSTRUCTIONS
Point Nutrition Plan for Preventing Hypoglycemia in Post-Bariatric Hypoglycemia.    Control portions of carbohydrate - 30 grams/meal, 15 grams/snack.  Choose low-glycemic carbohydrates.  Avoid high-glycemic carbohydrates.  Include (heart-healthy) fats in each meal or snack - 15 grams/meal, 5 grams/snack.  Emphasize optimal protein intake.  Space meals/snacks 3-4 hours apart.  Avoid consuming liquids with meals.  Avoid alcohol.  Avoid caffeine.  Maintain post-bariatric vitamin and mineral intake    Low Glycemic Index Carbohydrates (CHOOSE)    Steel-cut oats (regular, not quick-cook or instant)  Oat bran cereal  Beans/legumes (e.g., garbanzo, navy, kidney, lima, jolley, black-eyed and pea beans, edamame (soybeans), lentils  Bean products (e.g., hummus, tofu)  Pearled barley, cooked al dente  Yams  Some fruits (e.g., grapefruit, apples, pears, berries, apricots, peaches)  Some pasta (e.g., Barilla Plus pasta), cooked al dente  Some whole grain breads (e.g., Cornelio bread, Solitario’s Flax, Oat Bran & Whole Wheat Ana/Lavash/Tortillas)  Some whole grain crackers (e.g., RyKrisp, RyVita, Wasa)      Acarbose 25 mg before meals.  Call to for training for sensor if you receive it.

## 2024-02-02 ENCOUNTER — PATIENT OUTREACH (OUTPATIENT)
Dept: FAMILY MEDICINE CLINIC | Facility: CLINIC | Age: 54
End: 2024-02-02

## 2024-02-02 NOTE — PROGRESS NOTES
SAVAGE received a referral from  ASHKAN Maxwell to assist patient with applying for transportation, SNAP benefits renewal, and ACP/lifeline cell phone.     ROSALINE contacted Marylou to discuss the referral. She is agreeable to services but states she no longer needs transportation d/t her mother giving her a vehicle.   SAVAGE completed an application for lifeline cell phone service via [employer]. The application requires further documentation to process d/t a name discrepancy. Marylou explains that her  license and birth certificate have Chungrk as her last name and her SS card has Lex listed. A list of acceptable documentation was emailed to Marylou so she may review and determine if she has any of it available. If not, ROSALINE will assist with requesting another SS card from [facility] on next outreach.   SAVAGE contacted the EBT helpline after discovering Marylou had two EBT cards. It was determined that one of the cards was no longer active and the other card needed to be activated. This writer assisted with activating the card and relayed the card balance to Marylou. She expressed gratitude and plans to shop today.    Will outreach next week for a status update on the Lifeline applcation and assist as needed.

## 2024-02-07 NOTE — TELEPHONE ENCOUNTER
Called patient. She had surgery and is not had any symptoms since. She will come to her 6 m f/u appt to discuss if needed.

## 2024-02-14 ENCOUNTER — PATIENT OUTREACH (OUTPATIENT)
Dept: FAMILY MEDICINE CLINIC | Facility: CLINIC | Age: 54
End: 2024-02-14

## 2024-02-14 NOTE — PROGRESS NOTES
CMOC attempted to contact Marylou for a status update on ACP/Lifeline application and the requested documentation.     No answer. Unable to leave a message d/t voicemail box full.   CMOC attempted to contact patient's mother, Elizabeth. No answer. Left message on voicemail with reason for call, this writer's contact information, and a request for a call back to assess for further needs.     Will attempt to outreach in two weeks if no contact prior.

## 2024-02-16 ENCOUNTER — PATIENT OUTREACH (OUTPATIENT)
Dept: FAMILY MEDICINE CLINIC | Facility: CLINIC | Age: 54
End: 2024-02-16

## 2024-02-16 NOTE — PROGRESS NOTES
Update received from [facility] who assisted patient in activating EBT card for SNAP benefits and was informed by patient that she no longer needs transportation services as her mother gave her a vehicle to utilize.  [facility] also assisted patient in submitting Lifeline Cell Phone application.    Call placed to patient to check status and follow-up on Lifeline application.  Patient has not received update from [facility] yet.  Will route to [facility] to check status.  Patient denied any additional needs at this time.  Will follow.

## 2024-02-20 ENCOUNTER — TELEPHONE (OUTPATIENT)
Dept: FAMILY MEDICINE CLINIC | Facility: CLINIC | Age: 54
End: 2024-02-20

## 2024-02-20 NOTE — TELEPHONE ENCOUNTER
PA for omeprazole    Submitted via    [x]CMM-KEY [id number]  [][employer]-Case ID #   []Faxed to plan   []Other website   []Phone call Case ID #     Office notes sent, clinical questions answered. Awaiting determination    Turnaround time for your insurance to make a decision on your Prior Authorization can take 7-21 business days.

## 2024-02-26 ENCOUNTER — PATIENT OUTREACH (OUTPATIENT)
Dept: FAMILY MEDICINE CLINIC | Facility: CLINIC | Age: 54
End: 2024-02-26

## 2024-02-26 NOTE — PROGRESS NOTES
CMOC contacted Marylou for a status update on the Lifeline cell phone application. On previous outreach, CMOC emailed a list of acceptable documentation and Marylou planned to review it to see if she had any of it available.     Marylou states she hasn't had time to look at her email to check the list and she's decided she does not want to pursue the Lifeline application at this time. She plans to purchase more minutes for her existing cell phone on her next payday and states she will contact this writer if she changes her mind.  CMOC confirmed there are no other needs at this time and informed Marylou the referral will be closed. She may contact this writer or her PCP for any future needs. Marylou expressed understanding and thanks the Care Management team for their assistance.     No further outreaches will be made.

## 2024-03-01 ENCOUNTER — PATIENT OUTREACH (OUTPATIENT)
Dept: FAMILY MEDICINE CLINIC | Facility: CLINIC | Age: 54
End: 2024-03-01

## 2024-03-01 NOTE — PROGRESS NOTES
Update received from CMOC Lindsay stating patient is no longer interested in pursuing the Lifeline Application.  Patient plans to purchase additional minutes for her existing cell phone.  She will outreach OPCM team if she changes her mind regarding this.    Patient's EBT card has been activated and patient no longer has transportation barriers.  No other needs at this time.  Will close case.

## 2024-03-02 DIAGNOSIS — Z98.84 HISTORY OF GASTRIC BYPASS: ICD-10-CM

## 2024-03-04 NOTE — TELEPHONE ENCOUNTER
Patient requesting refill(s) of: B12 daily     Last filled: 11/27/2023 #30 x 2  Last appt: 1/16/2024  Next appt: 3/20/2024  Pharmacy: [facility] [place]

## 2024-03-05 DIAGNOSIS — Z98.84 HISTORY OF GASTRIC BYPASS: ICD-10-CM

## 2024-03-05 DIAGNOSIS — E16.2 HYPOGLYCEMIA: ICD-10-CM

## 2024-03-05 RX ORDER — BLOOD SUGAR DIAGNOSTIC
1 STRIP MISCELLANEOUS
Qty: 100 EACH | Refills: 3 | Status: SHIPPED | OUTPATIENT
Start: 2024-03-05

## 2024-03-05 RX ORDER — CHOLECALCIFEROL (VITAMIN D3) 125 MCG
500 CAPSULE ORAL DAILY
Qty: 30 TABLET | Refills: 2 | Status: SHIPPED | OUTPATIENT
Start: 2024-03-05

## 2024-03-05 RX ORDER — LANCETS 33 GAUGE
EACH MISCELLANEOUS
Qty: 100 EACH | Refills: 3 | Status: SHIPPED | OUTPATIENT
Start: 2024-03-05

## 2024-03-05 NOTE — TELEPHONE ENCOUNTER
Needs refill on her   Vitamin B 12 500 mcg  Blood Glucose Monitoring supplies     [facility]  [place]    Please advise    Phone;[phone]

## 2024-03-08 ENCOUNTER — PATIENT OUTREACH (OUTPATIENT)
Dept: FAMILY MEDICINE CLINIC | Facility: CLINIC | Age: 54
End: 2024-03-08

## 2024-03-11 NOTE — PROGRESS NOTES
CMOC received a call from Marylou. She didn't receive her SNAP benefits this month and is unsure why. She states she attempted to contact PRINGLE but wasn't able to get through, denies leaving a message.     D/T time of day, it's unlikely a 3-way call will be successful.     Will outreach Monday for a status update.

## 2024-03-13 ENCOUNTER — TELEPHONE (OUTPATIENT)
Dept: ENDOCRINOLOGY | Facility: CLINIC | Age: 54
End: 2024-03-13

## 2024-03-13 ENCOUNTER — PATIENT OUTREACH (OUTPATIENT)
Dept: FAMILY MEDICINE CLINIC | Facility: CLINIC | Age: 54
End: 2024-03-13

## 2024-03-13 NOTE — PROGRESS NOTES
CMROSALINE contacted Marylou for a status update on her SNAP benefits.    Marylou states she did not receive her SNAP benefits this month and doesn't know why. She disclosed she had an income change and that may be why her SNAP benefits were cut. She is agreeable to a 3-way call with YARY/KIMANI for more information.     S/W Ms. Keenan, she reviewed Marylou's file and informed the reason benefits were suspended was d/t RITA form not received by due date.   Since the RITA was already received, Ms. Keenan forwarded this information to Marylou's  and states she should receive her SNAP benefits in the next week.     Confirmed with Marylou that there are no other needs at this time and informed no further outreaches will be made. She may contact this writer, the CM SW, or her PCP for any future needs. She expressed understanding and thanks the Care Management team for their assistance.

## 2024-03-13 NOTE — TELEPHONE ENCOUNTER
Called patient and told her to expect a phone call back from the provider and to keep her phone with her.    She wants to speak to provider. Symptoms are not getting worse but would like to see about stopping the medication

## 2024-03-13 NOTE — TELEPHONE ENCOUNTER
Patient called the office and stated that the medication acarbose was giving her gas and now she is having bumps on her wrist. Patient said they're also black and blue and to wear she has trouble moving them. Patient doesn't want to keep taking that medication and would like a call back!

## 2024-03-16 ENCOUNTER — HOSPITAL ENCOUNTER (EMERGENCY)
Facility: HOSPITAL | Age: 54
Discharge: HOME | End: 2024-03-16
Attending: INTERNAL MEDICINE
Payer: COMMERCIAL

## 2024-03-16 ENCOUNTER — APPOINTMENT (EMERGENCY)
Dept: RADIOLOGY | Facility: HOSPITAL | Age: 54
End: 2024-03-16
Payer: COMMERCIAL

## 2024-03-16 VITALS
SYSTOLIC BLOOD PRESSURE: 118 MMHG | DIASTOLIC BLOOD PRESSURE: 76 MMHG | HEART RATE: 77 BPM | OXYGEN SATURATION: 98 % | WEIGHT: 185 LBS | BODY MASS INDEX: 34.04 KG/M2 | TEMPERATURE: 97.9 F | HEIGHT: 62 IN | RESPIRATION RATE: 18 BRPM

## 2024-03-16 DIAGNOSIS — M79.602 LEFT ARM PAIN: ICD-10-CM

## 2024-03-16 DIAGNOSIS — S16.1XXA CERVICAL STRAIN, ACUTE, INITIAL ENCOUNTER: Primary | ICD-10-CM

## 2024-03-16 PROCEDURE — 99284 EMERGENCY DEPT VISIT MOD MDM: CPT | Performed by: INTERNAL MEDICINE

## 2024-03-16 PROCEDURE — 73110 X-RAY EXAM OF WRIST: CPT

## 2024-03-16 PROCEDURE — 99283 EMERGENCY DEPT VISIT LOW MDM: CPT

## 2024-03-16 PROCEDURE — 73090 X-RAY EXAM OF FOREARM: CPT

## 2024-03-16 PROCEDURE — 73060 X-RAY EXAM OF HUMERUS: CPT

## 2024-03-16 PROCEDURE — 73030 X-RAY EXAM OF SHOULDER: CPT

## 2024-03-16 PROCEDURE — 72040 X-RAY EXAM NECK SPINE 2-3 VW: CPT

## 2024-03-16 RX ORDER — GABAPENTIN 100 MG/1
100 CAPSULE ORAL 3 TIMES DAILY
Qty: 30 CAPSULE | Refills: 0 | Status: SHIPPED | OUTPATIENT
Start: 2024-03-16 | End: 2024-03-20 | Stop reason: SDUPTHER

## 2024-03-16 RX ORDER — METHOCARBAMOL 500 MG/1
500 TABLET, FILM COATED ORAL 2 TIMES DAILY
Qty: 20 TABLET | Refills: 0 | Status: SHIPPED | OUTPATIENT
Start: 2024-03-16

## 2024-03-16 NOTE — ED PROVIDER NOTES
History  Chief Complaint   Patient presents with    Arm Injury     Patient experiencing left arm pain after picking up a dresser two weeks ago, pt says pain radiating into shoulder down into hand.  Pt also has numbess in her hand.     53-year-old female states she was moving a dresser 2 weeks ago tried to lift it and ever since then has had extreme left arm pain and radiating down from her neck.  Pain is whenever any kind of movement.  She denies any fall or injuring it otherwise.  She states she has tingling and numbness in her left hand.        Prior to Admission Medications   Prescriptions Last Dose Informant Patient Reported? Taking?   Blood Glucose Monitoring Suppl (OneTouch Verio Reflect) w/Device KIT   No No   Sig: Check blood sugars once daily. Please substitute with appropriate alternative as covered by patient's insurance. Dx: E11.65   Continuous Blood Gluc  (Dexcom G7 ) TOMEKA   No No   Sig: Use 1 each continuous   Continuous Blood Gluc Sensor (Dexcom G7 Sensor)   No No   Sig: Use 1 Device every 10 days   OneTouch Delica Lancets 33G MISC   No No   Sig: Check blood sugars once daily. Please substitute with appropriate alternative as covered by patient's insurance. Dx: E11.65   acarbose (PRECOSE) 25 mg tablet   No No   Sig: Take 1 tablet (25 mg total) by mouth 3 (three) times a day with meals   albuterol (PROVENTIL HFA,VENTOLIN HFA) 90 mcg/act inhaler   No No   Sig: Inhale 2 puffs every 6 (six) hours as needed for wheezing   busPIRone (BUSPAR) 7.5 mg tablet   No No   Sig: Take 1 tablet (7.5 mg total) by mouth 2 (two) times a day   dicyclomine (BENTYL) 20 mg tablet   No No   Sig: take 1 tablet by mouth twice a day if needed for ABDOMINAL PAIN   ergocalciferol (VITAMIN D2) 50,000 units   No No   Sig: Take 1 capsule (50,000 Units total) by mouth once a week   fluticasone (FLONASE) 50 mcg/act nasal spray   No No   Si spray into each nostril daily   Patient not taking: Reported on 10/27/2023    glucose blood (OneTouch Verio) test strip   No No   Sig: Use 1 each 4 (four) times a day (before meals and at bedtime) Check blood sugars once daily. Please substitute with appropriate alternative as covered by patient's insurance. Dx: E11.65   lactulose 20 g/30 mL   No No   Sig: Take 30 mL (20 g total) by mouth 2 (two) times a day as needed (constipation)   lubiprostone (AMITIZA) 24 mcg capsule   No No   Sig: Take 1 capsule (24 mcg total) by mouth 2 (two) times a day with meals   melatonin 3 mg   No No   Sig: Take 1 tablet (3 mg total) by mouth daily at bedtime   Patient not taking: Reported on 1/31/2024   nicotine (NICODERM CQ) 14 mg/24hr TD 24 hr patch   No No   Sig: Place 1 patch on the skin over 24 hours daily   nystatin powder   No No   Sig: Apply topically 2 (two) times a day   omeprazole (PriLOSEC) 40 MG capsule   No No   Sig: Take 1 capsule (40 mg total) by mouth daily   ondansetron (ZOFRAN) 4 mg tablet   No No   Sig: Take 1 tablet (4 mg total) by mouth every 8 (eight) hours as needed for nausea or vomiting   oxyCODONE-acetaminophen (PERCOCET) 5-325 mg per tablet   No No   Sig: Take 1 tablet by mouth every 6 (six) hours as needed for moderate pain for up to 10 doses Max Daily Amount: 4 tablets   Patient not taking: Reported on 1/31/2024   therapeutic multivitamin-minerals (THERAGRAN-M) tablet  Self Yes No   Sig: Take 1 tablet by mouth daily   vitamin B-12 (VITAMIN B-12) 500 mcg tablet   No No   Sig: take 1 tablet by mouth once daily      Facility-Administered Medications: None       Past Medical History:   Diagnosis Date    Asthma     GERD (gastroesophageal reflux disease)     H/O gastric bypass     History of methamphetamine abuse (HCC)     Positive UDS; Feb 2020    Hypokalemia     Pyelonephritis        Past Surgical History:   Procedure Laterality Date    CHOLECYSTECTOMY      DENTAL SURGERY      ESOPHAGEAL DILATION      GASTRIC BYPASS      CT LAPS ABD PRTM&OMENTUM DX W/WO SPEC BR/WA SPX N/A 1/11/2024     "Procedure: LAPAROSCOPY DIAGNOSTIC, REDUCTION AND REPAIR OF INTERNAL HERNIA;  Surgeon: Ruben Milner MD;  Location: [facility];  Service: Bariatrics    EILEEN-EN-Y PROCEDURE      SUBTOTAL COLECTOMY      TOOTH EXTRACTION      TUBAL LIGATION         Family History   Problem Relation Age of Onset    Kidney disease Mother         Pt also reports \"bone disease\"    Hypertension Mother     Diabetes Mother     Heart disease Mother     Stroke Mother     Arthritis Mother     Hyperlipidemia Mother     Colon cancer Father          motor cycle accident    Heart disease Sister     Coronary artery disease Sister     No Known Problems Sister     No Known Problems Sister     No Known Problems Daughter     No Known Problems Daughter     Breast cancer Maternal Grandmother     Lung cancer Maternal Grandmother     Lung cancer Maternal Grandfather     Lung cancer Paternal Grandmother     Lung cancer Paternal Grandfather     Cancer Brother     Heart attack Brother     Heart disease Brother     Coronary artery disease Brother     Uterine cancer Maternal Aunt     No Known Problems Maternal Aunt     No Known Problems Maternal Aunt     Lung cancer Maternal Aunt     Lung cancer Maternal Aunt     Thyroid cancer Maternal Uncle     No Known Problems Paternal Aunt     No Known Problems Paternal Aunt     Lung cancer Paternal Uncle     Lung cancer Cousin      I have reviewed and agree with the history as documented.    E-Cigarette/Vaping    E-Cigarette Use Never User     Cartridges/Day 1      E-Cigarette/Vaping Substances    Nicotine No     THC No     CBD No     Flavoring No     Other No     Unknown No      Social History     Tobacco Use    Smoking status: Every Day     Current packs/day: 0.50     Average packs/day: 0.5 packs/day for 38.0 years (19.0 ttl pk-yrs)     Types: Cigarettes    Smokeless tobacco: Never   Vaping Use    Vaping status: Never Used   Substance Use Topics    Alcohol use: Never    Drug use: Not Currently     Types: " Methamphetamines     Comment: reports use this week (12/2020)       Review of Systems   Constitutional: Negative.    Respiratory: Negative.     Cardiovascular: Negative.    Gastrointestinal: Negative.    Genitourinary: Negative.    Musculoskeletal:  Positive for arthralgias, joint swelling and neck pain.   Neurological: Negative.    Psychiatric/Behavioral: Negative.         Physical Exam  Physical Exam  Vitals and nursing note reviewed. Exam conducted with a chaperone present (Daughter).   Constitutional:       General: She is not in acute distress.     Appearance: Normal appearance. She is not ill-appearing.   HENT:      Head: Normocephalic and atraumatic.      Mouth/Throat:      Mouth: Mucous membranes are moist.   Eyes:      Extraocular Movements: Extraocular movements intact.      Conjunctiva/sclera: Conjunctivae normal.      Pupils: Pupils are equal, round, and reactive to light.   Neck:      Comments: Patient is tender over her left trapezius region.  No pain over C-spine.  She has pain with elevation of the arm.  Cardiovascular:      Rate and Rhythm: Normal rate and regular rhythm.      Pulses: Normal pulses.      Heart sounds: Normal heart sounds.   Pulmonary:      Effort: Pulmonary effort is normal.      Breath sounds: Normal breath sounds.   Abdominal:      General: Abdomen is flat.      Palpations: Abdomen is soft.   Musculoskeletal:      Cervical back: Normal range of motion and neck supple. Tenderness present.   Skin:     General: Skin is warm and dry.      Capillary Refill: Capillary refill takes less than 2 seconds.   Neurological:      General: No focal deficit present.      Mental Status: She is alert and oriented to person, place, and time.   Psychiatric:         Mood and Affect: Mood normal.         Behavior: Behavior normal.         Thought Content: Thought content normal.         Judgment: Judgment normal.         Vital Signs  ED Triage Vitals [03/16/24 1714]   Temperature Pulse Respirations  Blood Pressure SpO2   97.9 °F (36.6 °C) 77 18 118/76 98 %      Temp Source Heart Rate Source Patient Position - Orthostatic VS BP Location FiO2 (%)   Temporal Monitor Sitting Right arm --      Pain Score       7           Vitals:    03/16/24 1714   BP: 118/76   Pulse: 77   Patient Position - Orthostatic VS: Sitting         Visual Acuity      ED Medications  Medications - No data to display    Diagnostic Studies  Results Reviewed       None                   No orders to display              Procedures  Procedures         ED Course                               SBIRT 22yo+      Flowsheet Row Most Recent Value   Initial Alcohol Screen: US AUDIT-C     1. How often do you have a drink containing alcohol? 0 Filed at: 03/16/2024 1718   2. How many drinks containing alcohol do you have on a typical day you are drinking?  0 Filed at: 03/16/2024 1718   3a. Male UNDER 65: How often do you have five or more drinks on one occasion? 0 Filed at: 03/16/2024 1718   3b. FEMALE Any Age, or MALE 65+: How often do you have 4 or more drinks on one occassion? 0 Filed at: 03/16/2024 1718   Audit-C Score 0 Filed at: 03/16/2024 1718   PATRICK: How many times in the past year have you...    Used an illegal drug or used a prescription medication for non-medical reasons? Never Filed at: 03/16/2024 1718                      Medical Decision Making  Patient believes she injured her arm and neck 2 weeks ago while moving a dresser drawer and trying to lift it she had strain rating down her arm with some tingling and numbness.  Her exam is fairly unremarkable sensation joint position sense distal pulses are intact.  Patient has range of motion with discomfort.  Will prescribe a muscle relaxant as well as gabapentin low-dose.  Follow-up with her PCP or Dr. Wang orthopedics.  Patient cannot tolerate NSAIDs    Amount and/or Complexity of Data Reviewed  Radiology: ordered and independent interpretation performed.    Risk  Prescription drug  management.             Disposition  Final diagnoses:   None     ED Disposition       None          Follow-up Information    None         Patient's Medications   Discharge Prescriptions    No medications on file       No discharge procedures on file.    PDMP Review         Value Time User    PDMP Reviewed  Yes 1/16/2024 11:42 AM Roberta Britton DO            ED Provider  Electronically Signed by             Kiel Coffman MD  03/17/24 0017

## 2024-03-18 ENCOUNTER — OFFICE VISIT (OUTPATIENT)
Dept: OBGYN CLINIC | Facility: CLINIC | Age: 54
End: 2024-03-18
Payer: COMMERCIAL

## 2024-03-18 VITALS
HEIGHT: 62 IN | SYSTOLIC BLOOD PRESSURE: 127 MMHG | WEIGHT: 185 LBS | BODY MASS INDEX: 34.04 KG/M2 | HEART RATE: 87 BPM | DIASTOLIC BLOOD PRESSURE: 83 MMHG

## 2024-03-18 DIAGNOSIS — M25.532 PAIN IN LEFT WRIST: Primary | ICD-10-CM

## 2024-03-18 PROCEDURE — 99204 OFFICE O/P NEW MOD 45 MIN: CPT | Performed by: STUDENT IN AN ORGANIZED HEALTH CARE EDUCATION/TRAINING PROGRAM

## 2024-03-18 NOTE — PROGRESS NOTES
Ortho Sports Medicine New Patient Hand/Wrist Visit     Assesment:   53 y.o. female left wrist and forearm pain and swelling for 3 weeks after lifting injury with exam concerning for muscle strain versus tendinitis versus occult fracture.    Plan:  I reviewed the history, exam, and imaging with the patient in clinic today.  I did review the results of the x-rays which show no evidence of fracture or other osseous abnormality.  On exam, she does have significant tenderness in the forearm and wrist and pain with motion of the wrist.  I discussed with the patient that it is possible that she has a soft tissue injury including a possible muscle strain versus tendinitis.  Also discussed that she might have an occult fracture which did not appear on the x-rays.  Therefore, I recommended getting an MRI for further evaluation.  In the meantime, I did provide the patient with a wrist brace to minimize wrist motion until the MRI is complete.  I will see the patient back in clinic after the MRI to review the results and further treatment options.  She reach out to clinic with any question concerns anytime.  All patient's questions were answered during the visit.    Conservative treatment:  Ice to forearm  Continue current pain medication regimen  Wrist brace provided  MRI of the left forearm was ordered    Imaging:  All imaging from today was reviewed by myself and explained to the patient.   We will obtain an MRI of the forearm to rule out occult fracture.    Injection:  No Injection planned at this time.    Surgery:  No surgery is recommended at this point, continue with conservative treatment plan as noted.    Follow up:  Return after MRI.        Chief Complaint   Patient presents with    Left Wrist - Pain       History of Present Illness:    The patient is a 53 y.o., right hand dominant female whose occupation is disabled, referred to me by the emergency room, seen in clinic for consultation of left hand/wrist.  The patient  sustained an injury 3 weeks ago.  The mechanism of injury was lifting/reaching a dresser. She states she is unsure if there was a pop. She states she felt immediate pain. The pain is characterized as shooting.  The pain is present at all times. Pain worse with wrist extension. Pain is improved by rest.  Pain is aggravated by overhead activity and movement.    Symptoms include swelling. The patient has weakness.  The patient has numbness and tingling along all the fingertips. The patient has tried rest and muscle relaxers and gabapentin.  She notes some pain in her shoulder but notes pain along the entire arm.        Hand/wrist Surgical History:  None    Past Medical, Social and Family History:  Past Medical History:   Diagnosis Date    Asthma     GERD (gastroesophageal reflux disease)     H/O gastric bypass     History of methamphetamine abuse (HCC)     Positive UDS; Feb 2020    Hypokalemia     Pyelonephritis      Past Surgical History:   Procedure Laterality Date    CHOLECYSTECTOMY      DENTAL SURGERY      ESOPHAGEAL DILATION      GASTRIC BYPASS      AK LAPS ABD PRTM&OMENTUM DX W/WO SPEC BR/WA SPX N/A 1/11/2024    Procedure: LAPAROSCOPY DIAGNOSTIC, REDUCTION AND REPAIR OF INTERNAL HERNIA;  Surgeon: Ruben Milner MD;  Location: [facility] OR;  Service: Bariatrics    EILEEN-EN-Y PROCEDURE      SUBTOTAL COLECTOMY      TOOTH EXTRACTION      TUBAL LIGATION       Allergies   Allergen Reactions    Morphine      anaphylactic    Morphine Vomiting    Suboxone [Buprenorphine-Naloxone] Abdominal Pain    Sulfamethoxazole-Trimethoprim Hives    Sulfamethoxazole-Trimethoprim Nausea Only     Current Outpatient Medications on File Prior to Visit   Medication Sig Dispense Refill    acarbose (PRECOSE) 25 mg tablet Take 1 tablet (25 mg total) by mouth 3 (three) times a day with meals 90 tablet 2    albuterol (PROVENTIL HFA,VENTOLIN HFA) 90 mcg/act inhaler Inhale 2 puffs every 6 (six) hours as needed for wheezing 18 g 2    Blood Glucose  Monitoring Suppl (OneTouch Verio Reflect) w/Device KIT Check blood sugars once daily. Please substitute with appropriate alternative as covered by patient's insurance. Dx: E11.65 1 kit 0    busPIRone (BUSPAR) 7.5 mg tablet Take 1 tablet (7.5 mg total) by mouth 2 (two) times a day 60 tablet 5    Continuous Blood Gluc  (Dexcom G7 ) TOMEKA Use 1 each continuous (Patient not taking: Reported on 3/20/2024) 1 each 0    Continuous Blood Gluc Sensor (Dexcom G7 Sensor) Use 1 Device every 10 days (Patient not taking: Reported on 3/20/2024) 9 each 2    dicyclomine (BENTYL) 20 mg tablet take 1 tablet by mouth twice a day if needed for ABDOMINAL PAIN 20 tablet 1    ergocalciferol (VITAMIN D2) 50,000 units Take 1 capsule (50,000 Units total) by mouth once a week 12 capsule 0    glucose blood (OneTouch Verio) test strip Use 1 each 4 (four) times a day (before meals and at bedtime) Check blood sugars once daily. Please substitute with appropriate alternative as covered by patient's insurance. Dx: E11.65 100 each 3    lactulose 20 g/30 mL Take 30 mL (20 g total) by mouth 2 (two) times a day as needed (constipation) 946 mL 2    lubiprostone (AMITIZA) 24 mcg capsule Take 1 capsule (24 mcg total) by mouth 2 (two) times a day with meals 60 capsule 11    melatonin 3 mg Take 1 tablet (3 mg total) by mouth daily at bedtime 20 tablet 0    methocarbamol (ROBAXIN) 500 mg tablet Take 1 tablet (500 mg total) by mouth 2 (two) times a day 20 tablet 0    nicotine (NICODERM CQ) 14 mg/24hr TD 24 hr patch Place 1 patch on the skin over 24 hours daily 28 patch 2    nystatin powder Apply topically 2 (two) times a day 60 g 1    omeprazole (PriLOSEC) 40 MG capsule Take 1 capsule (40 mg total) by mouth daily 90 capsule 3    ondansetron (ZOFRAN) 4 mg tablet Take 1 tablet (4 mg total) by mouth every 8 (eight) hours as needed for nausea or vomiting 20 tablet 0    OneTouch Delica Lancets 33G MISC Check blood sugars once daily. Please substitute  with appropriate alternative as covered by patient's insurance. Dx: E11.65 100 each 3    therapeutic multivitamin-minerals (THERAGRAN-M) tablet Take 1 tablet by mouth daily      vitamin B-12 (VITAMIN B-12) 500 mcg tablet take 1 tablet by mouth once daily 30 tablet 2    [DISCONTINUED] gabapentin (Neurontin) 100 mg capsule Take 1 capsule (100 mg total) by mouth 3 (three) times a day for 10 days For post-herpetic neuralgia: Take 1 tablet on day 1,  Then take 2 tablets on day 2, Then take 3 tablets on day 3 and every day after that as instructed by your doctor. 30 capsule 0    fluticasone (FLONASE) 50 mcg/act nasal spray 1 spray into each nostril daily 16 g 1    [DISCONTINUED] oxyCODONE-acetaminophen (PERCOCET) 5-325 mg per tablet Take 1 tablet by mouth every 6 (six) hours as needed for moderate pain for up to 10 doses Max Daily Amount: 4 tablets 10 tablet 0     No current facility-administered medications on file prior to visit.     Social History     Socioeconomic History    Marital status: Single     Spouse name: Not on file    Number of children: 2    Years of education: Not on file    Highest education level: Not on file   Occupational History    Not on file   Tobacco Use    Smoking status: Every Day     Current packs/day: 0.50     Average packs/day: 0.5 packs/day for 38.0 years (19.0 ttl pk-yrs)     Types: Cigarettes    Smokeless tobacco: Never   Vaping Use    Vaping status: Never Used   Substance and Sexual Activity    Alcohol use: Never    Drug use: Not Currently     Types: Methamphetamines     Comment: reports use this week (12/2020)    Sexual activity: Not Currently     Partners: Male     Birth control/protection: Post-menopausal   Other Topics Concern    Not on file   Social History Narrative    ** Merged History Encounter **         Caffeine use    Ready to quit smoking    Never - 28 yrs male , 2 children    Lives with her mother    Unemployed and on SSI     Social Determinants of Health  "    Financial Resource Strain: Not on file   Food Insecurity: No Food Insecurity (1/12/2024)    Hunger Vital Sign     Worried About Running Out of Food in the Last Year: Never true     Ran Out of Food in the Last Year: Never true   Transportation Needs: Unmet Transportation Needs (1/12/2024)    PRAPARE - Transportation     Lack of Transportation (Medical): Yes     Lack of Transportation (Non-Medical): Yes   Physical Activity: Not on file   Stress: Not on file   Social Connections: Not on file   Intimate Partner Violence: Not on file   Housing Stability: Low Risk  (1/12/2024)    Housing Stability Vital Sign     Unable to Pay for Housing in the Last Year: No     Number of Places Lived in the Last Year: 1     Unstable Housing in the Last Year: No         I have reviewed the past medical, surgical, social and family history, medications and allergies as documented in the EMR.    Review of systems: ROS is negative other than that noted in the HPI.  Constitutional: Negative for fatigue and fever.   HENT: Negative for sore throat.    Respiratory: Negative for shortness of breath.    Cardiovascular: Negative for chest pain.   Gastrointestinal: Negative for abdominal pain.   Endocrine: Negative for cold intolerance and heat intolerance.   Genitourinary: Negative for flank pain.   Musculoskeletal: Negative for back pain.   Skin: Negative for rash.   Allergic/Immunologic: Negative for immunocompromised state.   Neurological: Negative for dizziness.   Psychiatric/Behavioral: Negative for agitation.     Physical Exam:    Blood pressure 127/83, pulse 87, height 5' 2\" (1.575 m), weight 83.9 kg (185 lb).    General/Constitutional: NAD, well developed, well nourished  HENT: Normocephalic, atraumatic  CV: Intact distal pulses, regular rate  Resp: No respiratory distress or labored breathing  Neuro: Alert and Oriented x 3  Psych: Normal mood, normal affect, normal judgement, normal behavior  Skin: Warm, dry, no rashes, no " erythema      Left Wrist/Hand focused exam:     Skin is intact.  No erythema or ecchymosis.  Mild swelling of the forearm and wrist.    Tender to palpation at the wrist and forearm musculature.  No tenderness over the medial or lateral epicondyle, olecranon, radial head.    No pain with range of motion of the elbow.  Pain with range of motion of the wrist most pronounced with wrist extension and finger extension.    UE NV Exam: +2 Radial pulses bilaterally  Sensation intact to light touch C5-T1 bilaterally, Radial/median/ulnar nerve motor intact.  Endorses numbness in her fingertips but has a history of diabetes    Cervical ROM is full without pain, numbness or tingling    Negative spurling maneuver bilaterally      Hand/Wrist Imaging:    X-rays of the left wrist, forearm, humerus, and shoulder were obtained on 3/16/2020 and reviewed with the patient.  Based on my review, there is no evidence of fracture at the wrist, forearm, elbow, or shoulder.  No evidence of dislocation.  No significant degenerative changes.  I agree with the radiology report.        Scribe Attestation      I,:   am acting as a scribe while in the presence of the attending physician.:       I,:   personally performed the services described in this documentation    as scribed in my presence.:

## 2024-03-20 ENCOUNTER — OFFICE VISIT (OUTPATIENT)
Dept: FAMILY MEDICINE CLINIC | Facility: CLINIC | Age: 54
End: 2024-03-20
Payer: COMMERCIAL

## 2024-03-20 VITALS
HEART RATE: 61 BPM | TEMPERATURE: 97.4 F | WEIGHT: 186.6 LBS | HEIGHT: 62 IN | OXYGEN SATURATION: 99 % | SYSTOLIC BLOOD PRESSURE: 120 MMHG | RESPIRATION RATE: 18 BRPM | DIASTOLIC BLOOD PRESSURE: 72 MMHG | BODY MASS INDEX: 34.34 KG/M2

## 2024-03-20 DIAGNOSIS — M79.602 LEFT ARM PAIN: ICD-10-CM

## 2024-03-20 DIAGNOSIS — R52 PAIN: ICD-10-CM

## 2024-03-20 DIAGNOSIS — M25.532 LEFT WRIST PAIN: Primary | ICD-10-CM

## 2024-03-20 DIAGNOSIS — S16.1XXA CERVICAL STRAIN, ACUTE, INITIAL ENCOUNTER: ICD-10-CM

## 2024-03-20 PROCEDURE — 99214 OFFICE O/P EST MOD 30 MIN: CPT | Performed by: FAMILY MEDICINE

## 2024-03-20 RX ORDER — GABAPENTIN 300 MG/1
CAPSULE ORAL
Qty: 90 CAPSULE | Refills: 0 | Status: SHIPPED | OUTPATIENT
Start: 2024-03-20 | End: 2024-03-29

## 2024-03-20 RX ORDER — OXYCODONE HYDROCHLORIDE AND ACETAMINOPHEN 5; 325 MG/1; MG/1
1 TABLET ORAL EVERY 8 HOURS PRN
Qty: 15 TABLET | Refills: 0 | Status: SHIPPED | OUTPATIENT
Start: 2024-03-20 | End: 2024-03-26 | Stop reason: SDUPTHER

## 2024-03-20 RX ORDER — GABAPENTIN 300 MG/1
CAPSULE ORAL
Qty: 90 CAPSULE | Refills: 0 | Status: SHIPPED | OUTPATIENT
Start: 2024-03-20 | End: 2024-03-20

## 2024-03-20 NOTE — PROGRESS NOTES
"Assessment/Plan:       Problem List Items Addressed This Visit          Rheumatology    Internal hernia    Relevant Medications    oxyCODONE-acetaminophen (PERCOCET) 5-325 mg per tablet     Other Visit Diagnoses       Left wrist pain    -  Primary    Cervical strain, acute, initial encounter        Relevant Medications    gabapentin (Neurontin) 300 mg capsule    Left arm pain        Relevant Medications    gabapentin (Neurontin) 300 mg capsule              Increase gabapentin to 300 mg, taper up to 300 mg TID.   Continue Tylenol PRN and Percocet PRN for severe pain, not to exceed 3,000 mg Tylenol total daily. We discussed being cautious/judicious with Percocet. She expresses understanding.     Subjective:      Patient ID: Marylou Morris is a 53 y.o. female.    HPI    She is experiencing severe left wrist pain as well as numbness in fingers, following with orthopedics and has an MRI scheduled for Friday. Here today to discuss pain control. Taking Tylenol, gabapentin and muscle relaxer without relief. History of drug abuse and aware/cautious about this.     The following portions of the patient's history were reviewed and updated as appropriate: allergies, current medications, past family history, past medical history, past social history, past surgical history, and problem list.    Review of Systems   All other systems reviewed and are negative.        Objective:      /72   Pulse 61   Temp (!) 97.4 °F (36.3 °C)   Resp 18   Ht 5' 2\" (1.575 m)   Wt 84.6 kg (186 lb 9.6 oz)   LMP  (LMP Unknown)   SpO2 99%   BMI 34.13 kg/m²          Physical Exam  Vitals reviewed.   Constitutional:       General: She is not in acute distress.     Appearance: Normal appearance. She is not ill-appearing, toxic-appearing or diaphoretic.   Pulmonary:      Effort: Pulmonary effort is normal. No respiratory distress.   Neurological:      Mental Status: She is alert and oriented to person, place, and time.   Psychiatric:         " Mood and Affect: Mood normal.         Behavior: Behavior normal.             Roberta Britton DO  [facility] [place]

## 2024-03-22 ENCOUNTER — HOSPITAL ENCOUNTER (OUTPATIENT)
Dept: MRI IMAGING | Facility: HOSPITAL | Age: 54
End: 2024-03-22
Payer: COMMERCIAL

## 2024-03-22 DIAGNOSIS — F41.9 ANXIETY: ICD-10-CM

## 2024-03-22 DIAGNOSIS — M25.532 PAIN IN LEFT WRIST: ICD-10-CM

## 2024-03-22 PROCEDURE — 73218 MRI UPPER EXTREMITY W/O DYE: CPT

## 2024-03-22 RX ORDER — BUSPIRONE HYDROCHLORIDE 7.5 MG/1
7.5 TABLET ORAL 2 TIMES DAILY
Qty: 60 TABLET | Refills: 5 | Status: SHIPPED | OUTPATIENT
Start: 2024-03-22

## 2024-03-26 DIAGNOSIS — R52 PAIN: ICD-10-CM

## 2024-03-26 RX ORDER — OXYCODONE HYDROCHLORIDE AND ACETAMINOPHEN 5; 325 MG/1; MG/1
1 TABLET ORAL EVERY 8 HOURS PRN
Qty: 15 TABLET | Refills: 0 | Status: SHIPPED | OUTPATIENT
Start: 2024-03-26

## 2024-03-26 NOTE — TELEPHONE ENCOUNTER
Patient requesting refill(s) of: Percocet    Last filled: 3/20/24  Last appt: 3/20/24  Next appt: none  Pharmacy: [facility] [place]

## 2024-03-26 NOTE — TELEPHONE ENCOUNTER
Patient called back requesting an update on the medication she requested. Informed patient that Dr Britton has been with patients all day.

## 2024-03-29 ENCOUNTER — OFFICE VISIT (OUTPATIENT)
Dept: OBGYN CLINIC | Facility: CLINIC | Age: 54
End: 2024-03-29
Payer: COMMERCIAL

## 2024-03-29 VITALS
HEIGHT: 62 IN | SYSTOLIC BLOOD PRESSURE: 115 MMHG | HEART RATE: 61 BPM | DIASTOLIC BLOOD PRESSURE: 82 MMHG | WEIGHT: 189 LBS | BODY MASS INDEX: 34.78 KG/M2

## 2024-03-29 DIAGNOSIS — M65.4 DE QUERVAIN'S TENOSYNOVITIS, LEFT: ICD-10-CM

## 2024-03-29 DIAGNOSIS — M65.4 DE QUERVAIN'S TENOSYNOVITIS, RIGHT: ICD-10-CM

## 2024-03-29 DIAGNOSIS — M54.12 RADICULOPATHY, CERVICAL REGION: Primary | ICD-10-CM

## 2024-03-29 PROCEDURE — 99214 OFFICE O/P EST MOD 30 MIN: CPT | Performed by: STUDENT IN AN ORGANIZED HEALTH CARE EDUCATION/TRAINING PROGRAM

## 2024-03-29 NOTE — PROGRESS NOTES
Ortho Sports Medicine New Patient Hand/Wrist Visit     Assesment:   53 y.o. female left wrist and forearm pain and swelling for approximately 5 weeks after lifting injury with MRI revealing DeQuervain's tenosynovitis and exam also concerning for cervical radiculopathy.    Plan:  I reviewed the history, exam, and imaging with the patient in clinic today.  I did review the results of the MRI which show left wrist de Quervain's tenosynovitis.  The patient also continues to endorse neck pain and pain rating from her neck down to her fingers with tingling in the fingers.  I did discuss this is most likely due to cervical pathology.  She has not been seen by spine or pain.  For her neck pain, I recommended referral to spine and pain for evaluation of her cervical spine.  I discussed potential treatment options for her Decore veins focusing on conservative management including bracing, anti-inflammatories, and possible injection.  Patient is unable to take anti-inflammatories given her history of gastric bypass surgery.  I offered her thumb spica brace which the patient was amenable to.  Patient was also interested in a injection.  I discussed that I would refer her to hand surgery for possible injections.  Patient demonstrated understanding discussion was agreed the plan.  All of her questions were answered.  She can follow-up on an as-needed basis.  She reach out to clinic with any question concerns anytime.    Conservative treatment:  Ice to forearm  Continue current pain medication regimen  Bilateral thumb spica braces provided  Referral placed to Dr. Wang for consideration of a corticosteroid injection   Referral provided to Spine and Pain Management for symptoms concerning for cervical radiculopathy.    Imaging:  All imaging from prior to today was reviewed by myself and explained to the patient.     Injection:  No Injection planned at this time.    Surgery:  No surgery is recommended at this point, continue with  conservative treatment plan as noted.    Follow up:  Return if symptoms worsen or fail to improve.        Chief Complaint   Patient presents with    Left Wrist - Pain       History of Present Illness:  The patient is a 53 y.o., right hand dominant female presents for an 11 day follow up to review her recent MRI. She states she continues to have pain in the right wrist and also into the shoulder. She also has pain in the left wrist as well.  At her last appointment, she was given a brace for the left wrist. She also notes continued numbness in the fingertips.    Previous HPI from 3/18/24:  The patient is a 53 y.o., right hand dominant female whose occupation is disabled, referred to me by the emergency room, seen in clinic for consultation of left hand/wrist.  The patient sustained an injury 3 weeks ago.  The mechanism of injury was lifting/reaching a dresser. She states she is unsure if there was a pop. She states she felt immediate pain. The pain is characterized as shooting.  The pain is present at all times. Pain worse with wrist extension. Pain is improved by rest.  Pain is aggravated by overhead activity and movement.    Symptoms include swelling. The patient has weakness.  The patient has numbness and tingling along all the fingertips. The patient has tried rest and muscle relaxers and gabapentin.  She notes some pain in her shoulder but notes pain along the entire arm.        Hand/wrist Surgical History:  None    Past Medical, Social and Family History:  Past Medical History:   Diagnosis Date    Asthma     GERD (gastroesophageal reflux disease)     H/O gastric bypass     History of methamphetamine abuse (HCC)     Positive UDS; Feb 2020    Hypokalemia     Pyelonephritis      Past Surgical History:   Procedure Laterality Date    CHOLECYSTECTOMY      DENTAL SURGERY      ESOPHAGEAL DILATION      GASTRIC BYPASS      CT LAPS ABD PRTM&OMENTUM DX W/WO SPEC BR/WA SPX N/A 1/11/2024    Procedure: LAPAROSCOPY DIAGNOSTIC,  REDUCTION AND REPAIR OF INTERNAL HERNIA;  Surgeon: Ruben Milner MD;  Location: [facility];  Service: Bariatrics    EILEEN-EN-Y PROCEDURE      SUBTOTAL COLECTOMY      TOOTH EXTRACTION      TUBAL LIGATION       Allergies   Allergen Reactions    Morphine      anaphylactic    Morphine Vomiting    Suboxone [Buprenorphine-Naloxone] Abdominal Pain    Sulfamethoxazole-Trimethoprim Hives    Sulfamethoxazole-Trimethoprim Nausea Only     Current Outpatient Medications on File Prior to Visit   Medication Sig Dispense Refill    acarbose (PRECOSE) 25 mg tablet Take 1 tablet (25 mg total) by mouth 3 (three) times a day with meals 90 tablet 2    albuterol (PROVENTIL HFA,VENTOLIN HFA) 90 mcg/act inhaler Inhale 2 puffs every 6 (six) hours as needed for wheezing 18 g 2    Blood Glucose Monitoring Suppl (OneTouch Verio Reflect) w/Device KIT Check blood sugars once daily. Please substitute with appropriate alternative as covered by patient's insurance. Dx: E11.65 1 kit 0    busPIRone (BUSPAR) 7.5 mg tablet take 1 tablet by mouth twice a day 60 tablet 5    dicyclomine (BENTYL) 20 mg tablet take 1 tablet by mouth twice a day if needed for ABDOMINAL PAIN 20 tablet 1    ergocalciferol (VITAMIN D2) 50,000 units Take 1 capsule (50,000 Units total) by mouth once a week 12 capsule 0    fluticasone (FLONASE) 50 mcg/act nasal spray 1 spray into each nostril daily 16 g 1    gabapentin (Neurontin) 300 mg capsule Take 1 capsule (300 mg total) by mouth daily at bedtime for 3 days, THEN 1 capsule (300 mg total) 2 (two) times a day for 3 days, THEN 1 capsule (300 mg total) 3 (three) times a day for 3 days. 90 capsule 0    glucose blood (OneTouch Verio) test strip Use 1 each 4 (four) times a day (before meals and at bedtime) Check blood sugars once daily. Please substitute with appropriate alternative as covered by patient's insurance. Dx: E11.65 100 each 3    lactulose 20 g/30 mL Take 30 mL (20 g total) by mouth 2 (two) times a day as needed  (constipation) 946 mL 2    lubiprostone (AMITIZA) 24 mcg capsule Take 1 capsule (24 mcg total) by mouth 2 (two) times a day with meals 60 capsule 11    melatonin 3 mg Take 1 tablet (3 mg total) by mouth daily at bedtime 20 tablet 0    methocarbamol (ROBAXIN) 500 mg tablet Take 1 tablet (500 mg total) by mouth 2 (two) times a day 20 tablet 0    nicotine (NICODERM CQ) 14 mg/24hr TD 24 hr patch Place 1 patch on the skin over 24 hours daily 28 patch 2    nystatin powder Apply topically 2 (two) times a day 60 g 1    omeprazole (PriLOSEC) 40 MG capsule Take 1 capsule (40 mg total) by mouth daily 90 capsule 3    ondansetron (ZOFRAN) 4 mg tablet Take 1 tablet (4 mg total) by mouth every 8 (eight) hours as needed for nausea or vomiting 20 tablet 0    OneTouch Delica Lancets 33G MISC Check blood sugars once daily. Please substitute with appropriate alternative as covered by patient's insurance. Dx: E11.65 100 each 3    oxyCODONE-acetaminophen (PERCOCET) 5-325 mg per tablet Take 1 tablet by mouth every 8 (eight) hours as needed for moderate pain for up to 10 doses Max Daily Amount: 3 tablets 15 tablet 0    therapeutic multivitamin-minerals (THERAGRAN-M) tablet Take 1 tablet by mouth daily      vitamin B-12 (VITAMIN B-12) 500 mcg tablet take 1 tablet by mouth once daily 30 tablet 2    Continuous Blood Gluc  (Dexcom G7 ) TOMEKA Use 1 each continuous (Patient not taking: Reported on 3/20/2024) 1 each 0    Continuous Blood Gluc Sensor (Dexcom G7 Sensor) Use 1 Device every 10 days (Patient not taking: Reported on 3/20/2024) 9 each 2     No current facility-administered medications on file prior to visit.     Social History     Socioeconomic History    Marital status: Single     Spouse name: Not on file    Number of children: 2    Years of education: Not on file    Highest education level: Not on file   Occupational History    Not on file   Tobacco Use    Smoking status: Every Day     Current packs/day: 0.50      "Average packs/day: 0.5 packs/day for 38.0 years (19.0 ttl pk-yrs)     Types: Cigarettes    Smokeless tobacco: Never   Vaping Use    Vaping status: Never Used   Substance and Sexual Activity    Alcohol use: Never    Drug use: Not Currently     Types: Methamphetamines     Comment: reports use this week (12/2020)    Sexual activity: Not Currently     Partners: Male     Birth control/protection: Post-menopausal   Other Topics Concern    Not on file   Social History Narrative    ** Merged History Encounter **         Caffeine use    Ready to quit smoking    Never - 28 yrs male , 2 children    Lives with her mother    Unemployed and on SSI     Social Determinants of Health     Financial Resource Strain: Not on file   Food Insecurity: No Food Insecurity (1/12/2024)    Hunger Vital Sign     Worried About Running Out of Food in the Last Year: Never true     Ran Out of Food in the Last Year: Never true   Transportation Needs: Unmet Transportation Needs (1/12/2024)    PRAPARE - Transportation     Lack of Transportation (Medical): Yes     Lack of Transportation (Non-Medical): Yes   Physical Activity: Not on file   Stress: Not on file   Social Connections: Not on file   Intimate Partner Violence: Not on file   Housing Stability: Low Risk  (1/12/2024)    Housing Stability Vital Sign     Unable to Pay for Housing in the Last Year: No     Number of Places Lived in the Last Year: 1     Unstable Housing in the Last Year: No         I have reviewed the past medical, surgical, social and family history, medications and allergies as documented in the EMR.    Review of systems: ROS is negative other than that noted in the HPI.    Physical Exam:    Blood pressure 115/82, pulse 61, height 5' 2\" (1.575 m), weight 85.7 kg (189 lb).    General/Constitutional: NAD, well developed, well nourished  HENT: Normocephalic, atraumatic  CV: Intact distal pulses, regular rate  Resp: No respiratory distress or labored breathing  Neuro: " Alert and Oriented x 3  Psych: Normal mood, normal affect, normal judgement, normal behavior  Skin: Warm, dry, no rashes, no erythema      Left Wrist/Hand focused exam:     Skin is intact.  No erythema or ecchymosis.  Mild swelling of the forearm and wrist.    Tender to palpation at the wrist and forearm musculature.  No tenderness over the medial or lateral epicondyle, olecranon, radial head.    No pain with range of motion of the elbow.  Pain with range of motion of the wrist most pronounced with wrist extension and finger extension.    UE NV Exam: +2 Radial pulses bilaterally  Sensation intact to light touch C5-T1 bilaterally, Radial/median/ulnar nerve motor intact.  Endorses numbness in her fingertips but has a history of diabetes    Cervical ROM is full without pain, numbness or tingling    Negative spurling maneuver bilaterally      Hand/Wrist Imaging:    X-rays of the left wrist, forearm, humerus, and shoulder were obtained on 3/16/2020 and reviewed with the patient.  Based on my review, there is no evidence of fracture at the wrist, forearm, elbow, or shoulder.  No evidence of dislocation.  No significant degenerative changes.  I agree with the radiology report.    MRI of the left wrist obtained on 3/22/24 revealed DeQuervain's tenosynovitis.     Scribe Attestation      I,:  Lottie Seo PA-C am acting as a scribe while in the presence of the attending physician.:       I,:  Patrick Lockhart MD personally performed the services described in this documentation    as scribed in my presence.:

## 2024-04-08 ENCOUNTER — OFFICE VISIT (OUTPATIENT)
Dept: OBGYN CLINIC | Facility: CLINIC | Age: 54
End: 2024-04-08
Payer: COMMERCIAL

## 2024-04-08 VITALS
HEIGHT: 62 IN | WEIGHT: 189 LBS | BODY MASS INDEX: 34.78 KG/M2 | HEART RATE: 82 BPM | DIASTOLIC BLOOD PRESSURE: 82 MMHG | SYSTOLIC BLOOD PRESSURE: 130 MMHG

## 2024-04-08 DIAGNOSIS — M65.4 DE QUERVAIN'S TENOSYNOVITIS, LEFT: ICD-10-CM

## 2024-04-08 DIAGNOSIS — M65.4 DE QUERVAIN'S TENOSYNOVITIS, RIGHT: Primary | ICD-10-CM

## 2024-04-08 PROCEDURE — 20550 NJX 1 TENDON SHEATH/LIGAMENT: CPT | Performed by: ORTHOPAEDIC SURGERY

## 2024-04-08 PROCEDURE — 99203 OFFICE O/P NEW LOW 30 MIN: CPT | Performed by: ORTHOPAEDIC SURGERY

## 2024-04-08 RX ORDER — BUPIVACAINE HYDROCHLORIDE 2.5 MG/ML
0.5 INJECTION, SOLUTION INFILTRATION; PERINEURAL
Status: COMPLETED | OUTPATIENT
Start: 2024-04-08 | End: 2024-04-08

## 2024-04-08 RX ORDER — BETAMETHASONE SODIUM PHOSPHATE AND BETAMETHASONE ACETATE 3; 3 MG/ML; MG/ML
3 INJECTION, SUSPENSION INTRA-ARTICULAR; INTRALESIONAL; INTRAMUSCULAR; SOFT TISSUE
Status: COMPLETED | OUTPATIENT
Start: 2024-04-08 | End: 2024-04-08

## 2024-04-08 RX ADMIN — BUPIVACAINE HYDROCHLORIDE 0.5 ML: 2.5 INJECTION, SOLUTION INFILTRATION; PERINEURAL at 13:30

## 2024-04-08 RX ADMIN — BETAMETHASONE SODIUM PHOSPHATE AND BETAMETHASONE ACETATE 3 MG: 3; 3 INJECTION, SUSPENSION INTRA-ARTICULAR; INTRALESIONAL; INTRAMUSCULAR; SOFT TISSUE at 13:30

## 2024-04-08 NOTE — PROGRESS NOTES
ASSESSMENT/PLAN:    Diagnoses and all orders for this visit:    De Quervain's tenosynovitis, right    De Quervain's tenosynovitis, left  -     Ambulatory Referral to Orthopedic Surgery    Other orders  -     Hand/upper extremity injection        The patient was seen and examined.  She has de Quervain's tenosynovitis of both wrist.  Possible treatment options were discussed with the patient.  She elected for corticosteroid injections.  Both of the patient's first dorsal compartments were injected with Celestone and Marcaine.  She tolerated the injections quite well.  She will follow-up with our office in 2 months.  She is acceptable to this plan.    Return in about 2 months (around 6/8/2024).    The patient has first dorsal compartment tendinitis bilaterally.  These were injected with Celestone and Marcaine.  She tolerated procedures quite well.  Return back in 2 months for reevaluation      _____________________________________________________  CHIEF COMPLAINT:  Chief Complaint   Patient presents with    Left Wrist - Pain    Right Wrist - Pain         SUBJECTIVE:  Marylou Morris is a 53 y.o. female who presents to our office with pain along both wrist.  The pain is along the first dorsal compartments of both wrists.  She denies any new falls or trauma.  She denies any fever or chills.  She was referred to our office by Dr. Lockhart for possible corticosteroid injections.    The following portions of the patient's history were reviewed and updated as appropriate: allergies, current medications, past family history, past medical history, past social history, past surgical history and problem list.    PAST MEDICAL HISTORY:  Past Medical History:   Diagnosis Date    Asthma     GERD (gastroesophageal reflux disease)     H/O gastric bypass     History of methamphetamine abuse (HCC)     Positive UDS; Feb 2020    Hypokalemia     Pyelonephritis        PAST SURGICAL HISTORY:  Past Surgical History:   Procedure Laterality Date  "   CHOLECYSTECTOMY      DENTAL SURGERY      ESOPHAGEAL DILATION      GASTRIC BYPASS      TX LAPS ABD PRTM&OMENTUM DX W/WO SPEC BR/WA SPX N/A 2024    Procedure: LAPAROSCOPY DIAGNOSTIC, REDUCTION AND REPAIR OF INTERNAL HERNIA;  Surgeon: Ruben Milner MD;  Location: [facility];  Service: Bariatrics    EILEEN-EN-Y PROCEDURE      SUBTOTAL COLECTOMY      TOOTH EXTRACTION      TUBAL LIGATION         FAMILY HISTORY:  Family History   Problem Relation Age of Onset    Kidney disease Mother         Pt also reports \"bone disease\"    Hypertension Mother     Diabetes Mother     Heart disease Mother     Stroke Mother     Arthritis Mother     Hyperlipidemia Mother     Colon cancer Father          motor cycle accident    Heart disease Sister     Coronary artery disease Sister     No Known Problems Sister     No Known Problems Sister     No Known Problems Daughter     No Known Problems Daughter     Breast cancer Maternal Grandmother     Lung cancer Maternal Grandmother     Lung cancer Maternal Grandfather     Lung cancer Paternal Grandmother     Lung cancer Paternal Grandfather     Cancer Brother     Heart attack Brother     Heart disease Brother     Coronary artery disease Brother     Uterine cancer Maternal Aunt     No Known Problems Maternal Aunt     No Known Problems Maternal Aunt     Lung cancer Maternal Aunt     Lung cancer Maternal Aunt     Thyroid cancer Maternal Uncle     No Known Problems Paternal Aunt     No Known Problems Paternal Aunt     Lung cancer Paternal Uncle     Lung cancer Cousin        SOCIAL HISTORY:  Social History     Tobacco Use    Smoking status: Every Day     Current packs/day: 0.50     Average packs/day: 0.5 packs/day for 38.0 years (19.0 ttl pk-yrs)     Types: Cigarettes    Smokeless tobacco: Never   Vaping Use    Vaping status: Never Used   Substance Use Topics    Alcohol use: Never    Drug use: Not Currently     Types: Methamphetamines     Comment: reports use this week (2020) "       MEDICATIONS:    Current Outpatient Medications:     acarbose (PRECOSE) 25 mg tablet, Take 1 tablet (25 mg total) by mouth 3 (three) times a day with meals, Disp: 90 tablet, Rfl: 2    albuterol (PROVENTIL HFA,VENTOLIN HFA) 90 mcg/act inhaler, Inhale 2 puffs every 6 (six) hours as needed for wheezing, Disp: 18 g, Rfl: 2    Blood Glucose Monitoring Suppl (OneTouch Verio Reflect) w/Device KIT, Check blood sugars once daily. Please substitute with appropriate alternative as covered by patient's insurance. Dx: E11.65, Disp: 1 kit, Rfl: 0    busPIRone (BUSPAR) 7.5 mg tablet, take 1 tablet by mouth twice a day, Disp: 60 tablet, Rfl: 5    dicyclomine (BENTYL) 20 mg tablet, take 1 tablet by mouth twice a day if needed for ABDOMINAL PAIN, Disp: 20 tablet, Rfl: 1    ergocalciferol (VITAMIN D2) 50,000 units, Take 1 capsule (50,000 Units total) by mouth once a week, Disp: 12 capsule, Rfl: 0    fluticasone (FLONASE) 50 mcg/act nasal spray, 1 spray into each nostril daily, Disp: 16 g, Rfl: 1    glucose blood (OneTouch Verio) test strip, Use 1 each 4 (four) times a day (before meals and at bedtime) Check blood sugars once daily. Please substitute with appropriate alternative as covered by patient's insurance. Dx: E11.65, Disp: 100 each, Rfl: 3    lactulose 20 g/30 mL, Take 30 mL (20 g total) by mouth 2 (two) times a day as needed (constipation), Disp: 946 mL, Rfl: 2    lubiprostone (AMITIZA) 24 mcg capsule, Take 1 capsule (24 mcg total) by mouth 2 (two) times a day with meals, Disp: 60 capsule, Rfl: 11    melatonin 3 mg, Take 1 tablet (3 mg total) by mouth daily at bedtime, Disp: 20 tablet, Rfl: 0    methocarbamol (ROBAXIN) 500 mg tablet, Take 1 tablet (500 mg total) by mouth 2 (two) times a day, Disp: 20 tablet, Rfl: 0    nicotine (NICODERM CQ) 14 mg/24hr TD 24 hr patch, Place 1 patch on the skin over 24 hours daily, Disp: 28 patch, Rfl: 2    nystatin powder, Apply topically 2 (two) times a day, Disp: 60 g, Rfl: 1     omeprazole (PriLOSEC) 40 MG capsule, Take 1 capsule (40 mg total) by mouth daily, Disp: 90 capsule, Rfl: 3    ondansetron (ZOFRAN) 4 mg tablet, Take 1 tablet (4 mg total) by mouth every 8 (eight) hours as needed for nausea or vomiting, Disp: 20 tablet, Rfl: 0    OneTouch Delica Lancets 33G MISC, Check blood sugars once daily. Please substitute with appropriate alternative as covered by patient's insurance. Dx: E11.65, Disp: 100 each, Rfl: 3    oxyCODONE-acetaminophen (PERCOCET) 5-325 mg per tablet, Take 1 tablet by mouth every 8 (eight) hours as needed for moderate pain for up to 10 doses Max Daily Amount: 3 tablets, Disp: 15 tablet, Rfl: 0    therapeutic multivitamin-minerals (THERAGRAN-M) tablet, Take 1 tablet by mouth daily, Disp: , Rfl:     vitamin B-12 (VITAMIN B-12) 500 mcg tablet, take 1 tablet by mouth once daily, Disp: 30 tablet, Rfl: 2    Continuous Blood Gluc  (Dexcom G7 ) TOMEKA, Use 1 each continuous (Patient not taking: Reported on 3/20/2024), Disp: 1 each, Rfl: 0    Continuous Blood Gluc Sensor (Dexcom G7 Sensor), Use 1 Device every 10 days (Patient not taking: Reported on 3/20/2024), Disp: 9 each, Rfl: 2    gabapentin (Neurontin) 300 mg capsule, Take 1 capsule (300 mg total) by mouth daily at bedtime for 3 days, THEN 1 capsule (300 mg total) 2 (two) times a day for 3 days, THEN 1 capsule (300 mg total) 3 (three) times a day for 3 days., Disp: 90 capsule, Rfl: 0    ALLERGIES:  Allergies   Allergen Reactions    Morphine      anaphylactic    Morphine Vomiting    Suboxone [Buprenorphine-Naloxone] Abdominal Pain    Sulfamethoxazole-Trimethoprim Hives    Sulfamethoxazole-Trimethoprim Nausea Only       ROS:  Review of Systems     Constitutional: Negative for fatigue, fever or loss of appetite.   HENT: Negative.    Respiratory: Negative for shortness of breath, dyspnea.    Cardiovascular: Negative for chest pain/tightness.   Gastrointestinal: Negative for abdominal pain, N/V.   Endocrine:  "Negative for cold/heat intolerance, unexplained weight loss/gain.   Genitourinary: Negative for flank pain, dysuria, hematuria.   Musculoskeletal: Positive for arthralgia   Skin: Negative for rash.    Neurological: Negative for numbness or tingling  Psychiatric/Behavioral: Negative for agitation.  _____________________________________________________  PHYSICAL EXAMINATION:    Blood pressure 130/82, pulse 82, height 5' 2\" (1.575 m), weight 85.7 kg (189 lb).    Constitutional: Oriented to person, place, and time. Appears well-developed and well-nourished. No distress.   HENT:   Head: Normocephalic.   Eyes: Conjunctivae are normal. Right eye exhibits no discharge. Left eye exhibits no discharge. No scleral icterus.   Cardiovascular: Normal rate.    Pulmonary/Chest: Effort normal.   Neurological: Alert and oriented to person, place, and time.   Skin: Skin is warm and dry. No rash noted. Not diaphoretic. No erythema. No pallor.   Psychiatric: Normal mood and affect. Behavior is normal. Judgment and thought content normal.      MUSCULOSKELETAL EXAMINATION:   Physical Exam  Ortho Exam    Bilateral upper extremities are neurovascularly intact  Fingers are pink and mobile  Compartments are soft  Positive Finkelstein's bilaterally  Tenderness to palpation along both first dorsal compartments  Brisk cap refill and sensation intact    Objective:  BP Readings from Last 1 Encounters:   04/08/24 130/82      Wt Readings from Last 1 Encounters:   04/08/24 85.7 kg (189 lb)        BMI:   Estimated body mass index is 34.57 kg/m² as calculated from the following:    Height as of this encounter: 5' 2\" (1.575 m).    Weight as of this encounter: 85.7 kg (189 lb).      PROCEDURES PERFORMED:  Hand/upper extremity injection: bilateral extensor compartment 1  Universal Protocol:  Risks and benefits: risks, benefits and alternatives were discussed  Consent given by: patient  Site marked: the operative site was marked  Supporting " Documentation  Indications: pain   Procedure Details  Condition:de Quervain's tenosynovitis Site: bilateral extensor compartment 1   Preparation: Patient was prepped and draped in the usual sterile fashion  Needle size: 25 G  Ultrasound guidance: no  Approach: radial  Medications (Right): 0.5 mL bupivacaine 0.25 %; 3 mg betamethasone acetate-betamethasone sodium phosphate 6 (3-3) mg/mLMedications (Left): 0.5 mL bupivacaine 0.25 %; 3 mg betamethasone acetate-betamethasone sodium phosphate 6 (3-3) mg/mL   Patient tolerance: patient tolerated the procedure well with no immediate complications  Dressing:  Sterile dressing applied             Scribe Attestation      I,:  Ari Mayberry PA-C am acting as a scribe while in the presence of the attending physician.:       I,:  Chintan Wang DO personally performed the services described in this documentation    as scribed in my presence.:

## 2024-04-09 ENCOUNTER — CONSULT (OUTPATIENT)
Age: 54
End: 2024-04-09
Payer: COMMERCIAL

## 2024-04-09 ENCOUNTER — TELEPHONE (OUTPATIENT)
Dept: FAMILY MEDICINE CLINIC | Facility: CLINIC | Age: 54
End: 2024-04-09

## 2024-04-09 VITALS
HEIGHT: 62 IN | WEIGHT: 189 LBS | DIASTOLIC BLOOD PRESSURE: 86 MMHG | SYSTOLIC BLOOD PRESSURE: 155 MMHG | BODY MASS INDEX: 34.78 KG/M2 | HEART RATE: 67 BPM

## 2024-04-09 DIAGNOSIS — G89.4 CHRONIC PAIN SYNDROME: ICD-10-CM

## 2024-04-09 DIAGNOSIS — M25.562 CHRONIC PAIN OF BOTH KNEES: ICD-10-CM

## 2024-04-09 DIAGNOSIS — M79.18 MYOFASCIAL PAIN SYNDROME: ICD-10-CM

## 2024-04-09 DIAGNOSIS — M54.12 RADICULOPATHY, CERVICAL REGION: ICD-10-CM

## 2024-04-09 DIAGNOSIS — M25.561 CHRONIC PAIN OF BOTH KNEES: ICD-10-CM

## 2024-04-09 DIAGNOSIS — R52 PAIN: ICD-10-CM

## 2024-04-09 DIAGNOSIS — R29.898 ARM WEAKNESS: ICD-10-CM

## 2024-04-09 DIAGNOSIS — M54.16 LUMBAR RADICULOPATHY: Primary | ICD-10-CM

## 2024-04-09 DIAGNOSIS — G89.29 CHRONIC PAIN OF BOTH KNEES: ICD-10-CM

## 2024-04-09 DIAGNOSIS — R29.898 LEG WEAKNESS, BILATERAL: ICD-10-CM

## 2024-04-09 PROCEDURE — 99204 OFFICE O/P NEW MOD 45 MIN: CPT | Performed by: ANESTHESIOLOGY

## 2024-04-09 RX ORDER — METHYLPREDNISOLONE 4 MG/1
TABLET ORAL
Qty: 21 TABLET | Refills: 0 | Status: SHIPPED | OUTPATIENT
Start: 2024-04-09

## 2024-04-09 RX ORDER — GABAPENTIN 600 MG/1
600 TABLET ORAL 3 TIMES DAILY
Qty: 90 TABLET | Refills: 1 | Status: SHIPPED | OUTPATIENT
Start: 2024-04-09 | End: 2024-05-09

## 2024-04-09 RX ORDER — TIZANIDINE 4 MG/1
4 TABLET ORAL EVERY 8 HOURS PRN
Qty: 90 TABLET | Refills: 0 | Status: SHIPPED | OUTPATIENT
Start: 2024-04-09 | End: 2024-05-09

## 2024-04-09 NOTE — TELEPHONE ENCOUNTER
Yes correct she would need to contact them/discuss pain medications with them going forward. Thanks!

## 2024-04-09 NOTE — PROGRESS NOTES
Assessment:  1. Lumbar radiculopathy    2. Radiculopathy, cervical region    3. Pain    4. Chronic pain of both knees    5. Myofascial pain syndrome    6. Leg weakness, bilateral    7. Arm weakness    8. Chronic pain syndrome        Plan:  Patient is a 53-year-old female complains of bilateral shoulder pain, bilateral wrist pain, low back pain, bilateral knee pain and left leg pain presents to office for initial consultation.  Patient has difficulty holding objects in her hand and lifting objects secondary to the significant weakness and pain that she experiences when doing any activity.  Patient also has difficulty standing up out of a chair often have to walk back and forth and pulling herself up which is a great tour and causes immense pain.  Patient has significant weakness in both upper extremities and lower extremities which is noted on the physical exam.  At this time we feel that patient is not a candidate for physical therapy and we will need to get diagnostic images to assess the pathology by patient's current presentation.  Patient reported Percocet provided her with no relief at all.  At this time there is not a medication that has been able to provide patient any significant alleviation of symptoms.  1.  We will order an x-ray of the lumbar spine to better assess the discogenic pathology degenerative change and correlate patient current presentation.  2.  Order an MRI of the cervical and lumbar spine to assess for the discogenic pathology behind patient's current upper extremity and lower extremity radicular symptoms and weakness  3.  We will trial a Medrol Dosepak for radicular symptoms  4.  We will titrate gabapentin up to 600 mg p.o. 3 times daily  5.  Will  order x-rays of bilateral knees to assess for any arthritic changes or correlate patient current presentation.  6.  We will follow-up 1 month review diagnostic imaging and plan for interventional management        History of Present Illness:     The patient is a 53 y.o. female who presents for consultation in regards to Shoulder Pain, Wrist Pain, Hand Pain, Hip Pain, Rib Pain, and Knee Pain.  Symptoms have been present for 5 years. Symptoms began without any precipitating injury or trauma. Pain is reported to be 10 on the numeric rating scale.  Symptoms are felt constantly and worst in the no typical pattern.  Symptoms are characterized as burning, sharp, numbing, tingling, and pressure-like.  Symptoms are associated with bilateral arm weakness and bilateral leg weakness.  Aggravating factors include lying down, standing, bending, leaning forward, leaning bckward, sitting, walking, exercise, turning the head, relaxation, and coughing/sneezing.  Relieving factors include kneeling, coughing/sneezing, and bowel movements.  Patient has not tried any pain relieving modalities at this time.  Medications to relieve symptoms include gabapentin Robaxin Percocet 5/325.    Review of Systems:    Review of Systems   Constitutional:  Positive for unexpected weight change. Negative for chills, fatigue and fever.   HENT:  Negative for hearing loss, sinus pain, sore throat and trouble swallowing.    Eyes:  Negative for pain and visual disturbance.   Respiratory:  Negative for shortness of breath and wheezing.    Cardiovascular:  Negative for chest pain and palpitations.   Gastrointestinal:  Negative for abdominal pain, constipation and nausea.   Endocrine: Positive for polyphagia. Negative for polydipsia and polyuria.   Genitourinary:  Negative for difficulty urinating.   Musculoskeletal:  Positive for joint swelling and myalgias. Negative for arthralgias and gait problem.        Joint pain   Skin:  Negative for rash.   Neurological:  Positive for numbness and headaches. Negative for dizziness and weakness.   Hematological:  Does not bruise/bleed easily.   Psychiatric/Behavioral:  Negative for dysphoric mood. The patient is not nervous/anxious.    All other systems  "reviewed and are negative.      Past Medical History:   Diagnosis Date    Asthma     GERD (gastroesophageal reflux disease)     H/O gastric bypass     History of methamphetamine abuse (HCC)     Positive UDS; 2020    Hypokalemia     Pyelonephritis        Past Surgical History:   Procedure Laterality Date    CHOLECYSTECTOMY      DENTAL SURGERY      ESOPHAGEAL DILATION      GASTRIC BYPASS      KS LAPS ABD PRTM&OMENTUM DX W/WO SPEC BR/WA SPX N/A 2024    Procedure: LAPAROSCOPY DIAGNOSTIC, REDUCTION AND REPAIR OF INTERNAL HERNIA;  Surgeon: Ruben Milner MD;  Location: [facility];  Service: Bariatrics    EILEEN-EN-Y PROCEDURE      SUBTOTAL COLECTOMY      TOOTH EXTRACTION      TUBAL LIGATION         Family History   Problem Relation Age of Onset    Kidney disease Mother         Pt also reports \"bone disease\"    Hypertension Mother     Diabetes Mother     Heart disease Mother     Stroke Mother     Arthritis Mother     Hyperlipidemia Mother     Colon cancer Father          motor cycle accident    Heart disease Sister     Coronary artery disease Sister     No Known Problems Sister     No Known Problems Sister     No Known Problems Daughter     No Known Problems Daughter     Breast cancer Maternal Grandmother     Lung cancer Maternal Grandmother     Lung cancer Maternal Grandfather     Lung cancer Paternal Grandmother     Lung cancer Paternal Grandfather     Cancer Brother     Heart attack Brother     Heart disease Brother     Coronary artery disease Brother     Uterine cancer Maternal Aunt     No Known Problems Maternal Aunt     No Known Problems Maternal Aunt     Lung cancer Maternal Aunt     Lung cancer Maternal Aunt     Thyroid cancer Maternal Uncle     No Known Problems Paternal Aunt     No Known Problems Paternal Aunt     Lung cancer Paternal Uncle     Lung cancer Cousin        Social History     Occupational History    Not on file   Tobacco Use    Smoking status: Every Day     Current packs/day: 0.50 "     Average packs/day: 0.5 packs/day for 38.0 years (19.0 ttl pk-yrs)     Types: Cigarettes    Smokeless tobacco: Never   Vaping Use    Vaping status: Never Used   Substance and Sexual Activity    Alcohol use: Never    Drug use: Not Currently     Types: Methamphetamines     Comment: reports use this week (12/2020)    Sexual activity: Not Currently     Partners: Male     Birth control/protection: Post-menopausal         Current Outpatient Medications:     acarbose (PRECOSE) 25 mg tablet, Take 1 tablet (25 mg total) by mouth 3 (three) times a day with meals, Disp: 90 tablet, Rfl: 2    albuterol (PROVENTIL HFA,VENTOLIN HFA) 90 mcg/act inhaler, Inhale 2 puffs every 6 (six) hours as needed for wheezing, Disp: 18 g, Rfl: 2    Blood Glucose Monitoring Suppl (OneTouch Verio Reflect) w/Device KIT, Check blood sugars once daily. Please substitute with appropriate alternative as covered by patient's insurance. Dx: E11.65, Disp: 1 kit, Rfl: 0    busPIRone (BUSPAR) 7.5 mg tablet, take 1 tablet by mouth twice a day, Disp: 60 tablet, Rfl: 5    dicyclomine (BENTYL) 20 mg tablet, take 1 tablet by mouth twice a day if needed for ABDOMINAL PAIN, Disp: 20 tablet, Rfl: 1    ergocalciferol (VITAMIN D2) 50,000 units, Take 1 capsule (50,000 Units total) by mouth once a week, Disp: 12 capsule, Rfl: 0    fluticasone (FLONASE) 50 mcg/act nasal spray, 1 spray into each nostril daily, Disp: 16 g, Rfl: 1    glucose blood (OneTouch Verio) test strip, Use 1 each 4 (four) times a day (before meals and at bedtime) Check blood sugars once daily. Please substitute with appropriate alternative as covered by patient's insurance. Dx: E11.65, Disp: 100 each, Rfl: 3    lactulose 20 g/30 mL, Take 30 mL (20 g total) by mouth 2 (two) times a day as needed (constipation), Disp: 946 mL, Rfl: 2    lubiprostone (AMITIZA) 24 mcg capsule, Take 1 capsule (24 mcg total) by mouth 2 (two) times a day with meals, Disp: 60 capsule, Rfl: 11    melatonin 3 mg, Take 1  tablet (3 mg total) by mouth daily at bedtime, Disp: 20 tablet, Rfl: 0    methocarbamol (ROBAXIN) 500 mg tablet, Take 1 tablet (500 mg total) by mouth 2 (two) times a day, Disp: 20 tablet, Rfl: 0    nicotine (NICODERM CQ) 14 mg/24hr TD 24 hr patch, Place 1 patch on the skin over 24 hours daily, Disp: 28 patch, Rfl: 2    nystatin powder, Apply topically 2 (two) times a day, Disp: 60 g, Rfl: 1    omeprazole (PriLOSEC) 40 MG capsule, Take 1 capsule (40 mg total) by mouth daily, Disp: 90 capsule, Rfl: 3    ondansetron (ZOFRAN) 4 mg tablet, Take 1 tablet (4 mg total) by mouth every 8 (eight) hours as needed for nausea or vomiting, Disp: 20 tablet, Rfl: 0    OneTouch Delica Lancets 33G MISC, Check blood sugars once daily. Please substitute with appropriate alternative as covered by patient's insurance. Dx: E11.65, Disp: 100 each, Rfl: 3    oxyCODONE-acetaminophen (PERCOCET) 5-325 mg per tablet, Take 1 tablet by mouth every 8 (eight) hours as needed for moderate pain for up to 10 doses Max Daily Amount: 3 tablets, Disp: 15 tablet, Rfl: 0    therapeutic multivitamin-minerals (THERAGRAN-M) tablet, Take 1 tablet by mouth daily, Disp: , Rfl:     vitamin B-12 (VITAMIN B-12) 500 mcg tablet, take 1 tablet by mouth once daily, Disp: 30 tablet, Rfl: 2    Continuous Blood Gluc  (Dexcom G7 ) TOMEKA, Use 1 each continuous (Patient not taking: Reported on 3/20/2024), Disp: 1 each, Rfl: 0    Continuous Blood Gluc Sensor (Dexcom G7 Sensor), Use 1 Device every 10 days (Patient not taking: Reported on 3/20/2024), Disp: 9 each, Rfl: 2    gabapentin (Neurontin) 300 mg capsule, Take 1 capsule (300 mg total) by mouth daily at bedtime for 3 days, THEN 1 capsule (300 mg total) 2 (two) times a day for 3 days, THEN 1 capsule (300 mg total) 3 (three) times a day for 3 days., Disp: 90 capsule, Rfl: 0  No current facility-administered medications for this visit.    Allergies   Allergen Reactions    Morphine      anaphylactic    Morphine  "Vomiting    Suboxone [Buprenorphine-Naloxone] Abdominal Pain    Sulfamethoxazole-Trimethoprim Hives    Sulfamethoxazole-Trimethoprim Nausea Only       Physical Exam:    Ht 5' 2\" (1.575 m)   Wt 85.7 kg (189 lb)   LMP  (LMP Unknown)   BMI 34.57 kg/m²     Constitutional: normal, well developed, well nourished, alert, in no distress and non-toxic and no overt pain behavior. and obese  Eyes: anicteric  HEENT: grossly intact  Neck: supple, symmetric, trachea midline and no masses   Pulmonary:even and unlabored  Cardiovascular:No edema or pitting edema present  Skin:Normal without rashes or lesions and well hydrated  Psychiatric:Mood and affect appropriate  Neurologic:Cranial Nerves II-XII grossly intact  Musculoskeletal:antalgic    Cervical Spine examination demonstrates. Decreased ROM secondary to pain with lateral rotation to the left/right and bending to the left/right, in addition to neck flexion. 3/5 upper extremity strength in all muscle groups bilaterally. Negative Spurling's maneuver to the b/l Ue, sensitivity to light touch intact b/l Ue.    Lumbar/Sacral Spine examination demonstrates.  Decreased range of motion lumbar spine with pain upon: flexion, lateral rotation to the left/right, and bending to the left/right.  Bilateral lumbar paraspinals tender to palpation. Muscle spasms noted in the lumbar area bilaterally. 3/5 lower extremity strength in all muscle groups bilaterally. Positive seated straight leg raise for bilateral lower extremities.  Sensitivity to light touch intact bilateral lower extremities. 2+ reflexes in the patella and Achilles.  No ankle clonus    Imaging  No orders to display       No orders of the defined types were placed in this encounter.    "

## 2024-04-09 NOTE — TELEPHONE ENCOUNTER
Patient called the office today. She saw spine and pain today and they said she had a pinched nerve in her back. She said they gave her medications today. She said she needs a pain medicine. I advised that spine and pain would take care of this now and she asked me to ask Dr Britton.     Looks like Dr Ba gave her gabapentin, tizanidine, and prednisone.

## 2024-04-10 ENCOUNTER — TELEPHONE (OUTPATIENT)
Age: 54
End: 2024-04-10

## 2024-04-10 NOTE — TELEPHONE ENCOUNTER
Caller: Marylou    Doctor: dr ann    Reason for call: pt calling to ask if she can be written a script for percocet. Pt asked family doctor and was advised to ask us    Call back#: [phone]

## 2024-04-10 NOTE — TELEPHONE ENCOUNTER
S/w pt who was seen in consult yesterday 4/9/24 RM. Pt states she meant to ask in the consult but forgot. Pt asking if RM would be able to prescr her Percocet as her prev PCP did? RN advised pt that a OV would be reqr to discuss opioids, if the provider is willing to prescr, and a UDS would need to be collected at that time. RN advised once that is complete and reviewed then opioids are able to be prescribed. Pt verbalized understanding and states that steroids never aided in any pain relief in the past but the percocet allowed her to sleep. RN advised that the Tizanidine may help her sleep at night as well. Pt verbalized understanding.     Pls advise on whether or not pt would be able to come in for contract? Thank you!

## 2024-04-12 NOTE — TELEPHONE ENCOUNTER
S/W pt and advised. Currently is taking dose pack and notes a little improvement, continues with tizanidine and increased dose of gabapentin.  Advised she can take ES tylenol and use ice or heat as needed

## 2024-04-13 ENCOUNTER — HOSPITAL ENCOUNTER (OUTPATIENT)
Dept: MRI IMAGING | Facility: HOSPITAL | Age: 54
Discharge: HOME | End: 2024-04-13
Payer: COMMERCIAL

## 2024-04-13 DIAGNOSIS — M54.12 RADICULOPATHY, CERVICAL REGION: ICD-10-CM

## 2024-04-13 DIAGNOSIS — G89.4 CHRONIC PAIN SYNDROME: ICD-10-CM

## 2024-04-13 DIAGNOSIS — M54.16 LUMBAR RADICULOPATHY: ICD-10-CM

## 2024-04-13 PROCEDURE — 72141 MRI NECK SPINE W/O DYE: CPT

## 2024-04-13 PROCEDURE — 72148 MRI LUMBAR SPINE W/O DYE: CPT

## 2024-04-18 ENCOUNTER — TELEPHONE (OUTPATIENT)
Age: 54
End: 2024-04-18

## 2024-04-18 NOTE — TELEPHONE ENCOUNTER
S/w pt who states she is having incr pain in Lt arm w/ incr edema. Pt states she is unable to find positional relief and the Ok 600mg TID, Tizanidine, Tylenol 1000mg TID NSAID's, ice/heat do not provide relief either. Pt states she is having incr diff sleeping and pain currently is 10/10 sharp Lt arm. Pt denies SOB, CP, radiating jaw pain or dizziness/LOC.       Pt had steroid pack on 4/9 prescribed @ consult.   Pt compl Cerv/lumbar MRI on 4/13/24 waiting to be finalized.   RN will attempt to ro to [facility] for sooner results.     Pls advise. Thank you!

## 2024-04-18 NOTE — TELEPHONE ENCOUNTER
RN's pls keep in bin.     Awaiting final MRI results. Pt may need sooner appt due to pain. Current NOV 5/21/24.

## 2024-04-18 NOTE — TELEPHONE ENCOUNTER
S/w pt who states she is having incr pain in Lt arm w/ incr edema. Pt states she is unable to find positional relief and the Ok 600mg TID, Tizanidine, Tylenol 1000mg TID NSAID's, ice/heat do not provide relief either. Pt states she is having incr diff sleeping and pain currently is 10/10 sharp Lt arm. Pt denies SOB, CP, radiating jaw pain or dizziness/LOC.         Pt had steroid pack on 4/9 prescribed @ consult.       Based on this message, any recommendations?

## 2024-04-18 NOTE — TELEPHONE ENCOUNTER
Caller: Marylou    Doctor: Jesenia    Reason for call: Pt is calling to see if the Dr received her MRI results advised pt that the results have not been finalized yet but she should receive a phone call with her results once they are submitted. Pt was appreciative of information but did want the Dr to know that she has been having a hard time sleeping due to the amount of pain she is in.     Please advise    Call back#: [phone]

## 2024-04-19 NOTE — TELEPHONE ENCOUNTER
When I s/w pt yesterday she stated that the steroid pack helped for the first 2 days and then no longer provided relief.     Pls advise. Thank you!

## 2024-04-19 NOTE — TELEPHONE ENCOUNTER
S/w pt who states she has incr pain w/ movement of Lt UE out in front, out to side, and above head. Pt states she is able to complete those motions but having incr pain when doing. RN questioned if pt has had any prev shoulder injury/ trauma? Pt states no previous injury/trauma to Lt shoulder or arm. Pt states edema is unchanged in Lt UE from yesterdays call.     Pls advise. Thank you!

## 2024-04-19 NOTE — TELEPHONE ENCOUNTER
Does pt have pain when lifting arm above shoulder height with arms out like doing a jumping avelina

## 2024-04-22 ENCOUNTER — TELEPHONE (OUTPATIENT)
Dept: FAMILY MEDICINE CLINIC | Facility: CLINIC | Age: 54
End: 2024-04-22

## 2024-04-22 DIAGNOSIS — G47.00 INSOMNIA, UNSPECIFIED TYPE: Primary | ICD-10-CM

## 2024-04-22 NOTE — TELEPHONE ENCOUNTER
Pt called and states she can't get in to pain management until May 21 and still having really bad arm and lower back pain that is preventing her from sleeping. Would like you you to give her a call or let us know what can do in the mean time. Please advise.

## 2024-04-23 NOTE — TELEPHONE ENCOUNTER
She's already established with pain management though, should call their office for guidance in the meantime.

## 2024-04-24 NOTE — TELEPHONE ENCOUNTER
We have prescribed trazodone in the past for insomnia, does she recall did this work for her prior?

## 2024-04-24 NOTE — TELEPHONE ENCOUNTER
Spoke with pt, stated they are just doing shots in her arm and nothing else. Asking if something could be prescribed for sleep as she is having trouble sleeping due to pain.

## 2024-04-25 DIAGNOSIS — G89.4 CHRONIC PAIN SYNDROME: ICD-10-CM

## 2024-04-25 DIAGNOSIS — M79.18 MYOFASCIAL PAIN SYNDROME: Primary | ICD-10-CM

## 2024-04-25 RX ORDER — BACLOFEN 10 MG/1
10 TABLET ORAL 3 TIMES DAILY
Qty: 90 TABLET | Refills: 1 | Status: SHIPPED | OUTPATIENT
Start: 2024-04-25 | End: 2024-06-24

## 2024-04-25 NOTE — TELEPHONE ENCOUNTER
It looks like when I prescribed it prior, I sent 50 mg. I typically recommend starting with 50 mg, can increase to 100 mg from there, max dose 200 mg. Was it helpful while she was taking it?

## 2024-04-25 NOTE — TELEPHONE ENCOUNTER
Spoke with patient. States she remembers being on trazodone, unsure what dosing for sure, possibly 100 mg. states she feels she would have benefit from a higher dosage.

## 2024-04-25 NOTE — TELEPHONE ENCOUNTER
She said it took the edge off a little. She is in so much pain right now, though. Please send the 50Mg

## 2024-04-25 NOTE — TELEPHONE ENCOUNTER
Patient called in returning a missed call. Warm transfer to [facility] in clinical.

## 2024-04-26 ENCOUNTER — TELEPHONE (OUTPATIENT)
Age: 54
End: 2024-04-26

## 2024-04-26 DIAGNOSIS — G89.4 CHRONIC PAIN SYNDROME: ICD-10-CM

## 2024-04-26 DIAGNOSIS — K45.8 INTERNAL HERNIA: ICD-10-CM

## 2024-04-26 DIAGNOSIS — M54.12 RADICULOPATHY, CERVICAL REGION: ICD-10-CM

## 2024-04-26 DIAGNOSIS — M54.16 LUMBAR RADICULOPATHY: Primary | ICD-10-CM

## 2024-04-26 RX ORDER — ONDANSETRON 4 MG/1
4 TABLET, FILM COATED ORAL EVERY 8 HOURS PRN
Qty: 20 TABLET | Refills: 0 | Status: SHIPPED | OUTPATIENT
Start: 2024-04-26

## 2024-04-26 RX ORDER — TRAZODONE HYDROCHLORIDE 50 MG/1
50 TABLET ORAL
Qty: 30 TABLET | Refills: 5 | Status: SHIPPED | OUTPATIENT
Start: 2024-04-26

## 2024-04-26 NOTE — TELEPHONE ENCOUNTER
Sent, please make her aware not to take at same time as gabapentin or baclofen, would separate doses, combination can cause drowsiness.

## 2024-04-26 NOTE — TELEPHONE ENCOUNTER
Patient received Zofran 4mg from the hospital. She is down to one pill. She wants to know if the doctor will write a script for the medication. It can be sent to the [id number]  Thank you

## 2024-04-29 ENCOUNTER — TELEPHONE (OUTPATIENT)
Age: 54
End: 2024-04-29

## 2024-04-29 ENCOUNTER — APPOINTMENT (OUTPATIENT)
Dept: LAB | Facility: CLINIC | Age: 54
End: 2024-04-29
Payer: COMMERCIAL

## 2024-04-29 DIAGNOSIS — F41.9 ANXIETY: ICD-10-CM

## 2024-04-29 DIAGNOSIS — E16.2 HYPOGLYCEMIA: ICD-10-CM

## 2024-04-29 LAB
25(OH)D3 SERPL-MCNC: 50.4 NG/ML (ref 30–100)
ANION GAP SERPL CALCULATED.3IONS-SCNC: 8 MMOL/L (ref 4–13)
BUN SERPL-MCNC: 11 MG/DL (ref 5–25)
CALCIUM SERPL-MCNC: 9.1 MG/DL (ref 8.4–10.2)
CHLORIDE SERPL-SCNC: 107 MMOL/L (ref 96–108)
CO2 SERPL-SCNC: 27 MMOL/L (ref 21–32)
CORTIS AM PEAK SERPL-MCNC: 8.8 UG/DL (ref 6.7–22.6)
CREAT SERPL-MCNC: 0.56 MG/DL (ref 0.6–1.3)
GFR SERPL CREATININE-BSD FRML MDRD: 107 ML/MIN/1.73SQ M
GLUCOSE P FAST SERPL-MCNC: 79 MG/DL (ref 65–99)
INSULIN SERPL-ACNC: 2.7 UIU/ML
POTASSIUM SERPL-SCNC: 4.1 MMOL/L (ref 3.5–5.3)
SODIUM SERPL-SCNC: 142 MMOL/L (ref 135–147)
TSH SERPL DL<=0.05 MIU/L-ACNC: 2.27 UIU/ML (ref 0.45–4.5)

## 2024-04-29 PROCEDURE — 83525 ASSAY OF INSULIN: CPT

## 2024-04-29 PROCEDURE — 80048 BASIC METABOLIC PNL TOTAL CA: CPT

## 2024-04-29 PROCEDURE — 84443 ASSAY THYROID STIM HORMONE: CPT

## 2024-04-29 PROCEDURE — 82533 TOTAL CORTISOL: CPT

## 2024-04-29 PROCEDURE — 82306 VITAMIN D 25 HYDROXY: CPT

## 2024-04-29 PROCEDURE — 84681 ASSAY OF C-PEPTIDE: CPT

## 2024-04-29 PROCEDURE — 36415 COLL VENOUS BLD VENIPUNCTURE: CPT

## 2024-04-30 LAB — C PEPTIDE SERPL-MCNC: 1.5 NG/ML (ref 1.1–4.4)

## 2024-05-01 ENCOUNTER — OFFICE VISIT (OUTPATIENT)
Dept: ENDOCRINOLOGY | Facility: CLINIC | Age: 54
End: 2024-05-01
Payer: COMMERCIAL

## 2024-05-01 VITALS
SYSTOLIC BLOOD PRESSURE: 132 MMHG | TEMPERATURE: 98.4 F | HEART RATE: 67 BPM | OXYGEN SATURATION: 98 % | DIASTOLIC BLOOD PRESSURE: 70 MMHG | WEIGHT: 183.6 LBS | BODY MASS INDEX: 33.58 KG/M2

## 2024-05-01 DIAGNOSIS — E66.09 CLASS 1 OBESITY DUE TO EXCESS CALORIES WITH SERIOUS COMORBIDITY AND BODY MASS INDEX (BMI) OF 33.0 TO 33.9 IN ADULT: Primary | ICD-10-CM

## 2024-05-01 DIAGNOSIS — K91.2 HYPOGLYCEMIA AFTER GI (GASTROINTESTINAL) SURGERY: ICD-10-CM

## 2024-05-01 PROCEDURE — 99213 OFFICE O/P EST LOW 20 MIN: CPT | Performed by: STUDENT IN AN ORGANIZED HEALTH CARE EDUCATION/TRAINING PROGRAM

## 2024-05-01 NOTE — ASSESSMENT & PLAN NOTE
She has changed her lifestyle according to recommendations and I made at her last visit, in addition she has taken a carbose before meals, which helped her significantly to alleviate her symptoms.  However she has been feeling very gassy, bloated, with abdominal discomfort with a carbose and would like to discontinue.  We reviewed different options, she may benefit from GLP-1 agonist, counseled on adverse side effects of GLP-1 agonist including nausea, vomiting, pancreatitis, medullary thyroid cancer,   I started Ozempic 0.25 once weekly for 4 weeks and then will increase to 0.5 mg once weekly,  She was advised to start using continuous glucose monitoring.  Lifestyle modifications which was discussed at her last visit was emphasized.  Return back in 4 months.

## 2024-05-01 NOTE — PROGRESS NOTES
Marylou Morris 53 y.o. female MRN: [id number]    Encounter: [id number]      Assessment/Plan     Problem List Items Addressed This Visit          Surgery/Wound/Pain    Hypoglycemia after GI (gastrointestinal) surgery     She has changed her lifestyle according to recommendations and I made at her last visit, in addition she has taken a carbose before meals, which helped her significantly to alleviate her symptoms.  However she has been feeling very gassy, bloated, with abdominal discomfort with a carbose and would like to discontinue.  We reviewed different options, she may benefit from GLP-1 agonist, counseled on adverse side effects of GLP-1 agonist including nausea, vomiting, pancreatitis, medullary thyroid cancer,   I started Ozempic 0.25 once weekly for 4 weeks and then will increase to 0.5 mg once weekly,  She was advised to start using continuous glucose monitoring.  Lifestyle modifications which was discussed at her last visit was emphasized.  Return back in 4 months.           Relevant Medications    semaglutide, 0.25 or 0.5 mg/dose, (Ozempic, 0.25 or 0.5 MG/DOSE,) 2 mg/3 mL injection pen     Other Visit Diagnoses       Class 1 obesity due to excess calories with serious comorbidity and body mass index (BMI) of 33.0 to 33.9 in adult    -  Primary    Relevant Medications    semaglutide, 0.25 or 0.5 mg/dose, (Ozempic, 0.25 or 0.5 MG/DOSE,) 2 mg/3 mL injection pen              CC:   Hypoglycemia     History of Present Illness     HPI:  Marylou Morris is a 53 year old female who presented for follow up for postprandial hyperglycemia.  She was a started on a carbose 25 mg before meals, which has helped her significantly however she could not tolerate.  She has received her continuous glucose monitoring, has not been using it though.    Review of Systems   Constitutional:  Negative for appetite change, fatigue and unexpected weight change.   HENT:  Negative for trouble swallowing and voice change.    Eyes:   "Negative for visual disturbance.   Respiratory:  Negative for cough, shortness of breath and wheezing.    Cardiovascular:  Negative for palpitations and leg swelling.   Gastrointestinal:  Negative for abdominal pain, constipation, diarrhea, nausea and vomiting.   Endocrine: Negative for cold intolerance, heat intolerance, polyphagia and polyuria.   Musculoskeletal:  Negative for arthralgias.   Skin:  Negative for color change, rash and wound.   Neurological:  Negative for dizziness, tremors, weakness, light-headedness, numbness and headaches.   Psychiatric/Behavioral:  Negative for agitation and sleep disturbance. The patient is not nervous/anxious.        Historical Information   Past Medical History:   Diagnosis Date    Asthma     GERD (gastroesophageal reflux disease)     H/O gastric bypass     History of methamphetamine abuse (HCC)     Positive UDS; Feb 2020    Hypokalemia     Pyelonephritis      Past Surgical History:   Procedure Laterality Date    CHOLECYSTECTOMY      DENTAL SURGERY      ESOPHAGEAL DILATION      GASTRIC BYPASS      WV LAPS ABD PRTM&OMENTUM DX W/WO SPEC BR/WA SPX N/A 1/11/2024    Procedure: LAPAROSCOPY DIAGNOSTIC, REDUCTION AND REPAIR OF INTERNAL HERNIA;  Surgeon: Ruben Milner MD;  Location: [facility];  Service: Bariatrics    EILEEN-EN-Y PROCEDURE      SUBTOTAL COLECTOMY      TOOTH EXTRACTION      TUBAL LIGATION       Social History   Social History     Substance and Sexual Activity   Alcohol Use Never     Social History     Substance and Sexual Activity   Drug Use Not Currently    Types: Methamphetamines    Comment: reports use this week (12/2020)     Social History     Tobacco Use   Smoking Status Every Day    Current packs/day: 0.50    Average packs/day: 0.5 packs/day for 38.0 years (19.0 ttl pk-yrs)    Types: Cigarettes   Smokeless Tobacco Never     Family History:   Family History   Problem Relation Age of Onset    Kidney disease Mother         Pt also reports \"bone disease\"    " Hypertension Mother     Diabetes Mother     Heart disease Mother     Stroke Mother     Arthritis Mother     Hyperlipidemia Mother     Colon cancer Father          motor cycle accident    Heart disease Sister     Coronary artery disease Sister     No Known Problems Sister     No Known Problems Sister     No Known Problems Daughter     No Known Problems Daughter     Breast cancer Maternal Grandmother     Lung cancer Maternal Grandmother     Lung cancer Maternal Grandfather     Lung cancer Paternal Grandmother     Lung cancer Paternal Grandfather     Cancer Brother     Heart attack Brother     Heart disease Brother     Coronary artery disease Brother     Uterine cancer Maternal Aunt     No Known Problems Maternal Aunt     No Known Problems Maternal Aunt     Lung cancer Maternal Aunt     Lung cancer Maternal Aunt     Thyroid cancer Maternal Uncle     No Known Problems Paternal Aunt     No Known Problems Paternal Aunt     Lung cancer Paternal Uncle     Lung cancer Cousin        Meds/Allergies   Current Outpatient Medications   Medication Sig Dispense Refill    acarbose (PRECOSE) 25 mg tablet Take 1 tablet (25 mg total) by mouth 3 (three) times a day with meals 90 tablet 2    albuterol (PROVENTIL HFA,VENTOLIN HFA) 90 mcg/act inhaler Inhale 2 puffs every 6 (six) hours as needed for wheezing 18 g 2    Blood Glucose Monitoring Suppl (OneTouch Verio Reflect) w/Device KIT Check blood sugars once daily. Please substitute with appropriate alternative as covered by patient's insurance. Dx: E11.65 1 kit 0    cyclobenzaprine (FLEXERIL) 10 mg tablet Take 1 tablet (10 mg total) by mouth 3 (three) times a day as needed for muscle spasms 30 tablet 0    dicyclomine (BENTYL) 20 mg tablet take 1 tablet by mouth twice a day if needed for ABDOMINAL PAIN 20 tablet 1    ergocalciferol (VITAMIN D2) 50,000 units Take 1 capsule (50,000 Units total) by mouth once a week 12 capsule 0    gabapentin (NEURONTIN) 600 MG tablet Take 1 tablet  (600 mg total) by mouth 3 (three) times a day 90 tablet 1    glucose blood (OneTouch Verio) test strip Use 1 each 4 (four) times a day (before meals and at bedtime) Check blood sugars once daily. Please substitute with appropriate alternative as covered by patient's insurance. Dx: E11.65 100 each 3    nystatin powder Apply topically 2 (two) times a day 60 g 1    omeprazole (PriLOSEC) 40 MG capsule Take 1 capsule (40 mg total) by mouth daily 90 capsule 3    ondansetron (ZOFRAN) 4 mg tablet Take 1 tablet (4 mg total) by mouth every 8 (eight) hours as needed for nausea or vomiting 20 tablet 0    OneTouch Delica Lancets 33G MISC Check blood sugars once daily. Please substitute with appropriate alternative as covered by patient's insurance. Dx: E11.65 100 each 3    traZODone (DESYREL) 50 mg tablet Take 1 tablet (50 mg total) by mouth daily at bedtime 30 tablet 5    vitamin B-12 (VITAMIN B-12) 500 mcg tablet take 1 tablet by mouth once daily 30 tablet 2    baclofen 10 mg tablet Take 1 tablet (10 mg total) by mouth 3 (three) times a day (Patient not taking: Reported on 5/1/2024) 90 tablet 1    busPIRone (BUSPAR) 7.5 mg tablet take 1 tablet by mouth twice a day (Patient not taking: Reported on 5/1/2024) 60 tablet 5    Continuous Blood Gluc  (Dexcom G7 ) TOMEKA Use 1 each continuous (Patient not taking: Reported on 3/20/2024) 1 each 0    Continuous Blood Gluc Sensor (Dexcom G7 Sensor) Use 1 Device every 10 days (Patient not taking: Reported on 3/20/2024) 9 each 2    fluticasone (FLONASE) 50 mcg/act nasal spray 1 spray into each nostril daily (Patient not taking: Reported on 5/1/2024) 16 g 1    lactulose 20 g/30 mL Take 30 mL (20 g total) by mouth 2 (two) times a day as needed (constipation) (Patient not taking: Reported on 5/1/2024) 946 mL 2    lubiprostone (AMITIZA) 24 mcg capsule Take 1 capsule (24 mcg total) by mouth 2 (two) times a day with meals (Patient not taking: Reported on 5/1/2024) 60 capsule 11     melatonin 3 mg Take 1 tablet (3 mg total) by mouth daily at bedtime (Patient not taking: Reported on 5/1/2024) 20 tablet 0    methylPREDNISolone 4 MG tablet therapy pack Use as directed on package (Patient not taking: Reported on 5/1/2024) 21 tablet 0    nicotine (NICODERM CQ) 14 mg/24hr TD 24 hr patch Place 1 patch on the skin over 24 hours daily (Patient not taking: Reported on 5/1/2024) 28 patch 2    therapeutic multivitamin-minerals (THERAGRAN-M) tablet Take 1 tablet by mouth daily (Patient not taking: Reported on 5/1/2024)       No current facility-administered medications for this visit.     Allergies   Allergen Reactions    Morphine      anaphylactic    Morphine Vomiting    Suboxone [Buprenorphine-Naloxone] Abdominal Pain    Sulfamethoxazole-Trimethoprim Hives    Sulfamethoxazole-Trimethoprim Nausea Only       Objective   Vitals: Blood pressure 132/70, pulse 67, temperature 98.4 °F (36.9 °C), weight 83.3 kg (183 lb 9.6 oz), SpO2 98%.    Physical Exam  Constitutional:       General: She is not in acute distress.     Appearance: Normal appearance. She is not ill-appearing, toxic-appearing or diaphoretic.   HENT:      Head: Normocephalic and atraumatic.   Eyes:      Extraocular Movements: Extraocular movements intact.   Pulmonary:      Effort: Pulmonary effort is normal. No respiratory distress.   Neurological:      Mental Status: She is alert and oriented to person, place, and time.       The history was obtained from the review of the chart, patient.    Lab Results:   Lab Results   Component Value Date/Time    Potassium 4.1 04/29/2024 08:39 AM    Potassium 4.0 01/12/2024 05:33 AM    Potassium 3.9 01/11/2024 01:07 PM    Chloride 107 04/29/2024 08:39 AM    Chloride 109 (H) 01/12/2024 05:33 AM    Chloride 108 01/11/2024 01:07 PM    CO2 27 04/29/2024 08:39 AM    CO2 24 01/12/2024 05:33 AM    CO2 25 01/11/2024 01:07 PM    BUN 11 04/29/2024 08:39 AM    BUN 13 01/12/2024 05:33 AM    BUN 13 01/11/2024 01:07 PM     "Creatinine 0.56 (L) 04/29/2024 08:39 AM    Creatinine 0.59 (L) 01/12/2024 05:33 AM    Creatinine 0.74 01/11/2024 01:07 PM    Glucose, Fasting 79 04/29/2024 08:39 AM    Glucose, Fasting 83 09/18/2023 12:34 PM    Glucose, Fasting 80 07/12/2023 05:17 AM    Calcium 9.1 04/29/2024 08:39 AM    Calcium 8.3 (L) 01/12/2024 05:33 AM    Calcium 9.4 01/11/2024 01:07 PM    eGFR 107 04/29/2024 08:39 AM    eGFR 105 01/12/2024 05:33 AM    eGFR 93 01/11/2024 01:07 PM    TSH 3RD GENERATON 2.273 04/29/2024 08:39 AM    TSH 3RD GENERATON 1.225 07/13/2023 02:26 PM             Imaging Studies:         I have personally reviewed pertinent reports.      Portions of the record may have been created with voice recognition software. Occasional wrong word or \"sound a like\" substitutions may have occurred due to the inherent limitations of voice recognition software. Read the chart carefully and recognize, using context, where substitutions have occurred.    "

## 2024-05-01 NOTE — PATIENT INSTRUCTIONS
I started Ozempic 0.25 once weekly for 4 weeks and then will increase to 0.5 mg once weekly, please call the office if you develop any side effects.

## 2024-05-03 ENCOUNTER — TELEPHONE (OUTPATIENT)
Dept: ENDOCRINOLOGY | Facility: CLINIC | Age: 54
End: 2024-05-03

## 2024-05-03 NOTE — TELEPHONE ENCOUNTER
Patient came to the office for 2 reasons,  1) stated her Ozempic was not working, it was just running down her leg? I took her in a room and asked her to show me what she was doing, put needle tip on and dialed up amount, and watched patient hover over her thigh instead of injecting directly into her skin. Showed her correctly how to do this, needed a new script due to all that was wasted by patient, patient was given samples of Ozempic x2 Lot#[id number] - Expires 07/31/25    2) Patient was also very confused as how to set up her DEXCOM G7, has all the equipment but is not sure how to set it up, patient was asked to call the office Monday for NV to get started, patient can be reached at [phone]

## 2024-05-06 ENCOUNTER — EVALUATION (OUTPATIENT)
Dept: PHYSICAL THERAPY | Facility: CLINIC | Age: 54
End: 2024-05-06
Payer: COMMERCIAL

## 2024-05-06 ENCOUNTER — TELEPHONE (OUTPATIENT)
Age: 54
End: 2024-05-06

## 2024-05-06 DIAGNOSIS — M54.16 LUMBAR RADICULOPATHY: ICD-10-CM

## 2024-05-06 DIAGNOSIS — M54.12 RADICULOPATHY, CERVICAL REGION: ICD-10-CM

## 2024-05-06 DIAGNOSIS — G89.4 CHRONIC PAIN SYNDROME: Primary | ICD-10-CM

## 2024-05-06 PROCEDURE — 97163 PT EVAL HIGH COMPLEX 45 MIN: CPT | Performed by: PHYSICAL THERAPIST

## 2024-05-06 PROCEDURE — 97112 NEUROMUSCULAR REEDUCATION: CPT | Performed by: PHYSICAL THERAPIST

## 2024-05-06 NOTE — TELEPHONE ENCOUNTER
No additional recommendations regarding medication since she has been tried on multiple medications with failure including a steroid taper.  She could take over-the-counter Tylenol or ibuprofen if not contraindicated.  Otherwise, no other medication recommendations at this time.  Treatment is [facility].
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
## 2024-05-06 NOTE — TELEPHONE ENCOUNTER
"S/W pt. She is taking Gabapentin, Fail Tizanidine,baclofen and Flexeril. States they do \"nothing\" for her and previously saw she was given Robaxin by PCP  Consult 4/9. [place] [facility] [place]   Please advise. Nurse did suggest office visit for discussion  "

## 2024-05-06 NOTE — TELEPHONE ENCOUNTER
Caller: Patient     Doctor:      Reason for call: Patient states the cyclobenzaprine medication is not helping.     She is having a lot of pain and waking her up in night with pain. She has pain all day in the backs and arms.     Please advise     Call back#: [phone]

## 2024-05-09 ENCOUNTER — OFFICE VISIT (OUTPATIENT)
Dept: CARDIOLOGY CLINIC | Facility: CLINIC | Age: 54
End: 2024-05-09
Payer: COMMERCIAL

## 2024-05-09 VITALS
SYSTOLIC BLOOD PRESSURE: 110 MMHG | HEIGHT: 62 IN | WEIGHT: 179 LBS | HEART RATE: 64 BPM | DIASTOLIC BLOOD PRESSURE: 80 MMHG | BODY MASS INDEX: 32.94 KG/M2

## 2024-05-09 DIAGNOSIS — E66.09 CLASS 2 OBESITY DUE TO EXCESS CALORIES WITHOUT SERIOUS COMORBIDITY WITH BODY MASS INDEX (BMI) OF 35.0 TO 35.9 IN ADULT: ICD-10-CM

## 2024-05-09 DIAGNOSIS — G89.29 CHRONIC LOW BACK PAIN, UNSPECIFIED BACK PAIN LATERALITY, UNSPECIFIED WHETHER SCIATICA PRESENT: ICD-10-CM

## 2024-05-09 DIAGNOSIS — R00.2 PALPITATIONS: ICD-10-CM

## 2024-05-09 DIAGNOSIS — Z98.84 S/P GASTRIC BYPASS: ICD-10-CM

## 2024-05-09 DIAGNOSIS — F17.200 CURRENT SMOKER: ICD-10-CM

## 2024-05-09 DIAGNOSIS — R07.9 CHEST PAIN, UNSPECIFIED TYPE: Primary | ICD-10-CM

## 2024-05-09 DIAGNOSIS — M54.50 CHRONIC LOW BACK PAIN, UNSPECIFIED BACK PAIN LATERALITY, UNSPECIFIED WHETHER SCIATICA PRESENT: ICD-10-CM

## 2024-05-09 PROCEDURE — 99214 OFFICE O/P EST MOD 30 MIN: CPT | Performed by: INTERNAL MEDICINE

## 2024-05-09 NOTE — PROGRESS NOTES
[facility] [place]   [address] [facility] [address]                                            Cardiology Office Follow up  Marylou Morris, 53 y.o. female  YOB: 1970  MRN: [id number] Encounter: [id number]      PCP - Roberta Britton DO  Referring Provider - No ref. provider found    Chief Complaint   Patient presents with    Chest Pain       Assessment  Chest discomfort  Palpitations  GERD  Hiatal hernia  Left ringing in the air, neck swelling  Stroke-like symptoms  7/2023 IP eval for stroke, MRI Randy negative  Current smoker  Was down to 0.5 PPD  S/p gastric bypass (10+ yrs ago, Dr.Selena Flash)  Pre-surgical wt 302 lbs --> 105 lbs (2020) --> now up to 184 lbs  Obesity, Body mass index is 32.74 kg/m².     Plan  Chest discomfort, Shortness of breath, GERD / hiatal hernia  Overview  Atypical symptoms, ongoing intermittently for months, no clear exertional triggers / occurs even at rest, but reports worsening shortness of breath  9/2022 Exercise stress test - 4:01 min, 5.8 METS, got shortness of breath, stress ECG negative for ischemia --> overall low sensitivity due to low level of exercise performed  12/2023: ECG no acute ST T changes  She has severe back pain and with progressive shortness of breath, is unable to exercise  Recommend proceeding with nuclear Rx stress to further evaluate for CAD  5/2024: still has intermittent chest tightness, which she believes is related to the back and should/arm issues. But it does occur with exertion  She did not complete the nuclear stress test that was ordered for  Impression  Atypical, but somewhat concerning symptoms. ?exertional  Plan  I have asked her to at least complete the nuclear stress test to try and clarify CAD contribution to the symptoms  Monitor for symptoms and try to assess if truly exertional, or any other clear triggers  Treatment of musculoskeletal issues through  PCP/orthopedics  Emphasized need to cut down and quit smoking      Palpitations, Current smoker  Mainly at night, at times with dizziness, occurs almost daily  12/2023: Holter monitor ordered  5/2024: symptoms better, has cut down on smoking - now < 5 cigs/day, did not complete holter  Plan  Continue with lifestyle modifications to minimize caffeine  Emphasized need to quit smoking  Minimize or avoid alcohol  Increase walking/cardio activities    No results found for this visit on 05/09/24.      No orders of the defined types were placed in this encounter.    No follow-ups on file.      History of Present Illness   53 y.o. female comes in as a new patient for consultation regarding ongoing symptoms of chest discomfort and palpitations.  She describes ongoing symptoms of chest discomfort for several months, extending from the midsternal region to her neck, which occurs at rest and is constantly present at times.  There is no clear worsening with exertion.  She has been evaluated in the ED for this and ruled out for ACS and was not found to have very problems.  She is awaiting further endoscopic evaluation regarding this.      She additionally has a lot of other complaints including swelling in the left side of the neck left leg pain, ringing in the ears, and has been evaluated for a stroke as well in the ED in July 2023.  She was supposed to follow-up with neurology after this, but was not able to go to [place] for follow-up and as a result has not seen anyone thereafter.  She does have issues with GERD and hiatal hernia and is awaiting follow-up/endoscopy with GI    Interval history - 5/9/2024  She returns for follow-up after 6 months.  She did not complete either of the nuclear stress test or the Holter monitor.  She does continue to report intermittent chest discomfort.  She continues to have a lot of issues with back pain as well as left forearm pain and is seeing orthopedics for this, but is too afraid to get  "back pain medication injections as she is worried about having a pneumothorax.      Historical Information   Past Medical History:   Diagnosis Date    Asthma     GERD (gastroesophageal reflux disease)     H/O gastric bypass     Hiatal hernia     History of methamphetamine abuse (HCC)     Positive UDS; 2020    Hypokalemia     Pyelonephritis      Past Surgical History:   Procedure Laterality Date    CHOLECYSTECTOMY      DENTAL SURGERY      ESOPHAGEAL DILATION      GASTRIC BYPASS      NM LAPS ABD PRTM&OMENTUM DX W/WO SPEC BR/WA SPX N/A 2024    Procedure: LAPAROSCOPY DIAGNOSTIC, REDUCTION AND REPAIR OF INTERNAL HERNIA;  Surgeon: Ruben Milner MD;  Location: [facility];  Service: Bariatrics    EILEEN-EN-Y PROCEDURE      SUBTOTAL COLECTOMY      TOOTH EXTRACTION      TUBAL LIGATION       Family History   Problem Relation Age of Onset    Kidney disease Mother         Pt also reports \"bone disease\"    Hypertension Mother     Diabetes Mother     Heart disease Mother     Stroke Mother     Arthritis Mother     Hyperlipidemia Mother     Colon cancer Father          motor cycle accident    Heart disease Sister     Coronary artery disease Sister     No Known Problems Sister     No Known Problems Sister     No Known Problems Daughter     No Known Problems Daughter     Breast cancer Maternal Grandmother     Lung cancer Maternal Grandmother     Lung cancer Maternal Grandfather     Lung cancer Paternal Grandmother     Lung cancer Paternal Grandfather     Cancer Brother     Heart attack Brother     Heart disease Brother     Coronary artery disease Brother     Uterine cancer Maternal Aunt     No Known Problems Maternal Aunt     No Known Problems Maternal Aunt     Lung cancer Maternal Aunt     Lung cancer Maternal Aunt     Thyroid cancer Maternal Uncle     No Known Problems Paternal Aunt     No Known Problems Paternal Aunt     Lung cancer Paternal Uncle     Lung cancer Cousin      Current Outpatient Medications on " File Prior to Visit   Medication Sig Dispense Refill    albuterol (PROVENTIL HFA,VENTOLIN HFA) 90 mcg/act inhaler Inhale 2 puffs every 6 (six) hours as needed for wheezing 18 g 2    Blood Glucose Monitoring Suppl (OneTouch Verio Reflect) w/Device KIT Check blood sugars once daily. Please substitute with appropriate alternative as covered by patient's insurance. Dx: E11.65 1 kit 0    dicyclomine (BENTYL) 20 mg tablet take 1 tablet by mouth twice a day if needed for ABDOMINAL PAIN 20 tablet 1    ergocalciferol (VITAMIN D2) 50,000 units Take 1 capsule (50,000 Units total) by mouth once a week 12 capsule 0    gabapentin (NEURONTIN) 600 MG tablet Take 1 tablet (600 mg total) by mouth 3 (three) times a day 90 tablet 1    glucose blood (OneTouch Verio) test strip Use 1 each 4 (four) times a day (before meals and at bedtime) Check blood sugars once daily. Please substitute with appropriate alternative as covered by patient's insurance. Dx: E11.65 100 each 3    nystatin powder Apply topically 2 (two) times a day 60 g 1    omeprazole (PriLOSEC) 40 MG capsule Take 1 capsule (40 mg total) by mouth daily 90 capsule 3    ondansetron (ZOFRAN) 4 mg tablet Take 1 tablet (4 mg total) by mouth every 8 (eight) hours as needed for nausea or vomiting 20 tablet 0    OneTouch Delica Lancets 33G MISC Check blood sugars once daily. Please substitute with appropriate alternative as covered by patient's insurance. Dx: E11.65 100 each 3    semaglutide, 0.25 or 0.5 mg/dose, (Ozempic, 0.25 or 0.5 MG/DOSE,) 2 mg/3 mL injection pen Inject 0.375 mL (0.25 mg total) under the skin every 7 days for 30 days, THEN 0.75 mL (0.5 mg total) every 7 days. 9 mL 0    traZODone (DESYREL) 50 mg tablet Take 1 tablet (50 mg total) by mouth daily at bedtime 30 tablet 5    vitamin B-12 (VITAMIN B-12) 500 mcg tablet take 1 tablet by mouth once daily 30 tablet 2    baclofen 10 mg tablet Take 1 tablet (10 mg total) by mouth 3 (three) times a day (Patient not taking:  Reported on 5/1/2024) 90 tablet 1    busPIRone (BUSPAR) 7.5 mg tablet take 1 tablet by mouth twice a day (Patient not taking: Reported on 5/1/2024) 60 tablet 5    Continuous Blood Gluc  (Dexcom G7 ) TOMEKA Use 1 each continuous (Patient not taking: Reported on 3/20/2024) 1 each 0    Continuous Blood Gluc Sensor (Dexcom G7 Sensor) Use 1 Device every 10 days (Patient not taking: Reported on 3/20/2024) 9 each 2    cyclobenzaprine (FLEXERIL) 10 mg tablet Take 1 tablet (10 mg total) by mouth 3 (three) times a day as needed for muscle spasms (Patient not taking: Reported on 5/9/2024) 30 tablet 0    fluticasone (FLONASE) 50 mcg/act nasal spray 1 spray into each nostril daily (Patient not taking: Reported on 5/1/2024) 16 g 1    lactulose 20 g/30 mL Take 30 mL (20 g total) by mouth 2 (two) times a day as needed (constipation) (Patient not taking: Reported on 5/1/2024) 946 mL 2    lubiprostone (AMITIZA) 24 mcg capsule Take 1 capsule (24 mcg total) by mouth 2 (two) times a day with meals (Patient not taking: Reported on 5/1/2024) 60 capsule 11    melatonin 3 mg Take 1 tablet (3 mg total) by mouth daily at bedtime (Patient not taking: Reported on 5/1/2024) 20 tablet 0    methylPREDNISolone 4 MG tablet therapy pack Use as directed on package (Patient not taking: Reported on 5/1/2024) 21 tablet 0    nicotine (NICODERM CQ) 14 mg/24hr TD 24 hr patch Place 1 patch on the skin over 24 hours daily (Patient not taking: Reported on 5/1/2024) 28 patch 2    therapeutic multivitamin-minerals (THERAGRAN-M) tablet Take 1 tablet by mouth daily (Patient not taking: Reported on 5/1/2024)       No current facility-administered medications on file prior to visit.     Allergies   Allergen Reactions    Morphine      anaphylactic    Morphine Vomiting    Suboxone [Buprenorphine-Naloxone] Abdominal Pain    Sulfamethoxazole-Trimethoprim Hives    Sulfamethoxazole-Trimethoprim Nausea Only     Social History     Socioeconomic History     "Marital status: Single     Spouse name: None    Number of children: 2    Years of education: None    Highest education level: None   Occupational History    None   Tobacco Use    Smoking status: Every Day     Current packs/day: 0.50     Average packs/day: 0.5 packs/day for 38.0 years (19.0 ttl pk-yrs)     Types: Cigarettes    Smokeless tobacco: Never   Vaping Use    Vaping status: Never Used   Substance and Sexual Activity    Alcohol use: Never    Drug use: Not Currently     Types: Methamphetamines     Comment: reports use this week (12/2020)    Sexual activity: Not Currently     Partners: Male     Birth control/protection: Post-menopausal   Other Topics Concern    None   Social History Narrative    ** Merged History Encounter **         Caffeine use    Ready to quit smoking    Never - 28 yrs male , 2 children    Lives with her mother    Unemployed and on SSI     Social Determinants of Health     Financial Resource Strain: Not on file   Food Insecurity: No Food Insecurity (1/12/2024)    Hunger Vital Sign     Worried About Running Out of Food in the Last Year: Never true     Ran Out of Food in the Last Year: Never true   Transportation Needs: Unmet Transportation Needs (1/12/2024)    PRAPARE - Transportation     Lack of Transportation (Medical): Yes     Lack of Transportation (Non-Medical): Yes   Physical Activity: Not on file   Stress: Not on file   Social Connections: Not on file   Intimate Partner Violence: Not on file   Housing Stability: Low Risk  (1/12/2024)    Housing Stability Vital Sign     Unable to Pay for Housing in the Last Year: No     Number of Places Lived in the Last Year: 1     Unstable Housing in the Last Year: No        Review of Systems   All other systems reviewed and are negative.        Vitals:  Vitals:    05/09/24 1241   BP: 110/80   Pulse: 64   Weight: 81.2 kg (179 lb)   Height: 5' 2\" (1.575 m)     BMI - Body mass index is 32.74 kg/m².  Wt Readings from Last 7 Encounters: "   05/09/24 81.2 kg (179 lb)   05/01/24 83.3 kg (183 lb 9.6 oz)   04/09/24 85.7 kg (189 lb)   04/08/24 85.7 kg (189 lb)   03/29/24 85.7 kg (189 lb)   03/20/24 84.6 kg (186 lb 9.6 oz)   03/18/24 83.9 kg (185 lb)       Physical Exam  Vitals and nursing note reviewed.   Constitutional:       General: She is not in acute distress.     Appearance: Normal appearance. She is well-developed. She is obese. She is not ill-appearing.   HENT:      Head: Normocephalic and atraumatic.      Nose: No congestion.   Eyes:      General: No scleral icterus.     Conjunctiva/sclera: Conjunctivae normal.   Neck:      Vascular: No carotid bruit or JVD.   Cardiovascular:      Rate and Rhythm: Normal rate and regular rhythm.      Pulses: Normal pulses.      Heart sounds: Normal heart sounds. No murmur heard.     No friction rub. No gallop.   Pulmonary:      Effort: Pulmonary effort is normal. No respiratory distress.      Breath sounds: Normal breath sounds. No rales.   Abdominal:      General: There is no distension.      Palpations: Abdomen is soft.      Tenderness: There is abdominal tenderness in the epigastric area and left upper quadrant.      Comments: Mild tenderness   Musculoskeletal:         General: No swelling.      Left upper arm: Tenderness present.      Cervical back: Neck supple.      Right lower leg: No edema.      Left lower leg: No edema.      Comments: Pain with rotation of left forearm   Skin:     General: Skin is warm.   Neurological:      General: No focal deficit present.      Mental Status: She is alert and oriented to person, place, and time. Mental status is at baseline.   Psychiatric:         Mood and Affect: Mood normal.         Behavior: Behavior normal.         Thought Content: Thought content normal.           Labs:  CBC:   Lab Results   Component Value Date    WBC 7.85 01/12/2024    RBC 3.72 (L) 01/12/2024    HGB 10.5 (L) 01/12/2024    HCT 33.4 (L) 01/12/2024    MCV 90 01/12/2024     01/12/2024    RDW  "14.7 01/12/2024       CMP:   Lab Results   Component Value Date    K 4.1 04/29/2024     04/29/2024    CO2 27 04/29/2024    BUN 11 04/29/2024    CREATININE 0.56 (L) 04/29/2024    EGFR 107 04/29/2024    CALCIUM 9.1 04/29/2024    AST 26 01/11/2024    ALT 22 01/11/2024    ALKPHOS 67 01/11/2024       Magnesium:  Lab Results   Component Value Date    MG 1.9 01/13/2024       Lipid Profile:   Lab Results   Component Value Date    HDL 49 (L) 07/12/2023    TRIG 106 07/12/2023    LDLCALC 66 07/12/2023       Thyroid Studies:   Lab Results   Component Value Date    IFV5YEZBTNCP 2.273 04/29/2024       A1c:  No components found for: \"HGA1C\"    INR:  Lab Results   Component Value Date    INR 0.92 07/11/2023    INR 0.98 09/07/2020    INR 1.03 02/24/2020   5    Imaging: FL barium swallow    Result Date: 12/8/2023  Narrative: BARIUM SWALLOW-ESOPHAGRAM INDICATION:   R13.10: Dysphagia, unspecified. COMPARISON: 4/15/2016 IMAGES: FLUOROSCOPY TIME:   2.0 minutes TECHNIQUE: The patient was given barium by mouth and images of the esophagus were obtained. FINDINGS: The esophagus is normal in caliber.  Esophageal motility is normal and emptying of contrast from the esophagus is prompt.  No mucosal lesion, ulceration or evidence of fold thickening is seen. Small reducible hiatal hernia without gastroesophageal reflux. Bariatric [place]-en-Y gastric bypass again noted with small posterior gastric diverticulum     Impression: Small reducible hiatal hernia without gastroesophageal reflux. Unremarkable esophagram otherwise. Workstation performed: [id number]       Cardiac testing:   No results found for this or any previous visit.    No results found for this or any previous visit.    No results found for this or any previous visit.    No results found for this or any previous visit.      MRI lumbar spine wo contrast  Narrative: MRI LUMBAR SPINE WITHOUT CONTRAST    INDICATION: M54.16: Radiculopathy, lumbar region  G89.4: Chronic pain " syndrome.    COMPARISON: 4/9/2024    TECHNIQUE:  Multiplanar, multisequence imaging of the lumbar spine was performed. .    IMAGE QUALITY:  Diagnostic    FINDINGS:    VERTEBRAL BODIES:  There are 5 lumbar type vertebral bodies. Minimal levoscoliosis mid lumbar spine. Normal lordosis. Scattered degenerative endplate changes.  No focally suspicious marrow lesions.  No bone marrow edema or compression abnormality.    SACRUM:  Normal signal within the sacrum. No evidence of insufficiency or stress fracture.    DISTAL CORD AND CONUS:  Normal size and signal within the distal cord and conus. The conus medullaris terminates at the L1 level.    PARASPINAL SOFT TISSUES:  Paraspinal soft tissues are unremarkable.    LOWER THORACIC DISC SPACES: Mild noncompressive lower thoracic degenerative change.    LUMBAR DISC SPACES:    L1-L2: Tiny central disc protrusion. No significant central canal or neural foraminal narrowing.    L2-L3: There is no focal disk herniation, central canal or neural foraminal narrowing.  Bilateral facet hypertrophy noted.    L3-L4: There is no focal disk herniation, central canal or neural foraminal narrowing.  Bilateral facet hypertrophy noted.    L4-L5: There is bilateral facet hypertrophy. There is a right neural foraminal disc protrusion. Mild right neural foraminal narrowing. Central canal and left neural foramen patent.    L5-S1: There is loss of disc height and signal. There is a left neural foraminal disc protrusion. Mild left neural foraminal narrowing. Central canal and right neural foramen patent.    OTHER FINDINGS:  None.  Impression: Mild noncompressive lumbar degenerative change.    Workstation performed: [id number]

## 2024-05-11 DIAGNOSIS — G89.4 CHRONIC PAIN SYNDROME: ICD-10-CM

## 2024-05-11 DIAGNOSIS — M79.18 MYOFASCIAL PAIN SYNDROME: ICD-10-CM

## 2024-05-13 ENCOUNTER — APPOINTMENT (OUTPATIENT)
Dept: PHYSICAL THERAPY | Facility: CLINIC | Age: 54
End: 2024-05-13
Payer: COMMERCIAL

## 2024-05-13 RX ORDER — CYCLOBENZAPRINE HCL 10 MG
10 TABLET ORAL 3 TIMES DAILY PRN
Qty: 90 TABLET | Refills: 1 | Status: SHIPPED | OUTPATIENT
Start: 2024-05-13 | End: 2024-05-17

## 2024-05-14 DIAGNOSIS — Z98.84 HISTORY OF GASTRIC BYPASS: ICD-10-CM

## 2024-05-14 NOTE — TELEPHONE ENCOUNTER
Medication: Vitamin B-12     Dose/Frequency: 500 mcg tab     Quantity: 30    Pharmacy: [facility] # [id number]    Office:   [x] PCP/Provider - Dr. Britton   [] Speciality/Provider -     Does the patient have enough for 3 days?   [x] Yes   [] No - Send as HP to POD

## 2024-05-15 ENCOUNTER — OFFICE VISIT (OUTPATIENT)
Dept: PHYSICAL THERAPY | Facility: CLINIC | Age: 54
End: 2024-05-15
Payer: COMMERCIAL

## 2024-05-15 DIAGNOSIS — G89.4 CHRONIC PAIN SYNDROME: ICD-10-CM

## 2024-05-15 DIAGNOSIS — M54.16 LUMBAR RADICULOPATHY: Primary | ICD-10-CM

## 2024-05-15 DIAGNOSIS — M54.12 RADICULOPATHY, CERVICAL REGION: ICD-10-CM

## 2024-05-15 PROCEDURE — 97110 THERAPEUTIC EXERCISES: CPT

## 2024-05-15 PROCEDURE — 97112 NEUROMUSCULAR REEDUCATION: CPT

## 2024-05-15 RX ORDER — CHOLECALCIFEROL (VITAMIN D3) 125 MCG
500 CAPSULE ORAL DAILY
Qty: 30 TABLET | Refills: 2 | OUTPATIENT
Start: 2024-05-15

## 2024-05-15 NOTE — TELEPHONE ENCOUNTER
Refill must be reviewed and completed by the office or provider. The refill is unable to be approved or denied by the medication management team.

## 2024-05-15 NOTE — PROGRESS NOTES
Daily Note     Today's date: 5/15/2024  Patient name: Marylou Morris  : 1970  MRN: [id number]  Referring provider: Debbie Ba MD  Dx:   Encounter Diagnosis     ICD-10-CM    1. Lumbar radiculopathy  M54.16       2. Radiculopathy, cervical region  M54.12       3. Chronic pain syndrome  G89.4           Start Time: 1031          Subjective: Patient states her low back into left hip and her cervical area is a 8/10 pain. She feels like her left hip is higher when she walks and she feels like her hip will pop out at times.       Objective: See treatment diary below    Patient's home exercise program was updated to include additional exercises. Handout issued and explained with demonstration. Patient accepts new exercises.     Assessment: Tolerated treatment well. Patient would benefit from continued PT for stretching and strengthening. Patient was able to add exercises to her program with little difficulty and discomfort. Encouragement to move was needed at times. Patient fearful of pain and was educated on how to adjust exercises with pain. She seemed to understand all education given throughout session. Patient felt a lot better by the end of the session and wants to come to her next visit. She feels the therapy will help her.       Plan: Continue per plan of care.  Progress treatment as tolerated.       Precautions: [facility]  Access code: [email or web address]  Progress note:   POC:     Manuals 5/6 5/15                                      Neuro Re-Ed        Core brace  :05x10      Knee fall out  :05x10      Supine march  :05x10      SLR with core brace        Bird dog        Posture education With towel roll 10 min       Cervical retraction 10x x10      Ther Ex        Nustep s=5  L1 x8 min      Standing lumbar extension Prone lay 3 min       PPU        Bridge  x10                              LTR  x10      Quadruped LTR        Supine ball squeeze        Supine clamshell                QL stretch        Piriformis  stretch        Hip flexor stretch        Hamstring stretch        Ther Activity        Step ups        Sit to stand        Gait Training                        Modalities        Lumbar mechanical traction                  Access Code: [email or web address]  URL: [email or web address]  Date: 05/15/2024  Prepared by: Sigrid Nolan    Exercises  - Seated Cervical Retraction  - 8 x daily - 7 x weekly - 1 sets - 10 reps - 3 sec hold  - Standing Lumbar Extension with Counter  - 8 x daily - 7 x weekly - 1 sets - 10 reps  - Supine Transversus Abdominis Bracing - Hands on Stomach  - 2 x daily - 7 x weekly - 1 sets - 10 reps - 5 sec hold  - Bent Knee Fallouts  - 2 x daily - 7 x weekly - 1 sets - 10 reps  - Supine March  - 2 x daily - 7 x weekly - 1 sets - 10 reps  - Supine Bridge  - 2 x daily - 7 x weekly - 1 sets - 10 reps - 3-5 sec hold  - Supine Lower Trunk Rotation  - 2 x daily - 7 x weekly - 1 sets - 10 reps

## 2024-05-16 ENCOUNTER — TELEPHONE (OUTPATIENT)
Dept: FAMILY MEDICINE CLINIC | Facility: CLINIC | Age: 54
End: 2024-05-16

## 2024-05-17 ENCOUNTER — OFFICE VISIT (OUTPATIENT)
Dept: FAMILY MEDICINE CLINIC | Facility: CLINIC | Age: 54
End: 2024-05-17
Payer: COMMERCIAL

## 2024-05-17 VITALS
RESPIRATION RATE: 18 BRPM | HEART RATE: 64 BPM | OXYGEN SATURATION: 99 % | SYSTOLIC BLOOD PRESSURE: 130 MMHG | TEMPERATURE: 97.5 F | DIASTOLIC BLOOD PRESSURE: 84 MMHG | WEIGHT: 180.8 LBS | BODY MASS INDEX: 33.27 KG/M2 | HEIGHT: 62 IN

## 2024-05-17 DIAGNOSIS — M54.16 LUMBAR RADICULOPATHY: ICD-10-CM

## 2024-05-17 DIAGNOSIS — E55.9 VITAMIN D DEFICIENCY: ICD-10-CM

## 2024-05-17 DIAGNOSIS — F41.9 ANXIETY: ICD-10-CM

## 2024-05-17 DIAGNOSIS — H53.9 VISION CHANGES: ICD-10-CM

## 2024-05-17 DIAGNOSIS — G47.00 INSOMNIA, UNSPECIFIED TYPE: ICD-10-CM

## 2024-05-17 DIAGNOSIS — G89.4 CHRONIC PAIN SYNDROME: Primary | ICD-10-CM

## 2024-05-17 DIAGNOSIS — M54.12 RADICULOPATHY, CERVICAL REGION: ICD-10-CM

## 2024-05-17 LAB
DME PARACHUTE DELIVERY DATE REQUESTED: NORMAL
DME PARACHUTE ITEM DESCRIPTION: NORMAL
DME PARACHUTE ORDER STATUS: NORMAL
DME PARACHUTE SUPPLIER NAME: NORMAL
DME PARACHUTE SUPPLIER PHONE: NORMAL

## 2024-05-17 PROCEDURE — 99214 OFFICE O/P EST MOD 30 MIN: CPT | Performed by: FAMILY MEDICINE

## 2024-05-17 RX ORDER — BUSPIRONE HYDROCHLORIDE 10 MG/1
10 TABLET ORAL 2 TIMES DAILY
Qty: 60 TABLET | Refills: 5 | Status: SHIPPED | OUTPATIENT
Start: 2024-05-17

## 2024-05-17 RX ORDER — M-VIT,TX,IRON,MINS/CALC/FOLIC 27MG-0.4MG
1 TABLET ORAL DAILY
Qty: 90 TABLET | Refills: 3 | Status: SHIPPED | OUTPATIENT
Start: 2024-05-17

## 2024-05-17 RX ORDER — OXYCODONE HYDROCHLORIDE AND ACETAMINOPHEN 5; 325 MG/1; MG/1
1 TABLET ORAL EVERY 6 HOURS PRN
Qty: 15 TABLET | Refills: 0 | Status: SHIPPED | OUTPATIENT
Start: 2024-05-17 | End: 2024-06-21

## 2024-05-17 RX ORDER — QUETIAPINE FUMARATE 25 MG/1
25 TABLET, FILM COATED ORAL
Qty: 30 TABLET | Refills: 5 | Status: SHIPPED | OUTPATIENT
Start: 2024-05-17

## 2024-05-17 RX ORDER — METHOCARBAMOL 750 MG/1
750 TABLET, FILM COATED ORAL EVERY 8 HOURS PRN
Qty: 30 TABLET | Refills: 0 | Status: SHIPPED | OUTPATIENT
Start: 2024-05-17 | End: 2024-06-13

## 2024-05-17 NOTE — PROGRESS NOTES
"Assessment/Plan:       Problem List Items Addressed This Visit          Nervous and Auditory    Radiculopathy, cervical region    Relevant Orders    Ambulatory referral to Spine & Pain Management    Lumbar radiculopathy    Relevant Orders    Ambulatory referral to Spine & Pain Management       Surgery/Wound/Pain    Chronic pain syndrome - Primary    Relevant Orders    Ambulatory referral to Spine & Pain Management       Neurology/Sleep    Insomnia    Relevant Medications    QUEtiapine (SEROquel) 25 mg tablet       Other    Vitamin D deficiency    Relevant Medications    therapeutic multivitamin-minerals (THERAGRAN-M) tablet     Other Visit Diagnoses       Anxiety        Relevant Medications    busPIRone (BUSPAR) 10 mg tablet    Vision changes                  She is requesting a refill of Percocet to be used sparingly/as emergency. We discussed this will not be refilled/long-term prescription. Also trial Robaxin 750 mg PRN.   Seroquel at bedtime for sleep, discussed side effects/watch for EPS. Increase Buspar dose.       Subjective:      Patient ID: Marylou Morris is a 54 y.o. female.    HPI    Was following with pain management for lumbar radiculopathy, cervical radiculopathy, pain, chronic pain, myofascial pain syndrome, leg weakness bilaterally, arm weakness, chronic pain syndrome. Gabapentin 600 mg TID, muscle relaxer caused severe depression. Struggling with pain.     Insomnia- Trazodone not helping. Would like to try something else for sleep.     Cloud moving across eye. Recommended to follow-up eye doctor.     The following portions of the patient's history were reviewed and updated as appropriate: allergies, current medications, past family history, past medical history, past social history, past surgical history, and problem list.    Review of Systems   All other systems reviewed and are negative.        Objective:      /84   Pulse 64   Temp 97.5 °F (36.4 °C) (Temporal)   Resp 18   Ht 5' 2\" " (1.575 m)   Wt 82 kg (180 lb 12.8 oz)   LMP  (LMP Unknown)   SpO2 99%   BMI 33.07 kg/m²          Physical Exam  Vitals reviewed.   Constitutional:       General: She is not in acute distress.     Appearance: Normal appearance. She is not ill-appearing, toxic-appearing or diaphoretic.   Pulmonary:      Effort: Pulmonary effort is normal. No respiratory distress.   Neurological:      Mental Status: She is alert and oriented to person, place, and time.   Psychiatric:         Mood and Affect: Mood normal.         Behavior: Behavior normal.             Roberta Britton,   [facility]

## 2024-05-17 NOTE — TELEPHONE ENCOUNTER
Spoke with patient, she reports that she is no longer following with Pain management because they were unable to help her. States she will discuss further at her visit today.

## 2024-05-17 NOTE — TELEPHONE ENCOUNTER
I see she is on my schedule tomorrow for back pain/pain management. She follows with pain management for this, please let her know I won't be prescribing opioids/she should ask her pain management team about medications. I am happy to see her if she would like but she needs to discuss medication management with them.

## 2024-05-21 ENCOUNTER — OFFICE VISIT (OUTPATIENT)
Dept: PHYSICAL THERAPY | Facility: CLINIC | Age: 54
End: 2024-05-21
Payer: COMMERCIAL

## 2024-05-21 DIAGNOSIS — M54.12 RADICULOPATHY, CERVICAL REGION: ICD-10-CM

## 2024-05-21 DIAGNOSIS — M54.16 LUMBAR RADICULOPATHY: Primary | ICD-10-CM

## 2024-05-21 DIAGNOSIS — G89.4 CHRONIC PAIN SYNDROME: ICD-10-CM

## 2024-05-21 PROCEDURE — 97110 THERAPEUTIC EXERCISES: CPT | Performed by: PHYSICAL THERAPIST

## 2024-05-21 PROCEDURE — 97112 NEUROMUSCULAR REEDUCATION: CPT | Performed by: PHYSICAL THERAPIST

## 2024-05-21 NOTE — PROGRESS NOTES
"Daily Note     Today's date: 2024  Patient name: Marylou Morris  : 1970  MRN: [id number]  Referring provider: Debbie Ba MD  Dx:   Encounter Diagnosis     ICD-10-CM    1. Lumbar radiculopathy  M54.16       2. Radiculopathy, cervical region  M54.12       3. Chronic pain syndrome  G89.4                      Subjective: The patient states that she continues to have pain and a hard time standing up, especially in the morning.  \"My family needs to help me up when I get out of bed.\"        Objective: See treatment diary below      Assessment: Tolerated treatment fair.  She had pain with most movement and activity today.  Modified bridges to glut sets 2* increased lower back pain with bridges.  She requested to be done early with her program today 2* pain and feeling nauseous, was not able to get through all TE below.  Patient demonstrated fatigue post treatment and would benefit from continued PT      Plan: Continue per plan of care.   Continue to monitor symptoms and progress as able in upcoming visits.      Precautions: [facility]  Access code: [email or web address]  Progress note:   POC:     Manuals 5/6 5/15 5/22                                     Neuro Re-Ed        Core brace  :05x10 5\"x10     Knee fall out  :05x10 NP     Supine march  :05x10 NP     SLR with core brace        Bird dog        Posture education With towel roll 10 min       Cervical retraction 10x x10 10x     Ther Ex        Nustep s=5  L1 x8 min L1 10 min  No UE     Standing lumbar extension Prone lay 3 min       PPU        Bridge  x10 Pain with Bridges  Glut Sets  3\"x10                             LTR  x10 10x     Quadruped LTR        Supine ball squeeze        Supine clamshell                QL stretch        Piriformis stretch        Hip flexor stretch        Hamstring stretch        Ther Activity        Step ups        Sit to stand        Gait Training                        Modalities        Lumbar mechanical traction              "     Access Code: [email or web address]  URL: [email or web address]  Date: 05/15/2024  Prepared by: Sigrid Nolan    Exercises  - Seated Cervical Retraction  - 8 x daily - 7 x weekly - 1 sets - 10 reps - 3 sec hold  - Standing Lumbar Extension with Counter  - 8 x daily - 7 x weekly - 1 sets - 10 reps  - Supine Transversus Abdominis Bracing - Hands on Stomach  - 2 x daily - 7 x weekly - 1 sets - 10 reps - 5 sec hold  - Bent Knee Fallouts  - 2 x daily - 7 x weekly - 1 sets - 10 reps  - Supine March  - 2 x daily - 7 x weekly - 1 sets - 10 reps  - Supine Bridge  - 2 x daily - 7 x weekly - 1 sets - 10 reps - 3-5 sec hold  - Supine Lower Trunk Rotation  - 2 x daily - 7 x weekly - 1 sets - 10 reps

## 2024-05-23 ENCOUNTER — APPOINTMENT (OUTPATIENT)
Dept: PHYSICAL THERAPY | Facility: CLINIC | Age: 54
End: 2024-05-23
Payer: COMMERCIAL

## 2024-05-23 NOTE — PROGRESS NOTES
"Daily Note     Today's date: 2024  Patient name: Marylou Morris  : 1970  MRN: [id number]  Referring provider: Debbie Ba MD  Dx:   No diagnosis found.                 Subjective: The patient states that she continues to have pain and a hard time standing up, especially in the morning.  \"My family needs to help me up when I get out of bed.\"        Objective: See treatment diary below      Assessment: Tolerated treatment fair.  She had pain with most movement and activity today.  Modified bridges to glut sets 2* increased lower back pain with bridges.  She requested to be done early with her program today 2* pain and feeling nauseous, was not able to get through all TE below.  Patient demonstrated fatigue post treatment and would benefit from continued PT      Plan: Continue per plan of care.   Continue to monitor symptoms and progress as able in upcoming visits.      Precautions: [facility]  Access code: [email or web address]  Progress note:   POC:     Manuals 5/6 5/15 5/22 5/23                                    Neuro Re-Ed        Core brace  :05x10 5\"x10     Knee fall out  :05x10 NP     Supine march  :05x10 NP     SLR with core brace        Bird dog        Posture education With towel roll 10 min       Cervical retraction 10x x10 10x     Ther Ex        Nustep s=5  L1 x8 min L1 10 min  No UE     Standing lumbar extension Prone lay 3 min       PPU        Bridge  x10 Pain with Bridges  Glut Sets  3\"x10                             LTR  x10 10x     Quadruped LTR        Supine ball squeeze        Supine clamshell                QL stretch        Piriformis stretch        Hip flexor stretch        Hamstring stretch        Ther Activity        Step ups        Sit to stand        Gait Training                        Modalities        Lumbar mechanical traction                  Access Code: [email or web address]  URL: [email or web address]  Date: 05/15/2024  Prepared by: Sigrid Nolan    Exercises  - Seated " Cervical Retraction  - 8 x daily - 7 x weekly - 1 sets - 10 reps - 3 sec hold  - Standing Lumbar Extension with Counter  - 8 x daily - 7 x weekly - 1 sets - 10 reps  - Supine Transversus Abdominis Bracing - Hands on Stomach  - 2 x daily - 7 x weekly - 1 sets - 10 reps - 5 sec hold  - Bent Knee Fallouts  - 2 x daily - 7 x weekly - 1 sets - 10 reps  - Supine March  - 2 x daily - 7 x weekly - 1 sets - 10 reps  - Supine Bridge  - 2 x daily - 7 x weekly - 1 sets - 10 reps - 3-5 sec hold  - Supine Lower Trunk Rotation  - 2 x daily - 7 x weekly - 1 sets - 10 reps

## 2024-06-12 DIAGNOSIS — M54.16 LUMBAR RADICULOPATHY: ICD-10-CM

## 2024-06-12 DIAGNOSIS — G89.4 CHRONIC PAIN SYNDROME: ICD-10-CM

## 2024-06-12 DIAGNOSIS — M54.12 RADICULOPATHY, CERVICAL REGION: ICD-10-CM

## 2024-06-13 ENCOUNTER — APPOINTMENT (EMERGENCY)
Dept: CT IMAGING | Facility: HOSPITAL | Age: 54
End: 2024-06-13
Payer: COMMERCIAL

## 2024-06-13 ENCOUNTER — HOSPITAL ENCOUNTER (EMERGENCY)
Facility: HOSPITAL | Age: 54
Discharge: HOME | End: 2024-06-13
Attending: EMERGENCY MEDICINE
Payer: COMMERCIAL

## 2024-06-13 ENCOUNTER — HOSPITAL ENCOUNTER (OUTPATIENT)
Dept: NUCLEAR MEDICINE | Facility: HOSPITAL | Age: 54
Discharge: HOME | End: 2024-06-13
Attending: INTERNAL MEDICINE

## 2024-06-13 ENCOUNTER — NURSE TRIAGE (OUTPATIENT)
Age: 54
End: 2024-06-13

## 2024-06-13 VITALS
TEMPERATURE: 98.7 F | OXYGEN SATURATION: 95 % | DIASTOLIC BLOOD PRESSURE: 75 MMHG | HEART RATE: 79 BPM | RESPIRATION RATE: 16 BRPM | SYSTOLIC BLOOD PRESSURE: 123 MMHG

## 2024-06-13 DIAGNOSIS — R07.9 CHEST PAIN, UNSPECIFIED TYPE: ICD-10-CM

## 2024-06-13 DIAGNOSIS — R06.02 SHORTNESS OF BREATH: ICD-10-CM

## 2024-06-13 DIAGNOSIS — R10.9 ABDOMINAL PAIN: Primary | ICD-10-CM

## 2024-06-13 LAB
ALBUMIN SERPL BCP-MCNC: 4.3 G/DL (ref 3.5–5)
ALP SERPL-CCNC: 68 U/L (ref 34–104)
ALT SERPL W P-5'-P-CCNC: 12 U/L (ref 7–52)
ANION GAP SERPL CALCULATED.3IONS-SCNC: 9 MMOL/L (ref 4–13)
APTT PPP: 30 SECONDS (ref 23–37)
AST SERPL W P-5'-P-CCNC: 17 U/L (ref 13–39)
BASOPHILS # BLD AUTO: 0.02 THOUSANDS/ÂΜL (ref 0–0.1)
BASOPHILS NFR BLD AUTO: 0 % (ref 0–1)
BILIRUB SERPL-MCNC: 0.82 MG/DL (ref 0.2–1)
BILIRUB UR QL STRIP: NEGATIVE
BUN SERPL-MCNC: 12 MG/DL (ref 5–25)
CALCIUM SERPL-MCNC: 9.5 MG/DL (ref 8.4–10.2)
CHLORIDE SERPL-SCNC: 106 MMOL/L (ref 96–108)
CLARITY UR: CLEAR
CO2 SERPL-SCNC: 24 MMOL/L (ref 21–32)
COLOR UR: YELLOW
CREAT SERPL-MCNC: 0.7 MG/DL (ref 0.6–1.3)
EOSINOPHIL # BLD AUTO: 0.03 THOUSAND/ÂΜL (ref 0–0.61)
EOSINOPHIL NFR BLD AUTO: 1 % (ref 0–6)
ERYTHROCYTE [DISTWIDTH] IN BLOOD BY AUTOMATED COUNT: 16.4 % (ref 11.6–15.1)
GFR SERPL CREATININE-BSD FRML MDRD: 99 ML/MIN/1.73SQ M
GLUCOSE SERPL-MCNC: 87 MG/DL (ref 65–140)
GLUCOSE UR STRIP-MCNC: NEGATIVE MG/DL
HCT VFR BLD AUTO: 40.3 % (ref 34.8–46.1)
HGB BLD-MCNC: 12.8 G/DL (ref 11.5–15.4)
HGB UR QL STRIP.AUTO: NEGATIVE
IMM GRANULOCYTES # BLD AUTO: 0.01 THOUSAND/UL (ref 0–0.2)
IMM GRANULOCYTES NFR BLD AUTO: 0 % (ref 0–2)
INR PPP: 0.98 (ref 0.84–1.19)
KETONES UR STRIP-MCNC: NEGATIVE MG/DL
LEUKOCYTE ESTERASE UR QL STRIP: NEGATIVE
LIPASE SERPL-CCNC: 8 U/L (ref 11–82)
LYMPHOCYTES # BLD AUTO: 2.53 THOUSANDS/ÂΜL (ref 0.6–4.47)
LYMPHOCYTES NFR BLD AUTO: 46 % (ref 14–44)
MCH RBC QN AUTO: 28.4 PG (ref 26.8–34.3)
MCHC RBC AUTO-ENTMCNC: 31.8 G/DL (ref 31.4–37.4)
MCV RBC AUTO: 90 FL (ref 82–98)
MONOCYTES # BLD AUTO: 0.26 THOUSAND/ÂΜL (ref 0.17–1.22)
MONOCYTES NFR BLD AUTO: 5 % (ref 4–12)
NEUTROPHILS # BLD AUTO: 2.61 THOUSANDS/ÂΜL (ref 1.85–7.62)
NEUTS SEG NFR BLD AUTO: 48 % (ref 43–75)
NITRITE UR QL STRIP: NEGATIVE
NRBC BLD AUTO-RTO: 0 /100 WBCS
PH UR STRIP.AUTO: 6.5 [PH]
PLATELET # BLD AUTO: 216 THOUSANDS/UL (ref 149–390)
PMV BLD AUTO: 9.2 FL (ref 8.9–12.7)
POTASSIUM SERPL-SCNC: 3.8 MMOL/L (ref 3.5–5.3)
PROT SERPL-MCNC: 6.8 G/DL (ref 6.4–8.4)
PROT UR STRIP-MCNC: NEGATIVE MG/DL
PROTHROMBIN TIME: 13.2 SECONDS (ref 11.6–14.5)
RBC # BLD AUTO: 4.5 MILLION/UL (ref 3.81–5.12)
SODIUM SERPL-SCNC: 139 MMOL/L (ref 135–147)
SP GR UR STRIP.AUTO: <=1.005
UROBILINOGEN UR QL STRIP.AUTO: 0.2 E.U./DL
WBC # BLD AUTO: 5.46 THOUSAND/UL (ref 4.31–10.16)

## 2024-06-13 PROCEDURE — 81003 URINALYSIS AUTO W/O SCOPE: CPT | Performed by: EMERGENCY MEDICINE

## 2024-06-13 PROCEDURE — 99285 EMERGENCY DEPT VISIT HI MDM: CPT | Performed by: EMERGENCY MEDICINE

## 2024-06-13 PROCEDURE — 96375 TX/PRO/DX INJ NEW DRUG ADDON: CPT

## 2024-06-13 PROCEDURE — 85610 PROTHROMBIN TIME: CPT | Performed by: EMERGENCY MEDICINE

## 2024-06-13 PROCEDURE — 99284 EMERGENCY DEPT VISIT MOD MDM: CPT

## 2024-06-13 PROCEDURE — 74177 CT ABD & PELVIS W/CONTRAST: CPT

## 2024-06-13 PROCEDURE — 96365 THER/PROPH/DIAG IV INF INIT: CPT

## 2024-06-13 PROCEDURE — 80053 COMPREHEN METABOLIC PANEL: CPT | Performed by: EMERGENCY MEDICINE

## 2024-06-13 PROCEDURE — 83690 ASSAY OF LIPASE: CPT | Performed by: EMERGENCY MEDICINE

## 2024-06-13 PROCEDURE — 85025 COMPLETE CBC W/AUTO DIFF WBC: CPT | Performed by: EMERGENCY MEDICINE

## 2024-06-13 PROCEDURE — 36415 COLL VENOUS BLD VENIPUNCTURE: CPT | Performed by: EMERGENCY MEDICINE

## 2024-06-13 PROCEDURE — 85730 THROMBOPLASTIN TIME PARTIAL: CPT | Performed by: EMERGENCY MEDICINE

## 2024-06-13 RX ORDER — METHOCARBAMOL 750 MG/1
750 TABLET, FILM COATED ORAL EVERY 8 HOURS PRN
Qty: 30 TABLET | Refills: 0 | Status: SHIPPED | OUTPATIENT
Start: 2024-06-13

## 2024-06-13 RX ORDER — FENTANYL CITRATE 50 UG/ML
1 INJECTION, SOLUTION INTRAMUSCULAR; INTRAVENOUS ONCE
Status: COMPLETED | OUTPATIENT
Start: 2024-06-13 | End: 2024-06-13

## 2024-06-13 RX ORDER — ONDANSETRON 2 MG/ML
4 INJECTION INTRAMUSCULAR; INTRAVENOUS ONCE
Status: COMPLETED | OUTPATIENT
Start: 2024-06-13 | End: 2024-06-13

## 2024-06-13 RX ORDER — FENTANYL CITRATE 50 UG/ML
50 INJECTION, SOLUTION INTRAMUSCULAR; INTRAVENOUS ONCE
Status: COMPLETED | OUTPATIENT
Start: 2024-06-13 | End: 2024-06-13

## 2024-06-13 RX ORDER — ACETAMINOPHEN 10 MG/ML
1000 INJECTION, SOLUTION INTRAVENOUS ONCE
Status: COMPLETED | OUTPATIENT
Start: 2024-06-13 | End: 2024-06-13

## 2024-06-13 RX ADMIN — FENTANYL CITRATE 50 MCG: 50 INJECTION INTRAMUSCULAR; INTRAVENOUS at 18:39

## 2024-06-13 RX ADMIN — IOHEXOL 100 ML: 350 INJECTION, SOLUTION INTRAVENOUS at 19:39

## 2024-06-13 RX ADMIN — ONDANSETRON 4 MG: 2 INJECTION INTRAMUSCULAR; INTRAVENOUS at 18:38

## 2024-06-13 RX ADMIN — ACETAMINOPHEN 1000 MG: 10 INJECTION INTRAVENOUS at 18:39

## 2024-06-13 NOTE — ED PROVIDER NOTES
"History  Chief Complaint   Patient presents with    Abdominal Pain     Hx of gastric bypass; patient reports vomiting an hour ago and felt a \"pop\" in her stomach. Patient reported 10/10 pain. Patient received 50 mcg of fentanyl via IM injection and reports some relief.      HPI      This is a pleasant, nontoxic-appearing, 54-year-old female dents to the emergency department with a past history significant for hiatal hernia, potation's, GERD, status post gastric bypass: greater than 10 yrs ago by Dr. Lee in [place], chronic pain syndrome, myofascial pain syndrome, lumbar radiculopathy, anxiety, chronic idiopathic constipation, nausea.    The chart noted that in January 2024 patient was found to have an internal hernia, underwent a diagnostic laparoscopic procedure with reduction of that internal hernia and suture repair of the Petersons defect by Dr. Milner Jan 2024.    Remaining 12 point review of systems unremarkable, patient denies any melena, hematochezia, hemoptysis  Prior to Admission Medications   Prescriptions Last Dose Informant Patient Reported? Taking?   Blood Glucose Monitoring Suppl (OneTouch Verio Reflect) w/Device KIT   No No   Sig: Check blood sugars once daily. Please substitute with appropriate alternative as covered by patient's insurance. Dx: E11.65   Continuous Blood Gluc  (Dexcom G7 ) TOMEKA   No No   Sig: Use 1 each continuous   Patient not taking: Reported on 3/20/2024   Continuous Blood Gluc Sensor (Dexcom G7 Sensor)   No No   Sig: Use 1 Device every 10 days   Patient not taking: Reported on 3/20/2024   OneTouch Delica Lancets 33G MISC   No No   Sig: Check blood sugars once daily. Please substitute with appropriate alternative as covered by patient's insurance. Dx: E11.65   QUEtiapine (SEROquel) 25 mg tablet   No No   Sig: Take 1 tablet (25 mg total) by mouth daily at bedtime   albuterol (PROVENTIL HFA,VENTOLIN HFA) 90 mcg/act inhaler   No No   Sig: Inhale 2 puffs every 6 " (six) hours as needed for wheezing   busPIRone (BUSPAR) 10 mg tablet   No No   Sig: Take 1 tablet (10 mg total) by mouth 2 (two) times a day   ergocalciferol (VITAMIN D2) 50,000 units   No No   Sig: Take 1 capsule (50,000 Units total) by mouth once a week   gabapentin (NEURONTIN) 600 MG tablet   No No   Sig: Take 1 tablet (600 mg total) by mouth 3 (three) times a day   glucose blood (OneTouch Verio) test strip   No No   Sig: Use 1 each 4 (four) times a day (before meals and at bedtime) Check blood sugars once daily. Please substitute with appropriate alternative as covered by patient's insurance. Dx: E11.65   methocarbamol (ROBAXIN) 750 mg tablet   No No   Sig: take 1 tablet by mouth every 8 hours if needed for muscle spasm   omeprazole (PriLOSEC) 40 MG capsule   No No   Sig: Take 1 capsule (40 mg total) by mouth daily   ondansetron (ZOFRAN) 4 mg tablet   No No   Sig: Take 1 tablet (4 mg total) by mouth every 8 (eight) hours as needed for nausea or vomiting   oxyCODONE-acetaminophen (Percocet) 5-325 mg per tablet   No No   Sig: Take 1 tablet by mouth every 6 (six) hours as needed for moderate pain Max Daily Amount: 4 tablets   semaglutide, 0.25 or 0.5 mg/dose, (Ozempic, 0.25 or 0.5 MG/DOSE,) 2 mg/3 mL injection pen   No No   Sig: Inject 0.375 mL (0.25 mg total) under the skin every 7 days for 30 days, THEN 0.75 mL (0.5 mg total) every 7 days.   therapeutic multivitamin-minerals (THERAGRAN-M) tablet   No No   Sig: Take 1 tablet by mouth daily   vitamin B-12 (VITAMIN B-12) 500 mcg tablet   No No   Sig: take 1 tablet by mouth once daily      Facility-Administered Medications: None       Past Medical History:   Diagnosis Date    Asthma     GERD (gastroesophageal reflux disease)     H/O gastric bypass     Hiatal hernia     History of methamphetamine abuse (HCC)     Positive UDS; Feb 2020    Hypokalemia     Pyelonephritis        Past Surgical History:   Procedure Laterality Date    CHOLECYSTECTOMY      DENTAL SURGERY       "ESOPHAGEAL DILATION      GASTRIC BYPASS      OR LAPS ABD PRTM&OMENTUM DX W/WO SPEC BR/WA SPX N/A 2024    Procedure: LAPAROSCOPY DIAGNOSTIC, REDUCTION AND REPAIR OF INTERNAL HERNIA;  Surgeon: Ruben Milner MD;  Location: [facility];  Service: Bariatrics    EILEEN-EN-Y PROCEDURE      SUBTOTAL COLECTOMY      TOOTH EXTRACTION      TUBAL LIGATION         Family History   Problem Relation Age of Onset    Kidney disease Mother         Pt also reports \"bone disease\"    Hypertension Mother     Diabetes Mother     Heart disease Mother     Stroke Mother     Arthritis Mother     Hyperlipidemia Mother     Colon cancer Father          motor cycle accident    Heart disease Sister     Coronary artery disease Sister     No Known Problems Sister     No Known Problems Sister     No Known Problems Daughter     No Known Problems Daughter     Breast cancer Maternal Grandmother     Lung cancer Maternal Grandmother     Lung cancer Maternal Grandfather     Lung cancer Paternal Grandmother     Lung cancer Paternal Grandfather     Cancer Brother     Heart attack Brother     Heart disease Brother     Coronary artery disease Brother     Uterine cancer Maternal Aunt     No Known Problems Maternal Aunt     No Known Problems Maternal Aunt     Lung cancer Maternal Aunt     Lung cancer Maternal Aunt     Thyroid cancer Maternal Uncle     No Known Problems Paternal Aunt     No Known Problems Paternal Aunt     Lung cancer Paternal Uncle     Lung cancer Cousin      I have reviewed and agree with the history as documented.    E-Cigarette/Vaping    E-Cigarette Use Never User     Cartridges/Day 1      E-Cigarette/Vaping Substances    Nicotine No     THC No     CBD No     Flavoring No     Other No     Unknown No      Social History     Tobacco Use    Smoking status: Every Day     Current packs/day: 0.50     Average packs/day: 0.5 packs/day for 38.0 years (19.0 ttl pk-yrs)     Types: Cigarettes    Smokeless tobacco: Never   Vaping Use    " Vaping status: Never Used   Substance Use Topics    Alcohol use: Never    Drug use: Not Currently     Types: Methamphetamines     Comment: reports use this week (12/2020)       Review of Systems   Constitutional: Negative.    HENT: Negative.     Eyes: Negative.    Respiratory: Negative.  Negative for chest tightness and shortness of breath.    Cardiovascular: Negative.  Negative for chest pain and palpitations.   Gastrointestinal: Negative.  Negative for abdominal pain.   Endocrine: Negative.    Genitourinary: Negative.    Musculoskeletal: Negative.    Skin: Negative.    Allergic/Immunologic: Negative.    Neurological: Negative.    Hematological: Negative.    Psychiatric/Behavioral: Negative.         Physical Exam  Physical Exam  Vitals and nursing note reviewed.   Constitutional:       General: She is not in acute distress.     Appearance: She is well-developed and normal weight. She is not ill-appearing, toxic-appearing or diaphoretic.   HENT:      Head: Normocephalic and atraumatic.      Mouth/Throat:      Mouth: Mucous membranes are moist.   Cardiovascular:      Rate and Rhythm: Normal rate and regular rhythm.      Heart sounds: Normal heart sounds.   Pulmonary:      Effort: Pulmonary effort is normal.      Breath sounds: Normal breath sounds.   Abdominal:      General: Abdomen is flat. Bowel sounds are normal.      Palpations: Abdomen is soft.      Tenderness: There is abdominal tenderness in the right lower quadrant, periumbilical area, suprapubic area and left lower quadrant.   Skin:     General: Skin is warm.      Capillary Refill: Capillary refill takes less than 2 seconds.   Neurological:      General: No focal deficit present.      Mental Status: She is alert and oriented to person, place, and time.   Psychiatric:         Mood and Affect: Mood normal.         Behavior: Behavior normal.         Vital Signs  ED Triage Vitals [06/13/24 1804]   Temperature Pulse Respirations Blood Pressure SpO2   98.7 °F  (37.1 °C) 87 16 144/91 98 %      Temp Source Heart Rate Source Patient Position - Orthostatic VS BP Location FiO2 (%)   Tympanic Monitor Sitting -- --      Pain Score       3           Vitals:    06/13/24 1804 06/13/24 1900 06/13/24 2015 06/13/24 2030   BP: 144/91 123/75     Pulse: 87 77 76 79   Patient Position - Orthostatic VS: Sitting            Visual Acuity      ED Medications  Medications   fentanyl citrate (PF) (FOR EMS ONLY) 100 mcg/2 mL injection 100 mcg (0 mcg Does not apply Given to EMS 6/13/24 1806)   ondansetron (ZOFRAN) injection 4 mg (4 mg Intravenous Given 6/13/24 1838)   acetaminophen (Ofirmev) injection 1,000 mg (0 mg Intravenous Stopped 6/13/24 1911)   fentaNYL injection 50 mcg (50 mcg Intravenous Given 6/13/24 1839)   iohexol (OMNIPAQUE) 350 MG/ML injection (MULTI-DOSE) 100 mL (100 mL Intravenous Given 6/13/24 1939)   iohexol (OMNIPAQUE) 240 MG/ML solution 25 mL (25 mL Oral Given 6/13/24 1939)       Diagnostic Studies  Results Reviewed       Procedure Component Value Units Date/Time    UA w Reflex to Microscopic w Reflex to Culture [[id number]]  (Abnormal) Collected: 06/13/24 2124    Lab Status: Final result Specimen: Urine, Other Updated: 06/13/24 2130     Color, UA Yellow     Clarity, UA Clear     Specific Gravity, UA <=1.005     pH, UA 6.5     Leukocytes, UA Negative     Nitrite, UA Negative     Protein, UA Negative mg/dl      Glucose, UA Negative mg/dl      Ketones, UA Negative mg/dl      Urobilinogen, UA 0.2 E.U./dl      Bilirubin, UA Negative     Occult Blood, UA Negative    Comprehensive metabolic panel [[id number]] Collected: 06/13/24 1827    Lab Status: Final result Specimen: Blood from Arm, Right Updated: 06/13/24 1855     Sodium 139 mmol/L      Potassium 3.8 mmol/L      Chloride 106 mmol/L      CO2 24 mmol/L      ANION GAP 9 mmol/L      BUN 12 mg/dL      Creatinine 0.70 mg/dL      Glucose 87 mg/dL      Calcium 9.5 mg/dL      AST 17 U/L      ALT 12 U/L      Alkaline Phosphatase 68 U/L       Total Protein 6.8 g/dL      Albumin 4.3 g/dL      Total Bilirubin 0.82 mg/dL      eGFR 99 ml/min/1.73sq m     Narrative:      National Kidney Disease Foundation guidelines for Chronic Kidney Disease (CKD):     Stage 1 with normal or high GFR (GFR > 90 mL/min/1.73 square meters)    Stage 2 Mild CKD (GFR = 60-89 mL/min/1.73 square meters)    Stage 3A Moderate CKD (GFR = 45-59 mL/min/1.73 square meters)    Stage 3B Moderate CKD (GFR = 30-44 mL/min/1.73 square meters)    Stage 4 Severe CKD (GFR = 15-29 mL/min/1.73 square meters)    Stage 5 End Stage CKD (GFR <15 mL/min/1.73 square meters)  Note: GFR calculation is accurate only with a steady state creatinine    Lipase [[id number]]  (Abnormal) Collected: 06/13/24 1827    Lab Status: Final result Specimen: Blood from Arm, Right Updated: 06/13/24 1855     Lipase 8 u/L     Protime-INR [[id number]]  (Normal) Collected: 06/13/24 1827    Lab Status: Final result Specimen: Blood from Arm, Right Updated: 06/13/24 1848     Protime 13.2 seconds      INR 0.98    APTT [[id number]]  (Normal) Collected: 06/13/24 1827    Lab Status: Final result Specimen: Blood from Arm, Right Updated: 06/13/24 1848     PTT 30 seconds     CBC and differential [[id number]]  (Abnormal) Collected: 06/13/24 1827    Lab Status: Final result Specimen: Blood from Arm, Right Updated: 06/13/24 1831     WBC 5.46 Thousand/uL      RBC 4.50 Million/uL      Hemoglobin 12.8 g/dL      Hematocrit 40.3 %      MCV 90 fL      MCH 28.4 pg      MCHC 31.8 g/dL      RDW 16.4 %      MPV 9.2 fL      Platelets 216 Thousands/uL      nRBC 0 /100 WBCs      Segmented % 48 %      Immature Grans % 0 %      Lymphocytes % 46 %      Monocytes % 5 %      Eosinophils Relative 1 %      Basophils Relative 0 %      Absolute Neutrophils 2.61 Thousands/µL      Absolute Immature Grans 0.01 Thousand/uL      Absolute Lymphocytes 2.53 Thousands/µL      Absolute Monocytes 0.26 Thousand/µL      Eosinophils Absolute 0.03 Thousand/µL      Basophils  Absolute 0.02 Thousands/µL                    CT abdomen pelvis with contrast   Final Result by Franky Shahid MD (06/13 2100)      Status post [place]-en-Y gastric bypass. No visible complication. No acute inflammatory process.         Workstation performed: [id number]                    Procedures  ECG 12 Lead Documentation Only    Date/Time: 6/13/2024 6:36 PM    Performed by: Jose Raul Sloan III,   Authorized by: Jose Raul Sloan III,     Indications / Diagnosis:  Chest pain  ECG reviewed by me, the ED Provider: yes    Patient location:  ED  Comments:      I personally reviewed this EKG that was performed in the June 13, 2024, EKG was completed at 6:26 PM and interpreted by me at the same time, normal sinus rhythm with no acute ST O'Gayathri's noted care, ventricular rate of 78 bpm, midportion of adults with normal limits.    No diffuse elevations to indicate pericarditis.  No coved ST elevations greater than 2mm with negative T waves in V1-3 to indicate concern for brugada.  No biphasic T waves in V2, V3 to indicate Wellens (critical stenosis of LAD).   No elevation in aVR or deviation when compared to V1 (can be associated with ST depression in I,II, V4-6 when left main occlusion is present).            ED Course  ED Course as of 06/13/24 2235   Thu Jun 13, 2024   1812 Patient seen evaluated, orders placed.  Prior history of internal hernia repaired this calendar year at [facility], prior history of gastric bypass greater than 10 years ago at the [facility] prior history of cholecystectomy, presents emergency department generalized abdominal pain after eating mashed sweet potatoes at 1 PM.   2105 CT result negative.    2122 Patient reassessed, patient still having persistent back pain, will obtain a UA.                                             Medical Decision Making  Longstanding history of myofascial pain syndrome, lumbar radiculopathy, anxiety presents emergency  "department status post gastric bypass greater than 10 years ago, CT imaging showed no acute pathology, imaging was done via gastric bypass protocol.  Reassessed, feeling better, after as needed pain medicine, IV fluid hydration, UA negative, labs negative.    Portions of the record may have been created with voice recognition software. Occasional wrong word or \"sound a like\" substitutions may have occurred due to the inherent limitations of voice recognition software. Read the chart carefully and recognize, using context, where substitutions have occurred.       Counseling: I had a detailed discussion with the patient and/or guardian regarding: the historical points, exam findings, and any diagnostic results supporting the discharge diagnosis, lab results, radiology results, discharge instructions reviewed with patient and/or family/caregiver and understanding was verbalized. Instructions given to return to the emergency department if symptoms worsen or persist, or if there are any questions or concerns that arise at home.        Amount and/or Complexity of Data Reviewed  Labs: ordered.  Radiology: ordered.    Risk  Prescription drug management.             Disposition  Final diagnoses:   Abdominal pain     Time reflects when diagnosis was documented in both MDM as applicable and the Disposition within this note       Time User Action Codes Description Comment    6/13/2024  9:14 PM Jose Raul Sloan Add [R10.9] Abdominal pain           ED Disposition       ED Disposition   Discharge    Condition   Stable    Date/Time   Thu Jun 13, 2024  9:14 PM    Comment   Marylou Morris discharge to home/self care.                   Follow-up Information       Follow up With Specialties Details Why Contact Silvana Britton DO Family Medicine   [address]  [address]  [facility] [address]  [phone]              Discharge Medication List as of 6/13/2024  9:16 PM        CONTINUE these medications which have NOT CHANGED "    Details   albuterol (PROVENTIL HFA,VENTOLIN HFA) 90 mcg/act inhaler Inhale 2 puffs every 6 (six) hours as needed for wheezing, Starting Tue 11/28/2023, Normal      Blood Glucose Monitoring Suppl (OneTouch Verio Reflect) w/Device KIT Check blood sugars once daily. Please substitute with appropriate alternative as covered by patient's insurance. Dx: E11.65, Normal      busPIRone (BUSPAR) 10 mg tablet Take 1 tablet (10 mg total) by mouth 2 (two) times a day, Starting Fri 5/17/2024, Normal      Continuous Blood Gluc  (Dexcom G7 ) TOMEKA Use 1 each continuous, Starting Thu 2/1/2024, Normal      Continuous Blood Gluc Sensor (Dexcom G7 Sensor) Use 1 Device every 10 days, Starting Thu 2/1/2024, Until Mon 10/28/2024, Normal      ergocalciferol (VITAMIN D2) 50,000 units Take 1 capsule (50,000 Units total) by mouth once a week, Starting Tue 1/9/2024, Normal      gabapentin (NEURONTIN) 600 MG tablet Take 1 tablet (600 mg total) by mouth 3 (three) times a day, Starting Tue 4/9/2024, Until Fri 5/17/2024, Normal      glucose blood (OneTouch Verio) test strip Use 1 each 4 (four) times a day (before meals and at bedtime) Check blood sugars once daily. Please substitute with appropriate alternative as covered by patient's insurance. Dx: E11.65, Starting Tue 3/5/2024, Normal      methocarbamol (ROBAXIN) 750 mg tablet take 1 tablet by mouth every 8 hours if needed for muscle spasm, Starting Thu 6/13/2024, Normal      omeprazole (PriLOSEC) 40 MG capsule Take 1 capsule (40 mg total) by mouth daily, Starting Wed 1/31/2024, Normal      ondansetron (ZOFRAN) 4 mg tablet Take 1 tablet (4 mg total) by mouth every 8 (eight) hours as needed for nausea or vomiting, Starting Fri 4/26/2024, Normal      OneTouch Delica Lancets 33G MISC Check blood sugars once daily. Please substitute with appropriate alternative as covered by patient's insurance. Dx: E11.65, Normal      oxyCODONE-acetaminophen (Percocet) 5-325 mg per tablet Take 1  tablet by mouth every 6 (six) hours as needed for moderate pain Max Daily Amount: 4 tablets, Starting Fri 5/17/2024, Normal      QUEtiapine (SEROquel) 25 mg tablet Take 1 tablet (25 mg total) by mouth daily at bedtime, Starting Fri 5/17/2024, Normal      semaglutide, 0.25 or 0.5 mg/dose, (Ozempic, 0.25 or 0.5 MG/DOSE,) 2 mg/3 mL injection pen Multiple Dosages:Starting Wed 5/1/2024, Until Thu 5/30/2024, THEN Starting Fri 5/31/2024, Until Mon 7/29/2024Inject 0.375 mL (0.25 mg total) under the skin every 7 days for 30 days, THEN 0.75 mL (0.5 mg total) every 7 days., Normal      therapeutic multivitamin-minerals (THERAGRAN-M) tablet Take 1 tablet by mouth daily, Starting Fri 5/17/2024, Normal      vitamin B-12 (VITAMIN B-12) 500 mcg tablet take 1 tablet by mouth once daily, Starting Tue 3/5/2024, Normal             No discharge procedures on file.    PDMP Review         Value Time User    PDMP Reviewed  Yes 5/17/2024 11:27 AM Roberta Britton DO            ED Provider  Electronically Signed by             Jose Raul Sloan III, DO  06/13/24 6041

## 2024-06-13 NOTE — TELEPHONE ENCOUNTER
Call transferred to RN for triage.     Patient states she vomited, and had difficulty breathing. States when she tried to drink water, there is a burning sensation. Patient states she felt like this this morning, the sensation went away and came back again. Patient states that it feels like there is a pulling sensation, with 10+/10 pain.     RN offered to call EMS for patient.     Patient states she would like EMS to be contacted.     RN placed call to EMS, spoke with agent #[id number] regarding concerns. Patient address confirmed. Advised patient that EMS was on the way, offered to stay on the phone with patient until EMS arrived.     Patient declined for RN to stay on call until EMS arrived at residence.

## 2024-06-18 DIAGNOSIS — Z98.84 HISTORY OF GASTRIC BYPASS: ICD-10-CM

## 2024-06-20 RX ORDER — UREA 10 %
500 LOTION (ML) TOPICAL DAILY
Qty: 30 TABLET | Refills: 2 | Status: SHIPPED | OUTPATIENT
Start: 2024-06-20

## 2024-06-21 ENCOUNTER — OFFICE VISIT (OUTPATIENT)
Dept: FAMILY MEDICINE CLINIC | Facility: CLINIC | Age: 54
End: 2024-06-21
Payer: COMMERCIAL

## 2024-06-21 VITALS
OXYGEN SATURATION: 99 % | DIASTOLIC BLOOD PRESSURE: 78 MMHG | SYSTOLIC BLOOD PRESSURE: 128 MMHG | HEIGHT: 62 IN | WEIGHT: 173 LBS | HEART RATE: 76 BPM | RESPIRATION RATE: 18 BRPM | BODY MASS INDEX: 31.83 KG/M2 | TEMPERATURE: 97.6 F

## 2024-06-21 DIAGNOSIS — Z80.9 FAMILY HISTORY OF CANCER: ICD-10-CM

## 2024-06-21 DIAGNOSIS — Z82.49 FAMILY HISTORY OF BRAIN ANEURYSM: ICD-10-CM

## 2024-06-21 DIAGNOSIS — G89.4 CHRONIC PAIN SYNDROME: Primary | ICD-10-CM

## 2024-06-21 DIAGNOSIS — F17.210 SMOKING GREATER THAN 20 PACK YEARS: ICD-10-CM

## 2024-06-21 DIAGNOSIS — Z12.2 SCREENING FOR LUNG CANCER: ICD-10-CM

## 2024-06-21 DIAGNOSIS — Z80.8 FAMILY HISTORY OF SKIN CANCER: ICD-10-CM

## 2024-06-21 DIAGNOSIS — R21 RASH: ICD-10-CM

## 2024-06-21 PROCEDURE — 99214 OFFICE O/P EST MOD 30 MIN: CPT | Performed by: FAMILY MEDICINE

## 2024-06-21 RX ORDER — PREGABALIN 75 MG/1
75 CAPSULE ORAL 2 TIMES DAILY
Qty: 60 CAPSULE | Refills: 2 | Status: SHIPPED | OUTPATIENT
Start: 2024-06-21 | End: 2024-06-26 | Stop reason: SDUPTHER

## 2024-06-21 RX ORDER — NYSTATIN 100000 [USP'U]/G
POWDER TOPICAL 3 TIMES DAILY
Qty: 60 G | Refills: 1 | Status: SHIPPED | OUTPATIENT
Start: 2024-06-21

## 2024-06-21 NOTE — PROGRESS NOTES
"Assessment/Plan:       Problem List Items Addressed This Visit          Surgery/Wound/Pain    Chronic pain syndrome - Primary    Relevant Medications    pregabalin (LYRICA) 75 mg capsule     Other Visit Diagnoses       Family history of skin cancer        Relevant Orders    Ambulatory Referral to Dermatology    Smoking greater than 20 pack years        Relevant Orders    CT lung screening program    Screening for lung cancer        Relevant Orders    CT lung screening program    Family history of cancer        Relevant Orders    Ambulatory Referral to Genetics    Family history of brain aneurysm        Relevant Orders    CTA head w wo contrast    Rash        Relevant Medications    nystatin (MYCOSTATIN) powder              Subjective:      Patient ID: Marylou Morris is a 54 y.o. female.    HPI    She reports pain all over, severe, becoming more depressed due to this, no SI. Weaned off of gabapentin was not helpful. History of prescription drug abuse, tramadol/opioids in past.     Concerned about strong family history and would like screening tests. UTD mammo, colon cancer screening, pap. Skin cancer in sister, thyroid cancer uncle, ovarian cancer in aunt, breast cancer maternal grandmother, colon cancer in father, lung cancer in aunts and sister. Family history brain aneurysm aunt, abdominal aneurysm uncle.      She mentions chronic SOB since pneumothorax as result of medical procedure, albuterol makes her breathing worse. Chronic and stable. Follow-up further next visit.     The following portions of the patient's history were reviewed and updated as appropriate: allergies, current medications, past family history, past medical history, past social history, past surgical history, and problem list.    Review of Systems   All other systems reviewed and are negative.        Objective:      /78   Pulse 76   Temp 97.6 °F (36.4 °C) (Temporal)   Resp 18   Ht 5' 2\" (1.575 m)   Wt 78.5 kg (173 lb)   LMP  (LMP " Unknown)   SpO2 99%   BMI 31.64 kg/m²          Physical Exam  Vitals reviewed.   Constitutional:       General: She is not in acute distress.     Appearance: Normal appearance. She is not ill-appearing, toxic-appearing or diaphoretic.   Pulmonary:      Effort: Pulmonary effort is normal. No respiratory distress.   Neurological:      Mental Status: She is alert and oriented to person, place, and time.      Motor: No weakness.   Psychiatric:         Mood and Affect: Mood is anxious.         Behavior: Behavior normal.             Roberta Britton,   [facility]

## 2024-06-25 ENCOUNTER — TELEPHONE (OUTPATIENT)
Dept: FAMILY MEDICINE CLINIC | Facility: CLINIC | Age: 54
End: 2024-06-25

## 2024-06-25 NOTE — TELEPHONE ENCOUNTER
"Reviewed peer to peer request for CTA head-    \"The insurance determined the following:      [X ] Does not meet Medical Necessity   [ ] No prior imaging   [ ] PT or conservative treatment incomplete   [ ] Missing Labs   [ ] Frequency      Denial Rationale:     Requests for further information and a peer to peer discussion are being sent to the ordering physician. However, if no further information is received within the regulatory time frame, this determination will become final. We reviewed the medical information given by your doctor. Your doctor's records say you have a family history of blood vessel problems. With what was received from your doctor, a decision was made that this is not a reason for a(n) Brain CT Angiography. To approve, we need doctor's notes that say two or more members of your family (mother, father, sister, brother or child) have a wide blood vessel in the brain (aneurysm). The notes could show the finding of another brain test. Those findings should show this test is also needed   \"    Please let her know she does not meet criteria, would need to have 2 or more immediate family members with history of brain aneurysm   "

## 2024-06-26 ENCOUNTER — TELEPHONE (OUTPATIENT)
Age: 54
End: 2024-06-26

## 2024-06-26 DIAGNOSIS — E55.9 VITAMIN D DEFICIENCY: ICD-10-CM

## 2024-06-26 DIAGNOSIS — G89.4 CHRONIC PAIN SYNDROME: ICD-10-CM

## 2024-06-26 RX ORDER — ERGOCALCIFEROL 1.25 MG/1
50000 CAPSULE ORAL WEEKLY
Qty: 12 CAPSULE | Refills: 0 | Status: SHIPPED | OUTPATIENT
Start: 2024-06-26

## 2024-06-26 RX ORDER — PREGABALIN 150 MG/1
150 CAPSULE ORAL 2 TIMES DAILY
Qty: 60 CAPSULE | Refills: 1 | Status: SHIPPED | OUTPATIENT
Start: 2024-06-26

## 2024-06-26 RX ORDER — ERGOCALCIFEROL 1.25 MG/1
50000 CAPSULE ORAL WEEKLY
Qty: 12 CAPSULE | Refills: 0 | OUTPATIENT
Start: 2024-06-26

## 2024-06-26 NOTE — TELEPHONE ENCOUNTER
Patient stated she spoke with Dr. Britton about possibly upping the Lyrica. She says it is not working so she would like the dosage increased. Patient requests call back to further advise.

## 2024-06-26 NOTE — TELEPHONE ENCOUNTER
Medication: ergocalciferol (VITAMIN D2) 50,000 units     Dose/Frequency: Take 1 capsule (50,000 Units total) by mouth once a week     Quantity:     Pharmacy: [id number] - [place] - [address] [place] [address]     Office:   [x] PCP/Provider -   [] Speciality/Provider -     Does the patient have enough for 3 days?   [] Yes   [x] No - Send as HP to POD

## 2024-06-27 NOTE — TELEPHONE ENCOUNTER
She needs to be on current dosage for at least 1 week, please make sure she knows to wait to increase thanks!

## 2024-07-09 ENCOUNTER — HOSPITAL ENCOUNTER (OUTPATIENT)
Dept: NUCLEAR MEDICINE | Facility: HOSPITAL | Age: 54
Discharge: HOME | End: 2024-07-09
Payer: COMMERCIAL

## 2024-07-09 ENCOUNTER — HOSPITAL ENCOUNTER (OUTPATIENT)
Dept: NON INVASIVE DIAGNOSTICS | Facility: HOSPITAL | Age: 54
Discharge: HOME | End: 2024-07-09
Payer: COMMERCIAL

## 2024-07-09 VITALS
BODY MASS INDEX: 33.13 KG/M2 | OXYGEN SATURATION: 99 % | DIASTOLIC BLOOD PRESSURE: 70 MMHG | HEART RATE: 57 BPM | RESPIRATION RATE: 20 BRPM | HEIGHT: 62 IN | SYSTOLIC BLOOD PRESSURE: 118 MMHG | WEIGHT: 180 LBS

## 2024-07-09 LAB
ARRHY DURING EX: NORMAL
CHEST PAIN STATEMENT: NORMAL
MAX DIASTOLIC BP: 84 MMHG
MAX PREDICTED HEART RATE: 167 BPM
NUC STRESS EJECTION FRACTION: 63 %
PROTOCOL NAME: NORMAL
RATE PRESSURE PRODUCT: NORMAL
REASON FOR TERMINATION: NORMAL
SL CV REST NUCLEAR ISOTOPE DOSE: 11 MCI
SL CV STRESS NUCLEAR ISOTOPE DOSE: 32 MCI
SL CV STRESS RECOVERY BP: NORMAL MMHG
SL CV STRESS RECOVERY HR: 80 BPM
SL CV STRESS RECOVERY O2 SAT: 99 %
STRESS ANGINA INDEX: 0
STRESS BASELINE BP: NORMAL MMHG
STRESS BASELINE HR: 57 BPM
STRESS O2 SAT REST: 99 %
STRESS PEAK HR: 104 BPM
STRESS POST EXERCISE DUR MIN: 3 MIN
STRESS POST EXERCISE DUR SEC: 0 SEC
STRESS POST O2 SAT PEAK: 99 %
STRESS POST PEAK BP: 146 MMHG
STRESS POST PEAK HR: 114 BPM
STRESS POST PEAK SYSTOLIC BP: 146 MMHG
TARGET HR FORMULA: NORMAL
TEST INDICATION: NORMAL

## 2024-07-09 PROCEDURE — 78452 HT MUSCLE IMAGE SPECT MULT: CPT | Performed by: INTERNAL MEDICINE

## 2024-07-09 PROCEDURE — G1004 CDSM NDSC: HCPCS

## 2024-07-09 PROCEDURE — 93018 CV STRESS TEST I&R ONLY: CPT | Performed by: INTERNAL MEDICINE

## 2024-07-09 PROCEDURE — 93016 CV STRESS TEST SUPVJ ONLY: CPT | Performed by: INTERNAL MEDICINE

## 2024-07-09 PROCEDURE — 93017 CV STRESS TEST TRACING ONLY: CPT

## 2024-07-09 PROCEDURE — 78452 HT MUSCLE IMAGE SPECT MULT: CPT

## 2024-07-09 PROCEDURE — A9502 TC99M TETROFOSMIN: HCPCS

## 2024-07-09 RX ORDER — REGADENOSON 0.08 MG/ML
0.4 INJECTION, SOLUTION INTRAVENOUS ONCE
Status: COMPLETED | OUTPATIENT
Start: 2024-07-09 | End: 2024-07-09

## 2024-07-09 RX ADMIN — REGADENOSON 0.4 MG: 0.08 INJECTION, SOLUTION INTRAVENOUS at 09:52

## 2024-07-23 ENCOUNTER — NURSE TRIAGE (OUTPATIENT)
Age: 54
End: 2024-07-23

## 2024-07-23 NOTE — TELEPHONE ENCOUNTER
Patient called- she has been experiencing low sugars at night for the last month, maybe a little longer. She said they drop into the 40s, she drinks orange juice and it comes back up for a little but drops right back down.     She said she gets really bad headaches, sweaty, off-balance and is always really tired. She feels ok now besides a headache because it is only occurring at night at this point. She also said she gets a burning sensation in her legs when she wakes up with low sugars too, but she isn't sure if this is related. I asked her if she feels any pain/pressure, or has an red spots but she said no. Her legs just burn, no numbness either.     She has an appointment in September but wants it moved up. I tried to schedule her for today or tomorrow but there are no appointments until 8/6. Because I could not offer her an appointment, I asked if she would be willing to go to Urgent Care or the ED to be seen but she said she would rather wait to see Dr. Rodríguez or someone else in the office.     Please advise if there are any appointments available or what she should do.     I asked her to call back immediately with any new/worsening symptoms or if her sugar drops. She verbalized understanding.

## 2024-07-23 NOTE — TELEPHONE ENCOUNTER
"Reason for Disposition  • Patient wants to be seen    Answer Assessment - Initial Assessment Questions  1. SYMPTOMS: \"What symptoms are you concerned about?\"      Tired, off-balance, headaches     2. ONSET:  \"When did the symptoms start?\"      About a month ago     3. BLOOD GLUCOSE: \"What is your blood glucose level?\"       In the 40s    4. USUAL RANGE: \"What is your blood glucose level usually?\" (e.g., usual fasting morning value, usual evening value)      60-70s usually     5. TYPE 1 or 2:  \"Do you know what type of diabetes you have?\"  (e.g., Type 1, Type 2, Gestational; doesn't know)       Type 2 (she thinks)     6. INSULIN: \"Do you take insulin?\" \"What type of insulin(s) do you use? What is the mode of delivery? (syringe, pen; injection or pump) \"When did you last give yourself an insulin dose?\" (i.e., time or hours/minutes ago) \"How much did you give?\" (i.e., how many units)      Not on any insulins     7. DIABETES PILLS: \"Do you take any pills for your diabetes?\"      Denies, doesn't take any     8. OTHER SYMPTOMS: \"Do you have any symptoms?\" (e.g., fever, frequent urination, difficulty breathing, vomiting)      Difficulty breathing (typical for her), sweats really bad     9. LOW BLOOD GLUCOSE TREATMENT: \"What have you done so far to treat the low blood glucose level?\"      Drinks orange juice     10. FOOD: \"When did you last eat or drink?\"        Breakfast     11. ALONE: \"Are you alone right now or is someone with you?\"         No not alone    Protocols used: Diabetes - Low Blood Sugar-ADULT-[place]    "

## 2024-07-24 ENCOUNTER — OFFICE VISIT (OUTPATIENT)
Dept: ENDOCRINOLOGY | Facility: CLINIC | Age: 54
End: 2024-07-24
Payer: COMMERCIAL

## 2024-07-24 VITALS
OXYGEN SATURATION: 99 % | SYSTOLIC BLOOD PRESSURE: 110 MMHG | HEART RATE: 60 BPM | DIASTOLIC BLOOD PRESSURE: 64 MMHG | WEIGHT: 175.8 LBS | BODY MASS INDEX: 32.15 KG/M2 | TEMPERATURE: 98 F

## 2024-07-24 DIAGNOSIS — K91.2 HYPOGLYCEMIA AFTER GI (GASTROINTESTINAL) SURGERY: Primary | ICD-10-CM

## 2024-07-24 PROCEDURE — 99213 OFFICE O/P EST LOW 20 MIN: CPT | Performed by: STUDENT IN AN ORGANIZED HEALTH CARE EDUCATION/TRAINING PROGRAM

## 2024-07-24 PROCEDURE — 95251 CONT GLUC MNTR ANALYSIS I&R: CPT | Performed by: STUDENT IN AN ORGANIZED HEALTH CARE EDUCATION/TRAINING PROGRAM

## 2024-07-24 RX ORDER — ACARBOSE 25 MG/1
TABLET ORAL
Qty: 90 TABLET | Refills: 0 | Status: SHIPPED | OUTPATIENT
Start: 2024-07-24

## 2024-07-24 RX ORDER — METFORMIN HYDROCHLORIDE 500 MG/1
500 TABLET, EXTENDED RELEASE ORAL
Qty: 90 TABLET | Refills: 0 | Status: SHIPPED | OUTPATIENT
Start: 2024-07-24 | End: 2024-10-22

## 2024-07-24 NOTE — PROGRESS NOTES
Ambulatory Visit  Name: Marylou Morris      : 1970      MRN: [id number]  Encounter Provider: aRchel Rodríguez MD  Encounter Date: 2024   Encounter department: [facility] [place]    Assessment & Plan   1. Hypoglycemia after GI (gastrointestinal) surgery  Assessment & Plan:  Marylou presents today concerning for overnight hypoglycemia, I did review her CGM report which is consistent with postprandial hypoglycemia, secondary due to gastric bypass surgery, we did reviewed again that there is no FDA approved medication for postprandial hypoglycemia after GI surgery, and medications including alpha glucosidase inhibitor like Acarbose, calcium channel blocker, alpha-blocker, etc. are recommended to alleviate symptoms, however tolerability is an issue.  She previously tried acarbose, which caused bloating she is willing to try half a pill before dinner.  Which was ordered.  I did recommend to discontinue semaglutide for now, I started metformin extended release 500 mg with breakfast.  We did encourage lifestyle modification that we discussed before, as mainstay of treatment.  Return back in 3 months.    Orders:  -     acarbose (PRECOSE) 25 mg tablet; 1/2 tab before main meal  -     metFORMIN (GLUCOPHAGE-XR) 500 mg 24 hr tablet; Take 1 tablet (500 mg total) by mouth daily with breakfast      History of Present Illness     Marylou Morris is a 54 y.o. female who presents for follow up for post prandial hypoglycemia    She reports frequent over night low blood sugars which causes early morning headaches.  She reports blood sugar drops as low as in low 40s, hypoglycemia symptoms including headaches, burning in her toes, feeling shaky and sweaty.  She reports eating her last meal at 6 PM, and a snack at 9 PM.  Currently on Ozempic 0.5 mg weekly, she lost 4 pounds since last visit.    Marylou Morris   Device used,dexcom G7  Home use       Indication   hypoglycema      More than 72  hours of data was reviewed. Report to be scanned to chart.     Date Range: July 11 - 24th    Analysis of data:   Average Glucose: 88 mg/dl  Coefficient of Variation: x   SD : 17 mg/dl   Time in Target Range: 89%   Time Above Range: <1%   Time Below Range: 10%         Review of Systems   Constitutional:  Positive for fatigue. Negative for appetite change and unexpected weight change.   HENT:  Negative for trouble swallowing and voice change.    Cardiovascular:  Negative for chest pain and palpitations.   Gastrointestinal:  Negative for abdominal pain, constipation, diarrhea, nausea and vomiting.   Endocrine: Negative for cold intolerance, heat intolerance, polydipsia, polyphagia and polyuria.   Neurological:  Positive for headaches.     Medical History Reviewed by provider this encounter:       Current Outpatient Medications on File Prior to Visit   Medication Sig Dispense Refill    albuterol (PROVENTIL HFA,VENTOLIN HFA) 90 mcg/act inhaler Inhale 2 puffs every 6 (six) hours as needed for wheezing 18 g 2    Blood Glucose Monitoring Suppl (OneTouch Verio Reflect) w/Device KIT Check blood sugars once daily. Please substitute with appropriate alternative as covered by patient's insurance. Dx: E11.65 1 kit 0    busPIRone (BUSPAR) 10 mg tablet Take 1 tablet (10 mg total) by mouth 2 (two) times a day 60 tablet 5    Continuous Blood Gluc  (Dexcom G7 ) TOMEKA Use 1 each continuous 1 each 0    Continuous Blood Gluc Sensor (Dexcom G7 Sensor) Use 1 Device every 10 days 9 each 2    ergocalciferol (VITAMIN D2) 50,000 units take 1 capsule by mouth ONCE EVERY WEEK 12 capsule 0    glucose blood (OneTouch Verio) test strip Use 1 each 4 (four) times a day (before meals and at bedtime) Check blood sugars once daily. Please substitute with appropriate alternative as covered by patient's insurance. Dx: E11.65 100 each 3    methocarbamol (ROBAXIN) 750 mg tablet take 1 tablet by mouth every 8 hours if needed for muscle spasm  30 tablet 0    nystatin (MYCOSTATIN) powder Apply topically 3 (three) times a day 60 g 1    omeprazole (PriLOSEC) 40 MG capsule Take 1 capsule (40 mg total) by mouth daily 90 capsule 3    ondansetron (ZOFRAN) 4 mg tablet Take 1 tablet (4 mg total) by mouth every 8 (eight) hours as needed for nausea or vomiting 20 tablet 0    OneTouch Delica Lancets 33G MISC Check blood sugars once daily. Please substitute with appropriate alternative as covered by patient's insurance. Dx: E11.65 100 each 3    pregabalin (LYRICA) 150 mg capsule Take 1 capsule (150 mg total) by mouth 2 (two) times a day 60 capsule 1    semaglutide, 0.25 or 0.5 mg/dose, (Ozempic, 0.25 or 0.5 MG/DOSE,) 2 mg/3 mL injection pen Inject 0.375 mL (0.25 mg total) under the skin every 7 days for 30 days, THEN 0.75 mL (0.5 mg total) every 7 days. 9 mL 0    therapeutic multivitamin-minerals (THERAGRAN-M) tablet Take 1 tablet by mouth daily 90 tablet 3    vitamin B-12 (VITAMIN B-12) 500 mcg tablet take 1 tablet by mouth once daily 30 tablet 2    QUEtiapine (SEROquel) 25 mg tablet Take 1 tablet (25 mg total) by mouth daily at bedtime (Patient not taking: Reported on 7/9/2024) 30 tablet 5     No current facility-administered medications on file prior to visit.      Objective     /64 (BP Location: Right arm, Patient Position: Sitting, Cuff Size: Adult)   Pulse 60   Temp 98 °F (36.7 °C)   Wt 79.7 kg (175 lb 12.8 oz)   LMP  (LMP Unknown)   SpO2 99%   BMI 32.15 kg/m²     Physical Exam  Vitals and nursing note reviewed.   Constitutional:       General: She is not in acute distress.     Appearance: She is well-developed.   HENT:      Head: Normocephalic and atraumatic.   Eyes:      Conjunctiva/sclera: Conjunctivae normal.   Cardiovascular:      Rate and Rhythm: Normal rate and regular rhythm.      Heart sounds: No murmur heard.  Pulmonary:      Effort: Pulmonary effort is normal. No respiratory distress.      Breath sounds: Normal breath sounds.   Abdominal:       Palpations: Abdomen is soft.      Tenderness: There is no abdominal tenderness.   Musculoskeletal:         General: No swelling.      Cervical back: Neck supple.   Skin:     General: Skin is warm and dry.      Capillary Refill: Capillary refill takes less than 2 seconds.   Neurological:      Mental Status: She is alert.   Psychiatric:         Mood and Affect: Mood normal.                 Component  Ref Range & Units 6/13/24  6:27 PM 4/29/24  8:39 AM 1/12/24  5:33 AM 1/11/24  1:07 PM 9/28/23 11:01 AM 9/18/23 12:34 PM 7/12/23  5:17 AM   Sodium  135 - 147 mmol/L 139 142 138 140 135 141 137   Potassium  3.5 - 5.3 mmol/L 3.8 4.1 4.0 3.9 4.1 5.0 3.8   Chloride  96 - 108 mmol/L 106 107 109 High  108 104 106 107   CO2  21 - 32 mmol/L 24 27 24 25 22 26 26   ANION GAP  4 - 13 mmol/L 9 8 5 R 7 R 9 R 9 R 4 R   BUN  5 - 25 mg/dL 12 11 13 13 14 10 10   Creatinine  0.60 - 1.30 mg/dL 0.70 0.56 Low  CM 0.59 Low  CM 0.74 CM 0.52 Low  CM 0.67 CM 0.57 Low  CM   Comment: Standardized to IDMS reference method   Glucose  65 - 140 mg/dL 87  80 CM 80 CM 87 CM  80 CM   Comment: If the patient is fasting, the ADA then defines impaired fasting glucose as > 100 mg/dL and diabetes as > or equal to 123 mg/dL.   Calcium  8.4 - 10.2 mg/dL 9.5 9.1 8.3 Low  9.4 9.2 10.0 8.5   AST  13 - 39 U/L 17   26 18 21    ALT  7 - 52 U/L 12   22 CM 13 CM 11 CM    Comment: Specimen collection should occur prior to Sulfasalazine administration due to the potential for falsely depressed results.   Alkaline Phosphatase  34 - 104 U/L 68   67 60 58    Total Protein  6.4 - 8.4 g/dL 6.8   6.8 6.9 6.9    Albumin  3.5 - 5.0 g/dL 4.3   4.1 4.1 4.1    Total Bilirubin  0.20 - 1.00 mg/dL 0.82

## 2024-07-24 NOTE — ASSESSMENT & PLAN NOTE
Marylou presents today concerning for overnight hypoglycemia, I did review her CGM report which is consistent with postprandial hypoglycemia, secondary due to gastric bypass surgery, we did reviewed again that there is no FDA approved medication for postprandial hypoglycemia after GI surgery, and medications including alpha glucosidase inhibitor like Acarbose, calcium channel blocker, alpha-blocker, etc. are recommended to alleviate symptoms, however tolerability is an issue.  She previously tried acarbose, which caused bloating she is willing to try half a pill before dinner.  Which was ordered.  I did recommend to discontinue semaglutide for now, I started metformin extended release 500 mg with breakfast.  We did encourage lifestyle modification that we discussed before, as mainstay of treatment.  Return back in 3 months.

## 2024-07-25 ENCOUNTER — APPOINTMENT (OUTPATIENT)
Dept: LAB | Facility: CLINIC | Age: 54
End: 2024-07-25
Payer: COMMERCIAL

## 2024-07-25 DIAGNOSIS — Z98.84 HISTORY OF GASTRIC BYPASS: ICD-10-CM

## 2024-07-25 LAB — VIT B12 SERPL-MCNC: 611 PG/ML (ref 180–914)

## 2024-07-25 PROCEDURE — 36415 COLL VENOUS BLD VENIPUNCTURE: CPT

## 2024-07-25 PROCEDURE — 82607 VITAMIN B-12: CPT

## 2024-07-26 ENCOUNTER — TELEPHONE (OUTPATIENT)
Dept: FAMILY MEDICINE CLINIC | Facility: CLINIC | Age: 54
End: 2024-07-26

## 2024-07-26 NOTE — TELEPHONE ENCOUNTER
----- Message from Roberta Britton DO sent at 7/26/2024  6:25 AM EDT -----  Please let Marylou know her B12 level is great 611, would continue her B12 supplement.

## 2024-08-27 ENCOUNTER — OFFICE VISIT (OUTPATIENT)
Dept: FAMILY MEDICINE CLINIC | Facility: CLINIC | Age: 54
End: 2024-08-27
Payer: COMMERCIAL

## 2024-08-27 VITALS
RESPIRATION RATE: 18 BRPM | BODY MASS INDEX: 32.39 KG/M2 | OXYGEN SATURATION: 99 % | TEMPERATURE: 97.3 F | HEART RATE: 69 BPM | HEIGHT: 62 IN | SYSTOLIC BLOOD PRESSURE: 120 MMHG | WEIGHT: 176 LBS | DIASTOLIC BLOOD PRESSURE: 76 MMHG

## 2024-08-27 DIAGNOSIS — R13.10 DYSPHAGIA, UNSPECIFIED TYPE: ICD-10-CM

## 2024-08-27 DIAGNOSIS — K21.9 GASTROESOPHAGEAL REFLUX DISEASE WITHOUT ESOPHAGITIS: ICD-10-CM

## 2024-08-27 DIAGNOSIS — Z13.220 ENCOUNTER FOR SCREENING FOR LIPID DISORDER: ICD-10-CM

## 2024-08-27 DIAGNOSIS — E53.8 B12 DEFICIENCY: ICD-10-CM

## 2024-08-27 DIAGNOSIS — J45.40 MODERATE PERSISTENT ASTHMA, UNSPECIFIED WHETHER COMPLICATED: ICD-10-CM

## 2024-08-27 DIAGNOSIS — E55.9 VITAMIN D DEFICIENCY: ICD-10-CM

## 2024-08-27 DIAGNOSIS — K21.9 GASTROESOPHAGEAL REFLUX DISEASE, UNSPECIFIED WHETHER ESOPHAGITIS PRESENT: ICD-10-CM

## 2024-08-27 DIAGNOSIS — J30.9 ALLERGIC RHINITIS, UNSPECIFIED SEASONALITY, UNSPECIFIED TRIGGER: ICD-10-CM

## 2024-08-27 DIAGNOSIS — M54.50 CHRONIC BILATERAL LOW BACK PAIN WITHOUT SCIATICA: Primary | ICD-10-CM

## 2024-08-27 DIAGNOSIS — R09.81 NASAL CONGESTION: ICD-10-CM

## 2024-08-27 DIAGNOSIS — G89.29 CHRONIC BILATERAL LOW BACK PAIN WITHOUT SCIATICA: Primary | ICD-10-CM

## 2024-08-27 DIAGNOSIS — E01.0 THYROMEGALY: ICD-10-CM

## 2024-08-27 DIAGNOSIS — M25.532 LEFT WRIST PAIN: ICD-10-CM

## 2024-08-27 DIAGNOSIS — R51.9 INTERMITTENT HEADACHE: ICD-10-CM

## 2024-08-27 PROBLEM — Z80.8 FAMILY HISTORY OF THYROID CANCER: Status: ACTIVE | Noted: 2024-08-27

## 2024-08-27 PROCEDURE — 99214 OFFICE O/P EST MOD 30 MIN: CPT | Performed by: FAMILY MEDICINE

## 2024-08-27 RX ORDER — FLUTICASONE PROPIONATE AND SALMETEROL 100; 50 UG/1; UG/1
1 POWDER RESPIRATORY (INHALATION) 2 TIMES DAILY
Qty: 60 BLISTER | Refills: 5 | Status: SHIPPED | OUTPATIENT
Start: 2024-08-27 | End: 2025-02-23

## 2024-08-27 RX ORDER — LEVALBUTEROL TARTRATE 45 UG/1
1-2 AEROSOL, METERED ORAL EVERY 4 HOURS PRN
Qty: 45 G | Refills: 3 | Status: SHIPPED | OUTPATIENT
Start: 2024-08-27

## 2024-08-27 RX ORDER — DESLORATADINE 5 MG/1
5 TABLET ORAL DAILY
Qty: 30 TABLET | Refills: 5 | Status: SHIPPED | OUTPATIENT
Start: 2024-08-27

## 2024-08-27 RX ORDER — FAMOTIDINE 20 MG/1
20 TABLET, FILM COATED ORAL
Qty: 30 TABLET | Refills: 5 | Status: SHIPPED | OUTPATIENT
Start: 2024-08-27

## 2024-08-27 NOTE — PROGRESS NOTES
Assessment/Plan:       Problem List Items Addressed This Visit          Respiratory    Asthma    Relevant Medications    levalbuterol (Xopenex HFA) 45 mcg/act inhaler    Fluticasone-Salmeterol (Advair) 100-50 mcg/dose inhaler    Other Relevant Orders    TSH, 3rd generation with Free T4 reflex       Digestive    Gastroesophageal reflux disease    Relevant Medications    famotidine (PEPCID) 20 mg tablet    Other Relevant Orders    CBC and differential    Comprehensive metabolic panel    Phosphorus    Magnesium       Other    Vitamin D deficiency    Relevant Orders    Vitamin D 25 hydroxy     Other Visit Diagnoses       Chronic bilateral low back pain without sciatica    -  Primary    Left wrist pain        Relevant Medications    Diclofenac Sodium (VOLTAREN) 1 %    Dysphagia, unspecified type        Relevant Medications    famotidine (PEPCID) 20 mg tablet    Nasal congestion        Relevant Medications    desloratadine (CLARINEX) 5 MG tablet    Allergic rhinitis, unspecified seasonality, unspecified trigger        Relevant Medications    desloratadine (CLARINEX) 5 MG tablet    B12 deficiency        Relevant Orders    Vitamin B12    Encounter for screening for lipid disorder        Relevant Orders    Lipid Panel with Direct LDL reflex    Thyromegaly        Relevant Orders    US thyroid    Intermittent headache                  Subjective:      Patient ID: Marylou Morris is a 54 y.o. female.    HPI    Sharp pain in lower back alternates side, ongoing and previously seen in ER for this. No urinary symptoms. Continued left wrist pain as well.     Has fluid coming up and feels like she's choking, stops her breathing/has to stomp her feet to breathe.     Allergies- Flonase caused her throat to swell, allergies flaring recently.     Headaches 4-5 times daily for years. Sweating comes and goes.      The following portions of the patient's history were reviewed and updated as appropriate: allergies, current medications, past  "family history, past medical history, past social history, past surgical history, and problem list.    Review of Systems   All other systems reviewed and are negative.        Objective:      /76   Pulse 69   Temp (!) 97.3 °F (36.3 °C) (Temporal)   Resp 18   Ht 5' 2\" (1.575 m)   Wt 79.8 kg (176 lb)   LMP  (LMP Unknown)   SpO2 99%   BMI 32.19 kg/m²          Physical Exam  Vitals reviewed.   Constitutional:       General: She is not in acute distress.     Appearance: Normal appearance. She is not ill-appearing, toxic-appearing or diaphoretic.   HENT:      Head: Normocephalic and atraumatic.   Eyes:      General:         Right eye: No discharge.         Left eye: No discharge.      Extraocular Movements: Extraocular movements intact.      Conjunctiva/sclera: Conjunctivae normal.   Neck:      Thyroid: Thyromegaly present.   Cardiovascular:      Rate and Rhythm: Normal rate and regular rhythm.      Heart sounds: Normal heart sounds. No murmur heard.     No friction rub. No gallop.   Pulmonary:      Effort: Pulmonary effort is normal. No respiratory distress.      Breath sounds: Normal breath sounds. No stridor. No wheezing or rhonchi.   Musculoskeletal:         General: No swelling, tenderness or signs of injury.      Right lower leg: No edema.      Left lower leg: No edema.   Skin:     General: Skin is warm.      Coloration: Skin is not pale.      Findings: No erythema or rash.   Neurological:      Mental Status: She is alert and oriented to person, place, and time.      Motor: No weakness.   Psychiatric:         Mood and Affect: Mood normal.         Behavior: Behavior normal.             Roberta Britton,   [facility]"

## 2024-09-11 ENCOUNTER — HOSPITAL ENCOUNTER (OUTPATIENT)
Dept: ULTRASOUND IMAGING | Facility: HOSPITAL | Age: 54
Discharge: HOME | End: 2024-09-11
Payer: COMMERCIAL

## 2024-09-11 DIAGNOSIS — E01.0 THYROMEGALY: ICD-10-CM

## 2024-09-11 PROCEDURE — 76536 US EXAM OF HEAD AND NECK: CPT

## 2024-09-16 ENCOUNTER — TELEPHONE (OUTPATIENT)
Age: 54
End: 2024-09-16

## 2024-09-16 NOTE — TELEPHONE ENCOUNTER
Patient states she has been having more frequent lows and feeling very tired and nauseated. States she has been waking up with frequent headaches and has some forgetfulness. Patient is not able to get Clarity joaquina. Asked if she could bring Dexcom to office for download and patient asked for a sooner appt. No sooner appointment available. Please advise, thank you.

## 2024-09-24 ENCOUNTER — VBI (OUTPATIENT)
Dept: ADMINISTRATIVE | Facility: OTHER | Age: 54
End: 2024-09-24

## 2024-09-24 NOTE — TELEPHONE ENCOUNTER
09/24/24 7:23 AM     Chart reviewed for Hemoglobin A1c ; nothing is submitted to the patient's insurance at this time.     Sangeeta Ogden   [facility]

## 2024-09-25 ENCOUNTER — APPOINTMENT (OUTPATIENT)
Dept: LAB | Facility: CLINIC | Age: 54
End: 2024-09-25
Payer: COMMERCIAL

## 2024-09-25 ENCOUNTER — OFFICE VISIT (OUTPATIENT)
Dept: OBGYN CLINIC | Facility: CLINIC | Age: 54
End: 2024-09-25
Payer: COMMERCIAL

## 2024-09-25 VITALS
SYSTOLIC BLOOD PRESSURE: 124 MMHG | BODY MASS INDEX: 32.94 KG/M2 | WEIGHT: 179 LBS | DIASTOLIC BLOOD PRESSURE: 74 MMHG | HEIGHT: 62 IN

## 2024-09-25 DIAGNOSIS — Z13.220 ENCOUNTER FOR SCREENING FOR LIPID DISORDER: ICD-10-CM

## 2024-09-25 DIAGNOSIS — J45.40 MODERATE PERSISTENT ASTHMA, UNSPECIFIED WHETHER COMPLICATED: ICD-10-CM

## 2024-09-25 DIAGNOSIS — Z12.31 ENCOUNTER FOR SCREENING MAMMOGRAM FOR BREAST CANCER: ICD-10-CM

## 2024-09-25 DIAGNOSIS — E55.9 VITAMIN D DEFICIENCY: ICD-10-CM

## 2024-09-25 DIAGNOSIS — K21.9 GASTROESOPHAGEAL REFLUX DISEASE, UNSPECIFIED WHETHER ESOPHAGITIS PRESENT: ICD-10-CM

## 2024-09-25 DIAGNOSIS — Z01.419 ROUTINE GYNECOLOGICAL EXAMINATION: Primary | ICD-10-CM

## 2024-09-25 DIAGNOSIS — E53.8 B12 DEFICIENCY: ICD-10-CM

## 2024-09-25 LAB
25(OH)D3 SERPL-MCNC: 35.9 NG/ML (ref 30–100)
ALBUMIN SERPL BCG-MCNC: 4 G/DL (ref 3.5–5)
ALP SERPL-CCNC: 51 U/L (ref 34–104)
ALT SERPL W P-5'-P-CCNC: 11 U/L (ref 7–52)
ANION GAP SERPL CALCULATED.3IONS-SCNC: 10 MMOL/L (ref 4–13)
AST SERPL W P-5'-P-CCNC: 19 U/L (ref 13–39)
BASOPHILS # BLD AUTO: 0.02 THOUSANDS/ΜL (ref 0–0.1)
BASOPHILS NFR BLD AUTO: 0 % (ref 0–1)
BILIRUB SERPL-MCNC: 0.76 MG/DL (ref 0.2–1)
BUN SERPL-MCNC: 11 MG/DL (ref 5–25)
CALCIUM SERPL-MCNC: 9.1 MG/DL (ref 8.4–10.2)
CHLORIDE SERPL-SCNC: 107 MMOL/L (ref 96–108)
CHOLEST SERPL-MCNC: 163 MG/DL
CO2 SERPL-SCNC: 26 MMOL/L (ref 21–32)
CREAT SERPL-MCNC: 0.52 MG/DL (ref 0.6–1.3)
EOSINOPHIL # BLD AUTO: 0.24 THOUSAND/ΜL (ref 0–0.61)
EOSINOPHIL NFR BLD AUTO: 4 % (ref 0–6)
ERYTHROCYTE [DISTWIDTH] IN BLOOD BY AUTOMATED COUNT: 15 % (ref 11.6–15.1)
GFR SERPL CREATININE-BSD FRML MDRD: 109 ML/MIN/1.73SQ M
GLUCOSE P FAST SERPL-MCNC: 69 MG/DL (ref 65–99)
HCT VFR BLD AUTO: 40.7 % (ref 34.8–46.1)
HDLC SERPL-MCNC: 60 MG/DL
HGB BLD-MCNC: 13.1 G/DL (ref 11.5–15.4)
IMM GRANULOCYTES # BLD AUTO: 0.01 THOUSAND/UL (ref 0–0.2)
IMM GRANULOCYTES NFR BLD AUTO: 0 % (ref 0–2)
LDLC SERPL CALC-MCNC: 89 MG/DL (ref 0–100)
LYMPHOCYTES # BLD AUTO: 1.88 THOUSANDS/ΜL (ref 0.6–4.47)
LYMPHOCYTES NFR BLD AUTO: 35 % (ref 14–44)
MAGNESIUM SERPL-MCNC: 2 MG/DL (ref 1.9–2.7)
MCH RBC QN AUTO: 29.8 PG (ref 26.8–34.3)
MCHC RBC AUTO-ENTMCNC: 32.2 G/DL (ref 31.4–37.4)
MCV RBC AUTO: 93 FL (ref 82–98)
MONOCYTES # BLD AUTO: 0.28 THOUSAND/ΜL (ref 0.17–1.22)
MONOCYTES NFR BLD AUTO: 5 % (ref 4–12)
NEUTROPHILS # BLD AUTO: 2.99 THOUSANDS/ΜL (ref 1.85–7.62)
NEUTS SEG NFR BLD AUTO: 56 % (ref 43–75)
NRBC BLD AUTO-RTO: 0 /100 WBCS
PHOSPHATE SERPL-MCNC: 3.4 MG/DL (ref 2.7–4.5)
PLATELET # BLD AUTO: 299 THOUSANDS/UL (ref 149–390)
PMV BLD AUTO: 9.4 FL (ref 8.9–12.7)
POTASSIUM SERPL-SCNC: 3.9 MMOL/L (ref 3.5–5.3)
PROT SERPL-MCNC: 6.5 G/DL (ref 6.4–8.4)
RBC # BLD AUTO: 4.4 MILLION/UL (ref 3.81–5.12)
SODIUM SERPL-SCNC: 143 MMOL/L (ref 135–147)
TRIGL SERPL-MCNC: 72 MG/DL
TSH SERPL DL<=0.05 MIU/L-ACNC: 2.02 UIU/ML (ref 0.45–4.5)
VIT B12 SERPL-MCNC: 324 PG/ML (ref 180–914)
WBC # BLD AUTO: 5.42 THOUSAND/UL (ref 4.31–10.16)

## 2024-09-25 PROCEDURE — 82607 VITAMIN B-12: CPT

## 2024-09-25 PROCEDURE — 84443 ASSAY THYROID STIM HORMONE: CPT

## 2024-09-25 PROCEDURE — 80053 COMPREHEN METABOLIC PANEL: CPT

## 2024-09-25 PROCEDURE — 85025 COMPLETE CBC W/AUTO DIFF WBC: CPT

## 2024-09-25 PROCEDURE — 99396 PREV VISIT EST AGE 40-64: CPT | Performed by: ADVANCED PRACTICE MIDWIFE

## 2024-09-25 PROCEDURE — 82306 VITAMIN D 25 HYDROXY: CPT

## 2024-09-25 PROCEDURE — 36415 COLL VENOUS BLD VENIPUNCTURE: CPT

## 2024-09-25 PROCEDURE — 80061 LIPID PANEL: CPT

## 2024-09-25 PROCEDURE — 84100 ASSAY OF PHOSPHORUS: CPT

## 2024-09-25 PROCEDURE — 83735 ASSAY OF MAGNESIUM: CPT

## 2024-09-25 NOTE — PROGRESS NOTES
[facility] [address] [place]    ASSESSMENT/PLAN: Marylou Morris is a 54 y.o.  who presents for annual gynecologic exam.    Encounter for routine gynecologic examination  - Routine well woman exam completed today.  - Cervical Cancer Screening: Current ASCCP Guidelines reviewed. Last Pap: 2022 normal. Next Pap Due:   - HPV Vaccination status: Not immunized  - STI screening offered including HIV: not indicated based on hx or requested at time of visit  - Breast Cancer Screening: Last Mammogram 2023, script in chart  - Colorectal cancer screening was not ordered.  - The following were reviewed in today's visit: breast self exam, mammography screening ordered, menopause, adequate intake of calcium and vitamin D, and exercise  - RTO 1 yr    Additional problems addressed at this visit:  1. Routine gynecological examination  -     Mammo screening bilateral w 3d and cad; Future; Expected date: 2024  2. Encounter for screening mammogram for breast cancer  -     Mammo screening bilateral w 3d and cad; Future; Expected date: 2024        CC:  Annual Gynecologic Examination    HPI: Marylou Morris is a 54 y.o.  who presents for annual gynecologic examination.  Marylou presents today for gyn exam. No vaginal bleeding since onset of menopause.  last pap smear- normal, Hx of abnormal pap smear - no. Sexually active- none. Does  desire STI testing.  2023 mammogram- normal, and 2023 Cologuard- negative- repeat 3 yrs. Reports interrupted hrs of sleep daily, 1-3 servings of calcium rich foods daily. Exercises: active daily, walks dog daily. Trying to get weight down- on Ozempic. 1 serving servings of caffeine daily. Breast changes : none. Safe at home- yes. Concerns: notes regular hot flashes.        The following portions of the patient's history were reviewed and updated as appropriate: allergies, current medications, past family history, past  "medical history, obstetric history, gynecologic history, past social history, past surgical history and problem list.    Review of Systems   Constitutional:  Positive for fatigue. Negative for chills and fever.   Respiratory:  Negative for cough and shortness of breath.         No changes   Cardiovascular:  Negative for chest pain, palpitations and leg swelling.   Gastrointestinal:  Negative for constipation and diarrhea.        Controlled with diet   Endocrine: Positive for heat intolerance.   Genitourinary:  Negative for difficulty urinating, dysuria, frequency, genital sores, pelvic pain, urgency, vaginal bleeding, vaginal discharge and vaginal pain.   Neurological:  Negative for light-headedness and headaches.        Unsure if tension related and notes 2-3 per day- does not take medication, because she does not like to take medication. She has seen eye doctor.    Psychiatric/Behavioral:  Negative for self-injury. The patient is nervous/anxious.          Objective:  /74   Ht 5' 2\" (1.575 m)   Wt 81.2 kg (179 lb)   LMP  (LMP Unknown)   BMI 32.74 kg/m²    Physical Exam  Vitals reviewed.   Constitutional:       Appearance: Normal appearance.   HENT:      Head: Normocephalic.   Neck:      Thyroid: No thyroid mass or thyroid tenderness.   Cardiovascular:      Rate and Rhythm: Normal rate and regular rhythm.      Heart sounds: Normal heart sounds.   Pulmonary:      Effort: Pulmonary effort is normal.      Breath sounds: Normal breath sounds.   Chest:   Breasts:     Right: No mass, nipple discharge, skin change or tenderness.      Left: No mass, nipple discharge, skin change or tenderness.   Abdominal:      General: There is no distension.      Palpations: There is no mass.      Tenderness: There is no abdominal tenderness. There is no guarding.   Genitourinary:     General: Normal vulva.      Exam position: Lithotomy position.      Labia:         Right: No rash, tenderness or lesion.         Left: No rash, " tenderness or lesion.       Urethra: No urethral pain, urethral swelling or urethral lesion.      Vagina: No vaginal discharge, erythema, tenderness, bleeding or lesions.      Cervix: No cervical motion tenderness, discharge, friability, lesion, erythema or cervical bleeding.      Uterus: Normal. Not enlarged and not tender.       Adnexa:         Right: No mass, tenderness or fullness.          Left: No mass, tenderness or fullness.     Musculoskeletal:      Cervical back: Normal range of motion.   Lymphadenopathy:      Upper Body:      Right upper body: No axillary adenopathy.      Left upper body: No axillary adenopathy.   Skin:     General: Skin is warm and dry.   Neurological:      Mental Status: She is alert.   Psychiatric:         Mood and Affect: Mood normal.         Behavior: Behavior normal.         Judgment: Judgment normal.             Ana Saha CNM  [facility]  09/25/24 1:29 PM

## 2024-10-05 NOTE — TELEPHONE ENCOUNTER
Scheduled date of EGD(as of today): 1/02/2024    Physician performing EGD: Dari     Location of EGD: [facility]    Instructions reviewed with patient by: AL    Clearances: None    Rescheduled from 12/07/2023 due to the snow. [facility] notified. No indicators present

## 2024-10-15 DIAGNOSIS — K91.2 HYPOGLYCEMIA AFTER GI (GASTROINTESTINAL) SURGERY: ICD-10-CM

## 2024-10-16 RX ORDER — METFORMIN HYDROCHLORIDE 500 MG/1
TABLET, EXTENDED RELEASE ORAL
Qty: 90 TABLET | Refills: 1 | Status: SHIPPED | OUTPATIENT
Start: 2024-10-16

## 2024-10-17 ENCOUNTER — OFFICE VISIT (OUTPATIENT)
Dept: ENDOCRINOLOGY | Facility: CLINIC | Age: 54
End: 2024-10-17
Payer: COMMERCIAL

## 2024-10-17 VITALS
BODY MASS INDEX: 32.87 KG/M2 | HEIGHT: 62 IN | DIASTOLIC BLOOD PRESSURE: 66 MMHG | OXYGEN SATURATION: 99 % | SYSTOLIC BLOOD PRESSURE: 120 MMHG | TEMPERATURE: 97.8 F | HEART RATE: 70 BPM | WEIGHT: 178.6 LBS

## 2024-10-17 DIAGNOSIS — E55.9 VITAMIN D DEFICIENCY: ICD-10-CM

## 2024-10-17 DIAGNOSIS — K91.2 HYPOGLYCEMIA AFTER GI (GASTROINTESTINAL) SURGERY: Primary | ICD-10-CM

## 2024-10-17 PROCEDURE — 99213 OFFICE O/P EST LOW 20 MIN: CPT | Performed by: STUDENT IN AN ORGANIZED HEALTH CARE EDUCATION/TRAINING PROGRAM

## 2024-10-17 NOTE — PROGRESS NOTES
Ambulatory Visit  Name: Marylou Morris      : 1970      MRN: [id number]  Encounter Provider: Rachel Rodríguez MD  Encounter Date: 10/17/2024   Encounter department: [facility] [place]    Assessment & Plan  Hypoglycemia after GI (gastrointestinal) surgery  Frequency and severity of symptoms have improved on GLP-1 agonist, currently on 0.25 mg weekly which we will increase it to 0.5 mg.  I believe changing her taste which has been chronic is mostly related to smoking and I strongly encouraged her to quit smoking.  She could not tolerate alpha glucosidase (Acarbose) due to GI symptoms which will be discontinued.  We again emphasized lifestyle modifications and dietary recommendation which we discussed previously.  Return back in 5 months.           Vitamin D deficiency  Continue vitamin D supplementation.         History of Present Illness     Marylou Morris is a 54 y.o. female who presents for follow up for post prandial hypoglycemia.    She is currently on Ozempic 0.5 mg weekly.  She reports decreased in frequency of hypoglycemia episodes.    She was not using CGM, she does place sensor today, no enough information to interpret.    History obtained from : patient  Review of Systems   Constitutional:  Positive for fatigue. Negative for appetite change, chills, fever and unexpected weight change.   HENT:  Positive for voice change. Negative for ear pain and sore throat.    Eyes:  Negative for pain and visual disturbance.   Cardiovascular:  Negative for chest pain and palpitations.   Gastrointestinal:  Negative for abdominal pain, nausea and vomiting.   Musculoskeletal:  Positive for back pain.   Neurological:  Positive for headaches. Negative for seizures and syncope.   Psychiatric/Behavioral:  Positive for sleep disturbance.    All other systems reviewed and are negative.    Medical History Reviewed by provider this encounter:       Current Outpatient Medications on File  Prior to Visit   Medication Sig Dispense Refill    Blood Glucose Monitoring Suppl (OneTouch Verio Reflect) w/Device KIT Check blood sugars once daily. Please substitute with appropriate alternative as covered by patient's insurance. Dx: E11.65 1 kit 0    Continuous Blood Gluc  (Dexcom G7 ) TOMEKA Use 1 each continuous 1 each 0    Continuous Blood Gluc Sensor (Dexcom G7 Sensor) Use 1 Device every 10 days 9 each 2    desloratadine (CLARINEX) 5 MG tablet Take 1 tablet (5 mg total) by mouth daily 30 tablet 5    Diclofenac Sodium (VOLTAREN) 1 % Apply 2 g topically 4 (four) times a day as needed (Pain) 350 g 3    ergocalciferol (VITAMIN D2) 50,000 units take 1 capsule by mouth every week 12 capsule 0    famotidine (PEPCID) 20 mg tablet Take 1 tablet (20 mg total) by mouth daily at bedtime 30 tablet 5    Fluticasone-Salmeterol (Advair) 100-50 mcg/dose inhaler Inhale 1 puff 2 (two) times a day Rinse mouth after use. 60 blister 5    glucose blood (OneTouch Verio) test strip Use 1 each 4 (four) times a day (before meals and at bedtime) Check blood sugars once daily. Please substitute with appropriate alternative as covered by patient's insurance. Dx: E11.65 100 each 3    levalbuterol (Xopenex HFA) 45 mcg/act inhaler Inhale 1-2 puffs every 4 (four) hours as needed for wheezing 45 g 3    metFORMIN (GLUCOPHAGE-XR) 500 mg 24 hr tablet take 1 tablet by mouth EVERY MORNING WITH BREAKFAST 90 tablet 1    omeprazole (PriLOSEC) 40 MG capsule Take 1 capsule (40 mg total) by mouth daily 90 capsule 3    OneTouch Delica Lancets 33G MISC Check blood sugars once daily. Please substitute with appropriate alternative as covered by patient's insurance. Dx: E11.65 100 each 3    vitamin B-12 (VITAMIN B-12) 500 mcg tablet take 1 tablet by mouth once daily (Patient not taking: Reported on 10/17/2024) 30 tablet 2     No current facility-administered medications on file prior to visit.          Objective     LMP  (LMP Unknown)      Physical Exam  Vitals and nursing note reviewed.   Constitutional:       General: She is not in acute distress.     Appearance: She is well-developed.   HENT:      Head: Normocephalic and atraumatic.   Cardiovascular:      Rate and Rhythm: Normal rate and regular rhythm.   Pulmonary:      Effort: Pulmonary effort is normal. No respiratory distress.   Abdominal:      Palpations: Abdomen is soft.   Musculoskeletal:         General: No swelling.      Cervical back: Neck supple.   Skin:     General: Skin is warm and dry.   Neurological:      Mental Status: She is alert.       Component      Latest Ref Rng 9/25/2024   Sodium      135 - 147 mmol/L 143    Potassium      3.5 - 5.3 mmol/L 3.9    Chloride      96 - 108 mmol/L 107    Carbon Dioxide      21 - 32 mmol/L 26    ANION GAP      4 - 13 mmol/L 10    BUN      5 - 25 mg/dL 11    Creatinine      0.60 - 1.30 mg/dL 0.52 (L)    GLUCOSE, FASTING      65 - 99 mg/dL 69    Calcium      8.4 - 10.2 mg/dL 9.1    AST      13 - 39 U/L 19    ALT      7 - 52 U/L 11    ALK PHOS      34 - 104 U/L 51    Total Protein      6.4 - 8.4 g/dL 6.5    Albumin      3.5 - 5.0 g/dL 4.0    Total Bilirubin      0.20 - 1.00 mg/dL 0.76    GFR, Calculated      ml/min/1.73sq m 109    Cholesterol      See Comment mg/dL 163    Triglycerides      See Comment mg/dL 72    HDL      >=50 mg/dL 60    LDL Calculated      0 - 100 mg/dL 89    VITAMIN D, 25-HYDROXY      30.0 - 100.0 ng/mL 35.9    TSH 3RD GENERATON      0.450 - 4.500 uIU/mL 2.025       Legend:  (L) Low

## 2024-10-17 NOTE — ASSESSMENT & PLAN NOTE
Frequency and severity of symptoms have improved on GLP-1 agonist, currently on 0.25 mg weekly which we will increase it to 0.5 mg.  I believe changing her taste which has been chronic is mostly related to smoking and I strongly encouraged her to quit smoking.  She could not tolerate alpha glucosidase (Acarbose) due to GI symptoms which will be discontinued.  We again emphasized lifestyle modifications and dietary recommendation which we discussed previously.  Return back in 5 months.

## 2024-10-18 ENCOUNTER — TELEPHONE (OUTPATIENT)
Age: 54
End: 2024-10-18

## 2024-10-18 DIAGNOSIS — Z98.84 HISTORY OF GASTRIC BYPASS: ICD-10-CM

## 2024-10-18 RX ORDER — UREA 10 %
500 LOTION (ML) TOPICAL DAILY
Qty: 30 TABLET | Refills: 2 | Status: SHIPPED | OUTPATIENT
Start: 2024-10-18

## 2024-10-18 NOTE — TELEPHONE ENCOUNTER
"Patient states she was seen yesterday by Dr. Rodríguez and he was going to order \"a test to check for blockages in my legs\". States he would confer with PCP.  Please advise, thank you.   "

## 2024-10-18 NOTE — TELEPHONE ENCOUNTER
Called patient and was not able to leave VM. Mailbox was full.    Provider sent a message to PCP and they will be contacting the patient

## 2024-10-18 NOTE — TELEPHONE ENCOUNTER
Medication: vitamin B-12 (VITAMIN B-12) 500 mcg tablet     Dose/Frequency: take 1 tablet by mouth once daily,     Quantity:  30 tablet     Pharmacy:   [id number] - [place] - [address] [place] [address] [phone]   Office:   [x] PCP/Provider - Roberta Britton, DO   [] Speciality/Provider -     Does the patient have enough for 3 days?   [] Yes   [x] No - Send as HP to POD

## 2024-11-07 ENCOUNTER — HOSPITAL ENCOUNTER (OUTPATIENT)
Dept: MAMMOGRAPHY | Facility: HOSPITAL | Age: 54
End: 2024-11-07
Payer: COMMERCIAL

## 2024-11-07 DIAGNOSIS — E66.811 CLASS 1 OBESITY DUE TO EXCESS CALORIES WITH SERIOUS COMORBIDITY AND BODY MASS INDEX (BMI) OF 33.0 TO 33.9 IN ADULT: ICD-10-CM

## 2024-11-07 DIAGNOSIS — K91.2 HYPOGLYCEMIA AFTER GI (GASTROINTESTINAL) SURGERY: ICD-10-CM

## 2024-11-07 DIAGNOSIS — Z01.419 ROUTINE GYNECOLOGICAL EXAMINATION: ICD-10-CM

## 2024-11-07 DIAGNOSIS — Z12.31 ENCOUNTER FOR SCREENING MAMMOGRAM FOR BREAST CANCER: ICD-10-CM

## 2024-11-07 DIAGNOSIS — E66.09 CLASS 1 OBESITY DUE TO EXCESS CALORIES WITH SERIOUS COMORBIDITY AND BODY MASS INDEX (BMI) OF 33.0 TO 33.9 IN ADULT: ICD-10-CM

## 2024-11-07 PROCEDURE — 77067 SCR MAMMO BI INCL CAD: CPT

## 2024-11-07 PROCEDURE — 77063 BREAST TOMOSYNTHESIS BI: CPT

## 2024-11-07 RX ORDER — SEMAGLUTIDE 0.68 MG/ML
INJECTION, SOLUTION SUBCUTANEOUS
Qty: 9 ML | Refills: 0 | Status: SHIPPED | OUTPATIENT
Start: 2024-11-07

## 2024-11-14 ENCOUNTER — RESULTS FOLLOW-UP (OUTPATIENT)
Dept: OBGYN CLINIC | Facility: MEDICAL CENTER | Age: 54
End: 2024-11-14

## 2024-11-20 ENCOUNTER — OFFICE VISIT (OUTPATIENT)
Dept: CARDIOLOGY CLINIC | Facility: CLINIC | Age: 54
End: 2024-11-20
Payer: COMMERCIAL

## 2024-11-20 VITALS
HEART RATE: 80 BPM | WEIGHT: 173 LBS | BODY MASS INDEX: 33.96 KG/M2 | DIASTOLIC BLOOD PRESSURE: 66 MMHG | HEIGHT: 60 IN | RESPIRATION RATE: 18 BRPM | SYSTOLIC BLOOD PRESSURE: 114 MMHG | OXYGEN SATURATION: 99 %

## 2024-11-20 DIAGNOSIS — R00.2 PALPITATIONS: Primary | ICD-10-CM

## 2024-11-20 DIAGNOSIS — E66.09 CLASS 2 OBESITY DUE TO EXCESS CALORIES WITHOUT SERIOUS COMORBIDITY WITH BODY MASS INDEX (BMI) OF 35.0 TO 35.9 IN ADULT: ICD-10-CM

## 2024-11-20 DIAGNOSIS — Z98.84 S/P GASTRIC BYPASS: ICD-10-CM

## 2024-11-20 DIAGNOSIS — E66.812 CLASS 2 OBESITY DUE TO EXCESS CALORIES WITHOUT SERIOUS COMORBIDITY WITH BODY MASS INDEX (BMI) OF 35.0 TO 35.9 IN ADULT: ICD-10-CM

## 2024-11-20 DIAGNOSIS — F17.200 CURRENT SMOKER: ICD-10-CM

## 2024-11-20 PROCEDURE — 99213 OFFICE O/P EST LOW 20 MIN: CPT | Performed by: INTERNAL MEDICINE

## 2024-11-20 NOTE — PROGRESS NOTES
[facility] [place] [address] [address] [facility] [id number]                                            Cardiology Office Follow up  Marylou Morris, 54 y.o. female  YOB: 1970  MRN: [id number] Encounter: [id number]      PCP - Sherita Olivarez MD  Referring Provider - No ref. provider found    Chief Complaint   Patient presents with    Follow-up     6 month    Shortness of Breath     Admits regular occurrence but resolves with rest    Numbness     Admits regular occurrence when waking up in the hands and feet    Fatigue     Admits whole body weakness  Admits daytime sleepiness  Denies any issues falling asleep but admits issues staying asleep    Edema     Admits regular occurrence in the neck       Assessment  Chest discomfort  Palpitations  GERD  Hiatal hernia  Generalized weakness  Left ringing in the air, neck swelling  Stroke-like symptoms  7/2023 IP eval for stroke, MRI Randy negative  Current smoker  Was down to 0.5 PPD  S/p gastric bypass (10+ yrs ago, Dr.Selena Flash)  Pre-surgical wt 302 lbs --> 105 lbs (2020) --> now up to 184 lbs  Obesity, Body mass index is 33.96 kg/m².   Wt down from 198 lbs (9/2022) --> 173 lbs (11/2024)    Plan  Chest discomfort, Shortness of breath, GERD / hiatal hernia  Overview  Atypical symptoms, ongoing intermittently for months, no clear exertional triggers / occurs even at rest, but reports worsening shortness of breath  9/2022 Exercise stress test - 4:01 min, 5.8 METS, got shortness of breath, stress ECG negative for ischemia --> overall low sensitivity due to low level of exercise performed  12/2023: ECG no acute ST T changes  She has severe back pain and with progressive shortness of breath, is unable to exercise  Recommend proceeding with nuclear Rx stress to further evaluate for CAD  5/2024: still has intermittent chest tightness, which she believes is related to the back and should/arm issues. But it does  occur with exertion  She did not complete the nuclear stress test that was ordered   7/2024: Nuclear Rx: LVEF 63%, no diagnostic evidence of ischemia or infarct.  11/2024: No further complaints of chest pain or shortness of breath.  She does report some fatigue and weakness, and has lost additional weight  Impression  No diagnostic evidence of ischemia or infarct on stress test, and symptoms are now resolved  Plan  Prior symptoms were likely noncardiac, possibly related to hiatal hernia or musculoskeletal   Follow-up with PCP/GI    Palpitations, Current smoker  Overview  Mainly at night, at times with dizziness, occurs almost daily  12/2023: Holter monitor ordered  5/2024: symptoms better, has cut down on smoking - now < 5 cigs/day, did not complete holter  11/2024: still having 8 cigarettes, has cut down coffee to just 1 /day (previously used to do upto 10 cups of espresso coffee) and that seems to have   Impression  Palpitations symptoms improved with dramatically reducing caffeine intake and cutting down on smoking  Plan  Continue to monitor for symptoms  Emphasized need to cut down and quit smoking   Minimize caffeine intake  Avoid alcohol use  Increase cardio exercises to include walking for 20 to 30 minutes 5 to 7 days a week    Fatigue, generalized weakness  Possibly related to weight loss/Ozempic use with poor oral protein intake  Encouraged need to increase core exercises and improve protein intake in diet  She reports on and off issues with hypoglycemia --> will need to discuss with PCP regarding need to possibly reduce her cut down on metformin or other management for it    No results found for this visit on 11/20/24.      No orders of the defined types were placed in this encounter.    No follow-ups on file.      History of Present Illness   54 y.o. female comes in as a new patient for consultation regarding ongoing symptoms of chest discomfort and palpitations.  She describes ongoing symptoms of chest  discomfort for several months, extending from the midsternal region to her neck, which occurs at rest and is constantly present at times.  There is no clear worsening with exertion.  She has been evaluated in the ED for this and ruled out for ACS and was not found to have very problems.  She is awaiting further endoscopic evaluation regarding this.      She additionally has a lot of other complaints including swelling in the left side of the neck left leg pain, ringing in the ears, and has been evaluated for a stroke as well in the ED in July 2023.  She was supposed to follow-up with neurology after this, but was not able to go to [place] for follow-up and as a result has not seen anyone thereafter.  She does have issues with GERD and hiatal hernia and is awaiting follow-up/endoscopy with GI    Interval history - 5/9/2024  She returns for follow-up after 6 months.  She did not complete either of the nuclear stress test or the Holter monitor.  She does continue to report intermittent chest discomfort.  She continues to have a lot of issues with back pain as well as left forearm pain and is seeing orthopedics for this, but is too afraid to get back pain medication injections as she is worried about having a pneumothorax.    Interval history - 11/20/2024  She returns for 5-month follow-up.  She completed the nuclear stress test, which did not reveal any significant ischemia or infarct.  Her chest pain has since resolved and she is doing better.  She does report symptoms of feeling weak and fatigued and has lost some additional weight with Ozempic use.  Her weight is overall down from 194 to 173 pounds over the last 10 months.      Historical Information   Past Medical History:   Diagnosis Date    Arrhythmia     Asthma     GERD (gastroesophageal reflux disease)     H/O gastric bypass     Hiatal hernia     History of methamphetamine abuse (HCC)     Positive UDS; Feb 2020    Hypokalemia     PVC's (premature ventricular  "contractions)     Pyelonephritis      Past Surgical History:   Procedure Laterality Date    CHOLECYSTECTOMY      DENTAL SURGERY      ESOPHAGEAL DILATION      GASTRIC BYPASS      AR LAPS ABD PRTM&OMENTUM DX W/WO SPEC BR/WA SPX N/A 2024    Procedure: LAPAROSCOPY DIAGNOSTIC, REDUCTION AND REPAIR OF INTERNAL HERNIA;  Surgeon: Ruben Milner MD;  Location: [facility];  Service: Bariatrics    EILEEN-EN-Y PROCEDURE      SUBTOTAL COLECTOMY      TOOTH EXTRACTION      TUBAL LIGATION       Family History   Problem Relation Age of Onset    Kidney disease Mother         Pt also reports \"bone disease\"    Hypertension Mother     Diabetes Mother     Heart disease Mother     Stroke Mother     Arthritis Mother     Hyperlipidemia Mother     Colon cancer Father          motor cycle accident    Heart disease Sister     Coronary artery disease Sister     No Known Problems Sister     No Known Problems Sister     No Known Problems Daughter     No Known Problems Daughter     Breast cancer Maternal Grandmother     Lung cancer Maternal Grandmother     Lung cancer Maternal Grandfather     Lung cancer Paternal Grandmother     Lung cancer Paternal Grandfather     Cancer Brother     Heart attack Brother     Heart disease Brother     Coronary artery disease Brother     Uterine cancer Maternal Aunt     No Known Problems Maternal Aunt     No Known Problems Maternal Aunt     Lung cancer Maternal Aunt     Lung cancer Maternal Aunt     Thyroid cancer Maternal Uncle     No Known Problems Paternal Aunt     No Known Problems Paternal Aunt     Lung cancer Paternal Uncle     Lung cancer Cousin      Current Outpatient Medications on File Prior to Visit   Medication Sig Dispense Refill    Blood Glucose Monitoring Suppl (OneTouch Verio Reflect) w/Device KIT Check blood sugars once daily. Please substitute with appropriate alternative as covered by patient's insurance. Dx: E11.65 1 kit 0    desloratadine (CLARINEX) 5 MG tablet Take 1 tablet (5 mg " total) by mouth daily 30 tablet 5    Diclofenac Sodium (VOLTAREN) 1 % Apply 2 g topically 4 (four) times a day as needed (Pain) 350 g 3    ergocalciferol (VITAMIN D2) 50,000 units take 1 capsule by mouth every week 12 capsule 0    famotidine (PEPCID) 20 mg tablet Take 1 tablet (20 mg total) by mouth daily at bedtime 30 tablet 5    Fluticasone-Salmeterol (Advair) 100-50 mcg/dose inhaler Inhale 1 puff 2 (two) times a day Rinse mouth after use. 60 blister 5    levalbuterol (Xopenex HFA) 45 mcg/act inhaler Inhale 1-2 puffs every 4 (four) hours as needed for wheezing 45 g 3    metFORMIN (GLUCOPHAGE-XR) 500 mg 24 hr tablet take 1 tablet by mouth EVERY MORNING WITH BREAKFAST 90 tablet 1    omeprazole (PriLOSEC) 40 MG capsule Take 1 capsule (40 mg total) by mouth daily 90 capsule 3    OneTouch Delica Lancets 33G MISC Check blood sugars once daily. Please substitute with appropriate alternative as covered by patient's insurance. Dx: E11.65 100 each 3    Ozempic, 0.25 or 0.5 MG/DOSE, 2 MG/3ML injection pen inject 0.375 MILLILITERS (0.25MGS TOTAL) ONCE EVERY 7 DAYS FOR 30 DAYS, THEN inject 0.75 MILLILITERS (0.5MGS TOTAL) ONCE EVERY 7 DAYS 9 mL 0    vitamin B-12 (VITAMIN B-12) 500 mcg tablet Take 1 tablet (500 mcg total) by mouth daily 30 tablet 2    Continuous Blood Gluc  (Dexcom G7 ) TOMEKA Use 1 each continuous 1 each 0    glucose blood (OneTouch Verio) test strip Use 1 each 4 (four) times a day (before meals and at bedtime) Check blood sugars once daily. Please substitute with appropriate alternative as covered by patient's insurance. Dx: E11.65 100 each 3     No current facility-administered medications on file prior to visit.     Allergies   Allergen Reactions    Morphine      anaphylactic    Morphine Vomiting    Suboxone [Buprenorphine-Naloxone] Abdominal Pain    Sulfamethoxazole-Trimethoprim Hives    Tizanidine Other (See Comments)     Depression    Sulfamethoxazole-Trimethoprim Nausea Only     Social  History     Socioeconomic History    Marital status: Single     Spouse name: None    Number of children: 2    Years of education: None    Highest education level: None   Occupational History    None   Tobacco Use    Smoking status: Every Day     Current packs/day: 0.50     Average packs/day: 0.5 packs/day for 38.0 years (19.0 ttl pk-yrs)     Types: Cigarettes    Smokeless tobacco: Never   Vaping Use    Vaping status: Never Used   Substance and Sexual Activity    Alcohol use: Never    Drug use: Not Currently     Types: Methamphetamines     Comment: reports use this week (12/2020)    Sexual activity: Not Currently     Partners: Male     Birth control/protection: Post-menopausal   Other Topics Concern    None   Social History Narrative    ** Merged History Encounter **         Caffeine use    Ready to quit smoking    Never - 28 yrs male , 2 children    Lives with her mother    Unemployed and on SSI     Social Drivers of Health     Financial Resource Strain: Not on file   Food Insecurity: No Food Insecurity (1/12/2024)    Nursing - Inadequate Food Risk Classification     Worried About Running Out of Food in the Last Year: Never true     Ran Out of Food in the Last Year: Never true     Ran Out of Food in the Last Year: Not on file   Transportation Needs: Unmet Transportation Needs (1/12/2024)    PRAPARE - Transportation     Lack of Transportation (Medical): Yes     Lack of Transportation (Non-Medical): Yes   Physical Activity: Not on file   Stress: Not on file   Social Connections: Not on file   Intimate Partner Violence: Not on file   Housing Stability: Low Risk  (1/12/2024)    Housing Stability Vital Sign     Unable to Pay for Housing in the Last Year: No     Number of Times Moved in the Last Year: 1     Homeless in the Last Year: No        Review of Systems   All other systems reviewed and are negative.        Vitals:  Vitals:    11/20/24 1402   BP: 114/66   BP Location: Left arm   Patient Position:  "Sitting   Cuff Size: Large   Pulse: 80   Resp: 18   SpO2: 99%   Weight: 78.5 kg (173 lb)   Height: 4' 11.84\" (1.52 m)     BMI - Body mass index is 33.96 kg/m².  Wt Readings from Last 7 Encounters:   11/20/24 78.5 kg (173 lb)   10/17/24 81 kg (178 lb 9.6 oz)   09/25/24 81.2 kg (179 lb)   08/27/24 79.8 kg (176 lb)   07/24/24 79.7 kg (175 lb 12.8 oz)   07/09/24 81.6 kg (180 lb)   06/21/24 78.5 kg (173 lb)       Physical Exam  Vitals and nursing note reviewed.   Constitutional:       General: She is not in acute distress.     Appearance: Normal appearance. She is well-developed. She is obese. She is not ill-appearing.   HENT:      Head: Normocephalic and atraumatic.      Nose: No congestion.   Eyes:      General: No scleral icterus.     Conjunctiva/sclera: Conjunctivae normal.   Neck:      Vascular: No carotid bruit or JVD.   Cardiovascular:      Rate and Rhythm: Normal rate and regular rhythm.      Pulses: Normal pulses.      Heart sounds: Normal heart sounds. No murmur heard.     No friction rub. No gallop.   Pulmonary:      Effort: Pulmonary effort is normal. No respiratory distress.      Breath sounds: Normal breath sounds. No rales.   Abdominal:      General: There is no distension.      Palpations: Abdomen is soft.      Tenderness: There is abdominal tenderness in the epigastric area and left upper quadrant.      Comments: Mild tenderness   Musculoskeletal:         General: No swelling.      Left upper arm: Tenderness present.      Cervical back: Neck supple.      Right lower leg: No edema.      Left lower leg: No edema.      Comments: Pain with rotation of left forearm   Skin:     General: Skin is warm.   Neurological:      General: No focal deficit present.      Mental Status: She is alert and oriented to person, place, and time. Mental status is at baseline.   Psychiatric:         Mood and Affect: Mood normal.         Behavior: Behavior normal.         Thought Content: Thought content normal. " "          Labs:  CBC:   Lab Results   Component Value Date    WBC 5.42 09/25/2024    RBC 4.40 09/25/2024    HGB 13.1 09/25/2024    HCT 40.7 09/25/2024    MCV 93 09/25/2024     09/25/2024    RDW 15.0 09/25/2024       CMP:   Lab Results   Component Value Date    K 3.9 09/25/2024     09/25/2024    CO2 26 09/25/2024    BUN 11 09/25/2024    CREATININE 0.52 (L) 09/25/2024    EGFR 109 09/25/2024    CALCIUM 9.1 09/25/2024    AST 19 09/25/2024    ALT 11 09/25/2024    ALKPHOS 51 09/25/2024       Magnesium:  Lab Results   Component Value Date    MG 2.0 09/25/2024       Lipid Profile:   Lab Results   Component Value Date    HDL 60 09/25/2024    TRIG 72 09/25/2024    LDLCALC 89 09/25/2024       Thyroid Studies:   Lab Results   Component Value Date    KXH9DVEXEYBG 2.025 09/25/2024       A1c:  No components found for: \"HGA1C\"    INR:  Lab Results   Component Value Date    INR 0.98 06/13/2024    INR 0.92 07/11/2023    INR 0.98 09/07/2020   5    Imaging: FL barium swallow    Result Date: 12/8/2023  Narrative: BARIUM SWALLOW-ESOPHAGRAM INDICATION:   R13.10: Dysphagia, unspecified. COMPARISON: 4/15/2016 IMAGES: FLUOROSCOPY TIME:   2.0 minutes TECHNIQUE: The patient was given barium by mouth and images of the esophagus were obtained. FINDINGS: The esophagus is normal in caliber.  Esophageal motility is normal and emptying of contrast from the esophagus is prompt.  No mucosal lesion, ulceration or evidence of fold thickening is seen. Small reducible hiatal hernia without gastroesophageal reflux. Bariatric [place]-en-Y gastric bypass again noted with small posterior gastric diverticulum     Impression: Small reducible hiatal hernia without gastroesophageal reflux. Unremarkable esophagram otherwise. Workstation performed: [id number]       Cardiac testing:   No results found for this or any previous visit.    No results found for this or any previous visit.    No results found for this or any previous visit.    No results " found for this or any previous visit.      Mammo screening bilateral w 3d and cad  Narrative: DIAGNOSIS: Routine gynecological examination; Encounter for screening   mammogram for breast cancer     TECHNIQUE:  Digital screening mammography was performed. Computer Aided Detection   (CAD) analyzed all applicable images.   COMPARISONS: Prior breast imaging dated: 11/02/2023, 09/02/2022,   05/26/2015, and 05/15/2015    RELEVANT HISTORY:   Family Breast Cancer History: History of breast cancer in Maternal   Grandmother.  Family Medical History: Family medical history includes breast cancer in   maternal grandmother and colon cancer in father.   Personal History: No known relevant hormone history. No known relevant   surgical history. No known relevant medical history.    The patient is scheduled in a reminder system for screening mammography.    8-10% of cancers will be missed on mammography. Management of a palpable   abnormality must be based on clinical grounds.  Patients will be notified   of their results via letter from our facility. Accredited by American   [employer] and FDA.    RISK ASSESSMENT:   5 Year Tyrer-Cuzick: 0.51%  10 Year Tyrer-Cuzick: 1.12%  Lifetime Tyrer-Cuzick: 4.22%    TISSUE DENSITY:   The breasts are almost entirely fatty.     INDICATION: Marylou Morris is a 54 y.o. female presenting for screening   mammography.    FINDINGS:   LEFT  1) MASS [A]: There is an oval mass with circumscribed margins seen in the   outer region of the left breast in the anterior depth.     Right  There are no suspicious masses, grouped microcalcifications or areas of   unexplained architectural distortion. The skin and nipple areolar complex   are unremarkable.    Impression: Additional imaging required.  A breast health care nurse from our facility will be contacting the   patient regarding the need for additional imaging.    ASSESSMENT/BI-RADS CATEGORY:  Left: 0 - Incomplete: Needs Additional Imaging  Evaluation  Right: 1 - Negative  Overall: 0 - Incomplete: Needs Additional Imaging Evaluation    RECOMMENDATION:       - Ultrasound at the current time for the left breast.       - Routine screening mammogram in 1 year for the right breast.    Workstation ID: [id number]    Signed by:  Silva Arevalo MD

## 2024-11-27 ENCOUNTER — OFFICE VISIT (OUTPATIENT)
Dept: GASTROENTEROLOGY | Facility: CLINIC | Age: 54
End: 2024-11-27
Payer: COMMERCIAL

## 2024-11-27 VITALS
DIASTOLIC BLOOD PRESSURE: 72 MMHG | OXYGEN SATURATION: 97 % | TEMPERATURE: 97.4 F | SYSTOLIC BLOOD PRESSURE: 110 MMHG | WEIGHT: 172 LBS | HEART RATE: 55 BPM | HEIGHT: 60 IN | BODY MASS INDEX: 33.77 KG/M2

## 2024-11-27 DIAGNOSIS — K21.9 GASTROESOPHAGEAL REFLUX DISEASE WITHOUT ESOPHAGITIS: Primary | ICD-10-CM

## 2024-11-27 DIAGNOSIS — R13.10 DYSPHAGIA, UNSPECIFIED TYPE: ICD-10-CM

## 2024-11-27 DIAGNOSIS — Z98.84 HISTORY OF GASTRIC BYPASS: ICD-10-CM

## 2024-11-27 DIAGNOSIS — Z80.0 FAMILY HISTORY OF COLON CANCER IN FATHER: ICD-10-CM

## 2024-11-27 DIAGNOSIS — K59.00 CONSTIPATION, UNSPECIFIED CONSTIPATION TYPE: ICD-10-CM

## 2024-11-27 PROCEDURE — 99214 OFFICE O/P EST MOD 30 MIN: CPT | Performed by: PHYSICIAN ASSISTANT

## 2024-11-27 RX ORDER — SODIUM CHLORIDE, SODIUM LACTATE, POTASSIUM CHLORIDE, CALCIUM CHLORIDE 600; 310; 30; 20 MG/100ML; MG/100ML; MG/100ML; MG/100ML
125 INJECTION, SOLUTION INTRAVENOUS CONTINUOUS
OUTPATIENT
Start: 2024-11-27

## 2024-11-27 NOTE — PATIENT INSTRUCTIONS
Stay on omeprazole.   Open capsules and take in a small amount of applesauce.  I think this may help with absorption and may help to control your symptoms better.  Continue on nightly famotidine.  We can always consider increasing the strength of the famotidine and follow-up.  Stay on the prudent regimen as this is working well for you.  When you are due for your next colon cancer screening I think a colonoscopy may be reasonable.

## 2024-11-27 NOTE — PROGRESS NOTES
[facility] - Outpatient Follow-up Note  Marylou Morris 54 y.o. female MRN: [id number]  Encounter: [id number]    ASSESSMENT AND PLAN:      1. Gastroesophageal reflux disease without esophagitis (Primary)  2. Dysphagia, unspecified type  3. History of gastric bypass    Patient has a long history of reflux despite daily PPI and H2RA.   She is complaining of treatment refractory symptoms and intermittent dysphagia to solids.  She had a barium swallow in 12/2023 which was unremarkable.  I recommended trying opening PPI capsules and ingesting in small amt of applesauce to see if this aids in absorption and improves symptom control.   Focus on small frequent meals and diet and lifestyle modifications for GERD.  Given treatment refractory symptoms, reviewed recommendation for EGD to evaluate for intraluminal pathology, and patient is now agreeable.    I discussed informed consent with the patient. The risks/benefits/alternatives of the procedure were discussed with the patient. Risks included, but not limited to, infection, bleeding, perforation, injury to organs in the abdomen, missed lesion and incomplete procedure were discussed. Patient was agreeable. GLP-1 agonist hold and diabetic instructions required.     Pt shares she does develop some pre-procedure anxiety, and I recommended pt review this with her anesthesiologist prior to the procedure.     - EGD; Future    4. Constipation, unspecified constipation type    Hx of chronic constipation.   Failed OTC laxatives and tried and failed lactulose.   Was given Amitiza 24 mcg to trial at last oV though did not find this effective.     Interestingly, patient finds to be the most effective cathartic regimen for her to be prunes daily.  I encouraged her to continue on this regimen.   Encouraged focusing on improvement in her hydration  which has been an issue in the past.   She can aim for a high fiber diet as she tolerates.     5. Family history  of colon cancer in father    Pt had cologuard negative in 12/2023.   Given FDR with CRC, reviewed with pt he screening modality of choice is a colonoscopy, though patient has expressed hesitancy with this and declined in the past.  I reviewed that we can discuss a colonoscopy as her next screening modality in 2026.  Patient is willing to consider.    We will follow up in 6 months to reassess symptoms.   ______________________________________________________________________    SUBJECTIVE: Patient is a 54 y.o. female who presents today for follow-up regarding constipation. Pmhx sig for RYGB c/b internal hernia s/p repair in 01/2024, GERD, DM2, depression, hx of substance abuse. Hx of cholecystectomy.     Pt was last evaluated in 01/2024. At that time, she was complaining of constipation and no improvement with lactulose or other OTC. She was given amitiza to trial.     11/27/24:    Patient shares that she did try Amitiza though did not find this helpful to eliminate her constipation.  She also failed lactulose.  She went back to taking 2-3 dry prunes every evening and on this regimen has a bowel movement just about daily if not every other day.  She does have episodes of some straining and abdominal discomfort though overall feels that this has been the most effective regimen she has tried thus far. Pt denies BRBPR or melena. No nocturnal BM. No diarrhea. No abnormal weight loss over past 6 months. Notes father had CRC.    Pt is still having some breakthrough heartburn/indigestion.  Sometimes with water she will develop burning up into her throat.  She endorses intermittent solid food dysphagia. At times feels neck is swollen. Shares she required dilation of esophagus in the past. No sig nausea/emesis. No early satiety noted.     NSAIDs: no regular use  Etoh: none, sober  Tobacco: smokes pipe tobacco    12/2023: negative cologuard   06/2024: CT A/P: wnl  09/2024: Hb 13.1, MCV 93, platelets 299, BUN 11, creatinine  "0.52, AST 19, ALT 11, ALP 51, albumin 4.0, T. bili 0.76, B12 324, Vit D 35.9, TSH 2.025    Endoscopic history:   EGD: 10/2020: wnl  Colon:    Review of Systems   Otherwise Per HPI    Historical Information   Past Medical History:   Diagnosis Date    Arrhythmia     Asthma     GERD (gastroesophageal reflux disease)     H/O gastric bypass     Hiatal hernia     History of methamphetamine abuse (HCC)     Positive UDS; 2020    Hypokalemia     PVC's (premature ventricular contractions)     Pyelonephritis      Past Surgical History:   Procedure Laterality Date    CHOLECYSTECTOMY      DENTAL SURGERY      ESOPHAGEAL DILATION      GASTRIC BYPASS      MA LAPS ABD PRTM&OMENTUM DX W/WO SPEC BR/WA SPX N/A 2024    Procedure: LAPAROSCOPY DIAGNOSTIC, REDUCTION AND REPAIR OF INTERNAL HERNIA;  Surgeon: Ruben Milner MD;  Location: [facility];  Service: Bariatrics    EILEEN-EN-Y PROCEDURE      SUBTOTAL COLECTOMY      TOOTH EXTRACTION      TUBAL LIGATION       Social History   Social History     Substance and Sexual Activity   Alcohol Use Never     Social History     Substance and Sexual Activity   Drug Use Not Currently    Types: Methamphetamines    Comment: reports use this week (2020)     Social History     Tobacco Use   Smoking Status Every Day    Current packs/day: 0.50    Average packs/day: 0.5 packs/day for 38.0 years (19.0 ttl pk-yrs)    Types: Cigarettes   Smokeless Tobacco Never     Family History   Problem Relation Age of Onset    Kidney disease Mother         Pt also reports \"bone disease\"    Hypertension Mother     Diabetes Mother     Heart disease Mother     Stroke Mother     Arthritis Mother     Hyperlipidemia Mother     Colon cancer Father          motor cycle accident    Heart disease Sister     Coronary artery disease Sister     No Known Problems Sister     No Known Problems Sister     No Known Problems Daughter     No Known Problems Daughter     Breast cancer Maternal Grandmother     Lung cancer " Maternal Grandmother     Lung cancer Maternal Grandfather     Lung cancer Paternal Grandmother     Lung cancer Paternal Grandfather     Cancer Brother     Heart attack Brother     Heart disease Brother     Coronary artery disease Brother     Uterine cancer Maternal Aunt     No Known Problems Maternal Aunt     No Known Problems Maternal Aunt     Lung cancer Maternal Aunt     Lung cancer Maternal Aunt     Thyroid cancer Maternal Uncle     No Known Problems Paternal Aunt     No Known Problems Paternal Aunt     Lung cancer Paternal Uncle     Lung cancer Cousin        Meds/Allergies       Current Outpatient Medications:     Blood Glucose Monitoring Suppl (OneTouch Verio Reflect) w/Device KIT    Continuous Blood Gluc  (Dexcom G7 ) TOMEKA    desloratadine (CLARINEX) 5 MG tablet    Diclofenac Sodium (VOLTAREN) 1 %    ergocalciferol (VITAMIN D2) 50,000 units    famotidine (PEPCID) 20 mg tablet    Fluticasone-Salmeterol (Advair) 100-50 mcg/dose inhaler    glucose blood (OneTouch Verio) test strip    levalbuterol (Xopenex HFA) 45 mcg/act inhaler    metFORMIN (GLUCOPHAGE-XR) 500 mg 24 hr tablet    omeprazole (PriLOSEC) 40 MG capsule    OneTouch Delica Lancets 33G MISC    Ozempic, 0.25 or 0.5 MG/DOSE, 2 MG/3ML injection pen    vitamin B-12 (VITAMIN B-12) 500 mcg tablet    Allergies   Allergen Reactions    Morphine      anaphylactic    Morphine Vomiting    Suboxone [Buprenorphine-Naloxone] Abdominal Pain    Sulfamethoxazole-Trimethoprim Hives    Tizanidine Other (See Comments)     Depression    Sulfamethoxazole-Trimethoprim Nausea Only     Objective     Blood pressure 110/72, pulse 55, temperature (!) 97.4 °F (36.3 °C), temperature source Temporal, height 5' (1.524 m), weight 78 kg (172 lb), SpO2 97%. Body mass index is 33.59 kg/m².    Physical Exam  Vitals and nursing note reviewed.   Constitutional:       General: She is not in acute distress.     Appearance: She is well-developed.   HENT:      Head: Normocephalic  and atraumatic.   Eyes:      General: No scleral icterus.     Conjunctiva/sclera: Conjunctivae normal.   Cardiovascular:      Rate and Rhythm: Normal rate.   Pulmonary:      Effort: Pulmonary effort is normal. No respiratory distress.   Abdominal:      General: Bowel sounds are normal. There is no distension.      Palpations: Abdomen is soft.      Tenderness: There is no abdominal tenderness. There is no guarding or rebound.   Skin:     General: Skin is warm and dry.      Coloration: Skin is not jaundiced.   Neurological:      General: No focal deficit present.      Mental Status: She is alert.   Psychiatric:         Mood and Affect: Mood normal.         Behavior: Behavior normal.       Lab Results:   No visits with results within 1 Day(s) from this visit.   Latest known visit with results is:   Appointment on 09/25/2024   Component Date Value    WBC 09/25/2024 5.42     RBC 09/25/2024 4.40     Hemoglobin 09/25/2024 13.1     Hematocrit 09/25/2024 40.7     MCV 09/25/2024 93     MCH 09/25/2024 29.8     MCHC 09/25/2024 32.2     RDW 09/25/2024 15.0     MPV 09/25/2024 9.4     Platelets 09/25/2024 299     nRBC 09/25/2024 0     Segmented % 09/25/2024 56     Immature Grans % 09/25/2024 0     Lymphocytes % 09/25/2024 35     Monocytes % 09/25/2024 5     Eosinophils Relative 09/25/2024 4     Basophils Relative 09/25/2024 0     Absolute Neutrophils 09/25/2024 2.99     Absolute Immature Grans 09/25/2024 0.01     Absolute Lymphocytes 09/25/2024 1.88     Absolute Monocytes 09/25/2024 0.28     Eosinophils Absolute 09/25/2024 0.24     Basophils Absolute 09/25/2024 0.02     Sodium 09/25/2024 143     Potassium 09/25/2024 3.9     Chloride 09/25/2024 107     CO2 09/25/2024 26     ANION GAP 09/25/2024 10     BUN 09/25/2024 11     Creatinine 09/25/2024 0.52 (L)     Glucose, Fasting 09/25/2024 69     Calcium 09/25/2024 9.1     AST 09/25/2024 19     ALT 09/25/2024 11     Alkaline Phosphatase 09/25/2024 51     Total Protein 09/25/2024 6.5      Albumin 09/25/2024 4.0     Total Bilirubin 09/25/2024 0.76     eGFR 09/25/2024 109     Vitamin B-12 09/25/2024 324     Vit D, 25-Hydroxy 09/25/2024 35.9     TSH 3RD GENERATON 09/25/2024 2.025     Cholesterol 09/25/2024 163     Triglycerides 09/25/2024 72     HDL, Direct 09/25/2024 60     LDL Calculated 09/25/2024 89     Phosphorus 09/25/2024 3.4     Magnesium 09/25/2024 2.0      Radiology Results:   Mammo screening bilateral w 3d and cad  Result Date: 11/10/2024  Narrative: DIAGNOSIS: Routine gynecological examination; Encounter for screening mammogram for breast cancer TECHNIQUE: Digital screening mammography was performed. Computer Aided Detection (CAD) analyzed all applicable images. COMPARISONS: Prior breast imaging dated: 11/02/2023, 09/02/2022, 05/26/2015, and 05/15/2015 RELEVANT HISTORY: Family Breast Cancer History: History of breast cancer in Maternal Grandmother. Family Medical History: Family medical history includes breast cancer in maternal grandmother and colon cancer in father. Personal History: No known relevant hormone history. No known relevant surgical history. No known relevant medical history. The patient is scheduled in a reminder system for screening mammography. 8-10% of cancers will be missed on mammography. Management of a palpable abnormality must be based on clinical grounds.  Patients will be notified of their results via letter from our facility. Accredited by [employer] and FDA. RISK ASSESSMENT: 5 Year Tyrer-Cuzick: 0.51% 10 Year Tyrer-Cuzick: 1.12% Lifetime Tyrer-Cuzick: 4.22% TISSUE DENSITY: The breasts are almost entirely fatty. INDICATION: Marylou Morris is a 54 y.o. female presenting for screening mammography. FINDINGS: LEFT 1) MASS [A]: There is an oval mass with circumscribed margins seen in the outer region of the left breast in the anterior depth. Right There are no suspicious masses, grouped microcalcifications or areas of unexplained architectural  distortion. The skin and nipple areolar complex are unremarkable.      Impression: Additional imaging required. A breast health care nurse from our facility will be contacting the patient regarding the need for additional imaging. ASSESSMENT/BI-RADS CATEGORY: Left: 0 - Incomplete: Needs Additional Imaging Evaluation Right: 1 - Negative Overall: 0 - Incomplete: Needs Additional Imaging Evaluation RECOMMENDATION:      - Ultrasound at the current time for the left breast.      - Routine screening mammogram in 1 year for the right breast. Workstation ID: [id number] Signed by:  MD Urvashi Pickett PA-C    **Please note:  Dictation voice to text software may have been used in the creation of this record.  Occasional wrong word or “sound alike” substitutions may have occurred due to the inherent limitations of voice recognition software.  Read the chart carefully and recognize, using context, where substitutions have occurred.**

## 2024-12-05 NOTE — PROGRESS NOTES
LVM for return call      [facility]        NAME: Vincenzo Daily is a 53 y o  female  : 1970    MRN: [id number]  DATE: 2021  TIME: 1:16 PM    Assessment and Plan   Cough [R05]  1  Cough  Novel Coronavirus (Covid-19),PCR [facility] - Office Collection   2  Acute left-sided low back pain without sciatica  POCT urine dip    Urine culture         Patient Instructions     Patient Instructions   Patient instructed to self quarantine  Advised to avoid nsaids  If you develop prolonged high fever, worsening or productive cough, shortness of breath, difficulty breathing, chest pain, or any new or concerning symptoms please proceed ER  Instructed patient to call ER prior to arrival make them aware she is being test for covid  Patient verbalizes understanding  Start vitamin c 1g twice daily,vitamin d3 2000IU daily, and multivitamin  monitor pulse ox  Call PCP in 24 hours for f/u  [address]    Your healthcare provider and/or public health staff have evaluated you and have determined that you do not need to remain in the hospital at this time  At this time you can be isolated at home where you will be monitored by staff from your local or state health department  You should carefully follow the prevention and isolation steps below until a healthcare provider or local or state health department says that you can return to your normal activities  Stay home except to get medical care    People who are mildly ill with COVID-19 are able to isolate at home during their illness  You should restrict activities outside your home, except for getting medical care  Do not go to work, school, or public areas  Avoid using public transportation, ride-sharing, or taxis  Separate yourself from other people and animals in your home    People: As much as possible, you should stay in a specific room and away from other people in your home  Also, you should use a separate bathroom, if available  Animals:  You should restrict contact with pets and other animals while you are sick with COVID-19, just like you would around other people  Although there have not been reports of pets or other animals becoming sick with COVID-19, it is still recommended that people sick with COVID-19 limit contact with animals until more information is known about the virus  When possible, have another member of your household care for your animals while you are sick  If you are sick with COVID-19, avoid contact with your pet, including petting, snuggling, being kissed or licked, and sharing food  If you must care for your pet or be around animals while you are sick, wash your hands before and after you interact with pets and wear a facemask  See COVID-19 and Animals for more information  Call ahead before visiting your doctor    If you have a medical appointment, call the healthcare provider and tell them that you have or may have COVID-19  This will help the healthcare providers office take steps to keep other people from getting infected or exposed  Wear a facemask    You should wear a facemask when you are around other people (e g , sharing a room or vehicle) or pets and before you enter a healthcare providers office  If you are not able to wear a facemask (for example, because it causes trouble breathing), then people who live with you should not stay in the same room with you, or they should wear a facemask if they enter your room  Cover your coughs and sneezes    Cover your mouth and nose with a tissue when you cough or sneeze  Throw used tissues in a lined trash can  Immediately wash your hands with soap and water for at least 20 seconds or, if soap and water are not available, clean your hands with an alcohol-based hand  that contains at least 60% alcohol      Clean your hands often    Wash your hands often with soap and water for at least 20 seconds, especially after blowing your nose, coughing, or sneezing; going to the bathroom; and before eating or preparing food  If soap and water are not readily available, use an alcohol-based hand  with at least 60% alcohol, covering all surfaces of your hands and rubbing them together until they feel dry  Soap and water are the best option if hands are visibly dirty  Avoid touching your eyes, nose, and mouth with unwashed hands  Avoid sharing personal household items    You should not share dishes, drinking glasses, cups, eating utensils, towels, or bedding with other people or pets in your home  After using these items, they should be washed thoroughly with soap and water  Clean all high-touch surfaces everyday    High touch surfaces include counters, tabletops, doorknobs, bathroom fixtures, toilets, phones, keyboards, tablets, and bedside tables  Also, clean any surfaces that may have blood, stool, or body fluids on them  Use a household cleaning spray or wipe, according to the label instructions  Labels contain instructions for safe and effective use of the cleaning product including precautions you should take when applying the product, such as wearing gloves and making sure you have good ventilation during use of the product  Monitor your symptoms    Seek prompt medical attention if your illness is worsening (e g , difficulty breathing)  Before seeking care, call your healthcare provider and tell them that you have, or are being evaluated for, COVID-19  Put on a facemask before you enter the facility  These steps will help the healthcare providers office to keep other people in the office or waiting room from getting infected or exposed  Ask your healthcare provider to call the local or state health department  Persons who are placed under active monitoring or facilitated self-monitoring should follow instructions provided by their local health department or occupational health professionals, as appropriate    If you have a medical emergency and need to call 911, notify the dispatch personnel that you have, or are being evaluated for COVID-19  If possible, put on a facemask before emergency medical services arrive  Discontinuing home isolation    Patients with confirmed COVID-19 should remain under home isolation precautions until the following conditions are met:   - They have had no fever for at least 24 hours (that is one full day of no fever without the use medicine that reduces fevers)  AND  - other symptoms have improved (for example, when their cough or shortness of breath have improved)  AND  - If had mild or moderate illness, at least 10 days have passed since their symptoms first appeared or if severe illness (needed oxygen) or immunosuppressed, at least 20 days have passed since symptoms first appeared  Patients with confirmed COVID-19 should also notify close contacts (including their workplace) and ask that they self-quarantine  Currently, close contact is defined as being within 6 feet for 15 minutes or more from the period 24 hours starting 48 hours before symptom onset to the time at which the patient went into isolation  Close contacts of patients diagnosed with COVID-19 should be instructed by the patient to self-quarantine for 14 days from the last time of their last contact with the patient  Source: [employer]         Follow up with PCP in 3-5 days  Proceed to  ER if symptoms worsen  Chief Complaint     Chief Complaint   Patient presents with    COVID-19     Patient c/o back pain, fatigue, SOB, decreased appetite, vomiting, and headache x 3 days  History of Present Illness         Patient is a 50-year-old female who presents with a 3 day history of shortness of breath, fatigue, back pain, nausea and vomiting  Patient denies any chest pain, hemoptysis, or difficulty breathing  States shortness of breath is occasionally present with exertion    Denies any fever, chills, or body aches  Denies any back injury or trauma  Denies any urinary symptoms  Denies any numbness, tingling, weakness of extremities  Denies any abdominal pain or diarrhea  Notes 1 episode of vomiting  Also notes congestion but denies any sinus pain or pressure, earache, or sore throat  Denies feeling dizzy or lightheaded  Denies any recent sick contacts or known exposure to COVID-19  Denies any loss of taste or smell  Review of Systems   Review of Systems   Constitutional: Positive for fatigue  Negative for chills, diaphoresis and fever  HENT: Positive for congestion  Negative for ear discharge, ear pain, facial swelling, postnasal drip, rhinorrhea, sinus pressure, sinus pain, sore throat and trouble swallowing  Respiratory: Positive for shortness of breath  Negative for cough, chest tightness, wheezing and stridor  Cardiovascular: Negative for chest pain and palpitations  Gastrointestinal: Positive for nausea and vomiting  Negative for abdominal pain, constipation and diarrhea  Genitourinary: Negative for decreased urine volume, difficulty urinating, dysuria, flank pain, frequency, hematuria, pelvic pain and urgency  Musculoskeletal: Positive for back pain  Negative for arthralgias, gait problem, joint swelling, myalgias, neck pain and neck stiffness  Skin: Negative  Neurological: Negative  Current Medications       Current Outpatient Medications:     budesonide-formoterol (SYMBICORT) 160-4 5 mcg/act inhaler, Inhale 2 puffs 2 (two) times a day Rinse mouth after use   (Patient not taking: Reported on 11/25/2020), Disp: 3 Inhaler, Rfl: 0    Carafate 1 GM/10ML suspension, Take 10 mL (1 g total) by mouth 4 (four) times a day (with meals and at bedtime) (Patient not taking: Reported on 11/25/2020), Disp: 420 mL, Rfl: 2    Cholecalciferol (VITAMIN D3) 43723 units CAPS, Take by mouth, Disp: , Rfl:     clotrimazole-betamethasone (LOTRISONE) 1-0 05 % cream, Apply topically 2 (two) times a day (Patient not taking: Reported on 10/27/2020), Disp: 45 g, Rfl: 0    dicyclomine (BENTYL) 10 mg capsule, Take 1 capsule (10 mg total) by mouth 4 (four) times a day (before meals and at bedtime) (Patient not taking: Reported on 11/25/2020), Disp: 20 capsule, Rfl: 0    ondansetron (ZOFRAN) 4 mg tablet, Take 1 tablet (4 mg total) by mouth every 8 (eight) hours as needed for nausea or vomiting (Patient not taking: Reported on 11/25/2020), Disp: 20 tablet, Rfl: 0    pantoprazole (PROTONIX) 40 mg tablet, Take 1 tablet (40 mg total) by mouth daily (Patient not taking: Reported on 11/25/2020), Disp: 90 tablet, Rfl: 0    Current Allergies     Allergies as of 02/23/2021 - Reviewed 02/23/2021   Allergen Reaction Noted    Morphine  03/26/2017    Sulfamethoxazole-trimethoprim Nausea Only 09/30/2013            The following portions of the patient's history were reviewed and updated as appropriate: allergies, current medications, past family history, past medical history, past social history, past surgical history and problem list      Past Medical History:   Diagnosis Date    Asthma     GERD (gastroesophageal reflux disease)     H/O gastric bypass     Hypokalemia     Methamphetamine abuse ([facility] [address] )     Positive UDS;  Feb 2020       Past Surgical History:   Procedure Laterality Date    CHOLECYSTECTOMY      DENTAL SURGERY      ESOPHAGEAL DILATION      GASTRIC BYPASS      EILEEN-EN-Y PROCEDURE      TUBAL LIGATION         Family History   Problem Relation Age of Onset    Diabetes Mother     Heart disease Mother     Stroke Mother     Arthritis Mother     Hyperlipidemia Mother     Colon cancer Father     Heart disease Sister     Coronary artery disease Sister     Heart disease Brother     Coronary artery disease Brother     Lung cancer Maternal Grandmother     Lung cancer Maternal Grandfather     Lung cancer Paternal Grandmother     Lung cancer Paternal Grandfather     Lung cancer Paternal Uncle     Lung cancer Cousin          Medications have been verified  Objective   Pulse 82   Temp 97 7 °F (36 5 °C) (Temporal)   Resp 20   Ht 5' (1 524 m)   SpO2 100%   BMI 27 34 kg/m²   No LMP recorded  Patient is perimenopausal        Physical Exam     Physical Exam  Constitutional:       General: She is not in acute distress  Appearance: Normal appearance  She is well-developed  She is not diaphoretic  HENT:      Right Ear: Hearing, tympanic membrane, ear canal and external ear normal       Left Ear: Hearing, tympanic membrane, ear canal and external ear normal       Nose: Nose normal       Right Sinus: No maxillary sinus tenderness or frontal sinus tenderness  Left Sinus: No maxillary sinus tenderness or frontal sinus tenderness  Mouth/Throat:      Mouth: Mucous membranes are moist       Pharynx: Oropharynx is clear  Uvula midline  Tonsils: No tonsillar exudate or tonsillar abscesses  1+ on the right  1+ on the left  Cardiovascular:      Rate and Rhythm: Normal rate and regular rhythm  Heart sounds: Normal heart sounds, S1 normal and S2 normal    Pulmonary:      Effort: Pulmonary effort is normal       Breath sounds: Normal breath sounds and air entry  Abdominal:      General: Bowel sounds are normal  There is no distension  Palpations: Abdomen is soft  Abdomen is not rigid  There is no mass  Tenderness: There is no abdominal tenderness  There is no right CVA tenderness, left CVA tenderness, guarding or rebound  Negative signs include Khan's sign and McBurney's sign  Musculoskeletal:      Lumbar back: Normal    Skin:     General: Skin is warm and dry  Capillary Refill: Capillary refill takes less than 2 seconds  Neurological:      Mental Status: She is alert and oriented to person, place, and time

## 2024-12-08 DIAGNOSIS — E55.9 VITAMIN D DEFICIENCY: ICD-10-CM

## 2024-12-11 RX ORDER — ERGOCALCIFEROL 1.25 MG/1
50000 CAPSULE, LIQUID FILLED ORAL WEEKLY
Qty: 12 CAPSULE | Refills: 0 | Status: SHIPPED | OUTPATIENT
Start: 2024-12-11

## 2024-12-20 ENCOUNTER — OFFICE VISIT (OUTPATIENT)
Dept: FAMILY MEDICINE CLINIC | Facility: CLINIC | Age: 54
End: 2024-12-20
Payer: COMMERCIAL

## 2024-12-20 VITALS
HEIGHT: 60 IN | HEART RATE: 62 BPM | RESPIRATION RATE: 18 BRPM | WEIGHT: 172 LBS | TEMPERATURE: 98.1 F | SYSTOLIC BLOOD PRESSURE: 112 MMHG | BODY MASS INDEX: 33.77 KG/M2 | DIASTOLIC BLOOD PRESSURE: 74 MMHG | OXYGEN SATURATION: 96 %

## 2024-12-20 DIAGNOSIS — R92.8 ABNORMAL MAMMOGRAM: Primary | ICD-10-CM

## 2024-12-20 DIAGNOSIS — K21.9 GASTROESOPHAGEAL REFLUX DISEASE, UNSPECIFIED WHETHER ESOPHAGITIS PRESENT: ICD-10-CM

## 2024-12-20 DIAGNOSIS — Z00.00 ANNUAL PHYSICAL EXAM: ICD-10-CM

## 2024-12-20 DIAGNOSIS — Z12.39 ENCOUNTER FOR SCREENING FOR MALIGNANT NEOPLASM OF BREAST, UNSPECIFIED SCREENING MODALITY: ICD-10-CM

## 2024-12-20 PROCEDURE — 99214 OFFICE O/P EST MOD 30 MIN: CPT | Performed by: NURSE PRACTITIONER

## 2024-12-20 PROCEDURE — 99396 PREV VISIT EST AGE 40-64: CPT | Performed by: NURSE PRACTITIONER

## 2024-12-20 NOTE — PATIENT INSTRUCTIONS
"Patient Education     Routine physical for adults   The Basics   Written by the doctors and editors at [facility]   What is a physical? -- A physical is a routine visit, or \"check-up,\" with your doctor. You might also hear it called a \"wellness visit\" or \"preventive visit.\"  During each visit, the doctor will:   Ask about your physical and mental health   Ask about your habits, behaviors, and lifestyle   Do an exam   Give you vaccines if needed   Talk to you about any medicines you take   Give advice about your health   Answer your questions  Getting regular check-ups is an important part of taking care of your health. It can help your doctor find and treat any problems you have. But it's also important for preventing health problems.  A routine physical is different from a \"sick visit.\" A sick visit is when you see a doctor because of a health concern or problem. Since physicals are scheduled ahead of time, you can think about what you want to ask the doctor.  How often should I get a physical? -- It depends on your age and health. In general, for people age 21 years and older:   If you are younger than 50 years, you might be able to get a physical every 3 years.   If you are 50 years or older, your doctor might recommend a physical every year.  If you have an ongoing health condition, like diabetes or high blood pressure, your doctor will probably want to see you more often.  What happens during a physical? -- In general, each visit will include:   Physical exam - The doctor or nurse will check your height, weight, heart rate, and blood pressure. They will also look at your eyes and ears. They will ask about how you are feeling and whether you have any symptoms that bother you.   Medicines - It's a good idea to bring a list of all the medicines you take to each doctor visit. Your doctor will talk to you about your medicines and answer any questions. Tell them if you are having any side effects that bother you. You " "should also tell them if you are having trouble paying for any of your medicines.   Habits and behaviors - This includes:   Your diet   Your exercise habits   Whether you smoke, drink alcohol, or use drugs   Whether you are sexually active   Whether you feel safe at home  Your doctor will talk to you about things you can do to improve your health and lower your risk of health problems. They will also offer help and support. For example, if you want to quit smoking, they can give you advice and might prescribe medicines. If you want to improve your diet or get more physical activity, they can help you with this, too.   Lab tests, if needed - The tests you get will depend on your age and situation. For example, your doctor might want to check your:   Cholesterol   Blood sugar   Iron level   Vaccines - The recommended vaccines will depend on your age, health, and what vaccines you already had. Vaccines are very important because they can prevent certain serious or deadly infections.   Discussion of screening - \"Screening\" means checking for diseases or other health problems before they cause symptoms. Your doctor can recommend screening based on your age, risk, and preferences. This might include tests to check for:   Cancer, such as breast, prostate, cervical, ovarian, colorectal, prostate, lung, or skin cancer   Sexually transmitted infections, such as chlamydia and gonorrhea   Mental health conditions like depression and anxiety  Your doctor will talk to you about the different types of screening tests. They can help you decide which screenings to have. They can also explain what the results might mean.   Answering questions - The physical is a good time to ask the doctor or nurse questions about your health. If needed, they can refer you to other doctors or specialists, too.  Adults older than 65 years often need other care, too. As you get older, your doctor will talk to you about:   How to prevent falling at " home   Hearing or vision tests   Memory testing   How to take your medicines safely   Making sure that you have the help and support you need at home  All topics are updated as new evidence becomes available and our peer review process is complete.  This topic retrieved from [employer] on: May 02, 2024.  Topic 121794 Version 1.0  Release: 32.4.3 - C32.122  © 2024 UpToDate, Inc. and/or its affiliates. All rights reserved.  Consumer Information Use and Disclaimer   Disclaimer: This generalized information is a limited summary of diagnosis, treatment, and/or medication information. It is not meant to be comprehensive and should be used as a tool to help the user understand and/or assess potential diagnostic and treatment options. It does NOT include all information about conditions, treatments, medications, side effects, or risks that may apply to a specific patient. It is not intended to be medical advice or a substitute for the medical advice, diagnosis, or treatment of a health care provider based on the health care provider's examination and assessment of a patient's specific and unique circumstances. Patients must speak with a health care provider for complete information about their health, medical questions, and treatment options, including any risks or benefits regarding use of medications. This information does not endorse any treatments or medications as safe, effective, or approved for treating a specific patient. UpToDate, Inc. and its affiliates disclaim any warranty or liability relating to this information or the use thereof.The use of this information is governed by the Terms of Use, available at https://www.[employer].com/en/know/clinical-effectiveness-terms. 2024© UpToDate, Inc. and its affiliates and/or licensors. All rights reserved.  Copyright   © 2024 UpToDate, Inc. and/or its affiliates. All rights reserved.

## 2024-12-20 NOTE — PROGRESS NOTES
Adult Annual Physical  Name: Marylou Morris      : 1970      MRN: [id number]  Encounter Provider: ABDOULAYE Martino  Encounter Date: 2024   Encounter department: [facility]    Assessment & Plan  Annual physical exam    Orders:    Lipid panel; Future    Hemoglobin A1C; Future    CBC and differential; Future    Albumin / creatinine urine ratio; Future    Comprehensive metabolic panel; Future    TSH, 3rd generation with Free T4 reflex; Future    Vitamin D 25 hydroxy; Future    Abnormal mammogram    Orders:    US breast left limited (diagnostic); Future    Encounter for screening for malignant neoplasm of breast, unspecified screening modality    Orders:    US breast left limited (diagnostic); Future    Gastroesophageal reflux disease, unspecified whether esophagitis present         Immunizations and preventive care screenings were discussed with patient today. Appropriate education was printed on patient's after visit summary.    Counseling:            History of Present Illness     Adult Annual Physical:  Patient presents for annual physical. Patient presents for annual physical exam. Medications reviewed along with most recent testing and encounters. She reports having to wait until January for an u/s of the breast after having an abnormal mammogram. She would like this done sooner if possible. Staff to inquire with imaging .     Diet and Physical Activity:  - Diet/Nutrition: diabetic diet and portion control.  - Exercise: no formal exercise.    General Health:  - Sleep: sleeps well.  - Hearing: tinnitus and decreased hearing right ear. right ear  - Vision: wears glasses, vision problems and goes for regular eye exams.  - Dental:. full dentures upper and lowers    /GYN Health:  - Follows with GYN: yes.     Review of Systems   Constitutional:  Negative for chills and fever.   HENT:  Negative for ear pain and sore throat.    Eyes:  Negative for pain and visual disturbance.    Respiratory:  Negative for cough and shortness of breath.    Cardiovascular:  Negative for chest pain and palpitations.   Gastrointestinal:  Negative for abdominal pain and vomiting.   Genitourinary:  Negative for dysuria and hematuria.   Musculoskeletal:  Positive for arthralgias. Negative for back pain.   Skin:  Negative for color change and rash.   Neurological:  Negative for seizures and syncope.   Psychiatric/Behavioral:  The patient is nervous/anxious.    All other systems reviewed and are negative.        Objective   /74   Pulse 62   Temp 98.1 °F (36.7 °C) (Tympanic)   Resp 18   Ht 5' (1.524 m)   Wt 78 kg (172 lb)   LMP  (LMP Unknown)   SpO2 96%   BMI 33.59 kg/m²     Physical Exam  Vitals and nursing note reviewed.   Constitutional:       General: She is not in acute distress.     Appearance: She is well-developed.   HENT:      Head: Normocephalic and atraumatic.      Nose: Nose normal.      Mouth/Throat:      Mouth: Mucous membranes are moist.   Eyes:      Conjunctiva/sclera: Conjunctivae normal.   Cardiovascular:      Rate and Rhythm: Normal rate and regular rhythm.      Heart sounds: No murmur heard.  Pulmonary:      Effort: Pulmonary effort is normal. No respiratory distress.      Breath sounds: Normal breath sounds.   Abdominal:      Palpations: Abdomen is soft.      Tenderness: There is no abdominal tenderness.   Musculoskeletal:         General: No swelling.      Cervical back: Neck supple.   Skin:     General: Skin is warm and dry.      Capillary Refill: Capillary refill takes less than 2 seconds.   Neurological:      Mental Status: She is alert and oriented to person, place, and time.   Psychiatric:         Mood and Affect: Mood normal.         Behavior: Behavior normal.         Thought Content: Thought content normal.         Judgment: Judgment normal.

## 2025-01-07 ENCOUNTER — HOSPITAL ENCOUNTER (OUTPATIENT)
Dept: ULTRASOUND IMAGING | Facility: HOSPITAL | Age: 55
Discharge: HOME | End: 2025-01-07
Payer: COMMERCIAL

## 2025-01-07 DIAGNOSIS — R92.8 ABNORMAL MAMMOGRAM: ICD-10-CM

## 2025-01-07 PROCEDURE — 76642 ULTRASOUND BREAST LIMITED: CPT

## 2025-01-14 ENCOUNTER — RESULTS FOLLOW-UP (OUTPATIENT)
Dept: OBGYN CLINIC | Facility: CLINIC | Age: 55
End: 2025-01-14

## 2025-01-14 ENCOUNTER — TELEPHONE (OUTPATIENT)
Dept: GASTROENTEROLOGY | Facility: CLINIC | Age: 55
End: 2025-01-14

## 2025-01-15 ENCOUNTER — TELEPHONE (OUTPATIENT)
Age: 55
End: 2025-01-15

## 2025-01-15 DIAGNOSIS — K91.2 HYPOGLYCEMIA AFTER GI (GASTROINTESTINAL) SURGERY: Primary | ICD-10-CM

## 2025-01-15 NOTE — TELEPHONE ENCOUNTER
Patient called she stated that she was  prescribe  ozempic  by the  doctor I did not see it on  her medication list  she stated that the pharmacy  cancel the order.

## 2025-01-27 ENCOUNTER — ANESTHESIA EVENT (OUTPATIENT)
Dept: GASTROENTEROLOGY | Facility: HOSPITAL | Age: 55
End: 2025-01-27
Payer: COMMERCIAL

## 2025-01-27 ENCOUNTER — ANESTHESIA (OUTPATIENT)
Dept: GASTROENTEROLOGY | Facility: HOSPITAL | Age: 55
End: 2025-01-27
Payer: COMMERCIAL

## 2025-01-27 ENCOUNTER — HOSPITAL ENCOUNTER (OUTPATIENT)
Dept: GASTROENTEROLOGY | Facility: HOSPITAL | Age: 55
Setting detail: OUTPATIENT SURGERY
Discharge: HOME | End: 2025-01-27
Payer: COMMERCIAL

## 2025-01-27 VITALS
HEART RATE: 88 BPM | RESPIRATION RATE: 16 BRPM | OXYGEN SATURATION: 96 % | SYSTOLIC BLOOD PRESSURE: 100 MMHG | TEMPERATURE: 97.7 F | DIASTOLIC BLOOD PRESSURE: 57 MMHG

## 2025-01-27 DIAGNOSIS — R13.10 DYSPHAGIA, UNSPECIFIED TYPE: ICD-10-CM

## 2025-01-27 LAB
GLUCOSE SERPL-MCNC: 65 MG/DL (ref 65–140)
GLUCOSE SERPL-MCNC: 79 MG/DL (ref 65–140)

## 2025-01-27 PROCEDURE — 82948 REAGENT STRIP/BLOOD GLUCOSE: CPT

## 2025-01-27 PROCEDURE — 43239 EGD BIOPSY SINGLE/MULTIPLE: CPT | Performed by: STUDENT IN AN ORGANIZED HEALTH CARE EDUCATION/TRAINING PROGRAM

## 2025-01-27 PROCEDURE — 88305 TISSUE EXAM BY PATHOLOGIST: CPT | Performed by: PATHOLOGY

## 2025-01-27 RX ORDER — DEXTROSE MONOHYDRATE 25 G/50ML
INJECTION, SOLUTION INTRAVENOUS
Status: COMPLETED
Start: 2025-01-27 | End: 2025-01-27

## 2025-01-27 RX ORDER — SODIUM CHLORIDE, SODIUM LACTATE, POTASSIUM CHLORIDE, CALCIUM CHLORIDE 600; 310; 30; 20 MG/100ML; MG/100ML; MG/100ML; MG/100ML
125 INJECTION, SOLUTION INTRAVENOUS CONTINUOUS
Status: DISCONTINUED | OUTPATIENT
Start: 2025-01-27 | End: 2025-01-31 | Stop reason: HOSPADM

## 2025-01-27 RX ORDER — LIDOCAINE HYDROCHLORIDE 20 MG/ML
INJECTION, SOLUTION EPIDURAL; INFILTRATION; INTRACAUDAL; PERINEURAL AS NEEDED
Status: DISCONTINUED | OUTPATIENT
Start: 2025-01-27 | End: 2025-01-27

## 2025-01-27 RX ORDER — PROPOFOL 10 MG/ML
INJECTION, EMULSION INTRAVENOUS AS NEEDED
Status: DISCONTINUED | OUTPATIENT
Start: 2025-01-27 | End: 2025-01-27

## 2025-01-27 RX ORDER — DEXTROSE MONOHYDRATE 25 G/50ML
25 INJECTION, SOLUTION INTRAVENOUS ONCE
Status: COMPLETED | OUTPATIENT
Start: 2025-01-27 | End: 2025-01-27

## 2025-01-27 RX ORDER — SODIUM CHLORIDE, SODIUM LACTATE, POTASSIUM CHLORIDE, CALCIUM CHLORIDE 600; 310; 30; 20 MG/100ML; MG/100ML; MG/100ML; MG/100ML
INJECTION, SOLUTION INTRAVENOUS CONTINUOUS PRN
Status: DISCONTINUED | OUTPATIENT
Start: 2025-01-27 | End: 2025-01-27

## 2025-01-27 RX ORDER — MIDAZOLAM HYDROCHLORIDE 2 MG/2ML
INJECTION, SOLUTION INTRAMUSCULAR; INTRAVENOUS CONTINUOUS PRN
Status: DISCONTINUED | OUTPATIENT
Start: 2025-01-27 | End: 2025-01-27

## 2025-01-27 RX ORDER — MIDAZOLAM HYDROCHLORIDE 2 MG/2ML
1 INJECTION, SOLUTION INTRAMUSCULAR; INTRAVENOUS ONCE
Status: COMPLETED | OUTPATIENT
Start: 2025-01-27 | End: 2025-01-27

## 2025-01-27 RX ADMIN — SODIUM CHLORIDE, SODIUM LACTATE, POTASSIUM CHLORIDE, AND CALCIUM CHLORIDE: .6; .31; .03; .02 INJECTION, SOLUTION INTRAVENOUS at 10:32

## 2025-01-27 RX ADMIN — DEXTROSE MONOHYDRATE: 25 INJECTION, SOLUTION INTRAVENOUS at 10:29

## 2025-01-27 RX ADMIN — PROPOFOL 100 MG: 10 INJECTION, EMULSION INTRAVENOUS at 10:35

## 2025-01-27 RX ADMIN — SODIUM CHLORIDE, SODIUM LACTATE, POTASSIUM CHLORIDE, AND CALCIUM CHLORIDE 125 ML/HR: .6; .31; .03; .02 INJECTION, SOLUTION INTRAVENOUS at 09:12

## 2025-01-27 RX ADMIN — PROPOFOL 50 MG: 10 INJECTION, EMULSION INTRAVENOUS at 10:40

## 2025-01-27 RX ADMIN — PROPOFOL 50 MG: 10 INJECTION, EMULSION INTRAVENOUS at 10:36

## 2025-01-27 RX ADMIN — PROPOFOL 50 MG: 10 INJECTION, EMULSION INTRAVENOUS at 10:37

## 2025-01-27 RX ADMIN — LIDOCAINE HYDROCHLORIDE 100 MG: 20 INJECTION, SOLUTION EPIDURAL; INFILTRATION; INTRACAUDAL at 10:35

## 2025-01-27 RX ADMIN — MIDAZOLAM 1 MG: 1 INJECTION INTRAMUSCULAR; INTRAVENOUS at 09:21

## 2025-01-27 NOTE — H&P
"History and Physical -  [facility]  Marylou Morris 54 y.o. female MRN: [id number]                  HPI: Marylou Morris is a 54 y.o. year old female who presents for dysphagia       REVIEW OF SYSTEMS: Per the HPI, and otherwise unremarkable.    Historical Information   Past Medical History:   Diagnosis Date    Arrhythmia     Asthma     GERD (gastroesophageal reflux disease)     H/O gastric bypass     Hiatal hernia     History of methamphetamine abuse (HCC)     Positive UDS; 2020    Hypokalemia     PVC's (premature ventricular contractions)     Pyelonephritis      Past Surgical History:   Procedure Laterality Date    CHOLECYSTECTOMY      DENTAL SURGERY      ESOPHAGEAL DILATION      GASTRIC BYPASS      OK LAPS ABD PRTM&OMENTUM DX W/WO SPEC BR/WA SPX N/A 2024    Procedure: LAPAROSCOPY DIAGNOSTIC, REDUCTION AND REPAIR OF INTERNAL HERNIA;  Surgeon: Ruebn Milner MD;  Location: [facility];  Service: Bariatrics    EILEEN-EN-Y PROCEDURE      SUBTOTAL COLECTOMY      TOOTH EXTRACTION      TUBAL LIGATION       Social History   Social History     Substance and Sexual Activity   Alcohol Use Never     Social History     Substance and Sexual Activity   Drug Use Not Currently    Types: Methamphetamines    Comment: reports use this week (2020)     Social History     Tobacco Use   Smoking Status Every Day    Current packs/day: 0.50    Average packs/day: 0.5 packs/day for 38.0 years (19.0 ttl pk-yrs)    Types: Cigarettes   Smokeless Tobacco Never     Family History   Problem Relation Age of Onset    Kidney disease Mother         Pt also reports \"bone disease\"    Hypertension Mother     Diabetes Mother     Heart disease Mother     Stroke Mother     Arthritis Mother     Hyperlipidemia Mother     Colon cancer Father          motor cycle accident    Heart disease Sister     Coronary artery disease Sister     No Known Problems Sister     No Known Problems Sister     No Known Problems Daughter     " No Known Problems Daughter     Breast cancer Maternal Grandmother     Lung cancer Maternal Grandmother     Lung cancer Maternal Grandfather     Lung cancer Paternal Grandmother     Lung cancer Paternal Grandfather     Cancer Brother     Heart attack Brother     Heart disease Brother     Coronary artery disease Brother     Uterine cancer Maternal Aunt     No Known Problems Maternal Aunt     No Known Problems Maternal Aunt     Lung cancer Maternal Aunt     Lung cancer Maternal Aunt     Thyroid cancer Maternal Uncle     No Known Problems Paternal Aunt     No Known Problems Paternal Aunt     Lung cancer Paternal Uncle     Lung cancer Cousin        Meds/Allergies       Current Outpatient Medications:     famotidine (PEPCID) 20 mg tablet    metFORMIN (GLUCOPHAGE-XR) 500 mg 24 hr tablet    omeprazole (PriLOSEC) 40 MG capsule    vitamin B-12 (VITAMIN B-12) 500 mcg tablet    Blood Glucose Monitoring Suppl (OneTouch Verio Reflect) w/Device KIT    Continuous Blood Gluc  (Dexcom G7 ) TOMEKA    ergocalciferol (VITAMIN D2) 50,000 units    glucose blood (OneTouch Verio) test strip    OneTouch Delica Lancets 33G MISC    semaglutide, 0.25 or 0.5 mg/dose, (Ozempic, 0.25 or 0.5 MG/DOSE,) 2 mg/3 mL injection pen    Current Facility-Administered Medications:     lactated ringers infusion, 125 mL/hr, Intravenous, Continuous    Allergies   Allergen Reactions    Morphine      anaphylactic    Morphine Vomiting    Suboxone [Buprenorphine-Naloxone] Abdominal Pain    Sulfamethoxazole-Trimethoprim Hives    Tizanidine Other (See Comments)     Depression    Sulfamethoxazole-Trimethoprim Nausea Only       Objective     /83   Pulse 68   Temp 97.7 °F (36.5 °C) (Temporal)   Resp 18   LMP  (LMP Unknown)   SpO2 100%       PHYSICAL EXAM    Gen: NAD  Head: NCAT  CV: RRR  CHEST: Clear  ABD: soft, NT/ND  EXT: no edema      ASSESSMENT/PLAN:  This is a 54 y.o. year old female here for EGD, and she is stable and optimized for her  procedure.

## 2025-01-27 NOTE — ANESTHESIA PREPROCEDURE EVALUATION
Procedure:  EGD    Relevant Problems   CARDIO   (+) AGUILLON (dyspnea on exertion)   (+) Intermittent chest pain      GI/HEPATIC   (+) Gastroesophageal reflux disease      MUSCULOSKELETAL   (+) Myofascial pain syndrome   (+) Non-traumatic rhabdomyolysis      NEURO/PSYCH   (+) Chronic pain syndrome   (+) Depression with anxiety   (+) Leg weakness, bilateral   (+) Myofascial pain syndrome      PULMONARY   (+) Asthma   (+) AGUILLON (dyspnea on exertion)   (+) SOB (shortness of breath)        Stress test 7/2024    Stress ECG: No ST deviation is noted. The ECG was not diagnostic due to pharmacological (vasodilator) stress.    Perfusion: There are no perfusion defects.    Stress Function: Left ventricular function post-stress is normal. Stress ejection fraction is 63%.    Stress Combined Conclusion: The ECG and SPECT imaging portions of the stress study are concordant with no evidence of stress induced myocardial ischemia.     Normal study.    Physical Exam    Airway    Mallampati score: II  TM Distance: >3 FB  Neck ROM: full     Dental       Cardiovascular      Pulmonary      Other Findings  post-pubertal.      Anesthesia Plan  ASA Score- 3     Anesthesia Type- IV sedation with anesthesia with ASA Monitors.         Additional Monitors:     Airway Plan:            Plan Factors-    Chart reviewed. EKG reviewed.  Existing labs reviewed. Patient summary reviewed.                  Induction-     Postoperative Plan-         Informed Consent- Anesthetic plan and risks discussed with patient.  I personally reviewed this patient with the CRNA. Discussed and agreed on the Anesthesia Plan with the CRNA..      NPO Status:  Vitals Value Taken Time   Date of last liquid 01/26/25 01/27/25 0852   Time of last liquid 2000 01/27/25 0852   Date of last solid 01/26/25 01/27/25 0852   Time of last solid 1500 01/27/25 0852

## 2025-01-27 NOTE — ANESTHESIA POSTPROCEDURE EVALUATION
Post-Op Assessment Note    CV Status:  Stable  Pain Score: 0    Pain management: adequate       Mental Status:  Sleepy   Hydration Status:  Euvolemic   PONV Controlled:  None   Airway Patency:  Patent     Post Op Vitals Reviewed: Yes    No anethesia notable event occurred.    Staff: CRNA           Last Filed PACU Vitals:  Vitals Value Taken Time   Temp     Pulse 60    BP 98/57    Resp 16    SpO2 99

## 2025-01-29 ENCOUNTER — RESULTS FOLLOW-UP (OUTPATIENT)
Dept: GASTROENTEROLOGY | Facility: CLINIC | Age: 55
End: 2025-01-29

## 2025-01-29 PROCEDURE — 88305 TISSUE EXAM BY PATHOLOGIST: CPT | Performed by: PATHOLOGY

## 2025-01-29 NOTE — TELEPHONE ENCOUNTER
Spoke with patient reviewed Dr. Chapin's recommendations and pt verbalized understanding.     Per Dr. Chapin Nothing needs to be done about the hiatal hernia.  I suspect her regurgitation and vomiting is related to her gastric bypass.  She should try to eat smaller meals more frequently instead of 3 regular meals.

## 2025-01-29 NOTE — TELEPHONE ENCOUNTER
Spoke with patient reviewed results and pt verbalized understanding.     Patient does have questions on if anything is going to be done with the hital hernia that was found. Also questions that she is still having food regurgitating, and vomiting. Patient states her stomach will pull and squeezes that it is hard to suck in air.

## 2025-01-29 NOTE — TELEPHONE ENCOUNTER
----- Message from Demetria Chapin MD sent at 1/29/2025  3:33 PM EST -----  Please let patient know that the biopsies of her stomach are negative for H pylori infection.  The biopsies of her esophagus are normal.  She should follow up with [facility] as scheduled for May.

## 2025-02-12 DIAGNOSIS — Z98.84 HISTORY OF GASTRIC BYPASS: ICD-10-CM

## 2025-02-13 RX ORDER — MULTIVITAMIN WITH IRON
500 TABLET ORAL DAILY
Qty: 30 TABLET | Refills: 2 | Status: SHIPPED | OUTPATIENT
Start: 2025-02-13

## 2025-02-21 DIAGNOSIS — E16.2 HYPOGLYCEMIA: ICD-10-CM

## 2025-02-24 RX ORDER — ACYCLOVIR 400 MG/1
1 TABLET ORAL
Qty: 9 EACH | Refills: 1 | Status: SHIPPED | OUTPATIENT
Start: 2025-02-24 | End: 2025-11-21

## 2025-02-27 DIAGNOSIS — E55.9 VITAMIN D DEFICIENCY: ICD-10-CM

## 2025-02-28 RX ORDER — ERGOCALCIFEROL 1.25 MG/1
50000 CAPSULE, LIQUID FILLED ORAL WEEKLY
Qty: 12 CAPSULE | Refills: 0 | Status: SHIPPED | OUTPATIENT
Start: 2025-02-28

## 2025-03-03 ENCOUNTER — OFFICE VISIT (OUTPATIENT)
Dept: FAMILY MEDICINE CLINIC | Facility: CLINIC | Age: 55
End: 2025-03-03
Payer: COMMERCIAL

## 2025-03-03 VITALS
TEMPERATURE: 97.8 F | BODY MASS INDEX: 33.18 KG/M2 | RESPIRATION RATE: 18 BRPM | HEIGHT: 60 IN | SYSTOLIC BLOOD PRESSURE: 104 MMHG | DIASTOLIC BLOOD PRESSURE: 60 MMHG | WEIGHT: 169 LBS

## 2025-03-03 DIAGNOSIS — M79.7 FIBROMYALGIA: ICD-10-CM

## 2025-03-03 DIAGNOSIS — R10.84 GENERALIZED ABDOMINAL PAIN: Primary | ICD-10-CM

## 2025-03-03 DIAGNOSIS — R06.02 SOB (SHORTNESS OF BREATH): ICD-10-CM

## 2025-03-03 PROCEDURE — 99213 OFFICE O/P EST LOW 20 MIN: CPT | Performed by: NURSE PRACTITIONER

## 2025-03-03 NOTE — PROGRESS NOTES
"Name: Marylou Morris      : 1970      MRN: [id number]  Encounter Provider: ABDOULAYE Martino  Encounter Date: 3/3/2025   Encounter department: [facility]  :  Assessment & Plan  Generalized abdominal pain    Orders:    Amylase; Future    Lipase; Future    SOB (shortness of breath)    Orders:    XR chest pa and lateral; Future    Fibromyalgia    Orders:    C-reactive protein; Future           History of Present Illness   Patient feels her abdomen is causing her pain. She feels swelling around her entire body, she feels sob all the time. Feels chills without fever, but also feels hot. She feels she is sleeping with her legs straight up in the air.   She is asking for pain medication, \"Vicoden, Percocet or Soma\" and \"she may as well get them off the street if I can't get them here\" patient became upset when the provider states her actions are bullying in nature. Also states \"Dr. Britton knows my problems and gave me medication\". Again, this provider states this will not be provided.   Labs and cxr ordered and patient instructed as soon as we have notification of the testing we can formulate a plan of care. Gabapentin and cymbalta have been offered, patient declined         Abdominal Cramping  Associated symptoms include arthralgias, a fever, headaches, myalgias and nausea. Pertinent negatives include no dysuria, hematuria or vomiting.     Review of Systems   Constitutional:  Positive for activity change, appetite change, chills, fatigue and fever.   HENT:  Negative for ear pain and sore throat.    Eyes:  Negative for pain and visual disturbance.   Respiratory:  Positive for chest tightness and shortness of breath. Negative for cough.    Cardiovascular:  Positive for chest pain. Negative for palpitations.   Gastrointestinal:  Positive for abdominal pain and nausea. Negative for vomiting.   Genitourinary:  Negative for dysuria and hematuria.   Musculoskeletal:  Positive for " arthralgias, back pain, gait problem, joint swelling, myalgias, neck pain and neck stiffness.   Skin:  Negative for color change and rash.   Neurological:  Positive for headaches. Negative for seizures and syncope.   All other systems reviewed and are negative.      Objective   /60   Temp 97.8 °F (36.6 °C) (Temporal)   Resp 18   Ht 5' (1.524 m)   Wt 76.7 kg (169 lb)   LMP  (LMP Unknown)   BMI 33.01 kg/m²      Physical Exam  Vitals and nursing note reviewed.   Constitutional:       General: She is not in acute distress.     Appearance: She is well-developed.   HENT:      Head: Normocephalic and atraumatic.      Right Ear: Tympanic membrane, ear canal and external ear normal.      Left Ear: Tympanic membrane, ear canal and external ear normal.      Nose: Nose normal.      Mouth/Throat:      Mouth: Mucous membranes are moist.      Pharynx: No oropharyngeal exudate or posterior oropharyngeal erythema.   Eyes:      Conjunctiva/sclera: Conjunctivae normal.   Cardiovascular:      Rate and Rhythm: Normal rate and regular rhythm.      Heart sounds: No murmur heard.  Pulmonary:      Effort: Pulmonary effort is normal. No respiratory distress.      Comments: Decreased throughout no adventitious sounds appreciated    Abdominal:      General: Bowel sounds are normal.      Palpations: Abdomen is soft.      Tenderness: There is abdominal tenderness. There is guarding.   Musculoskeletal:         General: No swelling.      Cervical back: Neck supple.      Comments: Positive tenderness to skin, unable to palpate joints    Skin:     General: Skin is warm and dry.      Capillary Refill: Capillary refill takes less than 2 seconds.      Comments: Positive tenderness to skin, unable to palpate joints    Neurological:      Mental Status: She is alert.   Psychiatric:      Comments: Appears hyperfocused with pain response

## 2025-03-06 DIAGNOSIS — E55.9 VITAMIN D DEFICIENCY: ICD-10-CM

## 2025-03-06 RX ORDER — ERGOCALCIFEROL 1.25 MG/1
50000 CAPSULE, LIQUID FILLED ORAL WEEKLY
Qty: 12 CAPSULE | Refills: 0 | OUTPATIENT
Start: 2025-03-06

## 2025-03-10 DIAGNOSIS — R13.10 DYSPHAGIA, UNSPECIFIED TYPE: ICD-10-CM

## 2025-03-10 DIAGNOSIS — K21.9 GASTROESOPHAGEAL REFLUX DISEASE WITHOUT ESOPHAGITIS: ICD-10-CM

## 2025-03-11 RX ORDER — OMEPRAZOLE 40 MG/1
40 CAPSULE, DELAYED RELEASE ORAL DAILY
Qty: 90 CAPSULE | Refills: 1 | Status: SHIPPED | OUTPATIENT
Start: 2025-03-11

## 2025-03-12 ENCOUNTER — NURSE TRIAGE (OUTPATIENT)
Dept: GASTROENTEROLOGY | Facility: CLINIC | Age: 55
End: 2025-03-12

## 2025-03-12 DIAGNOSIS — R10.11 RUQ PAIN: Primary | ICD-10-CM

## 2025-03-12 DIAGNOSIS — R13.10 DYSPHAGIA, UNSPECIFIED TYPE: ICD-10-CM

## 2025-03-12 DIAGNOSIS — K21.9 GASTROESOPHAGEAL REFLUX DISEASE WITHOUT ESOPHAGITIS: ICD-10-CM

## 2025-03-12 RX ORDER — OMEPRAZOLE 40 MG/1
40 CAPSULE, DELAYED RELEASE ORAL DAILY
Qty: 90 CAPSULE | Refills: 1 | OUTPATIENT
Start: 2025-03-12

## 2025-03-12 NOTE — TELEPHONE ENCOUNTER
Patient called the RX Refill Line. Message is being forwarded to the office.     Patient called and states that her stomach got a little worse due to hernia. States that she is throwing up and in pain from it.     Please contact patient at [phone]

## 2025-03-12 NOTE — TELEPHONE ENCOUNTER
"FOLLOW UP: Please advise    REASON FOR CONVERSATION: Stomach and Abdominal Pain    SYMPTOMS: RUQ abdominal pain/cramping, present 5/10, at its worse 10/10. Denies change in bowel habits, 1 episode diarrhea yesterday. Episodes of sweating which she has f/u with PCP for.    OTHER: Is currently taking omeprazole 40 mg once daily and famotidine PRN. Has taken Bentyl previously with no relief.   Ov 5/28/25    DISPOSITION: No disposition on file.          Reason for Disposition   Mild abdominal pain    Answer Assessment - Initial Assessment Questions  1. LOCATION: \"Where does it hurt?\"       Upper right sided abdominal pain under rib  2. RADIATION: \"Does the pain shoot anywhere else?\" (e.g., chest, back)      Back   3. ONSET: \"When did the pain begin?\" (e.g., minutes, hours or days ago)       Couple days ago  4. SUDDEN: \"Gradual or sudden onset?\"      Gradually getting worse  5. PATTERN \"Does the pain come and go, or is it constant?\"      intermittent  6. SEVERITY: \"How bad is the pain?\"  (e.g., Scale 1-10; mild, moderate, or severe)      Now is 5/10-pulling   at its worse 10/10  7. RECURRENT SYMPTOM: \"Have you ever had this type of stomach pain before?\" If Yes, ask: \"When was the last time?\" and \"What happened that time?\"       recurrent  8. CAUSE: \"What do you think is causing the stomach pain?\"      unsure  9. RELIEVING/AGGRAVATING FACTORS: \"What makes it better or worse?\" (e.g., antacids, bending or twisting motion, bowel movement)      Bm makes it worse  10. OTHER SYMPTOMS: \"Do you have any other symptoms?\" (e.g., back pain, diarrhea, fever, urination pain, vomiting)        Diarrhea.    Sweating    Protocols used: Abdominal Pain - Female-Adult-[place]    "

## 2025-03-12 NOTE — TELEPHONE ENCOUNTER
Reason for call:   [x] Refill   [] Prior Auth  [] Other:     Office:   [] PCP/Provider -   [x] Specialty/Provider - Demetria Ibarraey [facility]     Medication: Prilosec    Dose/Frequency: 40 mg     Quantity: #90    Pharmacy: [facility] [address] [place] [address] Pharmacy   Does the patient have enough for 3 days?   [] Yes   [x] No - Send as HP to POD    Mail Away Pharmacy   Does the patient have enough for 10 days?   [] Yes   [] No - Send as HP to POD

## 2025-03-13 NOTE — TELEPHONE ENCOUNTER
Spoke with patient reviewed recommendations and central scheduling phone number given to pt to schedule US and pt verbalized understanding.   Per   Kwadwo Acharya PA-C  [facility]47 minutes ago (10:49 AM)     Recommend RUQ US to rule out gallstones in the bile ducts (noted patient has history of cholecystectomy).  I also recommend trial of OTC Beano three times daily before meals.  And lastly, continue using prunes to help manage underlying constipation.  Thanks!

## 2025-03-14 ENCOUNTER — APPOINTMENT (EMERGENCY)
Dept: RADIOLOGY | Facility: HOSPITAL | Age: 55
End: 2025-03-14
Payer: COMMERCIAL

## 2025-03-14 ENCOUNTER — HOSPITAL ENCOUNTER (EMERGENCY)
Facility: HOSPITAL | Age: 55
Discharge: HOME | End: 2025-03-14
Attending: EMERGENCY MEDICINE
Payer: COMMERCIAL

## 2025-03-14 ENCOUNTER — APPOINTMENT (EMERGENCY)
Dept: CT IMAGING | Facility: HOSPITAL | Age: 55
End: 2025-03-14
Payer: COMMERCIAL

## 2025-03-14 VITALS
DIASTOLIC BLOOD PRESSURE: 79 MMHG | HEIGHT: 60 IN | OXYGEN SATURATION: 97 % | SYSTOLIC BLOOD PRESSURE: 116 MMHG | WEIGHT: 169 LBS | HEART RATE: 84 BPM | RESPIRATION RATE: 18 BRPM | TEMPERATURE: 98.4 F | BODY MASS INDEX: 33.18 KG/M2

## 2025-03-14 DIAGNOSIS — R10.12 LEFT UPPER QUADRANT ABDOMINAL PAIN: Primary | ICD-10-CM

## 2025-03-14 LAB
ALBUMIN SERPL BCG-MCNC: 4.3 G/DL (ref 3.5–5)
ALP SERPL-CCNC: 69 U/L (ref 34–104)
ALT SERPL W P-5'-P-CCNC: 12 U/L (ref 7–52)
ANION GAP SERPL CALCULATED.3IONS-SCNC: 6 MMOL/L (ref 4–13)
AST SERPL W P-5'-P-CCNC: 19 U/L (ref 13–39)
BACTERIA UR QL AUTO: ABNORMAL /HPF
BASOPHILS # BLD AUTO: 0.02 THOUSANDS/ÂΜL (ref 0–0.1)
BASOPHILS NFR BLD AUTO: 0 % (ref 0–1)
BILIRUB SERPL-MCNC: 0.98 MG/DL (ref 0.2–1)
BILIRUB UR QL STRIP: NEGATIVE
BUN SERPL-MCNC: 12 MG/DL (ref 5–25)
CALCIUM SERPL-MCNC: 9.7 MG/DL (ref 8.4–10.2)
CHLORIDE SERPL-SCNC: 106 MMOL/L (ref 96–108)
CLARITY UR: CLEAR
CO2 SERPL-SCNC: 28 MMOL/L (ref 21–32)
COLOR UR: YELLOW
CREAT SERPL-MCNC: 0.82 MG/DL (ref 0.6–1.3)
EOSINOPHIL # BLD AUTO: 0.08 THOUSAND/ÂΜL (ref 0–0.61)
EOSINOPHIL NFR BLD AUTO: 2 % (ref 0–6)
ERYTHROCYTE [DISTWIDTH] IN BLOOD BY AUTOMATED COUNT: 14.6 % (ref 11.6–15.1)
GFR SERPL CREATININE-BSD FRML MDRD: 81 ML/MIN/1.73SQ M
GLUCOSE SERPL-MCNC: 73 MG/DL (ref 65–140)
GLUCOSE SERPL-MCNC: 77 MG/DL (ref 65–140)
GLUCOSE SERPL-MCNC: 78 MG/DL (ref 65–140)
GLUCOSE SERPL-MCNC: 81 MG/DL (ref 65–140)
GLUCOSE UR STRIP-MCNC: NEGATIVE MG/DL
HCT VFR BLD AUTO: 42.6 % (ref 34.8–46.1)
HGB BLD-MCNC: 13.8 G/DL (ref 11.5–15.4)
HGB UR QL STRIP.AUTO: NEGATIVE
IMM GRANULOCYTES # BLD AUTO: 0.01 THOUSAND/UL (ref 0–0.2)
IMM GRANULOCYTES NFR BLD AUTO: 0 % (ref 0–2)
KETONES UR STRIP-MCNC: NEGATIVE MG/DL
LEUKOCYTE ESTERASE UR QL STRIP: ABNORMAL
LIPASE SERPL-CCNC: 18 U/L (ref 11–82)
LYMPHOCYTES # BLD AUTO: 2 THOUSANDS/ÂΜL (ref 0.6–4.47)
LYMPHOCYTES NFR BLD AUTO: 37 % (ref 14–44)
MCH RBC QN AUTO: 30.2 PG (ref 26.8–34.3)
MCHC RBC AUTO-ENTMCNC: 32.4 G/DL (ref 31.4–37.4)
MCV RBC AUTO: 93 FL (ref 82–98)
MONOCYTES # BLD AUTO: 0.36 THOUSAND/ÂΜL (ref 0.17–1.22)
MONOCYTES NFR BLD AUTO: 7 % (ref 4–12)
NEUTROPHILS # BLD AUTO: 3.01 THOUSANDS/ÂΜL (ref 1.85–7.62)
NEUTS SEG NFR BLD AUTO: 54 % (ref 43–75)
NITRITE UR QL STRIP: NEGATIVE
NON-SQ EPI CELLS URNS QL MICRO: ABNORMAL /HPF
NRBC BLD AUTO-RTO: 0 /100 WBCS
PH UR STRIP.AUTO: 6.5 [PH]
PLATELET # BLD AUTO: 231 THOUSANDS/UL (ref 149–390)
PMV BLD AUTO: 9.1 FL (ref 8.9–12.7)
POTASSIUM SERPL-SCNC: 4.6 MMOL/L (ref 3.5–5.3)
PROT SERPL-MCNC: 7.1 G/DL (ref 6.4–8.4)
PROT UR STRIP-MCNC: NEGATIVE MG/DL
RBC # BLD AUTO: 4.57 MILLION/UL (ref 3.81–5.12)
RBC #/AREA URNS AUTO: ABNORMAL /HPF
SODIUM SERPL-SCNC: 140 MMOL/L (ref 135–147)
SP GR UR STRIP.AUTO: 1.02
UROBILINOGEN UR QL STRIP.AUTO: 0.2 E.U./DL
WBC # BLD AUTO: 5.48 THOUSAND/UL (ref 4.31–10.16)
WBC #/AREA URNS AUTO: ABNORMAL /HPF

## 2025-03-14 PROCEDURE — 82948 REAGENT STRIP/BLOOD GLUCOSE: CPT

## 2025-03-14 PROCEDURE — 99285 EMERGENCY DEPT VISIT HI MDM: CPT

## 2025-03-14 PROCEDURE — 80053 COMPREHEN METABOLIC PANEL: CPT | Performed by: EMERGENCY MEDICINE

## 2025-03-14 PROCEDURE — 99285 EMERGENCY DEPT VISIT HI MDM: CPT | Performed by: EMERGENCY MEDICINE

## 2025-03-14 PROCEDURE — 96360 HYDRATION IV INFUSION INIT: CPT

## 2025-03-14 PROCEDURE — 71045 X-RAY EXAM CHEST 1 VIEW: CPT

## 2025-03-14 PROCEDURE — 83690 ASSAY OF LIPASE: CPT | Performed by: EMERGENCY MEDICINE

## 2025-03-14 PROCEDURE — 74177 CT ABD & PELVIS W/CONTRAST: CPT

## 2025-03-14 PROCEDURE — 96361 HYDRATE IV INFUSION ADD-ON: CPT

## 2025-03-14 PROCEDURE — 81001 URINALYSIS AUTO W/SCOPE: CPT | Performed by: EMERGENCY MEDICINE

## 2025-03-14 PROCEDURE — 85025 COMPLETE CBC W/AUTO DIFF WBC: CPT | Performed by: EMERGENCY MEDICINE

## 2025-03-14 PROCEDURE — 36415 COLL VENOUS BLD VENIPUNCTURE: CPT | Performed by: EMERGENCY MEDICINE

## 2025-03-14 RX ADMIN — SODIUM CHLORIDE 1000 ML: 0.9 INJECTION, SOLUTION INTRAVENOUS at 08:23

## 2025-03-14 RX ADMIN — IOHEXOL 100 ML: 350 INJECTION, SOLUTION INTRAVENOUS at 08:51

## 2025-03-14 NOTE — ED PROVIDER NOTES
Time reflects when diagnosis was documented in both MDM as applicable and the Disposition within this note       Time User Action Codes Description Comment    3/14/2025 11:33 AM Robert Crain Add [R10.12] Left upper quadrant abdominal pain           ED Disposition       ED Disposition   Discharge    Condition   Stable    Date/Time   Fri Mar 14, 2025 11:33 AM    Comment   Marylou JASSO Alexandra discharge to home/self care.                   Assessment & Plan       Medical Decision Making  54-year-old female presenting for evaluation of abdominal pain, nausea, low blood sugars.  Abdominal pain for weeks worsening over last 3 days.  Sugars have been lower last 3 days.  Has had decreased appetite.  No loose stools.  Notes increased urinary frequency.  Does have some tenderness on exam.  Differential includes gastritis versus pancreatitis versus colitis.  Decreased p.o. intake may be why her sugars have been lower.  Will obtain belly labs.  Will obtain CT abdomen pelvis.  Will obtain chest x-ray to out pneumonia.  Will obtain UA to rule out UTI.  Labs and UA normal.  CT shows no acute findings.  Glucose  WNL here.  Patienet D/Cd    Problems Addressed:  Left upper quadrant abdominal pain: acute illness or injury    Amount and/or Complexity of Data Reviewed  Labs: ordered.  Radiology: ordered.    Risk  Prescription drug management.        ED Course as of 03/14/25 2109   Fri Mar 14, 2025   1132 Sugar at time of discharge was 78       Medications   sodium chloride 0.9 % bolus 1,000 mL (0 mL Intravenous Stopped 3/14/25 1120)   iohexol (OMNIPAQUE) 350 MG/ML injection (MULTI-DOSE) 100 mL (100 mL Intravenous Given 3/14/25 0851)       ED Risk Strat Scores                            SBIRT 20yo+      Flowsheet Row Most Recent Value   Initial Alcohol Screen: US AUDIT-C     1. How often do you have a drink containing alcohol? 0 Filed at: 03/14/2025 0804   2. How many drinks containing alcohol do you have on a typical day you are drinking?   "0 Filed at: 2025 08   3b. FEMALE Any Age, or MALE 65+: How often do you have 4 or more drinks on one occassion? 0 Filed at: 2025   Audit-C Score 0 Filed at: 2025   PATRICK: How many times in the past year have you...    Used an illegal drug or used a prescription medication for non-medical reasons? Never Filed at: 2025                            History of Present Illness       Chief Complaint   Patient presents with    Hypoglycemia - Symptomatic     Multiple somatic complaints such as back pain, abdominal pain, feeling cold, periods of confusion.        Past Medical History:   Diagnosis Date    Arrhythmia     Asthma     GERD (gastroesophageal reflux disease)     H/O gastric bypass     Hiatal hernia     History of methamphetamine abuse (HCC)     Positive UDS; 2020    Hypokalemia     PVC's (premature ventricular contractions)     Pyelonephritis       Past Surgical History:   Procedure Laterality Date    CHOLECYSTECTOMY      DENTAL SURGERY      ESOPHAGEAL DILATION      GASTRIC BYPASS      DC LAPS ABD PRTM&OMENTUM DX W/WO SPEC BR/WA SPX N/A 2024    Procedure: LAPAROSCOPY DIAGNOSTIC, REDUCTION AND REPAIR OF INTERNAL HERNIA;  Surgeon: Ruben Milner MD;  Location: [facility];  Service: Bariatrics    EILEEN-EN-Y PROCEDURE      SUBTOTAL COLECTOMY      TOOTH EXTRACTION      TUBAL LIGATION        Family History   Problem Relation Age of Onset    Kidney disease Mother         Pt also reports \"bone disease\"    Hypertension Mother     Diabetes Mother     Heart disease Mother     Stroke Mother     Arthritis Mother     Hyperlipidemia Mother     Colon cancer Father          motor cycle accident    Heart disease Sister     Coronary artery disease Sister     No Known Problems Sister     No Known Problems Sister     No Known Problems Daughter     No Known Problems Daughter     Breast cancer Maternal Grandmother     Lung cancer Maternal Grandmother     Lung cancer Maternal " "Grandfather     Lung cancer Paternal Grandmother     Lung cancer Paternal Grandfather     Cancer Brother     Heart attack Brother     Heart disease Brother     Coronary artery disease Brother     Uterine cancer Maternal Aunt     No Known Problems Maternal Aunt     No Known Problems Maternal Aunt     Lung cancer Maternal Aunt     Lung cancer Maternal Aunt     Thyroid cancer Maternal Uncle     No Known Problems Paternal Aunt     No Known Problems Paternal Aunt     Lung cancer Paternal Uncle     Lung cancer Cousin       Social History     Tobacco Use    Smoking status: Every Day     Current packs/day: 0.50     Average packs/day: 0.5 packs/day for 38.0 years (19.0 ttl pk-yrs)     Types: Cigarettes    Smokeless tobacco: Never   Vaping Use    Vaping status: Never Used   Substance Use Topics    Alcohol use: Never    Drug use: Not Currently     Types: Methamphetamines     Comment: reports use this week (12/2020)      E-Cigarette/Vaping    E-Cigarette Use Never User     Cartridges/Day 1       E-Cigarette/Vaping Substances    Nicotine No     THC No     CBD No     Flavoring No     Other No     Unknown No       I have reviewed and agree with the history as documented.     Patient is a 54-year-old female with a history of diabetes presenting for evaluation of low blood sugars, nausea, abdominal pain.  Patient says she has been dealing with left upper quadrant abdominal pain for \"weeks\" but says it has been worse over the last 3 days.  She admits to associated nausea and decreased appetite secondary to it.  Patient says her sugars have been running lower over the last 3 days she says it is sometimes been in the \"40s.\"  She says that that happened yesterday while she was at the hospital with her daughter.  She says that when her sugar gets low she feels that she has difficulty concentrating and feels lightheaded.  She denies any lower abdominal pain, loose stools.  She does admit to some increased urinary frequency but denies any " dysuria, hematuria.  Denies any fevers or chills.  Denies any cough, shortness of breath.  Patient is on Ozempic and metformin for her diabetes, she is not on insulin.        Review of Systems   Constitutional:  Negative for fever and unexpected weight change.   HENT:  Negative for congestion, ear pain, sore throat and trouble swallowing.    Eyes:  Negative for pain and redness.   Respiratory:  Negative for cough, chest tightness and shortness of breath.    Cardiovascular:  Negative for chest pain and leg swelling.   Gastrointestinal:  Positive for abdominal pain and nausea. Negative for abdominal distention, diarrhea and vomiting.   Endocrine: Negative for polyuria.   Genitourinary:  Negative for dysuria, hematuria, pelvic pain and vaginal bleeding.   Musculoskeletal:  Negative for back pain and myalgias.   Skin:  Negative for rash.   Neurological:  Negative for dizziness, syncope, weakness, light-headedness and headaches.           Objective       ED Triage Vitals   Temperature Pulse Blood Pressure Respirations SpO2 Patient Position - Orthostatic VS   03/14/25 0805 03/14/25 0802 03/14/25 0802 03/14/25 0802 03/14/25 0802 03/14/25 0802   98.7 °F (37.1 °C) 78 129/87 18 100 % Sitting      Temp Source Heart Rate Source BP Location FiO2 (%) Pain Score    03/14/25 0805 03/14/25 0802 03/14/25 0802 -- 03/14/25 0802    Temporal Monitor Left arm  5      Vitals      Date and Time Temp Pulse SpO2 Resp BP Pain Score FACES Pain Rating User   03/14/25 1130 98.4 °F (36.9 °C) 84 97 % 18 116/79 -- -- AP   03/14/25 1030 -- 61 98 % 18 125/78 -- -- JMC   03/14/25 0805 98.7 °F (37.1 °C) -- -- -- -- -- -- TG   03/14/25 0802 -- 78 100 % 18 129/87 5 -- TG            Physical Exam  Vitals and nursing note reviewed.   Constitutional:       General: She is not in acute distress.     Appearance: She is well-developed.   HENT:      Head: Normocephalic and atraumatic.      Right Ear: External ear normal.      Left Ear: External ear normal.       Nose: Nose normal.      Mouth/Throat:      Mouth: Mucous membranes are moist.      Pharynx: No oropharyngeal exudate.   Eyes:      Conjunctiva/sclera: Conjunctivae normal.      Pupils: Pupils are equal, round, and reactive to light.   Cardiovascular:      Rate and Rhythm: Normal rate and regular rhythm.      Heart sounds: Normal heart sounds. No murmur heard.     No friction rub. No gallop.   Pulmonary:      Effort: Pulmonary effort is normal. No respiratory distress.      Breath sounds: Normal breath sounds. No wheezing or rales.   Abdominal:      General: There is no distension.      Palpations: Abdomen is soft.      Tenderness: There is abdominal tenderness (LUQ). There is no guarding.   Musculoskeletal:         General: No swelling, tenderness or deformity. Normal range of motion.      Cervical back: Normal range of motion and neck supple.   Lymphadenopathy:      Cervical: No cervical adenopathy.   Skin:     General: Skin is warm and dry.   Neurological:      General: No focal deficit present.      Mental Status: She is alert and oriented to person, place, and time. Mental status is at baseline.      Cranial Nerves: No cranial nerve deficit.      Sensory: No sensory deficit.      Motor: No weakness or abnormal muscle tone.      Coordination: Coordination normal.         Results Reviewed       Procedure Component Value Units Date/Time    Fingerstick Glucose (POCT) [[id number]]  (Normal) Collected: 03/14/25 1131    Lab Status: Final result Specimen: Blood Updated: 03/14/25 1133     POC Glucose 78 mg/dl     Fingerstick Glucose (POCT) [[id number]]  (Normal) Collected: 03/14/25 1054    Lab Status: Final result Specimen: Blood Updated: 03/14/25 1055     POC Glucose 73 mg/dl     Urine Microscopic [[id number]]  (Abnormal) Collected: 03/14/25 0821    Lab Status: Final result Specimen: Urine, Clean Catch Updated: 03/14/25 0926     RBC, UA 1-2 /hpf      WBC, UA 2-4 /hpf      Epithelial Cells Moderate /hpf      Bacteria, UA  None Seen /hpf     UA (URINE) with reflex to Scope [[id number]]  (Abnormal) Collected: 03/14/25 0821    Lab Status: Final result Specimen: Urine, Clean Catch Updated: 03/14/25 0854     Color, UA Yellow     Clarity, UA Clear     Specific Gravity, UA 1.020     pH, UA 6.5     Leukocytes, UA Trace     Nitrite, UA Negative     Protein, UA Negative mg/dl      Glucose, UA Negative mg/dl      Ketones, UA Negative mg/dl      Urobilinogen, UA 0.2 E.U./dl      Bilirubin, UA Negative     Occult Blood, UA Negative    Lipase [[id number]]  (Normal) Collected: 03/14/25 0808    Lab Status: Final result Specimen: Blood from Arm, Right Updated: 03/14/25 0838     Lipase 18 u/L     Comprehensive metabolic panel [[id number]] Collected: 03/14/25 0808    Lab Status: Final result Specimen: Blood from Arm, Right Updated: 03/14/25 0837     Sodium 140 mmol/L      Potassium 4.6 mmol/L      Chloride 106 mmol/L      CO2 28 mmol/L      ANION GAP 6 mmol/L      BUN 12 mg/dL      Creatinine 0.82 mg/dL      Glucose 81 mg/dL      Calcium 9.7 mg/dL      AST 19 U/L      ALT 12 U/L      Alkaline Phosphatase 69 U/L      Total Protein 7.1 g/dL      Albumin 4.3 g/dL      Total Bilirubin 0.98 mg/dL      eGFR 81 ml/min/1.73sq m     Narrative:      [employer] guidelines for Chronic Kidney Disease (CKD):     Stage 1 with normal or high GFR (GFR > 90 mL/min/1.73 square meters)    Stage 2 Mild CKD (GFR = 60-89 mL/min/1.73 square meters)    Stage 3A Moderate CKD (GFR = 45-59 mL/min/1.73 square meters)    Stage 3B Moderate CKD (GFR = 30-44 mL/min/1.73 square meters)    Stage 4 Severe CKD (GFR = 15-29 mL/min/1.73 square meters)    Stage 5 End Stage CKD (GFR <15 mL/min/1.73 square meters)  Note: GFR calculation is accurate only with a steady state creatinine    Fingerstick Glucose (POCT) [[id number]]  (Normal) Collected: 03/14/25 0830    Lab Status: Final result Specimen: Blood Updated: 03/14/25 0831     POC Glucose 77 mg/dl     CBC and  differential [[id number]] Collected: 03/14/25 0808    Lab Status: Final result Specimen: Blood from Arm, Right Updated: 03/14/25 0821     WBC 5.48 Thousand/uL      RBC 4.57 Million/uL      Hemoglobin 13.8 g/dL      Hematocrit 42.6 %      MCV 93 fL      MCH 30.2 pg      MCHC 32.4 g/dL      RDW 14.6 %      MPV 9.1 fL      Platelets 231 Thousands/uL      nRBC 0 /100 WBCs      Segmented % 54 %      Immature Grans % 0 %      Lymphocytes % 37 %      Monocytes % 7 %      Eosinophils Relative 2 %      Basophils Relative 0 %      Absolute Neutrophils 3.01 Thousands/µL      Absolute Immature Grans 0.01 Thousand/uL      Absolute Lymphocytes 2.00 Thousands/µL      Absolute Monocytes 0.36 Thousand/µL      Eosinophils Absolute 0.08 Thousand/µL      Basophils Absolute 0.02 Thousands/µL             CT abdomen pelvis with contrast   Final Interpretation by Robinson Palafox MD (03/14 0906)      No acute findings in the abdomen or pelvis.         Workstation performed: [id number]         XR chest 1 view portable   Final Interpretation by Robinson Palafox MD (03/14 0907)      No acute cardiopulmonary disease.            Workstation performed: [id number]             Procedures    ED Medication and Procedure Management   Prior to Admission Medications   Prescriptions Last Dose Informant Patient Reported? Taking?   Blood Glucose Monitoring Suppl (OneTouch Verio Reflect) w/Device KIT   No No   Sig: Check blood sugars once daily. Please substitute with appropriate alternative as covered by patient's insurance. Dx: E11.65   Continuous Blood Gluc  (Dexcom G7 ) TOMEKA   No No   Sig: Use 1 each continuous   Continuous Glucose Sensor (Dexcom G7 Sensor)   No No   Sig: USE 1 DEVICE EVERY 10 DAYS   OneTouch Delica Lancets 33G MISC   No No   Sig: Check blood sugars once daily. Please substitute with appropriate alternative as covered by patient's insurance. Dx: E11.65   ergocalciferol (VITAMIN D2) 50,000 units   No No    Sig: take 1 capsule by mouth every week   famotidine (PEPCID) 20 mg tablet   No No   Sig: Take 1 tablet (20 mg total) by mouth daily at bedtime   glucose blood (OneTouch Verio) test strip   No No   Sig: Use 1 each 4 (four) times a day (before meals and at bedtime) Check blood sugars once daily. Please substitute with appropriate alternative as covered by patient's insurance. Dx: E11.65   metFORMIN (GLUCOPHAGE-XR) 500 mg 24 hr tablet   No No   Sig: take 1 tablet by mouth EVERY MORNING WITH BREAKFAST   omeprazole (PriLOSEC) 40 MG capsule   No No   Sig: take 1 capsule by mouth once daily   semaglutide, 0.25 or 0.5 mg/dose, (Ozempic, 0.25 or 0.5 MG/DOSE,) 2 mg/3 mL injection pen   No No   Sig: Inject 0.75 mL (0.5 mg total) under the skin every 7 days   vitamin B-12 (VITAMIN B-12) 500 mcg tablet   No No   Sig: TAKE 1 TABLET BY MOUTH DAILY      Facility-Administered Medications: None     Discharge Medication List as of 3/14/2025 11:34 AM        CONTINUE these medications which have NOT CHANGED    Details   Blood Glucose Monitoring Suppl (OneTouch Verio Reflect) w/Device KIT Check blood sugars once daily. Please substitute with appropriate alternative as covered by patient's insurance. Dx: E11.65, Normal      Continuous Blood Gluc  (Dexcom G7 ) TOMEKA Use 1 each continuous, Starting Thu 2/1/2024, Normal      Continuous Glucose Sensor (Dexcom G7 Sensor) USE 1 DEVICE EVERY 10 DAYS, Starting Mon 2/24/2025, Until Fri 11/21/2025, Normal      ergocalciferol (VITAMIN D2) 50,000 units take 1 capsule by mouth every week, Starting Fri 2/28/2025, Normal      famotidine (PEPCID) 20 mg tablet Take 1 tablet (20 mg total) by mouth daily at bedtime, Starting Tue 8/27/2024, Normal      glucose blood (OneTouch Verio) test strip Use 1 each 4 (four) times a day (before meals and at bedtime) Check blood sugars once daily. Please substitute with appropriate alternative as covered by patient's insurance. Dx: E11.65, Starting Tue  3/5/2024, Normal      metFORMIN (GLUCOPHAGE-XR) 500 mg 24 hr tablet take 1 tablet by mouth EVERY MORNING WITH BREAKFAST, Normal      omeprazole (PriLOSEC) 40 MG capsule take 1 capsule by mouth once daily, Starting Tue 3/11/2025, Normal      OneTouch Delica Lancets 33G MISC Check blood sugars once daily. Please substitute with appropriate alternative as covered by patient's insurance. Dx: E11.65, Normal      semaglutide, 0.25 or 0.5 mg/dose, (Ozempic, 0.25 or 0.5 MG/DOSE,) 2 mg/3 mL injection pen Inject 0.75 mL (0.5 mg total) under the skin every 7 days, Starting Wed 1/15/2025, Normal      vitamin B-12 (VITAMIN B-12) 500 mcg tablet TAKE 1 TABLET BY MOUTH DAILY, Starting Thu 2/13/2025, Normal           No discharge procedures on file.  ED SEPSIS DOCUMENTATION   Time reflects when diagnosis was documented in both MDM as applicable and the Disposition within this note       Time User Action Codes Description Comment    3/14/2025 11:33 AM Robert Crain Add [R10.12] Left upper quadrant abdominal pain                  Robert Crain, DO  03/14/25 1760

## 2025-03-18 ENCOUNTER — OFFICE VISIT (OUTPATIENT)
Dept: ENDOCRINOLOGY | Facility: CLINIC | Age: 55
End: 2025-03-18
Payer: COMMERCIAL

## 2025-03-18 VITALS
BODY MASS INDEX: 33.14 KG/M2 | TEMPERATURE: 97.9 F | HEART RATE: 81 BPM | SYSTOLIC BLOOD PRESSURE: 114 MMHG | RESPIRATION RATE: 16 BRPM | DIASTOLIC BLOOD PRESSURE: 72 MMHG | WEIGHT: 168.8 LBS | HEIGHT: 60 IN | OXYGEN SATURATION: 99 %

## 2025-03-18 DIAGNOSIS — K91.2 HYPOGLYCEMIA AFTER GI (GASTROINTESTINAL) SURGERY: ICD-10-CM

## 2025-03-18 DIAGNOSIS — E16.2 HYPOGLYCEMIA: ICD-10-CM

## 2025-03-18 DIAGNOSIS — Z98.84 HISTORY OF GASTRIC BYPASS: ICD-10-CM

## 2025-03-18 PROCEDURE — 99214 OFFICE O/P EST MOD 30 MIN: CPT | Performed by: STUDENT IN AN ORGANIZED HEALTH CARE EDUCATION/TRAINING PROGRAM

## 2025-03-18 PROCEDURE — 95251 CONT GLUC MNTR ANALYSIS I&R: CPT | Performed by: STUDENT IN AN ORGANIZED HEALTH CARE EDUCATION/TRAINING PROGRAM

## 2025-03-18 RX ORDER — METFORMIN HYDROCHLORIDE 500 MG/1
TABLET, EXTENDED RELEASE ORAL
Qty: 90 TABLET | Refills: 1 | Status: CANCELLED | OUTPATIENT
Start: 2025-03-18

## 2025-03-18 RX ORDER — ACYCLOVIR 400 MG/1
1 TABLET ORAL
Qty: 9 EACH | Refills: 1 | Status: SHIPPED | OUTPATIENT
Start: 2025-03-18 | End: 2025-12-13

## 2025-03-18 RX ORDER — BLOOD SUGAR DIAGNOSTIC
1 STRIP MISCELLANEOUS
Qty: 100 EACH | Refills: 3 | Status: SHIPPED | OUTPATIENT
Start: 2025-03-18

## 2025-03-18 NOTE — ASSESSMENT & PLAN NOTE
Orders:  •  glucose blood (OneTouch Verio) test strip; Use 1 each 4 (four) times a day (before meals and at bedtime) Check blood sugars once daily. Please substitute with appropriate alternative as covered by patient's insurance. Dx: E11.65

## 2025-03-18 NOTE — PROGRESS NOTES
Name: Marylou Morris      : 1970      MRN: [id number]  Encounter Provider: Rachel Rodríguez MD  Encounter Date: 3/18/2025   Encounter department: [facility] [place]    Chief Complaint   Patient presents with   • Follow-up   :  Assessment & Plan  Hypoglycemia    Orders:  •  Continuous Glucose Sensor (Dexcom G7 Sensor); Use 1 Device every 10 days  •  glucose blood (OneTouch Verio) test strip; Use 1 each 4 (four) times a day (before meals and at bedtime) Check blood sugars once daily. Please substitute with appropriate alternative as covered by patient's insurance. Dx: E11.65    Hypoglycemia after GI (gastrointestinal) surgery    Her Dexcom report indicates a blood glucose level as low as 41. During her recent hospitalization, her blood glucose was recorded as 81 at 8 AM, followed by fingerstick readings of 77, 73, and 78, all within the normal range. The CT scan performed during her hospital stay revealed no active abdominal findings. She has been experiencing hypoglycemic episodes, particularly during sleep.  She is currently on Ozempic 0.5 mg, which overall helped her significantly for getting less frequent and less severe hypoglycemic episode.  There is discrepancy between her CGM and her plasma glucose, for which I may consider 72 hours fasting and/or mixed meal test in the hospital.  Previously she could not tolerate acarbose, at this time I deferred starting calcium channel blocker or diazoxide given limited benefit as well as possible adverse reactions.  She is advised to adhere to the dietary recommendations provided during her last visit, including eating three times a day with 30 g of carbs, consuming low glycemic mixed carbs, having snacks in between meals with 15 g of carbs, eating smaller portions more frequently, avoiding drinking water with meals, reducing alcohol and caffeine intake, and taking her supplements and vitamins for bariatric surgery.   She is  instructed to discontinue metformin but continue Ozempic, with an increased dosage to 1 mg. She is to take 2 injections of 0.5 mg back-to-back and then start the 1 mg dose. She is to keep the office updated on her condition. If she continues to experience hypoglycemia, a fasting test or mixed meal test may be considered. Blood work, including A1c, lipid panel, and CMP will be conducted during her next visit.      Follow-up  The patient will follow up in 6 months.      Orders:  •  semaglutide, 1 mg/dose, (Ozempic, 1 MG/DOSE,) 4 mg/3 mL injection pen; Inject 0.75 mL (1 mg total) under the skin every 7 days    History of gastric bypass    Orders:  •  glucose blood (OneTouch Verio) test strip; Use 1 each 4 (four) times a day (before meals and at bedtime) Check blood sugars once daily. Please substitute with appropriate alternative as covered by patient's insurance. Dx: E11.65        History of Present Illness   History of Present Illness  The patient is a 53-year-old female who presents for evaluation of hypoglycemia after gastrointestinal surgery.    She reports experiencing hypoglycemic episodes, with blood glucose levels dropping to the 40s for 3 consecutive days. She sought medical attention at [facility] in [place], where her blood glucose was elevated to 70. However, upon discharge, she experienced a recurrence of hypoglycemia, particularly during sleep. She has not had any hypoglycemic episodes since her last hospital discharge. She reports feeling fatigued, a symptom she did not experience prior to starting Ozempic. She also reports difficulty with cognitive function, specifically recalling the timing of her meals and blood glucose fluctuations.   She continues to experience abdominal and back pain, which she does not attribute to Ozempic. She has been adhering to lifestyle modifications discussed during her last visit, with the exception of smoking cessation.     She is currently on a daily dose of  metformin and has expressed a desire to discontinue this medication.   She has been on Ozempic 0.5 mg, which she reports has improved her blood glucose control. Prior to these recent episodes, her blood glucose levels were well-managed. She also reports weight loss since starting Ozempic. Her blood glucose levels typically rise in the afternoon, approximately 2 hours postprandial.    She presented to the hospital due to hypoglycemia and severe abdominal, back, and left ear pain. A CT scan performed at the hospital which was unremarkable including adrenal and pancreas..    SOCIAL HISTORY  The patient admits to smoking.    MEDICATIONS  Current: Ozempic, metformin    Marylou LOUISA Buskirk   Device used, G7  Home use       Indication   hypoglycemia      More than 72 hours of data was reviewed. Report to be scanned to chart.     Date Range: March 4 - 18    Analysis of data:   Average Glucose: 91 mg/dL  Coefficient of Variation: 5.5  SD : 18  Time in Target Range: 93%  Time Above Range: 0%  Time Below Range: 1%    Interpretation of data:       Pertinent Medical History           Review of Systems as per [facility]  Medical History Reviewed by provider this encounter:     .  Current Outpatient Medications on File Prior to Visit   Medication Sig Dispense Refill   • Blood Glucose Monitoring Suppl (OneTouch Verio Reflect) w/Device KIT Check blood sugars once daily. Please substitute with appropriate alternative as covered by patient's insurance. Dx: E11.65 1 kit 0   • Continuous Blood Gluc  (Dexcom G7 ) TOMEKA Use 1 each continuous 1 each 0   • ergocalciferol (VITAMIN D2) 50,000 units take 1 capsule by mouth every week 12 capsule 0   • famotidine (PEPCID) 20 mg tablet Take 1 tablet (20 mg total) by mouth daily at bedtime 30 tablet 5   • metFORMIN (GLUCOPHAGE-XR) 500 mg 24 hr tablet take 1 tablet by mouth EVERY MORNING WITH BREAKFAST 90 tablet 1   • omeprazole (PriLOSEC) 40 MG capsule take 1 capsule by mouth once daily 90  capsule 1   • OneTouch Delica Lancets 33G MISC Check blood sugars once daily. Please substitute with appropriate alternative as covered by patient's insurance. Dx: E11.65 100 each 3   • vitamin B-12 (VITAMIN B-12) 500 mcg tablet TAKE 1 TABLET BY MOUTH DAILY 30 tablet 2   • [DISCONTINUED] Continuous Glucose Sensor (Dexcom G7 Sensor) USE 1 DEVICE EVERY 10 DAYS 9 each 1   • [DISCONTINUED] glucose blood (OneTouch Verio) test strip Use 1 each 4 (four) times a day (before meals and at bedtime) Check blood sugars once daily. Please substitute with appropriate alternative as covered by patient's insurance. Dx: E11.65 100 each 3   • [DISCONTINUED] semaglutide, 0.25 or 0.5 mg/dose, (Ozempic, 0.25 or 0.5 MG/DOSE,) 2 mg/3 mL injection pen Inject 0.75 mL (0.5 mg total) under the skin every 7 days 9 mL 1     No current facility-administered medications on file prior to visit.         Medical History Reviewed by provider this encounter:     .    Objective   /72 (BP Location: Right arm, Patient Position: Sitting, Cuff Size: Large)   Pulse 81   Temp 97.9 °F (36.6 °C) (Temporal)   Resp 16   Ht 5' (1.524 m)   Wt 76.6 kg (168 lb 12.8 oz)   LMP  (LMP Unknown)   SpO2 99%   BMI 32.97 kg/m²      Body mass index is 32.97 kg/m².  Wt Readings from Last 3 Encounters:   03/18/25 76.6 kg (168 lb 12.8 oz)   03/14/25 76.7 kg (169 lb)   03/03/25 76.7 kg (169 lb)     Physical Exam  Vitals and nursing note reviewed.   Constitutional:       General: She is not in acute distress.     Appearance: She is well-developed.   HENT:      Head: Normocephalic and atraumatic.   Cardiovascular:      Rate and Rhythm: Normal rate and regular rhythm.   Pulmonary:      Effort: Pulmonary effort is normal. No respiratory distress.   Abdominal:      Palpations: Abdomen is soft.   Musculoskeletal:         General: No swelling.      Cervical back: Neck supple.   Skin:     General: Skin is warm and dry.   Neurological:      Mental Status: She is alert.  "      Physical Exam      Results  Laboratory Studies  Blood sugar was 41. Blood sugar was 81 at the hospital. Fingerstick blood sugar was 77, 73, and 78.    Imaging  CAT scan showed no hernia or other active findings in the abdomen.  Labs:   Lab Results   Component Value Date    HGBA1C 4.9 06/01/2023    HGBA1C 5.2 10/27/2020     Lab Results   Component Value Date    CREATININE 0.82 03/14/2025    CREATININE 0.52 (L) 09/25/2024    CREATININE 0.70 06/13/2024    BUN 12 03/14/2025    K 4.6 03/14/2025     03/14/2025    CO2 28 03/14/2025     GFR, Calculated   Date Value Ref Range Status   12/17/2020 108 >60 mL/min/1.73m2 Final     Comment:     mL/min per 1.73 square meters                                            Normal Function or Mild Renal    Disease (if clinically at risk):  >or=60  Moderately Decreased:                30-59  Severely Decreased:                  15-29  Renal Failure:                         <15                                            -American GFR: multiply reported GFR by 1.16    Please note that the eGFR is based on the CKD-EPI calculation, and is not intended to be used for drug dosing.     eGFR   Date Value Ref Range Status   03/14/2025 81 ml/min/1.73sq m Final     Lab Results   Component Value Date    HDL 60 09/25/2024    TRIG 72 09/25/2024     Lab Results   Component Value Date    ALT 12 03/14/2025    AST 19 03/14/2025    ALKPHOS 69 03/14/2025     Lab Results   Component Value Date    QTK9ZLGAPKIH 2.025 09/25/2024    JVK1AMWGRIAF 2.273 04/29/2024    COW8XYWDELWX 1.225 07/13/2023     No results found for: \"FREET4\", \"TSI\"    Patient Instructions   Point Nutrition Plan for Preventing Hypoglycemia in Post-Bariatric Hypoglycemia.    Control portions of carbohydrate - 30 grams/meal, 15 grams/snack.  Choose low-glycemic carbohydrates.  Avoid high-glycemic carbohydrates.  Include (heart-healthy) fats in each meal or snack - 15 grams/meal, 5 grams/snack.  Emphasize optimal protein " intake.  Space meals/snacks 3-4 hours apart.  Avoid consuming liquids with meals.  Avoid alcohol.  Avoid caffeine.  Maintain post-bariatric vitamin and mineral intake    Low Glycemic Index Carbohydrates (CHOOSE)    Steel-cut oats (regular, not quick-cook or instant)  Oat bran cereal  Beans/legumes (e.g., garbanzo, navy, kidney, lima, jolley, black-eyed and pea beans, edamame (soybeans), lentils  Bean products (e.g., hummus, tofu)  Pearled barley, cooked al dente  Yams  Some fruits (e.g., grapefruit, apples, pears, berries, apricots, peaches)  Some pasta (e.g., Barilla Plus pasta), cooked al dente  Some whole grain breads (e.g., Cornelio bread, Solitario’s Flax, Oat Bran & Whole Wheat Ana/Lavash/Tortillas)  Some whole grain crackers (e.g., RyKrisp, RyVita, Wasa)    Discussed with the patient and all questioned fully answered. She will call me if any problems arise.

## 2025-03-18 NOTE — ASSESSMENT & PLAN NOTE
Her Dexcom report indicates a blood glucose level as low as 41. During her recent hospitalization, her blood glucose was recorded as 81 at 8 AM, followed by fingerstick readings of 77, 73, and 78, all within the normal range. The CT scan performed during her hospital stay revealed no active abdominal findings. She has been experiencing hypoglycemic episodes, particularly during sleep.  She is currently on Ozempic 0.5 mg, which overall helped her significantly for getting less frequent and less severe hypoglycemic episode.  There is discrepancy between her CGM and her plasma glucose, for which I may consider 72 hours fasting and/or mixed meal test in the hospital.  Previously she could not tolerate acarbose, at this time I deferred starting calcium channel blocker or diazoxide given limited benefit as well as possible adverse reactions.  She is advised to adhere to the dietary recommendations provided during her last visit, including eating three times a day with 30 g of carbs, consuming low glycemic mixed carbs, having snacks in between meals with 15 g of carbs, eating smaller portions more frequently, avoiding drinking water with meals, reducing alcohol and caffeine intake, and taking her supplements and vitamins for bariatric surgery.   She is instructed to discontinue metformin but continue Ozempic, with an increased dosage to 1 mg. She is to take 2 injections of 0.5 mg back-to-back and then start the 1 mg dose. She is to keep the office updated on her condition. If she continues to experience hypoglycemia, a fasting test or mixed meal test may be considered. Blood work, including A1c, lipid panel, and CMP will be conducted during her next visit.      Follow-up  The patient will follow up in 6 months.      Orders:  •  semaglutide, 1 mg/dose, (Ozempic, 1 MG/DOSE,) 4 mg/3 mL injection pen; Inject 0.75 mL (1 mg total) under the skin every 7 days

## 2025-03-18 NOTE — PATIENT INSTRUCTIONS
Point Nutrition Plan for Preventing Hypoglycemia in Post-Bariatric Hypoglycemia.    Control portions of carbohydrate - 30 grams/meal, 15 grams/snack.  Choose low-glycemic carbohydrates.  Avoid high-glycemic carbohydrates.  Include (heart-healthy) fats in each meal or snack - 15 grams/meal, 5 grams/snack.  Emphasize optimal protein intake.  Space meals/snacks 3-4 hours apart.  Avoid consuming liquids with meals.  Avoid alcohol.  Avoid caffeine.  Maintain post-bariatric vitamin and mineral intake    Low Glycemic Index Carbohydrates (CHOOSE)    Steel-cut oats (regular, not quick-cook or instant)  Oat bran cereal  Beans/legumes (e.g., garbanzo, navy, kidney, lima, jolley, black-eyed and pea beans, edamame (soybeans), lentils  Bean products (e.g., hummus, tofu)  Pearled barley, cooked al dente  Yams  Some fruits (e.g., grapefruit, apples, pears, berries, apricots, peaches)  Some pasta (e.g., Barilla Plus pasta), cooked al dente  Some whole grain breads (e.g., Cornelio bread, Solitario’s Flax, Oat Bran & Whole Wheat Ana/Lavash/Tortillas)  Some whole grain crackers (e.g., RyKrisp, RyVita, Wasa)

## 2025-03-19 ENCOUNTER — HOSPITAL ENCOUNTER (OUTPATIENT)
Dept: ULTRASOUND IMAGING | Facility: HOSPITAL | Age: 55
Discharge: HOME | End: 2025-03-19
Payer: COMMERCIAL

## 2025-03-19 DIAGNOSIS — R10.11 RUQ PAIN: ICD-10-CM

## 2025-03-19 PROCEDURE — 76705 ECHO EXAM OF ABDOMEN: CPT

## 2025-03-24 ENCOUNTER — RESULTS FOLLOW-UP (OUTPATIENT)
Age: 55
End: 2025-03-24

## 2025-03-24 NOTE — TELEPHONE ENCOUNTER
"Pt calling in as she was unable to see message on her [employer]. Reviewed message from Kwadwo Acharya PA-C: \"Your abdominal ultrasound shows a small non-obstructing kidney stone in the right kidney, but otherwise was normal.  Non-obstructing kidney stones usually do not cause any symptoms, but I would recommend discussing this with your PCP.  Are you still having right upper abdominal pain?\"    Please advise:  Pt reports that she is still having 6/10 RUQ pain that radiates to her back and becomes worse after eating. Occasional vomiting - last episode yesterday.       "

## 2025-03-31 DIAGNOSIS — E16.2 HYPOGLYCEMIA: ICD-10-CM

## 2025-03-31 DIAGNOSIS — Z98.84 HISTORY OF GASTRIC BYPASS: ICD-10-CM

## 2025-03-31 NOTE — TELEPHONE ENCOUNTER
Reason for call:   [x] Refill   [] Prior Auth  [] Other:     Office:   [x] PCP/Provider - Roberta Britton, DO   [] Specialty/Provider -     Medication: OneTouch Delica Lancets 33G MISC     Dose/Frequency: Check blood sugars once daily.     Quantity: 100    [id number] [address] [place] [address] [facility]   Does the patient have enough for 3 days?   [x] Yes   [] No - Send as HP to POD    Mail Away Pharmacy   Does the patient have enough for 10 days?   [] Yes   [] No - Send as HP to POD

## 2025-04-02 RX ORDER — LANCETS 33 GAUGE
EACH MISCELLANEOUS
Qty: 100 EACH | Refills: 3 | Status: SHIPPED | OUTPATIENT
Start: 2025-04-02

## 2025-04-08 ENCOUNTER — HOSPITAL ENCOUNTER (EMERGENCY)
Facility: HOSPITAL | Age: 55
Discharge: HOME | End: 2025-04-08
Attending: EMERGENCY MEDICINE
Payer: COMMERCIAL

## 2025-04-08 VITALS
OXYGEN SATURATION: 98 % | HEIGHT: 60 IN | HEART RATE: 76 BPM | DIASTOLIC BLOOD PRESSURE: 68 MMHG | WEIGHT: 168 LBS | RESPIRATION RATE: 18 BRPM | TEMPERATURE: 98.8 F | SYSTOLIC BLOOD PRESSURE: 108 MMHG | BODY MASS INDEX: 32.98 KG/M2

## 2025-04-08 DIAGNOSIS — E16.2 HYPOGLYCEMIA: Primary | ICD-10-CM

## 2025-04-08 LAB
ALBUMIN SERPL BCG-MCNC: 4.2 G/DL (ref 3.5–5)
ALP SERPL-CCNC: 69 U/L (ref 34–104)
ALT SERPL W P-5'-P-CCNC: 11 U/L (ref 7–52)
ANION GAP SERPL CALCULATED.3IONS-SCNC: 6 MMOL/L (ref 4–13)
AST SERPL W P-5'-P-CCNC: 16 U/L (ref 13–39)
BASOPHILS # BLD AUTO: 0.02 THOUSANDS/ÂΜL (ref 0–0.1)
BASOPHILS NFR BLD AUTO: 0 % (ref 0–1)
BILIRUB SERPL-MCNC: 0.68 MG/DL (ref 0.2–1)
BUN SERPL-MCNC: 9 MG/DL (ref 5–25)
CALCIUM SERPL-MCNC: 9.5 MG/DL (ref 8.4–10.2)
CHLORIDE SERPL-SCNC: 106 MMOL/L (ref 96–108)
CO2 SERPL-SCNC: 26 MMOL/L (ref 21–32)
CREAT SERPL-MCNC: 0.65 MG/DL (ref 0.6–1.3)
EOSINOPHIL # BLD AUTO: 0.06 THOUSAND/ÂΜL (ref 0–0.61)
EOSINOPHIL NFR BLD AUTO: 1 % (ref 0–6)
ERYTHROCYTE [DISTWIDTH] IN BLOOD BY AUTOMATED COUNT: 13.9 % (ref 11.6–15.1)
GFR SERPL CREATININE-BSD FRML MDRD: 101 ML/MIN/1.73SQ M
GLUCOSE SERPL-MCNC: 108 MG/DL (ref 65–140)
GLUCOSE SERPL-MCNC: 139 MG/DL (ref 65–140)
GLUCOSE SERPL-MCNC: 62 MG/DL (ref 65–140)
GLUCOSE SERPL-MCNC: 65 MG/DL (ref 65–140)
GLUCOSE SERPL-MCNC: 76 MG/DL (ref 65–140)
GLUCOSE SERPL-MCNC: 80 MG/DL (ref 65–140)
GLUCOSE SERPL-MCNC: 95 MG/DL (ref 65–140)
HCT VFR BLD AUTO: 42.3 % (ref 34.8–46.1)
HGB BLD-MCNC: 13.6 G/DL (ref 11.5–15.4)
IMM GRANULOCYTES # BLD AUTO: 0.01 THOUSAND/UL (ref 0–0.2)
IMM GRANULOCYTES NFR BLD AUTO: 0 % (ref 0–2)
INSULIN SERPL-ACNC: 38.53 UIU/ML
LIPASE SERPL-CCNC: 16 U/L (ref 11–82)
LYMPHOCYTES # BLD AUTO: 2.4 THOUSANDS/ÂΜL (ref 0.6–4.47)
LYMPHOCYTES NFR BLD AUTO: 43 % (ref 14–44)
MCH RBC QN AUTO: 29.9 PG (ref 26.8–34.3)
MCHC RBC AUTO-ENTMCNC: 32.2 G/DL (ref 31.4–37.4)
MCV RBC AUTO: 93 FL (ref 82–98)
MONOCYTES # BLD AUTO: 0.28 THOUSAND/ÂΜL (ref 0.17–1.22)
MONOCYTES NFR BLD AUTO: 5 % (ref 4–12)
NEUTROPHILS # BLD AUTO: 2.8 THOUSANDS/ÂΜL (ref 1.85–7.62)
NEUTS SEG NFR BLD AUTO: 51 % (ref 43–75)
NRBC BLD AUTO-RTO: 0 /100 WBCS
PLATELET # BLD AUTO: 226 THOUSANDS/UL (ref 149–390)
PMV BLD AUTO: 8.9 FL (ref 8.9–12.7)
POTASSIUM SERPL-SCNC: 4.1 MMOL/L (ref 3.5–5.3)
PROT SERPL-MCNC: 6.9 G/DL (ref 6.4–8.4)
RBC # BLD AUTO: 4.55 MILLION/UL (ref 3.81–5.12)
SODIUM SERPL-SCNC: 138 MMOL/L (ref 135–147)
WBC # BLD AUTO: 5.57 THOUSAND/UL (ref 4.31–10.16)

## 2025-04-08 PROCEDURE — 80053 COMPREHEN METABOLIC PANEL: CPT | Performed by: EMERGENCY MEDICINE

## 2025-04-08 PROCEDURE — 82948 REAGENT STRIP/BLOOD GLUCOSE: CPT

## 2025-04-08 PROCEDURE — 84681 ASSAY OF C-PEPTIDE: CPT | Performed by: EMERGENCY MEDICINE

## 2025-04-08 PROCEDURE — 99285 EMERGENCY DEPT VISIT HI MDM: CPT

## 2025-04-08 PROCEDURE — 36415 COLL VENOUS BLD VENIPUNCTURE: CPT | Performed by: EMERGENCY MEDICINE

## 2025-04-08 PROCEDURE — 83525 ASSAY OF INSULIN: CPT | Performed by: EMERGENCY MEDICINE

## 2025-04-08 PROCEDURE — 84206 ASSAY OF PROINSULIN: CPT | Performed by: EMERGENCY MEDICINE

## 2025-04-08 PROCEDURE — 99284 EMERGENCY DEPT VISIT MOD MDM: CPT | Performed by: EMERGENCY MEDICINE

## 2025-04-08 PROCEDURE — 83690 ASSAY OF LIPASE: CPT | Performed by: EMERGENCY MEDICINE

## 2025-04-08 PROCEDURE — 85025 COMPLETE CBC W/AUTO DIFF WBC: CPT | Performed by: EMERGENCY MEDICINE

## 2025-04-08 NOTE — ED PROVIDER NOTES
Time reflects when diagnosis was documented in both MDM as applicable and the Disposition within this note       Time User Action Codes Description Comment    4/8/2025  2:32 PM Antoine Farrell Add [E16.2] Hypoglycemia           ED Disposition       ED Disposition   Discharge    Condition   Stable    Date/Time   Tue Apr 8, 2025  2:32 PM    Comment   Marylou LOUISA Morris discharge to home/self care.                   Assessment & Plan       Medical Decision Making  53 yo female with hx of recent episodes of hypoglycemia, follows with endocrine with recent increase in ozempic dosing as this seemed to help in the past per chart review. Patient tolerating PO here and . Will check baseline labs and reach out to endocrine.  Blood glucose appears to be stable as long as patient eats.  Recommended decreasing her Ozempic dose to 0.5 mg.  Patient and patient's daughter at bedside feel comfortable going home at this time and will call her endocrinologist soon as possible.  Strict return precautions discussed and provided.    Amount and/or Complexity of Data Reviewed  Labs: ordered.        ED Course as of 04/08/25 1607   Tue Apr 08, 2025   1249 Case discussed with on-call endocrine who is aware of patient's case and follow-up with her regularly as an outpatient.  Recommending sending of C-peptide, proinsulin, random insulin levels.  Stating if we are able to monitor patient and maintain glucose for approximate 2 hours and hopefully can try to discharge patient as unfortunate this is an ongoing problem.       Medications - No data to display    ED Risk Strat Scores                    No data recorded        SBIRT 20yo+      Flowsheet Row Most Recent Value   Initial Alcohol Screen: US AUDIT-C     1. How often do you have a drink containing alcohol? 0 Filed at: 04/08/2025 1132   2. How many drinks containing alcohol do you have on a typical day you are drinking?  0 Filed at: 04/08/2025 1132   3b. FEMALE Any Age, or MALE 65+: How  "often do you have 4 or more drinks on one occassion? 0 Filed at: 2025 1132   Audit-C Score 0 Filed at: 2025 1132   PATRICK: How many times in the past year have you...    Used an illegal drug or used a prescription medication for non-medical reasons? Never Filed at: 2025 1132                            History of Present Illness       Chief Complaint   Patient presents with    Hypoglycemia - Symptomatic     According to the patient she was a visitor with a patient and had a low blood glucose.       Past Medical History:   Diagnosis Date    Arrhythmia     Asthma     GERD (gastroesophageal reflux disease)     H/O gastric bypass     Hiatal hernia     History of methamphetamine abuse (HCC)     Positive UDS; 2020    Hypokalemia     PVC's (premature ventricular contractions)     Pyelonephritis       Past Surgical History:   Procedure Laterality Date    CHOLECYSTECTOMY      DENTAL SURGERY      ESOPHAGEAL DILATION      GASTRIC BYPASS      AZ LAPS ABD PRTM&OMENTUM DX W/WO SPEC BR/WA SPX N/A 2024    Procedure: LAPAROSCOPY DIAGNOSTIC, REDUCTION AND REPAIR OF INTERNAL HERNIA;  Surgeon: Ruben Milner MD;  Location: [facility];  Service: Bariatrics    EILEEN-EN-Y PROCEDURE      SUBTOTAL COLECTOMY      TOOTH EXTRACTION      TUBAL LIGATION        Family History   Problem Relation Age of Onset    Kidney disease Mother         Pt also reports \"bone disease\"    Hypertension Mother     Diabetes Mother     Heart disease Mother     Stroke Mother     Arthritis Mother     Hyperlipidemia Mother     Colon cancer Father          motor cycle accident    Heart disease Sister     Coronary artery disease Sister     No Known Problems Sister     No Known Problems Sister     No Known Problems Daughter     No Known Problems Daughter     Breast cancer Maternal Grandmother     Lung cancer Maternal Grandmother     Lung cancer Maternal Grandfather     Lung cancer Paternal Grandmother     Lung cancer Paternal Grandfather "     Cancer Brother     Heart attack Brother     Heart disease Brother     Coronary artery disease Brother     Uterine cancer Maternal Aunt     No Known Problems Maternal Aunt     No Known Problems Maternal Aunt     Lung cancer Maternal Aunt     Lung cancer Maternal Aunt     Thyroid cancer Maternal Uncle     No Known Problems Paternal Aunt     No Known Problems Paternal Aunt     Lung cancer Paternal Uncle     Lung cancer Cousin       Social History     Tobacco Use    Smoking status: Every Day     Current packs/day: 0.50     Average packs/day: 0.5 packs/day for 38.0 years (19.0 ttl pk-yrs)     Types: Cigarettes    Smokeless tobacco: Never   Vaping Use    Vaping status: Never Used   Substance Use Topics    Alcohol use: Never    Drug use: Not Currently     Types: Methamphetamines     Comment: reports use this week (12/2020)      E-Cigarette/Vaping    E-Cigarette Use Never User     Cartridges/Day 1       E-Cigarette/Vaping Substances    Nicotine No     THC No     CBD No     Flavoring No     Other No     Unknown No       I have reviewed and agree with the history as documented.     53 yo female presenting to the ed for reports that she has been having episodes of hypoglycemia for some time. States that today she came to the hospital with her daughter as she had an appt and while waiting she started to feel sweaty and started to have a bad headache. States this is normally how she feels when her sugar goes low. States hasn't taken anything for a headache because when she increases her sugar her headache goes away. States headache is feeling better now as he sugar has come up. States she's concerned because it seems to be worse at night. Denies any other symptoms or issues.       Hypoglycemia - Symptomatic  Associated symptoms: sweats        Review of Systems   Constitutional:  Positive for diaphoresis.   Neurological:  Positive for headaches.   All other systems reviewed and are negative.          Objective       ED Triage  Vitals [04/08/25 1129]   Temperature Pulse Blood Pressure Respirations SpO2 Patient Position - Orthostatic VS   98.8 °F (37.1 °C) 77 124/79 18 99 % Lying      Temp Source Heart Rate Source BP Location FiO2 (%) Pain Score    Temporal Monitor Right arm -- No Pain      Vitals      Date and Time Temp Pulse SpO2 Resp BP Pain Score FACES Pain Rating User   04/08/25 1443 -- 76 98 % 18 108/68 -- --    04/08/25 1300 -- 73 99 % 18 111/70 -- --    04/08/25 1245 -- 80 98 % 18 124/86 No Pain --    04/08/25 1145 -- 76 98 % 18 120/78 -- --    04/08/25 1129 98.8 °F (37.1 °C) 77 99 % 18 124/79 No Pain --             Physical Exam  Vitals and nursing note reviewed.   Constitutional:       General: She is not in acute distress.     Appearance: She is well-developed. She is not diaphoretic.   HENT:      Head: Normocephalic and atraumatic.      Right Ear: External ear normal.      Left Ear: External ear normal.      Nose: Nose normal.   Eyes:      General: No scleral icterus.        Right eye: No discharge.         Left eye: No discharge.      Conjunctiva/sclera: Conjunctivae normal.      Pupils: Pupils are equal, round, and reactive to light.   Neck:      Vascular: No JVD.      Trachea: No tracheal deviation.   Cardiovascular:      Rate and Rhythm: Normal rate and regular rhythm.      Heart sounds: Normal heart sounds. No murmur heard.     No friction rub. No gallop.   Pulmonary:      Effort: Pulmonary effort is normal. No respiratory distress.      Breath sounds: Normal breath sounds. No stridor. No wheezing or rales.   Abdominal:      General: Bowel sounds are normal. There is no distension.      Palpations: Abdomen is soft. There is no mass.      Tenderness: There is no abdominal tenderness. There is no guarding.   Musculoskeletal:         General: No tenderness or deformity. Normal range of motion.      Cervical back: Normal range of motion and neck supple.   Skin:     General: Skin is warm and dry.      Coloration: Skin  is not jaundiced or pale.      Findings: No bruising, erythema, lesion or rash.   Neurological:      General: No focal deficit present.      Mental Status: She is alert and oriented to person, place, and time. Mental status is at baseline.      Cranial Nerves: No cranial nerve deficit.      Sensory: No sensory deficit.      Motor: No weakness or abnormal muscle tone.   Psychiatric:         Behavior: Behavior normal.         Thought Content: Thought content normal.         Judgment: Judgment normal.         Results Reviewed       Procedure Component Value Units Date/Time    Fingerstick Glucose (POCT) [[id number]]  (Normal) Collected: 04/08/25 1418    Lab Status: Final result Specimen: Blood Updated: 04/08/25 1419     POC Glucose 95 mg/dl     Fingerstick Glucose (POCT) [[id number]]  (Normal) Collected: 04/08/25 1331    Lab Status: Final result Specimen: Blood Updated: 04/08/25 1331     POC Glucose 80 mg/dl     Fingerstick Glucose (POCT) [[id number]]  (Normal) Collected: 04/08/25 1258    Lab Status: Final result Specimen: Blood Updated: 04/08/25 1259     POC Glucose 108 mg/dl     Insulin, random [[id number]] Collected: 04/08/25 1251    Lab Status: In process Specimen: Blood from Arm, Right Updated: 04/08/25 1255    C-peptide [[id number]] Collected: 04/08/25 1251    Lab Status: In process Specimen: Blood from Arm, Right Updated: 04/08/25 1255    Proinsulin [[id number]] Collected: 04/08/25 1251    Lab Status: In process Specimen: Blood from Arm, Right Updated: 04/08/25 1255    Comprehensive metabolic panel [[id number]]  (Abnormal) Collected: 04/08/25 1230    Lab Status: Final result Specimen: Blood from Arm, Right Updated: 04/08/25 1250     Sodium 138 mmol/L      Potassium 4.1 mmol/L      Chloride 106 mmol/L      CO2 26 mmol/L      ANION GAP 6 mmol/L      BUN 9 mg/dL      Creatinine 0.65 mg/dL      Glucose 62 mg/dL      Calcium 9.5 mg/dL      AST 16 U/L      ALT 11 U/L      Alkaline Phosphatase 69 U/L      Total Protein 6.9  g/dL      Albumin 4.2 g/dL      Total Bilirubin 0.68 mg/dL      eGFR 101 ml/min/1.73sq m     Narrative:      [employer] guidelines for Chronic Kidney Disease (CKD):     Stage 1 with normal or high GFR (GFR > 90 mL/min/1.73 square meters)    Stage 2 Mild CKD (GFR = 60-89 mL/min/1.73 square meters)    Stage 3A Moderate CKD (GFR = 45-59 mL/min/1.73 square meters)    Stage 3B Moderate CKD (GFR = 30-44 mL/min/1.73 square meters)    Stage 4 Severe CKD (GFR = 15-29 mL/min/1.73 square meters)    Stage 5 End Stage CKD (GFR <15 mL/min/1.73 square meters)  Note: GFR calculation is accurate only with a steady state creatinine    Lipase [[id number]]  (Normal) Collected: 04/08/25 1230    Lab Status: Final result Specimen: Blood from Arm, Right Updated: 04/08/25 1250     Lipase 16 u/L     Fingerstick Glucose (POCT) [[id number]]  (Normal) Collected: 04/08/25 1238    Lab Status: Final result Specimen: Blood Updated: 04/08/25 1239     POC Glucose 65 mg/dl     CBC and differential [[id number]] Collected: 04/08/25 1230    Lab Status: Final result Specimen: Blood from Arm, Right Updated: 04/08/25 1237     WBC 5.57 Thousand/uL      RBC 4.55 Million/uL      Hemoglobin 13.6 g/dL      Hematocrit 42.3 %      MCV 93 fL      MCH 29.9 pg      MCHC 32.2 g/dL      RDW 13.9 %      MPV 8.9 fL      Platelets 226 Thousands/uL      nRBC 0 /100 WBCs      Segmented % 51 %      Immature Grans % 0 %      Lymphocytes % 43 %      Monocytes % 5 %      Eosinophils Relative 1 %      Basophils Relative 0 %      Absolute Neutrophils 2.80 Thousands/µL      Absolute Immature Grans 0.01 Thousand/uL      Absolute Lymphocytes 2.40 Thousands/µL      Absolute Monocytes 0.28 Thousand/µL      Eosinophils Absolute 0.06 Thousand/µL      Basophils Absolute 0.02 Thousands/µL     Fingerstick Glucose (POCT) [[id number]]  (Normal) Collected: 04/08/25 1223    Lab Status: Final result Specimen: Blood Updated: 04/08/25 1231     POC Glucose 76 mg/dl      Fingerstick Glucose (POCT) [[id number]]  (Normal) Collected: 04/08/25 1129    Lab Status: Final result Specimen: Blood Updated: 04/08/25 1131     POC Glucose 139 mg/dl             No orders to display       Procedures    ED Medication and Procedure Management   Prior to Admission Medications   Prescriptions Last Dose Informant Patient Reported? Taking?   Blood Glucose Monitoring Suppl (OneTouch Verio Reflect) w/Device KIT  Self No No   Sig: Check blood sugars once daily. Please substitute with appropriate alternative as covered by patient's insurance. Dx: E11.65   Continuous Blood Gluc  (Dexcom G7 ) TOMEKA  Self No No   Sig: Use 1 each continuous   Continuous Glucose Sensor (Dexcom G7 Sensor)   No No   Sig: Use 1 Device every 10 days   OneTouch Delica Lancets 33G MISC   No No   Sig: Check blood sugars once daily. Please substitute with appropriate alternative as covered by patient's insurance. Dx: E11.65   ergocalciferol (VITAMIN D2) 50,000 units  Self No No   Sig: take 1 capsule by mouth every week   famotidine (PEPCID) 20 mg tablet  Self No No   Sig: Take 1 tablet (20 mg total) by mouth daily at bedtime   glucose blood (OneTouch Verio) test strip   No No   Sig: Use 1 each 4 (four) times a day (before meals and at bedtime) Check blood sugars once daily. Please substitute with appropriate alternative as covered by patient's insurance. Dx: E11.65   metFORMIN (GLUCOPHAGE-XR) 500 mg 24 hr tablet  Self No No   Sig: take 1 tablet by mouth EVERY MORNING WITH BREAKFAST   omeprazole (PriLOSEC) 40 MG capsule  Self No No   Sig: take 1 capsule by mouth once daily   semaglutide, 1 mg/dose, (Ozempic, 1 MG/DOSE,) 4 mg/3 mL injection pen   No No   Sig: Inject 0.75 mL (1 mg total) under the skin every 7 days   vitamin B-12 (VITAMIN B-12) 500 mcg tablet  Self No No   Sig: TAKE 1 TABLET BY MOUTH DAILY      Facility-Administered Medications: None     Discharge Medication List as of 4/8/2025  2:38 PM        CONTINUE these  medications which have NOT CHANGED    Details   Blood Glucose Monitoring Suppl (OneTouch Verio Reflect) w/Device KIT Check blood sugars once daily. Please substitute with appropriate alternative as covered by patient's insurance. Dx: E11.65, Normal      Continuous Blood Gluc  (Dexcom G7 ) TOMEKA Use 1 each continuous, Starting Thu 2/1/2024, Normal      Continuous Glucose Sensor (Dexcom G7 Sensor) Use 1 Device every 10 days, Starting Tue 3/18/2025, Until Sat 12/13/2025, Normal      ergocalciferol (VITAMIN D2) 50,000 units take 1 capsule by mouth every week, Starting Fri 2/28/2025, Normal      famotidine (PEPCID) 20 mg tablet Take 1 tablet (20 mg total) by mouth daily at bedtime, Starting Tue 8/27/2024, Normal      glucose blood (OneTouch Verio) test strip Use 1 each 4 (four) times a day (before meals and at bedtime) Check blood sugars once daily. Please substitute with appropriate alternative as covered by patient's insurance. Dx: E11.65, Starting Tue 3/18/2025, Normal      metFORMIN (GLUCOPHAGE-XR) 500 mg 24 hr tablet take 1 tablet by mouth EVERY MORNING WITH BREAKFAST, Normal      omeprazole (PriLOSEC) 40 MG capsule take 1 capsule by mouth once daily, Starting Tue 3/11/2025, Normal      OneTouch Delica Lancets 33G MISC Check blood sugars once daily. Please substitute with appropriate alternative as covered by patient's insurance. Dx: E11.65, Normal      semaglutide, 1 mg/dose, (Ozempic, 1 MG/DOSE,) 4 mg/3 mL injection pen Inject 0.75 mL (1 mg total) under the skin every 7 days, Starting Tue 3/18/2025, Normal      vitamin B-12 (VITAMIN B-12) 500 mcg tablet TAKE 1 TABLET BY MOUTH DAILY, Starting Thu 2/13/2025, Normal           No discharge procedures on file.  ED SEPSIS DOCUMENTATION   Time reflects when diagnosis was documented in both MDM as applicable and the Disposition within this note       Time User Action Codes Description Comment    4/8/2025  2:32 PM Antoine Farrell Add [E16.2] Hypoglycemia                   Antoine Farrell,   04/08/25 1605

## 2025-04-08 NOTE — DISCHARGE INSTRUCTIONS
If you develop any new or worsening symptoms please immediately return to your nearest emergency department.     Please make sure to call your endocrinologist as soon as possible. Please decrease your ozempic to the 0.5mg dose.

## 2025-04-09 LAB — C PEPTIDE SERPL-MCNC: 5.8 NG/ML (ref 1.1–4.4)

## 2025-04-15 LAB — PROINSULIN SERPL-SCNC: 5.2 PMOL/L (ref 0–10)

## 2025-04-16 DIAGNOSIS — K91.2 HYPOGLYCEMIA AFTER GI (GASTROINTESTINAL) SURGERY: ICD-10-CM

## 2025-04-16 RX ORDER — METFORMIN HYDROCHLORIDE 500 MG/1
TABLET, EXTENDED RELEASE ORAL
Qty: 90 TABLET | Refills: 1 | Status: SHIPPED | OUTPATIENT
Start: 2025-04-16 | End: 2025-04-17

## 2025-04-17 ENCOUNTER — TELEPHONE (OUTPATIENT)
Age: 55
End: 2025-04-17

## 2025-04-17 NOTE — TELEPHONE ENCOUNTER
Patient called in states that her pharmacy told her there is a refill on metformin but that she has not been taking metformin, she thought she was supposed to stop it after the last office visit.  (Looks like refill was sent in yesterday).  She is asking if provider wants her to restart?  She said she would prefer not to, as it makes her legs hurt. She said she is on the Ozempic and doing well. Please advise and call patient.

## 2025-04-30 ENCOUNTER — OFFICE VISIT (OUTPATIENT)
Dept: GASTROENTEROLOGY | Facility: CLINIC | Age: 55
End: 2025-04-30
Payer: COMMERCIAL

## 2025-04-30 VITALS
HEIGHT: 60 IN | SYSTOLIC BLOOD PRESSURE: 108 MMHG | TEMPERATURE: 97.5 F | BODY MASS INDEX: 32.98 KG/M2 | OXYGEN SATURATION: 98 % | RESPIRATION RATE: 18 BRPM | WEIGHT: 168 LBS | DIASTOLIC BLOOD PRESSURE: 72 MMHG | HEART RATE: 70 BPM

## 2025-04-30 DIAGNOSIS — Z80.0 FAMILY HISTORY OF COLON CANCER IN FATHER: ICD-10-CM

## 2025-04-30 DIAGNOSIS — Z98.84 HISTORY OF GASTRIC BYPASS: ICD-10-CM

## 2025-04-30 DIAGNOSIS — K21.9 GASTROESOPHAGEAL REFLUX DISEASE WITHOUT ESOPHAGITIS: Primary | ICD-10-CM

## 2025-04-30 DIAGNOSIS — K59.00 CONSTIPATION, UNSPECIFIED CONSTIPATION TYPE: ICD-10-CM

## 2025-04-30 DIAGNOSIS — R10.10 UPPER ABDOMINAL PAIN: ICD-10-CM

## 2025-04-30 PROCEDURE — 99214 OFFICE O/P EST MOD 30 MIN: CPT | Performed by: PHYSICIAN ASSISTANT

## 2025-04-30 NOTE — ASSESSMENT & PLAN NOTE
This patient who has a history of RYGB and is on GLP-1 agonist was evaluated for heartburn and dysphagia.  Her upper endoscopy demonstrated a small hiatal hernia and some edematous tissue at the GJ anastomosis, biopsies were negative for intestinal metaplasia or H. pylori infection.  Her symptoms have markedly improved/resolved with escalation of PPI therapy and administration with opening capsules and ingesting.  Will continue her on current regimen.    Recommend diet and lifestyle modifications for GERD.  This includes avoiding spicy, saucy, greasy/oily foods, citrus, EtOH, NSAIDs, tobacco.  Avoid eating within 2 to 3 hours of bed.  Elevating height of bed 6 inches on blocks may be beneficial.  Weight loss would likely be beneficial.

## 2025-04-30 NOTE — ASSESSMENT & PLAN NOTE
History of such, will check B12 and Mg levels given chronic PPI usage.   I have encouraged her to reestablish with the bariatric service.  Orders:    Vitamin B12; Future    Magnesium; Future

## 2025-04-30 NOTE — PROGRESS NOTES
Name: Marylou Morris      : 1970      MRN: [id number]  Encounter Provider: Urvashi Weber PA-C  Encounter Date: 2025   Encounter department: [facility] [place]  :  Assessment & Plan  Gastroesophageal reflux disease without esophagitis  This patient who has a history of RYGB and is on GLP-1 agonist was evaluated for heartburn and dysphagia.  Her upper endoscopy demonstrated a small hiatal hernia and some edematous tissue at the GJ anastomosis, biopsies were negative for intestinal metaplasia or H. pylori infection.  Her symptoms have markedly improved/resolved with escalation of PPI therapy and administration with opening capsules and ingesting.  Will continue her on current regimen.    Recommend diet and lifestyle modifications for GERD.  This includes avoiding spicy, saucy, greasy/oily foods, citrus, EtOH, NSAIDs, tobacco.  Avoid eating within 2 to 3 hours of bed.  Elevating height of bed 6 inches on blocks may be beneficial.  Weight loss would likely be beneficial.       Upper abdominal pain  She was endorsing postprandial abdominal pain, notably in the left upper quadrant.  She is s/p cholecystectomy.  She is on therapeutic dosing of acid suppression.  Interestingly she does deal with postprandial hypoglycemia and sees endocrinology for this.  Given resolution of her pain, we mutually agreed to monitor for now.  She had a 3D imaging study completed at the time of pain onset, and this was unremarkable for intra-abdominal pathology.  I would recommend if she should develop recurrence of this pain, to take famotidine twice daily until pain resolves.  It may be due to some gastritis, functional dyspepsia, dysmotility, medication side effect, SIBO, etc.   Continue to monitor at this time.        History of gastric bypass  History of such, will check B12 and Mg levels given chronic PPI usage.   I have encouraged her to reestablish with the bariatric service.  Orders:     "Vitamin B12; Future    Magnesium; Future    Constipation, unspecified constipation type  Pt does have a history of chronic constipation.   Interestingly she not only failed over-the-counter laxatives though also tried and failed lactulose and Amitiza, the latter of which was the higher dose of medication.  She is currently utilizing prunes daily and feels that this has been the most effective regimen for her thus far.  Focus on excellent hydration and high-fiber diet.  No alarm features to lower GI tract at this time.       Family history of colon cancer in father  Does have history of first-degree relative with CRC.  She initially declined colonoscopy though is now agreeable to proceed for high risk screening purposes.  She is due in 12/2026.          We will follow up in 1 year to reassess symptoms.     History of Present Illness   Marylou Morris is a 54 y.o. female who presents for f/u for EGD.  Pmhx sig for RYGB c/b internal hernia s/p repair in 01/2024, GERD, DM2, depression, hx of substance abuse. Hx of cholecystectomy.      HPI  History obtained from: patient    Patient was last evaluated in 11/2024.  At that time, she was complaining of heartburn and dysphagia.  She was instructed to take her PPI by opening the capsule to improve absorption.  She underwent an upper endoscopy which demonstrated a small hiatal hernia and a stable at the GJ anastomosis with some edematous mucosa.    A few weeks ago, she was dealing with LUQ pain that radiated around to her back. She went to ER, had CT completed which was unremarkable. Symptoms abated.     04/30/25:     Feels much better from initial presentation.  Not having any heartburn or indigestion at this time.  No longer having abdominal pain.  Tolerating oral intake without issue.  No significant dysphagia or odynophagia.  No longer having episodes of \"choking on saliva\" late at night. No early satiety.  Weight overall is stable.  Has been dealing with postprandial " hypoglycemia.    Pt notes some constipation at times. As previously noted, tried amitiza and lactulose without sig improvement in symptoms. Now taking about 3 prunes daily and feels that this is mostly effective. Having stool every other day to daily. No BRBPR or melena. Notes some diarrhea over past 2 days but attributes this to dietary indiscretion.     Now interested in colonoscopy. Notes father was < 61 y/o when he was diagnosed with CRC.    NSAIDs: no regular use  Etoh: none, sober  Tobacco: smokes pipe tobacco     12/2023: negative cologuard   06/2024: CT A/P: wnl  09/2024: Hb 13.1, MCV 93, platelets 299, BUN 11, creatinine 0.52, AST 19, ALT 11, ALP 51, albumin 4.0, T. bili 0.76, B12 324, Vit D 35.9, TSH 2.025  03/2025: CT A/P: wnl  03/2025: US: wnl   04/2025: Hb 13.6, MCV 93, platelets 226, BUN 9, creatinine 0.65, AST 16, ALT 11, ALP 69, albumin 4.2, T. bili 0.68, lipase 16    Endoscopic history:   EGD: 10/2020: wnl  EGD: 01/2025: 1cm hiatal hernia; staple at GJ anastomosis with mildy edematous mucosa but gastric pouch   A. Stomach, gastric pouch bx r/o hpylori: Mild chronic inactive gastritis and focal foveolar hyperplasia; No H. pylori identified on H&E stained slide   B. Esophagus, r/o eoe: Superficial fragments of congested benign esophageal mucosa with no significant histopathology; No intraepithelial eosinophil granulocytes identified  Colon: ?    Review of Systems A complete review of systems is negative other than that noted above in the HPI.    Past Medical History   Past Medical History:   Diagnosis Date    Arrhythmia     Asthma     GERD (gastroesophageal reflux disease)     H/O gastric bypass     Hiatal hernia     History of methamphetamine abuse (HCC)     Positive UDS; Feb 2020    Hypokalemia     PVC's (premature ventricular contractions)     Pyelonephritis      Past Surgical History:   Procedure Laterality Date    CHOLECYSTECTOMY      DENTAL SURGERY      ESOPHAGEAL DILATION      GASTRIC BYPASS    "   MI LAPS ABD PRTM&OMENTUM DX W/WO SPEC BR/WA SPX N/A 2024    Procedure: LAPAROSCOPY DIAGNOSTIC, REDUCTION AND REPAIR OF INTERNAL HERNIA;  Surgeon: Ruben Milner MD;  Location: [facility];  Service: Bariatrics    EILEEN-EN-Y PROCEDURE      SUBTOTAL COLECTOMY      TOOTH EXTRACTION      TUBAL LIGATION       Family History   Problem Relation Age of Onset    Kidney disease Mother         Pt also reports \"bone disease\"    Hypertension Mother     Diabetes Mother     Heart disease Mother     Stroke Mother     Arthritis Mother     Hyperlipidemia Mother     Colon cancer Father          motor cycle accident    Heart disease Sister     Coronary artery disease Sister     No Known Problems Sister     No Known Problems Sister     No Known Problems Daughter     No Known Problems Daughter     Breast cancer Maternal Grandmother     Lung cancer Maternal Grandmother     Lung cancer Maternal Grandfather     Lung cancer Paternal Grandmother     Lung cancer Paternal Grandfather     Cancer Brother     Heart attack Brother     Heart disease Brother     Coronary artery disease Brother     Uterine cancer Maternal Aunt     No Known Problems Maternal Aunt     No Known Problems Maternal Aunt     Lung cancer Maternal Aunt     Lung cancer Maternal Aunt     Thyroid cancer Maternal Uncle     No Known Problems Paternal Aunt     No Known Problems Paternal Aunt     Lung cancer Paternal Uncle     Lung cancer Cousin       reports that she has been smoking cigarettes. She has a 19 pack-year smoking history. She has never used smokeless tobacco. She reports that she does not currently use drugs after having used the following drugs: Methamphetamines. She reports that she does not drink alcohol.  Current Outpatient Medications   Medication Instructions    Blood Glucose Monitoring Suppl (OneTouch Verio Reflect) w/Device KIT Check blood sugars once daily. Please substitute with appropriate alternative as covered by patient's insurance. Dx: " E11.65    Continuous Blood Gluc  (Dexcom G7 ) TOMEKA 1 each, Does not apply, Continuous    Continuous Glucose Sensor (Dexcom G7 Sensor) 1 Device, Does not apply, Every 10 days    ergocalciferol (VITAMIN D2) 50,000 Units, Oral, Weekly    famotidine (PEPCID) 20 mg, Oral, Daily at bedtime    glucose blood (OneTouch Verio) test strip 1 each, Other, 4 times daily (before meals and at bedtime), Check blood sugars once daily. Please substitute with appropriate alternative as covered by patient's insurance. Dx: E11.65    omeprazole (PRILOSEC) 40 mg, Oral, Daily    OneTouch Delica Lancets 33G MISC Check blood sugars once daily. Please substitute with appropriate alternative as covered by patient's insurance. Dx: E11.65    semaglutide (1 mg/dose) (OZEMPIC (1 MG/DOSE)) 1 mg, Subcutaneous, Every 7 days    vitamin B-12 (VITAMIN B-12) 500 mcg, Oral, Daily     Allergies   Allergen Reactions    Morphine      anaphylactic    Morphine Vomiting    Suboxone [Buprenorphine-Naloxone] Abdominal Pain    Sulfamethoxazole-Trimethoprim Hives    Tizanidine Other (See Comments)     Depression    Sulfamethoxazole-Trimethoprim Nausea Only      Current Outpatient Medications   Medication Sig Dispense Refill    Blood Glucose Monitoring Suppl (OneTouch Verio Reflect) w/Device KIT Check blood sugars once daily. Please substitute with appropriate alternative as covered by patient's insurance. Dx: E11.65 1 kit 0    Continuous Blood Gluc  (Dexcom G7 ) TOMEKA Use 1 each continuous 1 each 0    Continuous Glucose Sensor (Dexcom G7 Sensor) Use 1 Device every 10 days 9 each 1    ergocalciferol (VITAMIN D2) 50,000 units take 1 capsule by mouth every week 12 capsule 0    famotidine (PEPCID) 20 mg tablet Take 1 tablet (20 mg total) by mouth daily at bedtime 30 tablet 5    glucose blood (OneTouch Verio) test strip Use 1 each 4 (four) times a day (before meals and at bedtime) Check blood sugars once daily. Please substitute with  appropriate alternative as covered by patient's insurance. Dx: E11.65 100 each 3    omeprazole (PriLOSEC) 40 MG capsule take 1 capsule by mouth once daily 90 capsule 1    OneTouch Delica Lancets 33G MISC Check blood sugars once daily. Please substitute with appropriate alternative as covered by patient's insurance. Dx: E11.65 100 each 3    semaglutide, 1 mg/dose, (Ozempic, 1 MG/DOSE,) 4 mg/3 mL injection pen Inject 0.75 mL (1 mg total) under the skin every 7 days 9 mL 0    vitamin B-12 (VITAMIN B-12) 500 mcg tablet TAKE 1 TABLET BY MOUTH DAILY 30 tablet 2     No current facility-administered medications for this visit.     Objective   /72 (BP Location: Right arm, Patient Position: Sitting, Cuff Size: Adult)   Pulse 70   Temp 97.5 °F (36.4 °C) (Temporal)   Resp 18   Ht 5' (1.524 m)   Wt 76.2 kg (168 lb)   LMP  (LMP Unknown)   SpO2 98%   BMI 32.81 kg/m²     Physical Exam  Vitals and nursing note reviewed.   Constitutional:       General: She is not in acute distress.     Appearance: She is well-developed.   HENT:      Head: Normocephalic and atraumatic.   Eyes:      General: No scleral icterus.     Conjunctiva/sclera: Conjunctivae normal.   Cardiovascular:      Rate and Rhythm: Normal rate.      Heart sounds: No murmur heard.  Pulmonary:      Effort: Pulmonary effort is normal. No respiratory distress.   Abdominal:      General: Bowel sounds are normal. There is no distension.      Palpations: Abdomen is soft.      Tenderness: There is no abdominal tenderness. There is no guarding or rebound.   Skin:     General: Skin is warm and dry.      Coloration: Skin is not jaundiced.   Neurological:      General: No focal deficit present.      Mental Status: She is alert.   Psychiatric:         Mood and Affect: Mood normal.         Behavior: Behavior normal.            Lab Results: I personally reviewed relevant lab results. CBC/BMP: No new results in last 24 hours. , Creatinine Clearance: CrCl cannot be  calculated (Patient's most recent lab result is older than the maximum 7 days allowed.)., LFTs: No new results in last 24 hours. , PTT/INR:No new results in last 24 hours.     Radiology Results Review: I have reviewed the following images/report studies in PACS: No results found.   Results for orders placed during the hospital encounter of 01/27/25    EGD    Impression  The esophagus appeared normal. Performed random biopsy to rule out eosinophilic esophagitis.  1 cm type I hiatal hernia  Staple present at GJ anastomosis with mildly edematous mucosa but gastric pouch otherwise normal.  Biopsies taken for H pylori.  The jejunum appeared normal.      RECOMMENDATION:  Await pathology results  Follow up with GI Clinic            Demetria Chapin MD    **Please note:  Dictation voice to text software may have been used in the creation of this record.  Occasional wrong word or “sound alike” substitutions may have occurred due to the inherent limitations of voice recognition software.  Read the chart carefully and recognize, using context, where substitutions have occurred.**

## 2025-04-30 NOTE — ASSESSMENT & PLAN NOTE
Pt does have a history of chronic constipation.   Interestingly she not only failed over-the-counter laxatives though also tried and failed lactulose and Amitiza, the latter of which was the higher dose of medication.  She is currently utilizing prunes daily and feels that this has been the most effective regimen for her thus far.  Focus on excellent hydration and high-fiber diet.  No alarm features to lower GI tract at this time.

## 2025-04-30 NOTE — PATIENT INSTRUCTIONS
Stay on the omeprazole every morning.  Continue with the administration of opening the capsules and taking them that way to improve absorption and effectiveness of the medicine.    If you get recurrent abdominal pain, I would recommend that you take the famotidine twice daily until the pain resolves.    Keep home-improvement for now for your bowels.  You will be due for a colonoscopy around the end of next year because of your family history

## 2025-04-30 NOTE — ASSESSMENT & PLAN NOTE
Does have history of first-degree relative with CRC.  She initially declined colonoscopy though is now agreeable to proceed for high risk screening purposes.  She is due in 12/2026.

## 2025-05-01 DIAGNOSIS — E55.9 VITAMIN D DEFICIENCY: ICD-10-CM

## 2025-05-02 RX ORDER — ERGOCALCIFEROL 1.25 MG/1
50000 CAPSULE, LIQUID FILLED ORAL WEEKLY
Qty: 12 CAPSULE | Refills: 0 | Status: SHIPPED | OUTPATIENT
Start: 2025-05-02

## 2025-05-06 ENCOUNTER — TELEPHONE (OUTPATIENT)
Age: 55
End: 2025-05-06

## 2025-05-06 DIAGNOSIS — B37.49 CANDIDIASIS OF PERINEUM: Primary | ICD-10-CM

## 2025-05-06 RX ORDER — NYSTATIN 100000 [USP'U]/G
POWDER TOPICAL 3 TIMES DAILY PRN
Qty: 60 G | Refills: 1 | Status: SHIPPED | OUTPATIENT
Start: 2025-05-06

## 2025-05-06 NOTE — TELEPHONE ENCOUNTER
Incoming call from patient - requesting refill of Nystatin powder. Patient is not having any current concerns but states that she would like to have on hand if she develops rash. Chart reviewed, last prescribed by previous PCP, Dr. Britton 6/21/25. Patient states that this provider has now changed practices and she no longer sees them. Inquiring if GYN provider would be able to send in refill to [id number] - [place] - [address] [place] [address].     Routing to provider for review.    Patient is requesting refill :     blood glucose (ONE TOUCH ULTRA TEST) test strip    [facility] [place]

## 2025-05-07 NOTE — TELEPHONE ENCOUNTER
Call made to patient, advised prescription sent to preferred pharmacy. Patient verbalized understanding.

## 2025-05-13 DIAGNOSIS — K21.9 GASTROESOPHAGEAL REFLUX DISEASE WITHOUT ESOPHAGITIS: Primary | ICD-10-CM

## 2025-05-13 RX ORDER — ONDANSETRON 4 MG/1
4 TABLET, FILM COATED ORAL EVERY 8 HOURS PRN
Qty: 20 TABLET | Refills: 0 | Status: SHIPPED | OUTPATIENT
Start: 2025-05-13

## 2025-05-13 NOTE — TELEPHONE ENCOUNTER
Pt calling in for refill of zofran. It was d/c when she was in the hospital last year. Pt asking for refill.

## 2025-05-20 ENCOUNTER — TELEPHONE (OUTPATIENT)
Age: 55
End: 2025-05-20

## 2025-05-20 NOTE — TELEPHONE ENCOUNTER
Patients GI provider:  MO Weber    Number to return call: [phone]    Reason for call: Pt called in stating her mom is currently in the hospital with C-Diff and wants to know if the provider can place an order to check if patient has c-diff. Please reach out to patient, thank you.    Scheduled procedure/appointment date if applicable: Apt/procedure 7/9/2025

## 2025-05-21 NOTE — TELEPHONE ENCOUNTER
2nd attempt -  Left message on patient voicemail to call back for regarding Urvashi's recommendations

## 2025-05-30 DIAGNOSIS — E55.9 VITAMIN D DEFICIENCY: ICD-10-CM

## 2025-05-31 RX ORDER — ERGOCALCIFEROL 1.25 MG/1
50000 CAPSULE, LIQUID FILLED ORAL WEEKLY
Qty: 12 CAPSULE | Refills: 0 | Status: SHIPPED | OUTPATIENT
Start: 2025-05-31

## 2025-06-05 DIAGNOSIS — B37.49 CANDIDIASIS OF PERINEUM: ICD-10-CM

## 2025-06-05 DIAGNOSIS — Z98.84 HISTORY OF GASTRIC BYPASS: ICD-10-CM

## 2025-06-05 DIAGNOSIS — K91.2 HYPOGLYCEMIA AFTER GI (GASTROINTESTINAL) SURGERY: ICD-10-CM

## 2025-06-05 DIAGNOSIS — E16.2 HYPOGLYCEMIA: ICD-10-CM

## 2025-06-05 RX ORDER — NYSTATIN 100000 [USP'U]/G
POWDER TOPICAL
Qty: 60 G | Refills: 1 | Status: SHIPPED | OUTPATIENT
Start: 2025-06-05

## 2025-06-05 RX ORDER — BLOOD SUGAR DIAGNOSTIC
1 STRIP MISCELLANEOUS
Qty: 100 EACH | Refills: 3 | Status: SHIPPED | OUTPATIENT
Start: 2025-06-05

## 2025-06-05 RX ORDER — ACYCLOVIR 400 MG/1
1 TABLET ORAL
Qty: 9 EACH | Refills: 1 | Status: SHIPPED | OUTPATIENT
Start: 2025-06-05 | End: 2026-03-02

## 2025-06-05 NOTE — TELEPHONE ENCOUNTER
Patient switching from [facility] to [facility]      Medication refill    Ozempic 1 mg   Dexcom G7 sensors  OneTouch Verio test strips      Please send to [facility]

## 2025-06-09 ENCOUNTER — TELEPHONE (OUTPATIENT)
Dept: ENDOCRINOLOGY | Facility: CLINIC | Age: 55
End: 2025-06-09

## 2025-06-09 NOTE — TELEPHONE ENCOUNTER
PA for OZEMPIC 1MG SUBMITTED to [employer]    via    [x]CMM-KEY: [id number]  []Surescripts-Case ID #   []Availity-Auth ID # NDC #   []Faxed to plan   []Other website   []Phone call Case ID #     [x]PA sent as URGENT    All office notes, labs and other pertaining documents and studies sent. Clinical questions answered. Awaiting determination from insurance company.     Turnaround time for your insurance to make a decision on your Prior Authorization can take 7-21 business days.

## 2025-06-10 ENCOUNTER — TELEPHONE (OUTPATIENT)
Age: 55
End: 2025-06-10

## 2025-06-10 NOTE — TELEPHONE ENCOUNTER
PA for DEXCOM G7 SENSOR SUBMITTED to [employer]  via    [x]CMM-KEY: [id number]  []Surescripts-Case ID #   []Availity-Auth ID # NDC #   []Faxed to plan   []Other website   []Phone call Case ID #     [x]PA sent as URGENT    All office notes, labs and other pertaining documents and studies sent. Clinical questions answered. Awaiting determination from insurance company.     Turnaround time for your insurance to make a decision on your Prior Authorization can take 7-21 business days.

## 2025-06-10 NOTE — TELEPHONE ENCOUNTER
PA for OZEMPIC APPROVED     Date(s) approved 6/9/25-6/9/26      Patient advised by          []MyChart Message  [x]Phone call   []LMOM  []L/M to call office as no active Communication consent on file  []Unable to leave detailed message as VM not approved on Communication consent       Pharmacy advised by    [x]Fax  []Phone call  []Secure Chat    Specialty Pharmacy    []     Approval letter scanned into Media Yes

## 2025-06-10 NOTE — TELEPHONE ENCOUNTER
PA for DEXCOM G7 SENSOR APPROVED     Date(s) approved 6/10/25-6/10/26      Patient advised by          []X BODYhart Message  [x]Phone call   []LMOM  []L/M to call office as no active Communication consent on file  [x]Unable to leave detailed message as VM not approved on Communication consent       Pharmacy advised by    [x]Fax  []Phone call  []Secure Chat    Specialty Pharmacy    []     Approval letter scanned into Media Yes

## 2025-06-10 NOTE — TELEPHONE ENCOUNTER
Spoke with patient earlier today. She states Dexcom needs Prior Authorization. Informing Provider's office PA started. Thank you.

## 2025-06-25 ENCOUNTER — TELEPHONE (OUTPATIENT)
Age: 55
End: 2025-06-25

## 2025-06-25 NOTE — TELEPHONE ENCOUNTER
Patient called to confirm she did cancel her appointment scheduled for today.  Patient states her mom is very ill currently and she will call back at a later time to reschedule.

## 2025-07-09 ENCOUNTER — TELEPHONE (OUTPATIENT)
Age: 55
End: 2025-07-09

## 2025-07-09 NOTE — TELEPHONE ENCOUNTER
Pt calling because she said she missed a call from us this morning. Unable to find documentation of such call. Will send message to office to make sure. Pt verbalized understanding and will still plan on coming to her appointment today as scheduled.

## 2025-07-09 NOTE — TELEPHONE ENCOUNTER
Per note in teams from cecelia Barrera.  The 3:30 being Marylou's appt   Ruth Landin's 3:30  got your message and agreed to drop appt/not needed - appt cancelled     Per patient's chart patient did not need this follow up, patient is shannan to come back In the new year 2026.

## 2025-07-10 ENCOUNTER — TELEPHONE (OUTPATIENT)
Age: 55
End: 2025-07-10

## 2025-07-10 DIAGNOSIS — Z13.31 SCREENING FOR DEPRESSION: Primary | ICD-10-CM

## 2025-07-10 NOTE — TELEPHONE ENCOUNTER
Patient is requesting a referral for py services. I advise her that it would better to come in and speak with he provider about what's going on, but she stated that is having a hard time from loosing her mom and did want to see a bunch of doctors to get to the doctor she needs,

## 2025-07-16 ENCOUNTER — TELEPHONE (OUTPATIENT)
Age: 55
End: 2025-07-16

## 2025-07-16 NOTE — TELEPHONE ENCOUNTER
Called patient. We do not have the patches in the office.  Informed and recommended that she use [employer]

## 2025-07-16 NOTE — TELEPHONE ENCOUNTER
Patient called asking if we have any Dexcom patches that she can have? She said the pharmacy informed her they can't get them at this time because their is an issue with the .

## 2025-07-17 DIAGNOSIS — K91.2 HYPOGLYCEMIA AFTER GI (GASTROINTESTINAL) SURGERY: ICD-10-CM

## 2025-07-17 RX ORDER — SEMAGLUTIDE 1.34 MG/ML
INJECTION, SOLUTION SUBCUTANEOUS
Qty: 9 ML | Refills: 0 | Status: SHIPPED | OUTPATIENT
Start: 2025-07-17

## 2025-07-21 ENCOUNTER — TELEPHONE (OUTPATIENT)
Dept: ENDOCRINOLOGY | Facility: CLINIC | Age: 55
End: 2025-07-21

## 2025-07-21 DIAGNOSIS — K91.2 HYPOGLYCEMIA AFTER GI (GASTROINTESTINAL) SURGERY: Primary | ICD-10-CM

## 2025-07-23 NOTE — TELEPHONE ENCOUNTER
PA for (OneTouch Verio) test strip SUBMITTED to [employer]    via    [x]CMM-KEY: [id number]  [x]PA sent as URGENT    All office notes, labs and other pertaining documents and studies sent. Clinical questions answered. Awaiting determination from insurance company.     Turnaround time for your insurance to make a decision on your Prior Authorization can take 7-21 business days.

## 2025-07-24 RX ORDER — LANCETS
EACH MISCELLANEOUS
Qty: 102 EACH | Refills: 2 | Status: SHIPPED | OUTPATIENT
Start: 2025-07-24

## 2025-07-24 RX ORDER — BLOOD-GLUCOSE METER
EACH MISCELLANEOUS ONCE
Qty: 1 KIT | Refills: 0 | Status: SHIPPED | OUTPATIENT
Start: 2025-07-24 | End: 2025-07-24

## 2025-07-24 RX ORDER — BLOOD SUGAR DIAGNOSTIC
STRIP MISCELLANEOUS
Qty: 200 EACH | Refills: 1 | Status: SHIPPED | OUTPATIENT
Start: 2025-07-24

## 2025-07-24 NOTE — TELEPHONE ENCOUNTER
PA for  (OneTouch Verio) test strip  DENIED    Reason:        Message sent to office clinical pool Yes    Denial letter scanned into Media Yes    We can gladly do an appeal but the process can take about 30-60 days to provide determination. Please have the office staff schedule a Peer to Peer at phone [phone] . If an appeal is truly warranted please have Provider send clinical documentation to the PA department to support the appeal.     **Please follow up with your patient regarding denial and next steps**

## 2025-07-25 ENCOUNTER — TELEPHONE (OUTPATIENT)
Age: 55
End: 2025-07-25

## 2025-07-25 DIAGNOSIS — Z98.84 HISTORY OF GASTRIC BYPASS: ICD-10-CM

## 2025-07-25 RX ORDER — MULTIVITAMIN WITH IRON
500 TABLET ORAL DAILY
Qty: 30 TABLET | Refills: 2 | Status: SHIPPED | OUTPATIENT
Start: 2025-07-25

## 2025-07-25 NOTE — TELEPHONE ENCOUNTER
Contacted patient in regards to ROUTINE Referral. Spoke with patient who confirmed the following:    Services Needed: TT & MM  Location Pref: [place]  Male/Female Pref: N/A  In-person/VV Pref: in-person  Verify Insurance Info: [employer]

## 2025-07-25 NOTE — TELEPHONE ENCOUNTER
Medication: vitamin B-12 (VITAMIN B-12)     Dose/Frequency: 500 MCG     Quantity: 30 tablets     Pharmacy: [facility] - [address] [place], [place] [address]      Office:   [x] PCP/Provider -   [] Speciality/Provider -     Does the patient have enough for 3 days?   [] Yes   [x] No - Send as HP to POD

## 2025-08-18 DIAGNOSIS — K91.2 HYPOGLYCEMIA AFTER GI (GASTROINTESTINAL) SURGERY: ICD-10-CM

## 2025-08-19 RX ORDER — SEMAGLUTIDE 1.34 MG/ML
INJECTION, SOLUTION SUBCUTANEOUS
Qty: 9 ML | Refills: 0 | Status: SHIPPED | OUTPATIENT
Start: 2025-08-19

## (undated) DEVICE — SYRINGE 20ML LL

## (undated) DEVICE — TROCAR: Brand: KII FIOS FIRST ENTRY

## (undated) DEVICE — ENDOPATH PNEUMONEEDLE INSUFFLATION NEEDLES WITH LUER LOCK CONNECTORS 120MM: Brand: ENDOPATH

## (undated) DEVICE — SUT MONOCRYL 4-0 PS-2 27 IN Y426H

## (undated) DEVICE — VISUALIZATION SYSTEM: Brand: CLEARIFY

## (undated) DEVICE — GLOVE SRG BIOGEL ECLIPSE 7.5

## (undated) DEVICE — SCD SEQUENTIAL COMPRESSION COMFORT SLEEVE MEDIUM KNEE LENGTH: Brand: KENDALL SCD

## (undated) DEVICE — ADHESIVE SKIN HIGH VISCOSITY EXOFIN 1ML

## (undated) DEVICE — TUBING SUCTION 5MM X 12 FT

## (undated) DEVICE — TROCAR: Brand: KII® SLEEVE

## (undated) DEVICE — INTENDED FOR TISSUE SEPARATION, AND OTHER PROCEDURES THAT REQUIRE A SHARP SURGICAL BLADE TO PUNCTURE OR CUT.: Brand: BARD-PARKER SAFETY BLADES SIZE 11, STERILE

## (undated) DEVICE — [HIGH FLOW INSUFFLATOR,  DO NOT USE IF PACKAGE IS DAMAGED,  KEEP DRY,  KEEP AWAY FROM SUNLIGHT,  PROTECT FROM HEAT AND RADIOACTIVE SOURCES.]: Brand: PNEUMOSURE

## (undated) DEVICE — TUBING SMOKE EVAC W/FILTRATION DEVICE PLUMEPORT ACTIV

## (undated) DEVICE — DRAPE EQUIPMENT RF WAND

## (undated) DEVICE — NEEDLE SPINAL18G X 3.5 IN QUINCKE

## (undated) DEVICE — VIOLET BRAIDED (POLYGLACTIN 910), SYNTHETIC ABSORBABLE SUTURE: Brand: COATED VICRYL

## (undated) DEVICE — PENCIL ELECTROSURG E-Z CLEAN -0035H

## (undated) DEVICE — CHLORAPREP HI-LITE 26ML ORANGE

## (undated) DEVICE — HARMONIC ACE +7 LAPAROSCOPIC SHEARS ADVANCED HEMOSTASIS 5MM DIAMETER 36CM SHAFT LENGTH  FOR USE WITH GRAY HAND PIECE ONLY: Brand: HARMONIC ACE

## (undated) DEVICE — Device: Brand: DEFENDO AIR/WATER/SUCTION AND BIOPSY VALVE

## (undated) DEVICE — DISPOSABLE OR TOWEL: Brand: CARDINAL HEALTH

## (undated) DEVICE — SUT ETHIBOND 3-0 SH 36 IN X522H

## (undated) DEVICE — WEBRIL 6 IN UNSTERILE

## (undated) DEVICE — SURGICAL GOWN, XL SMARTSLEEVE: Brand: CONVERTORS

## (undated) DEVICE — SYRINGE 30ML LL

## (undated) DEVICE — GLOVE SRG BIOGEL ECLIPSE 8

## (undated) DEVICE — PACK PBDS LAP CHOLE RF

## (undated) DEVICE — PMI DISPOSABLE PUNCTURE CLOSURE DEVICE / SUTURE GRASPER: Brand: PMI

## (undated) DEVICE — Device

## (undated) DEVICE — URETERAL CATHETER ADAPTOR TIP

## (undated) DEVICE — LAPAROSCOPIC TROCAR SLEEVE/SINGLE USE: Brand: KII® SLEEVE